# Patient Record
Sex: FEMALE | Race: WHITE | Employment: UNEMPLOYED | URBAN - METROPOLITAN AREA
[De-identification: names, ages, dates, MRNs, and addresses within clinical notes are randomized per-mention and may not be internally consistent; named-entity substitution may affect disease eponyms.]

---

## 2017-04-06 ENCOUNTER — HOSPITAL ENCOUNTER (OUTPATIENT)
Dept: GENERAL RADIOLOGY | Age: 57
Discharge: HOME | End: 2017-04-06
Attending: INTERNAL MEDICINE
Payer: MEDICAID

## 2017-04-06 ENCOUNTER — OFFICE VISIT (OUTPATIENT)
Dept: INTERNAL MEDICINE CLINIC | Facility: CLINIC | Age: 57
End: 2017-04-06

## 2017-04-06 VITALS
WEIGHT: 135 LBS | HEIGHT: 62 IN | HEART RATE: 74 BPM | OXYGEN SATURATION: 96 % | DIASTOLIC BLOOD PRESSURE: 112 MMHG | SYSTOLIC BLOOD PRESSURE: 150 MMHG | RESPIRATION RATE: 18 BRPM | TEMPERATURE: 98 F | BODY MASS INDEX: 24.84 KG/M2

## 2017-04-06 DIAGNOSIS — M54.12 CHRONIC CERVICAL RADICULOPATHY: ICD-10-CM

## 2017-04-06 DIAGNOSIS — M54.41 CHRONIC BILATERAL LOW BACK PAIN WITH RIGHT-SIDED SCIATICA: Primary | ICD-10-CM

## 2017-04-06 DIAGNOSIS — G89.29 CHRONIC BILATERAL LOW BACK PAIN WITH RIGHT-SIDED SCIATICA: ICD-10-CM

## 2017-04-06 DIAGNOSIS — G89.29 CHRONIC BILATERAL LOW BACK PAIN WITH RIGHT-SIDED SCIATICA: Primary | ICD-10-CM

## 2017-04-06 DIAGNOSIS — M54.41 CHRONIC BILATERAL LOW BACK PAIN WITH RIGHT-SIDED SCIATICA: ICD-10-CM

## 2017-04-06 DIAGNOSIS — I10 ESSENTIAL HYPERTENSION: ICD-10-CM

## 2017-04-06 DIAGNOSIS — Z12.31 ENCOUNTER FOR SCREENING MAMMOGRAM FOR BREAST CANCER: ICD-10-CM

## 2017-04-06 DIAGNOSIS — G93.0 CYST OF BRAIN: ICD-10-CM

## 2017-04-06 DIAGNOSIS — Z00.00 PREVENTATIVE HEALTH CARE: ICD-10-CM

## 2017-04-06 PROCEDURE — 99204 OFFICE O/P NEW MOD 45 MIN: CPT | Performed by: INTERNAL MEDICINE

## 2017-04-06 PROCEDURE — 72110 X-RAY EXAM L-2 SPINE 4/>VWS: CPT

## 2017-04-06 PROCEDURE — 72050 X-RAY EXAM NECK SPINE 4/5VWS: CPT

## 2017-04-06 RX ORDER — HYDROCHLOROTHIAZIDE 12.5 MG/1
12.5 CAPSULE, GELATIN COATED ORAL DAILY
COMMUNITY
End: 2017-11-01

## 2017-04-06 RX ORDER — AMLODIPINE BESYLATE 10 MG/1
10 TABLET ORAL DAILY
Qty: 30 TABLET | Refills: 3 | Status: SHIPPED | OUTPATIENT
Start: 2017-04-06 | End: 2017-11-01

## 2017-04-06 RX ORDER — LISINOPRIL 40 MG/1
40 TABLET ORAL DAILY
Qty: 30 TABLET | Refills: 3 | Status: SHIPPED | OUTPATIENT
Start: 2017-04-06 | End: 2017-11-01

## 2017-04-06 RX ORDER — AMLODIPINE BESYLATE 2.5 MG/1
2.5 TABLET ORAL DAILY
COMMUNITY
End: 2017-04-06

## 2017-04-06 RX ORDER — PROPRANOLOL HYDROCHLORIDE 10 MG/1
10 TABLET ORAL 3 TIMES DAILY
COMMUNITY
End: 2017-04-06 | Stop reason: ALTCHOICE

## 2017-04-06 RX ORDER — BISOPROLOL FUMARATE 5 MG/1
5 TABLET ORAL DAILY
COMMUNITY
End: 2017-04-06

## 2017-04-06 RX ORDER — BISOPROLOL FUMARATE 5 MG/1
5 TABLET ORAL DAILY
Qty: 30 TABLET | Refills: 3 | Status: SHIPPED | OUTPATIENT
Start: 2017-04-06 | End: 2018-01-15

## 2017-04-06 RX ORDER — LISINOPRIL 40 MG/1
40 TABLET ORAL DAILY
COMMUNITY
End: 2017-04-06

## 2017-04-06 NOTE — PROGRESS NOTES
Solomon Estevez is a 55year old female.      HPI:   Patient presents with:  Low Back Pain: +5years, laying down severe pain, feels pressure from wait down  Other: right side laquita pain, right hip pain radiates to right leg, +6 months   Patient is a new carola lisinopril 40 MG Oral Tab, Take 1 tablet (40 mg total) by mouth daily. , Disp: 30 tablet, Rfl: 3  •  Bisoprolol Fumarate 5 MG Oral Tab, Take 1 tablet (5 mg total) by mouth daily. , Disp: 30 tablet, Rfl: 3  •  AmLODIPine Besylate 10 MG Oral Tab, Take 1 tablet symptoms. 3.  Cyst of brain  Patient states she had CT scan done four years ago after experiencing a severe migraine, was told she had a benign cyst.  Records requested from [facility].  Also referred to Neurosurgery as above.       4

## 2017-04-06 NOTE — PATIENT INSTRUCTIONS
- We will check x-rays today  - Follow up with Neurosurgery for your back/hip pain  - Get blood work done when fasting (at least 8 hours)  - Schedule mammogram when you have time  - Schedule appointment with Ob-Gyn for pap smear.     Follow up in 3-4 weeks

## 2017-04-27 ENCOUNTER — LAB ENCOUNTER (OUTPATIENT)
Dept: LAB | Age: 57
End: 2017-04-27
Attending: INTERNAL MEDICINE
Payer: MEDICAID

## 2017-04-27 ENCOUNTER — HOSPITAL ENCOUNTER (OUTPATIENT)
Dept: MAMMOGRAPHY | Age: 57
Discharge: HOME | End: 2017-04-27
Attending: INTERNAL MEDICINE
Payer: MEDICAID

## 2017-04-27 DIAGNOSIS — Z00.00 PREVENTATIVE HEALTH CARE: ICD-10-CM

## 2017-04-27 DIAGNOSIS — Z12.31 ENCOUNTER FOR SCREENING MAMMOGRAM FOR BREAST CANCER: ICD-10-CM

## 2017-04-27 PROCEDURE — 80061 LIPID PANEL: CPT

## 2017-04-27 PROCEDURE — 84443 ASSAY THYROID STIM HORMONE: CPT

## 2017-04-27 PROCEDURE — 80048 BASIC METABOLIC PNL TOTAL CA: CPT

## 2017-04-27 PROCEDURE — 36415 COLL VENOUS BLD VENIPUNCTURE: CPT

## 2017-04-27 PROCEDURE — 83036 HEMOGLOBIN GLYCOSYLATED A1C: CPT

## 2017-04-27 PROCEDURE — 77067 SCR MAMMO BI INCL CAD: CPT

## 2017-04-27 PROCEDURE — 85025 COMPLETE CBC W/AUTO DIFF WBC: CPT

## 2017-04-28 PROBLEM — E78.1 HYPERTRIGLYCERIDEMIA: Status: ACTIVE | Noted: 2017-04-28

## 2017-04-28 PROBLEM — R73.03 PREDIABETES: Status: ACTIVE | Noted: 2017-04-28

## 2017-04-29 ENCOUNTER — HOSPITAL ENCOUNTER (OUTPATIENT)
Age: 57
Discharge: HOME | End: 2017-04-29
Payer: MEDICAID

## 2017-04-29 VITALS
RESPIRATION RATE: 20 BRPM | TEMPERATURE: 98 F | OXYGEN SATURATION: 97 % | HEART RATE: 92 BPM | WEIGHT: 134 LBS | SYSTOLIC BLOOD PRESSURE: 181 MMHG | DIASTOLIC BLOOD PRESSURE: 85 MMHG | BODY MASS INDEX: 25 KG/M2

## 2017-04-29 DIAGNOSIS — H10.33 ACUTE CONJUNCTIVITIS OF BOTH EYES, UNSPECIFIED ACUTE CONJUNCTIVITIS TYPE: Primary | ICD-10-CM

## 2017-04-29 PROCEDURE — 99203 OFFICE O/P NEW LOW 30 MIN: CPT

## 2017-04-29 PROCEDURE — 99213 OFFICE O/P EST LOW 20 MIN: CPT

## 2017-04-29 RX ORDER — OFLOXACIN 3 MG/ML
1 SOLUTION/ DROPS OPHTHALMIC EVERY 4 HOURS
Qty: 1 BOTTLE | Refills: 0 | Status: SHIPPED | OUTPATIENT
Start: 2017-04-29 | End: 2017-05-06

## 2017-04-29 NOTE — ED INITIAL ASSESSMENT (HPI)
Pt started with pain and redness to eyes yesterday. (started with right eye, now in both eyes).   Vision problems in both eyes

## 2017-04-29 NOTE — ED PROVIDER NOTES
Patient Seen in: THE [facility] In [place] [facility]    History   Patient presents with:   Eye Visual Problem (opthalmic)    Stated Complaint: RED EYE IRRITATION    HPI    52-year-old female presents to the immediate care for evaluation of bilateral eye Device 04/29/17 1637 None (Room air)       Current:/85 mmHg  Pulse 92  Temp(Src) 98.2 °F (36.8 °C) (Temporal)  Resp 20  Wt 60.782 kg  SpO2 97%    Right Eye Chart Acuity: 20/20 (-1 letter'), Uncorrected    Left Eye Chart Acuity: 20/25, Uncorrected

## 2017-08-10 ENCOUNTER — OCC HEALTH (OUTPATIENT)
Dept: OCCUPATIONAL MEDICINE | Age: 57
End: 2017-08-10
Attending: PHYSICIAN ASSISTANT

## 2017-10-28 ENCOUNTER — HOSPITAL ENCOUNTER (INPATIENT)
Facility: HOSPITAL | Age: 57
LOS: 2 days | Discharge: HOME | DRG: 392 | End: 2017-10-30
Attending: EMERGENCY MEDICINE | Admitting: HOSPITALIST
Payer: MEDICAID

## 2017-10-28 ENCOUNTER — APPOINTMENT (OUTPATIENT)
Dept: CT IMAGING | Facility: HOSPITAL | Age: 57
DRG: 392 | End: 2017-10-28
Attending: EMERGENCY MEDICINE
Payer: MEDICAID

## 2017-10-28 DIAGNOSIS — K57.92 ACUTE DIVERTICULITIS: Primary | ICD-10-CM

## 2017-10-28 PROBLEM — R73.9 HYPERGLYCEMIA: Status: ACTIVE | Noted: 2017-10-28

## 2017-10-28 PROBLEM — D72.829 LEUKOCYTOSIS: Status: ACTIVE | Noted: 2017-10-28

## 2017-10-28 PROCEDURE — 74176 CT ABD & PELVIS W/O CONTRAST: CPT | Performed by: EMERGENCY MEDICINE

## 2017-10-28 PROCEDURE — 99223 1ST HOSP IP/OBS HIGH 75: CPT | Performed by: HOSPITALIST

## 2017-10-28 RX ORDER — AMLODIPINE BESYLATE 5 MG/1
10 TABLET ORAL DAILY
Status: DISCONTINUED | OUTPATIENT
Start: 2017-10-28 | End: 2017-10-30

## 2017-10-28 RX ORDER — LEVOFLOXACIN 5 MG/ML
750 INJECTION, SOLUTION INTRAVENOUS EVERY 24 HOURS
Status: DISCONTINUED | OUTPATIENT
Start: 2017-10-29 | End: 2017-10-30

## 2017-10-28 RX ORDER — SODIUM PHOSPHATE, DIBASIC AND SODIUM PHOSPHATE, MONOBASIC 7; 19 G/133ML; G/133ML
1 ENEMA RECTAL ONCE AS NEEDED
Status: DISCONTINUED | OUTPATIENT
Start: 2017-10-28 | End: 2017-10-30

## 2017-10-28 RX ORDER — NICOTINE 21 MG/24HR
1 PATCH, TRANSDERMAL 24 HOURS TRANSDERMAL ONCE
Status: COMPLETED | OUTPATIENT
Start: 2017-10-28 | End: 2017-10-29

## 2017-10-28 RX ORDER — METOPROLOL TARTRATE 50 MG/1
50 TABLET, FILM COATED ORAL
Status: DISCONTINUED | OUTPATIENT
Start: 2017-10-28 | End: 2017-10-30

## 2017-10-28 RX ORDER — METRONIDAZOLE 500 MG/100ML
500 INJECTION, SOLUTION INTRAVENOUS EVERY 8 HOURS
Status: DISCONTINUED | OUTPATIENT
Start: 2017-10-29 | End: 2017-10-30

## 2017-10-28 RX ORDER — LISINOPRIL 40 MG/1
40 TABLET ORAL DAILY
Status: DISCONTINUED | OUTPATIENT
Start: 2017-10-28 | End: 2017-10-30

## 2017-10-28 RX ORDER — SODIUM CHLORIDE 9 MG/ML
INJECTION, SOLUTION INTRAVENOUS CONTINUOUS
Status: CANCELLED | OUTPATIENT
Start: 2017-10-28 | End: 2017-10-28

## 2017-10-28 RX ORDER — HYDROMORPHONE HYDROCHLORIDE 1 MG/ML
0.2 INJECTION, SOLUTION INTRAMUSCULAR; INTRAVENOUS; SUBCUTANEOUS EVERY 2 HOUR PRN
Status: DISCONTINUED | OUTPATIENT
Start: 2017-10-28 | End: 2017-10-30

## 2017-10-28 RX ORDER — HYDROMORPHONE HYDROCHLORIDE 1 MG/ML
1 INJECTION, SOLUTION INTRAMUSCULAR; INTRAVENOUS; SUBCUTANEOUS ONCE
Status: COMPLETED | OUTPATIENT
Start: 2017-10-28 | End: 2017-10-28

## 2017-10-28 RX ORDER — ONDANSETRON 2 MG/ML
4 INJECTION INTRAMUSCULAR; INTRAVENOUS EVERY 4 HOURS PRN
Status: CANCELLED | OUTPATIENT
Start: 2017-10-28

## 2017-10-28 RX ORDER — HYDROMORPHONE HYDROCHLORIDE 1 MG/ML
0.8 INJECTION, SOLUTION INTRAMUSCULAR; INTRAVENOUS; SUBCUTANEOUS EVERY 2 HOUR PRN
Status: DISCONTINUED | OUTPATIENT
Start: 2017-10-28 | End: 2017-10-30

## 2017-10-28 RX ORDER — LEVOFLOXACIN 5 MG/ML
750 INJECTION, SOLUTION INTRAVENOUS ONCE
Status: DISCONTINUED | OUTPATIENT
Start: 2017-10-28 | End: 2017-10-28

## 2017-10-28 RX ORDER — METRONIDAZOLE 500 MG/100ML
500 INJECTION, SOLUTION INTRAVENOUS ONCE
Status: COMPLETED | OUTPATIENT
Start: 2017-10-28 | End: 2017-10-28

## 2017-10-28 RX ORDER — BISACODYL 10 MG
10 SUPPOSITORY, RECTAL RECTAL
Status: DISCONTINUED | OUTPATIENT
Start: 2017-10-28 | End: 2017-10-30

## 2017-10-28 RX ORDER — ONDANSETRON 2 MG/ML
4 INJECTION INTRAMUSCULAR; INTRAVENOUS EVERY 6 HOURS PRN
Status: DISCONTINUED | OUTPATIENT
Start: 2017-10-28 | End: 2017-10-30

## 2017-10-28 RX ORDER — POLYETHYLENE GLYCOL 3350 17 G/17G
17 POWDER, FOR SOLUTION ORAL DAILY PRN
Status: DISCONTINUED | OUTPATIENT
Start: 2017-10-28 | End: 2017-10-30

## 2017-10-28 RX ORDER — DEXTROSE AND SODIUM CHLORIDE 5; .9 G/100ML; G/100ML
INJECTION, SOLUTION INTRAVENOUS CONTINUOUS
Status: DISCONTINUED | OUTPATIENT
Start: 2017-10-28 | End: 2017-10-30

## 2017-10-28 RX ORDER — HYDROMORPHONE HYDROCHLORIDE 1 MG/ML
0.5 INJECTION, SOLUTION INTRAMUSCULAR; INTRAVENOUS; SUBCUTANEOUS EVERY 30 MIN PRN
Status: CANCELLED | OUTPATIENT
Start: 2017-10-28 | End: 2017-10-28

## 2017-10-28 RX ORDER — ACETAMINOPHEN 325 MG/1
650 TABLET ORAL EVERY 6 HOURS PRN
Status: DISCONTINUED | OUTPATIENT
Start: 2017-10-28 | End: 2017-10-30

## 2017-10-28 RX ORDER — ONDANSETRON 2 MG/ML
4 INJECTION INTRAMUSCULAR; INTRAVENOUS ONCE
Status: COMPLETED | OUTPATIENT
Start: 2017-10-28 | End: 2017-10-28

## 2017-10-28 RX ORDER — DOCUSATE SODIUM 100 MG/1
100 CAPSULE, LIQUID FILLED ORAL 2 TIMES DAILY
Status: DISCONTINUED | OUTPATIENT
Start: 2017-10-28 | End: 2017-10-30

## 2017-10-28 RX ORDER — HYDROMORPHONE HYDROCHLORIDE 1 MG/ML
0.4 INJECTION, SOLUTION INTRAMUSCULAR; INTRAVENOUS; SUBCUTANEOUS EVERY 2 HOUR PRN
Status: DISCONTINUED | OUTPATIENT
Start: 2017-10-28 | End: 2017-10-30

## 2017-10-28 NOTE — ED PROVIDER NOTES
Patient Seen in: [facility] Emergency Department    History   Patient presents with:  Abdomen/Flank Pain (GI/)    Stated Complaint: abd pain    HPI    60-year-old female complaining of abdominal pain.   This patient states started about 2 days ago Physical Exam    Patient's alert orient ×3 no acute distress HEENT exam within normal limits neck is no lymphadenopathy JVD lungs are clear cardiovascular exam shows regular rate and rhythm without murmurs abdomen is soft moderate tenderness somewh admitted. Disposition and Plan     Clinical Impression:  Acute diverticulitis  (primary encounter diagnosis)    Disposition:  Admit    Follow-up:  No follow-up provider specified.     Medications Prescribed:  Current Discharge Medication List        Pr

## 2017-10-29 PROCEDURE — 99232 SBSQ HOSP IP/OBS MODERATE 35: CPT | Performed by: HOSPITALIST

## 2017-10-29 RX ORDER — NICOTINE 21 MG/24HR
1 PATCH, TRANSDERMAL 24 HOURS TRANSDERMAL DAILY
Status: DISCONTINUED | OUTPATIENT
Start: 2017-10-29 | End: 2017-10-30

## 2017-10-29 RX ORDER — ENOXAPARIN SODIUM 100 MG/ML
40 INJECTION SUBCUTANEOUS DAILY
Status: DISCONTINUED | OUTPATIENT
Start: 2017-10-29 | End: 2017-10-30

## 2017-10-29 NOTE — ED NOTES
Report given to floor RN Maddi, patient and  updated on room assignment, room ready per floor RN, patient awaiting transportation.

## 2017-10-29 NOTE — PROGRESS NOTES
[facility] Note:  Dose Adjustment for Levaquin (levofloxacin)    Anitra Bosworth is a 55year old female who has been prescribed Levaquin (levofloxacin) 500 mg every one time.   Last known weight was 60.8kg from 4/29/17 so the dose has been adjusted to [facility]

## 2017-10-29 NOTE — PROGRESS NOTES
[facility]  Progress Note     Jes Plascencia Patient Status:  Inpatient    1960 MRN [id number]   [facility] 3NW-A Attending Melissa Colbert, [address] [facility] Day # 1 PCP Rafaela Beckham MD     Chief Complaint: Abdominal pain    S: Kandis Daily   • docusate sodium  100 mg Oral BID   • levofloxacin  750 mg Intravenous Q24H   • metRONIDAZOLE  500 mg Intravenous Q8H       ASSESSMENT / PLAN:     1. Acute diverticulitis  1. Continue NPO, IVF, anti-emetics, and pain control  2.  Continue empiric a

## 2017-10-29 NOTE — H&P
[facility]  History and Physical     Rasta Tamara Patient Status:  Emergency    1960 MRN [id number]   Location [address] [facility] Attending Raymundo Dobson MD   Hosp Day # 0 PCP Bibiana Angulo MD     Chief Complaint: positives and negatives noted in the HPI. Physical Exam:    /78   Pulse 87   Temp 98.3 °F (36.8 °C)   Resp (!) 2   SpO2 93%   General: No acute distress. Alert and oriented x 3. HEENT: Normocephalic atraumatic. Moist mucous membranes. EOM-I.  PERR Lovenox  · CODE status: Full Code  · Lombardo: None    Plan of care discussed with Patient.     Leatha Reese MD  10/28/2017

## 2017-10-30 VITALS
WEIGHT: 135 LBS | RESPIRATION RATE: 16 BRPM | SYSTOLIC BLOOD PRESSURE: 132 MMHG | TEMPERATURE: 98 F | BODY MASS INDEX: 23.92 KG/M2 | OXYGEN SATURATION: 96 % | HEART RATE: 74 BPM | HEIGHT: 63 IN | DIASTOLIC BLOOD PRESSURE: 66 MMHG

## 2017-10-30 PROCEDURE — 99239 HOSP IP/OBS DSCHRG MGMT >30: CPT | Performed by: HOSPITALIST

## 2017-10-30 RX ORDER — HYDROCODONE BITARTRATE AND ACETAMINOPHEN 5; 325 MG/1; MG/1
1 TABLET ORAL EVERY 4 HOURS PRN
Qty: 15 TABLET | Refills: 0 | Status: SHIPPED | OUTPATIENT
Start: 2017-10-30 | End: 2018-01-15

## 2017-10-30 RX ORDER — METRONIDAZOLE 500 MG/1
500 TABLET ORAL 3 TIMES DAILY
Qty: 24 TABLET | Refills: 0 | Status: SHIPPED | OUTPATIENT
Start: 2017-10-30 | End: 2017-11-07

## 2017-10-30 RX ORDER — LEVOFLOXACIN 500 MG/1
500 TABLET, FILM COATED ORAL DAILY
Qty: 8 TABLET | Refills: 0 | Status: SHIPPED | OUTPATIENT
Start: 2017-10-30 | End: 2017-11-07

## 2017-10-30 NOTE — CM/SW NOTE
10/30/17 1100   CM/SW Screening   Referral Source Social Work (self-referral)   Information Source [facility] staff;Nursing rounds; Chart review   Patient's Mental Status Alert;Oriented       Patient's post d/c needs discussed in care round with RN, Charge Rn,

## 2017-10-30 NOTE — PLAN OF CARE
Patient tolerating soft diet,  brought patient lunch. Denies any nausea, abdominal pain. Will discharge as ordered.

## 2017-10-30 NOTE — PLAN OF CARE
Patient sitting in chair. VSS. Had formed BM this am. Pain mild, states feels better. Passing flatus. Would like to have diet advanced. Denies chest/calf pain. POC discussed, all questions and concerns addressed. Will continue to monitor.

## 2017-10-30 NOTE — PROGRESS NOTES
[facility]  Progress Note     Pervis Jamaal Patient Status:  Inpatient    1960 MRN [id number]   [facility] 3NW-A Attending Chely Willams, [address] [facility] Day # 2 PCP Elder MD Dayana     Chief Complaint: Abdominal pain    S: Kandis docusate sodium  100 mg Oral BID   • levofloxacin  750 mg Intravenous Q24H   • metRONIDAZOLE  500 mg Intravenous Q8H       ASSESSMENT / PLAN:     1. Acute diverticulitis  1. Clinically improving   2. Continue empiric antibiotics  3.  Advance to [facility] and then

## 2017-10-30 NOTE — PLAN OF CARE
Patients 2 IV's d/c'd, catheters intact. All discharge instructions explained, all questions answered. Patient will be discharged via wheelchair with support staff.

## 2017-10-30 NOTE — PROGRESS NOTES
[facility] [address]  [place], [address]  ?  10/30/17  ? Re: Jes Plascencia  ? To Whom It May Concern:    Jes Plascencia was admitted to [facility] from 10/28/2017 to 10/30/17.     Please excuse Jes Plascencia from attending

## 2017-10-30 NOTE — DISCHARGE SUMMARY
[facility]  DISCHARGE SUMMARY     Elizabeth Crow Patient Status:  Inpatient    1960 MRN [id number]   [facility] 3NW-A Attending No att. providers found   [facility] Day # 2 PCP Rosalie Mccord MD     Date of Admission: 10/28/2017  D START taking these medications      Instructions Prescription details   HYDROcodone-acetaminophen 5-325 MG Tabs  Commonly known as:  Francia Kumari  Notes to patient:  Do not take other products that contain tylenol. Do not exceed dosage amount.        Take 1 t spent:  > 30 minutes

## 2017-10-30 NOTE — PAYOR COMM NOTE
--------------  ADMISSION REVIEW     Payor: Elvin Rivera #:  [id number]  Authorization Number: N/A    Admit date: 10/28/17  Admit time: 2111       Admitting Physician: Lindsey Lu MD  Attending Physician:  Rocio Tripathi in HPI.     Physical Exam[RH.1]   ED Triage Vitals  BP: (!) 174/100 [10/28/17 1836]  Pulse: 113 [10/28/17 1836]  Resp: 18 [10/28/17 1836]  Temp: 98.3 °F (36.8 °C) [10/28/17 1838]  Temp src: n/a  SpO2: 93 % [10/28/17 1836]  O2 Device: None (Room air) [10/28/ K57.92 10/28/2017 Unknown    Hyperglycemia R73.9 10/28/2017 Yes    Leukocytosis D72.829 10/28/2017 Yes[RH.2]        Bryce Medina MD on 10/28/2017  9:23 PM    H&P signed by Clif Mcmahon MD at 10/28/2017  8:04 PM     Author:  Clif Mcmahon MD Service: distress. Alert and oriented x 3. HEENT: Normocephalic atraumatic. Moist mucous membranes. EOM-I. PERRLA. Anicteric. Neck: No lymphadenopathy. No JVD. No carotid bruits. Respiratory: Clear to auscultation bilaterally. No wheezes. No rhonchi.   Cardiovasc mg     Date Action Dose Route User    10/30/2017 0815 Given 10 mg Oral Degroote, Carmela Wilson RN      dextrose 5 % and 0.9 % NaCl infusion    100cc/h    Date Action Dose Route User    10/29/2017 2110 New Bag (none) Intravenous Ramonita West, RN      docus

## 2017-10-31 ENCOUNTER — PATIENT OUTREACH (OUTPATIENT)
Dept: CASE MANAGEMENT | Age: 57
End: 2017-10-31

## 2017-10-31 ENCOUNTER — PATIENT MESSAGE (OUTPATIENT)
Dept: CASE MANAGEMENT | Age: 57
End: 2017-10-31

## 2017-11-01 ENCOUNTER — OFFICE VISIT (OUTPATIENT)
Dept: INTERNAL MEDICINE CLINIC | Facility: CLINIC | Age: 57
End: 2017-11-01

## 2017-11-01 VITALS
HEART RATE: 78 BPM | HEIGHT: 62 IN | DIASTOLIC BLOOD PRESSURE: 72 MMHG | TEMPERATURE: 98 F | BODY MASS INDEX: 25.58 KG/M2 | RESPIRATION RATE: 18 BRPM | WEIGHT: 139 LBS | SYSTOLIC BLOOD PRESSURE: 109 MMHG

## 2017-11-01 DIAGNOSIS — K57.32 DIVERTICULITIS OF LARGE INTESTINE WITHOUT PERFORATION OR ABSCESS WITHOUT BLEEDING: Primary | ICD-10-CM

## 2017-11-01 DIAGNOSIS — E78.1 HYPERTRIGLYCERIDEMIA: ICD-10-CM

## 2017-11-01 DIAGNOSIS — I10 ESSENTIAL HYPERTENSION: ICD-10-CM

## 2017-11-01 DIAGNOSIS — R73.03 PREDIABETES: ICD-10-CM

## 2017-11-01 PROBLEM — D72.829 LEUKOCYTOSIS: Status: RESOLVED | Noted: 2017-10-28 | Resolved: 2017-11-01

## 2017-11-01 PROCEDURE — 99214 OFFICE O/P EST MOD 30 MIN: CPT | Performed by: INTERNAL MEDICINE

## 2017-11-01 RX ORDER — LISINOPRIL 40 MG/1
40 TABLET ORAL DAILY
Qty: 90 TABLET | Refills: 1 | Status: SHIPPED | OUTPATIENT
Start: 2017-11-01 | End: 2018-03-02

## 2017-11-01 RX ORDER — AMLODIPINE BESYLATE 10 MG/1
10 TABLET ORAL DAILY
Qty: 90 TABLET | Refills: 1 | Status: SHIPPED | OUTPATIENT
Start: 2017-11-01 | End: 2018-01-15

## 2017-11-01 RX ORDER — HYDROCHLOROTHIAZIDE 12.5 MG/1
12.5 CAPSULE, GELATIN COATED ORAL DAILY
Qty: 90 CAPSULE | Refills: 1 | Status: SHIPPED | OUTPATIENT
Start: 2017-11-01 | End: 2018-08-23

## 2017-11-01 NOTE — PROGRESS NOTES
Multiple attempts to reach pt and messages left with no return call. Pt went in for HFU appt with PCP today. Encounter closing.

## 2017-11-01 NOTE — PATIENT INSTRUCTIONS
- Follow up with General Surgery to set up colonoscopy  - [id number] Caitlin Mckeon to return to work next week. Discharge Instructions for Diverticulitis  You have been diagnosed with diverticulitis.  This is a condition in which small pouches form in your colon (large intestin Call your healthcare provider immediately if you have any of the following:  · Fever of 100.4°F (38.0°C) or higher, or as directed by your healthcare provider  · Chills  · Severe cramps in the belly, most commonly the lower left side  · Tenderness in the b

## 2017-11-01 NOTE — PROGRESS NOTES
HPI:    Micaela Saxena is a 55year old female here today for hospital follow up.    She was discharged from Inpatient hospital, [facility] to Home   Admit Date: 10/28/17  Discharge Date: 10/30/17  Hospital Discharge Diagnosis: Acute diverticulitis  I mouth daily. metRONIDAZOLE 500 MG Oral Tab Take 1 tablet (500 mg total) by mouth 3 (three) times daily. HYDROcodone-acetaminophen 5-325 MG Oral Tab Take 1 tablet by mouth every 4 (four) hours as needed for Pain.    Bisoprolol Fumarate 5 MG Oral Tab Take clear  NECK: supple, no adenopathy, no bruits  LUNGS: clear to auscultation  CARDIO: RRR without murmur  GI: good BS's, no masses, HSM.   Tender to palpation LUQ  MUSCULOSKELETAL: back is not tender, FROM of the extremities  NEURO: Oriented times three, cra moderate    Overall Risk:   moderate    Patient seen within 14 days from date of discharge.      Jonathan Diane MD, 11/1/2017

## 2017-11-14 ENCOUNTER — OFFICE VISIT (OUTPATIENT)
Dept: SURGERY | Facility: CLINIC | Age: 57
End: 2017-11-14

## 2017-11-14 VITALS
RESPIRATION RATE: 16 BRPM | DIASTOLIC BLOOD PRESSURE: 83 MMHG | HEART RATE: 74 BPM | BODY MASS INDEX: 24.84 KG/M2 | SYSTOLIC BLOOD PRESSURE: 160 MMHG | HEIGHT: 62 IN | WEIGHT: 135 LBS

## 2017-11-14 DIAGNOSIS — K57.32 DIVERTICULITIS LARGE INTESTINE W/O PERFORATION OR ABSCESS W/O BLEEDING: Primary | ICD-10-CM

## 2017-11-14 PROCEDURE — 99243 OFF/OP CNSLTJ NEW/EST LOW 30: CPT | Performed by: SURGERY

## 2017-11-14 NOTE — H&P
New Patient Visit Note       Active Problems      1. Diverticulitis large intestine w/o perforation or abscess w/o bleeding        Chief Complaint   Patient presents with:  Colonoscopy: [facility] PT ref by Dr Edie Bruno for cscope consult- has diverticulitis.  PT has Problem Relation Age of Onset   • Heart Disease Father    • Stroke Father    • Heart Disease Mother    • Heart Disease Sister    • Heart Disease Brother      Social History    Marital status:              Spouse name:                       Years o and vomiting. Genitourinary: Negative for difficulty urinating, dysuria, frequency and urgency. Musculoskeletal: Negative for arthralgias and myalgias. Skin: Negative for color change and rash.    Neurological: Negative for tremors, syncope and weakne occipital adenopathy present. Right cervical: No superficial cervical, no deep cervical and no posterior cervical adenopathy present. Left cervical: No superficial cervical, no deep cervical and no posterior cervical adenopathy present. this encounter. Imaging & Referrals   None    Follow Up  Return in about 4 weeks (around 12/12/2017).     Jory Galloway MD

## 2017-12-15 RX ORDER — POLYETHYLENE GLYCOL 3350, SODIUM CHLORIDE, SODIUM BICARBONATE, POTASSIUM CHLORIDE 420; 11.2; 5.72; 1.48 G/4L; G/4L; G/4L; G/4L
POWDER, FOR SOLUTION ORAL
Qty: 1 BOTTLE | Refills: 0 | Status: SHIPPED | OUTPATIENT
Start: 2017-12-15 | End: 2017-12-21 | Stop reason: ALTCHOICE

## 2017-12-20 ENCOUNTER — LABORATORY ENCOUNTER (OUTPATIENT)
Dept: LAB | Age: 57
End: 2017-12-20
Attending: SURGERY
Payer: MEDICAID

## 2017-12-20 DIAGNOSIS — K57.92 DIVERTICULITIS: Primary | ICD-10-CM

## 2017-12-20 PROCEDURE — 88305 TISSUE EXAM BY PATHOLOGIST: CPT

## 2017-12-21 ENCOUNTER — OFFICE VISIT (OUTPATIENT)
Dept: INTERNAL MEDICINE CLINIC | Facility: CLINIC | Age: 57
End: 2017-12-21

## 2017-12-21 VITALS
HEART RATE: 78 BPM | DIASTOLIC BLOOD PRESSURE: 88 MMHG | RESPIRATION RATE: 19 BRPM | TEMPERATURE: 98 F | WEIGHT: 137.5 LBS | HEIGHT: 62 IN | BODY MASS INDEX: 25.3 KG/M2 | SYSTOLIC BLOOD PRESSURE: 150 MMHG

## 2017-12-21 DIAGNOSIS — R59.0 CERVICAL LYMPHADENOPATHY: Primary | ICD-10-CM

## 2017-12-21 DIAGNOSIS — I10 ESSENTIAL HYPERTENSION: ICD-10-CM

## 2017-12-21 DIAGNOSIS — K63.5 POLYP OF COLON, UNSPECIFIED PART OF COLON, UNSPECIFIED TYPE: ICD-10-CM

## 2017-12-21 PROCEDURE — 99214 OFFICE O/P EST MOD 30 MIN: CPT | Performed by: INTERNAL MEDICINE

## 2017-12-21 RX ORDER — AZITHROMYCIN 250 MG/1
TABLET, FILM COATED ORAL
Qty: 6 TABLET | Refills: 0 | Status: SHIPPED | OUTPATIENT
Start: 2017-12-21 | End: 2018-01-15 | Stop reason: ALTCHOICE

## 2017-12-21 NOTE — PROGRESS NOTES
Ata Almeida is a 65year old female. HPI:   Patient presents with:  Swollen Glands  Test Results: had colonoscopy done yesterday found liv   Patient presents with complaint of cervical lymphadenopathy.    She has been dealing with URI symptoms fo includes Heart Disease in her brother, father, mother, and sister; Stroke in her father. Social:  reports that she has quit smoking. She has a 40.00 pack-year smoking history.  She has never used smokeless tobacco. She reports that she does not drink alcoh half of that time was spent coordinating and counseling on the aforementioned medical issues, primarily on discussion of possible colonoscopy results, blood pressure.     Patient Care Team:  Susanna Cole MD as PCP - General (Internal Medicine)  The carola

## 2017-12-21 NOTE — PATIENT INSTRUCTIONS
- Start azithromycin (Hailey Patton)  - Follow up with Dr. Giuseppe Hernandez as scheduled. - Follow up with me in 2-3 months. It was a pleasure seeing you in the clinic today.   Thank you for choosing the [facility] office for your healthcare needs - Subjective


Encounter Start Date: 12/18/17


Encounter Start Time: 08:25





Pt doing well, no new complaints, no new events.  no labs.





 at bedside, updated him to current plans, issues. 





no f/C, no N/V/D/C, no chocking, no cough, no sputum production.  feeling a 

little stronger every day





10 point ROs performed and neg for all systems except as per HPI





- Objective


MAR Reviewed: Yes


Vital Signs & Weight: 


 Vital Signs (12 hours)











  Temp Pulse Resp BP BP Pulse Ox


 


 12/18/17 11:15  98.3 F  79  18   127/71  91 L


 


 12/18/17 08:37   94   174/71 H  


 


 12/18/17 08:00  98.9 F  94  20    96


 


 12/18/17 07:50  98.9 F  94  20   174/71 H  90 L








 Weight











Admit Weight                   250 lb


 


Weight                         322 lb 15.635 oz














I&O: 


 











 12/17/17 12/18/17 12/19/17





 06:59 06:59 06:59


 


Intake Total 2815 1920 620


 


Output Total 2550 1500 1350


 


Balance 265 420 -730











Result Diagrams: 


 12/16/17 03:47





 12/16/17 03:47





Phys Exam





- Physical Examination


Constitutional: NAD


HEENT: PERRLA, moist MMs, sclera anicteric, oral pharynx no lesions


DFT in place


Neck: no nodes, no JVD, supple, full ROM


Respiratory: no wheezing, no rales, no rhonchi, clear to auscultation bilateral


Cardiovascular: RRR, no significant murmur, no rub


Gastrointestinal: soft, non-tender, no distention, positive bowel sounds


Musculoskeletal: no edema, pulses present


Neurological: non-focal, normal sensation, moves all 4 limbs


Lymphatic: no nodes


Psychiatric: normal affect, A&O x 3


Skin: no rash, normal turgor





Dx/Plan


(1) Physical deconditioning


Code(s): R53.81 - OTHER MALAISE   Status: Acute   Comment: to rehab soon, LTAC 

denied.  Will discuss with daughter need for SNF.  will begin process.  she is 

adamant about trying appelaing LTAC and then IPR first.  Referral made by [facility] 12/

15.  Pt ha sno acute care needs, i do not see any way of getting an LTAC 

approved   





(2) CKD (chronic kidney disease) stage 3, GFR 30-59 ml/min


Code(s): N18.3 - CHRONIC KIDNEY DISEASE, STAGE 3 (MODERATE)   Status: Chronic   

Comment: GFR up over 60 now   





(3) Oropharyngeal dysphagia


Code(s): R13.12 - DYSPHAGIA, OROPHARYNGEAL PHASE   Status: Acute   Comment: TF 

at goal.  Diet upgraded by ST.  Nutrition to come by todya and assess need for 

ongoing TFs and DFT.  Will likely be able ot stop TF and pull DFT   





(4) Acute kidney injury


Code(s): N17.9 - ACUTE KIDNEY FAILURE, UNSPECIFIED   Status: Resolved   Comment

: AM labs   





(5) Hyperkalemia


Code(s): E87.5 - HYPERKALEMIA   Status: Resolved   Comment: resolved..   





(6) Dyslipidemia


Code(s): E78.5 - HYPERLIPIDEMIA, UNSPECIFIED   Status: Chronic   





(7) HTN (hypertension)


Code(s): I10 - ESSENTIAL (PRIMARY) HYPERTENSION   Status: Chronic   


Qualifiers: 


   Hypertension type: essential hypertension   Qualified Code(s): I10 - 

Essential (primary) hypertension   


Comment: better controlled   





(8) Obesity


Code(s): E66.9 - OBESITY, UNSPECIFIED   Status: Chronic   


Qualifiers: 


   Obesity classification: adult class 3 (BMI >= 40)   Body mass index: BMI 40.0

-44.9 





(9) TIA (transient ischemic attack)


Status: Resolved   Comment: Likely hypertensive emergency causing the neuro 

symptoms, repeat MRI negative for stroke   





(10) Metabolic encephalopathy


Code(s): G93.41 - METABOLIC ENCEPHALOPATHY   Status: Resolved   Comment: Ox3 

earlier.  markedly improved.  speech normal but shaky   





- Plan





* .

## 2018-01-04 ENCOUNTER — OFFICE VISIT (OUTPATIENT)
Dept: SURGERY | Facility: CLINIC | Age: 58
End: 2018-01-04

## 2018-01-04 VITALS
BODY MASS INDEX: 25 KG/M2 | HEART RATE: 84 BPM | TEMPERATURE: 98 F | WEIGHT: 137 LBS | DIASTOLIC BLOOD PRESSURE: 87 MMHG | SYSTOLIC BLOOD PRESSURE: 157 MMHG

## 2018-01-04 DIAGNOSIS — K57.32 DIVERTICULITIS LARGE INTESTINE W/O PERFORATION OR ABSCESS W/O BLEEDING: Primary | ICD-10-CM

## 2018-01-04 PROCEDURE — 99212 OFFICE O/P EST SF 10 MIN: CPT | Performed by: SURGERY

## 2018-01-04 RX ORDER — POLYETHYLENE GLYCOL 3350, SODIUM CHLORIDE, SODIUM BICARBONATE, POTASSIUM CHLORIDE 420; 11.2; 5.72; 1.48 G/4L; G/4L; G/4L; G/4L
POWDER, FOR SOLUTION ORAL
Qty: 1 BOTTLE | Refills: 0 | Status: SHIPPED | OUTPATIENT
Start: 2018-01-04 | End: 2018-02-22 | Stop reason: ALTCHOICE

## 2018-01-04 RX ORDER — METRONIDAZOLE 500 MG/1
TABLET ORAL
Qty: 3 TABLET | Refills: 0 | Status: SHIPPED | OUTPATIENT
Start: 2018-01-04 | End: 2018-02-08 | Stop reason: ALTCHOICE

## 2018-01-04 RX ORDER — NEOMYCIN SULFATE 500 MG/1
TABLET ORAL
Qty: 6 TABLET | Refills: 0 | Status: SHIPPED | OUTPATIENT
Start: 2018-01-04 | End: 2018-03-01

## 2018-01-04 NOTE — PROGRESS NOTES
Follow Up Visit Note       Active Problems      1.  Diverticulitis large intestine w/o perforation or abscess w/o bleeding          Chief Complaint   Patient presents with:  Colonoscopy: Est Pt in for further care and treatment after colonoscopy done 12/20/ diverticulitis. Allergies  Tyson Dixon has No Known Allergies. Past Medical / Surgical / Social / Family History    The past medical and past surgical history have been reviewed by me today.     Past Medical History:   Diagnosis Date   • High blood pressure daily. Disp: 90 tablet Rfl: 1   hydrochlorothiazide 12.5 MG Oral Cap Take 1 capsule (12.5 mg total) by mouth daily. Disp: 90 capsule Rfl: 1   Bisoprolol Fumarate 5 MG Oral Tab Take 1 tablet (5 mg total) by mouth daily.  Disp: 30 tablet Rfl: 3   azithromycin tenderness. There is no rebound and no guarding. Neurological: She is alert and oriented to person, place, and time. She has normal reflexes. Skin: Skin is warm and dry.             Assessment   Diverticulitis large intestine w/o perforation or absc

## 2018-02-01 ENCOUNTER — OFFICE VISIT (OUTPATIENT)
Dept: INTERNAL MEDICINE CLINIC | Facility: CLINIC | Age: 58
End: 2018-02-01

## 2018-02-01 ENCOUNTER — LAB ENCOUNTER (OUTPATIENT)
Dept: LAB | Age: 58
End: 2018-02-01
Attending: INTERNAL MEDICINE
Payer: MEDICAID

## 2018-02-01 VITALS
RESPIRATION RATE: 16 BRPM | WEIGHT: 143 LBS | BODY MASS INDEX: 26.31 KG/M2 | SYSTOLIC BLOOD PRESSURE: 136 MMHG | HEART RATE: 76 BPM | HEIGHT: 62 IN | TEMPERATURE: 98 F | DIASTOLIC BLOOD PRESSURE: 78 MMHG

## 2018-02-01 DIAGNOSIS — Z01.818 PREOPERATIVE EXAMINATION: Primary | ICD-10-CM

## 2018-02-01 DIAGNOSIS — E78.1 HYPERTRIGLYCERIDEMIA: ICD-10-CM

## 2018-02-01 DIAGNOSIS — Z01.818 PREOPERATIVE EXAMINATION: ICD-10-CM

## 2018-02-01 DIAGNOSIS — Z72.0 TOBACCO ABUSE: ICD-10-CM

## 2018-02-01 DIAGNOSIS — R73.03 PREDIABETES: ICD-10-CM

## 2018-02-01 DIAGNOSIS — I10 ESSENTIAL HYPERTENSION: ICD-10-CM

## 2018-02-01 DIAGNOSIS — K57.32 DIVERTICULITIS LARGE INTESTINE W/O PERFORATION OR ABSCESS W/O BLEEDING: ICD-10-CM

## 2018-02-01 LAB
BASOPHILS # BLD AUTO: 0.07 X10(3) UL (ref 0–0.1)
BASOPHILS NFR BLD AUTO: 0.8 %
BUN BLD-MCNC: 10 MG/DL (ref 8–20)
CALCIUM BLD-MCNC: 9.1 MG/DL (ref 8.3–10.3)
CHLORIDE: 107 MMOL/L (ref 101–111)
CO2: 26 MMOL/L (ref 22–32)
CREAT BLD-MCNC: 0.7 MG/DL (ref 0.55–1.02)
EOSINOPHIL # BLD AUTO: 0.18 X10(3) UL (ref 0–0.3)
EOSINOPHIL NFR BLD AUTO: 2 %
ERYTHROCYTE [DISTWIDTH] IN BLOOD BY AUTOMATED COUNT: 12.3 % (ref 11.5–16)
GLUCOSE BLD-MCNC: 95 MG/DL (ref 70–99)
HCT VFR BLD AUTO: 46.6 % (ref 34–50)
HGB BLD-MCNC: 15.8 G/DL (ref 12–16)
IMMATURE GRANULOCYTE COUNT: 0.02 X10(3) UL (ref 0–1)
IMMATURE GRANULOCYTE RATIO %: 0.2 %
LYMPHOCYTES # BLD AUTO: 3.59 X10(3) UL (ref 0.9–4)
LYMPHOCYTES NFR BLD AUTO: 39.5 %
MCH RBC QN AUTO: 29.1 PG (ref 27–33.2)
MCHC RBC AUTO-ENTMCNC: 33.9 G/DL (ref 31–37)
MCV RBC AUTO: 85.8 FL (ref 81–100)
MONOCYTES # BLD AUTO: 0.76 X10(3) UL (ref 0.1–0.6)
MONOCYTES NFR BLD AUTO: 8.4 %
NEUTROPHIL ABS PRELIM: 4.46 X10 (3) UL (ref 1.3–6.7)
NEUTROPHILS # BLD AUTO: 4.46 X10(3) UL (ref 1.3–6.7)
NEUTROPHILS NFR BLD AUTO: 49.1 %
PLATELET # BLD AUTO: 243 10(3)UL (ref 150–450)
POTASSIUM SERPL-SCNC: 4.4 MMOL/L (ref 3.6–5.1)
RBC # BLD AUTO: 5.43 X10(6)UL (ref 3.8–5.1)
RED CELL DISTRIBUTION WIDTH-SD: 38.5 FL (ref 35.1–46.3)
SODIUM SERPL-SCNC: 139 MMOL/L (ref 136–144)
WBC # BLD AUTO: 9.1 X10(3) UL (ref 4–13)

## 2018-02-01 PROCEDURE — 80048 BASIC METABOLIC PNL TOTAL CA: CPT | Performed by: INTERNAL MEDICINE

## 2018-02-01 PROCEDURE — 99214 OFFICE O/P EST MOD 30 MIN: CPT | Performed by: INTERNAL MEDICINE

## 2018-02-01 PROCEDURE — 36415 COLL VENOUS BLD VENIPUNCTURE: CPT | Performed by: INTERNAL MEDICINE

## 2018-02-01 PROCEDURE — 93000 ELECTROCARDIOGRAM COMPLETE: CPT | Performed by: INTERNAL MEDICINE

## 2018-02-01 PROCEDURE — 85025 COMPLETE CBC W/AUTO DIFF WBC: CPT | Performed by: INTERNAL MEDICINE

## 2018-02-01 NOTE — PATIENT INSTRUCTIONS
- Get blood work done this morning and then you'll be done with pre-op testing  - You should be fine for surgery  - Starting 1 week before surgery, do not take aspirin, Advil, Ibuprofen, anything over the counter. Tylenol is ok.   - Follow up when you are

## 2018-02-01 NOTE — PROGRESS NOTES
Rasta Mcdonald is a 65year old female who presents for a pre-operative physical exam.   Rasta Mcdonald is scheduled for a robotic colectomy procedure at [facility] on 2/26/18 performed by Dr Howard Mcgrath, who has requested that I provide pre-oper • Heart Disease Brother        Social History  Social History   Marital status:   Spouse name: N/A    Years of education: N/A  Number of children: N/A     Occupational History  None on file     Social History Main Topics   Smoking status: Former S no joint pain  PSYCH: pleasant, appropriate mood and affect  LABORATORY DATA:   EKG: NSR  CBC/CMP pending  ASSESSMENT AND PLAN:   1. Pre-operative exam  Patient presents for pre-operative examination at the request of performing surgeon.   Patient has good

## 2018-02-02 NOTE — PROGRESS NOTES
Labs and EKG faxed with confirmation to [facility]. Pre op  Clearance faxed to [facility] with confirmation.  All corresponding paperwork placed in purple folder labeled \"Pre-op\"

## 2018-02-08 ENCOUNTER — OFFICE VISIT (OUTPATIENT)
Dept: INTERNAL MEDICINE CLINIC | Facility: CLINIC | Age: 58
End: 2018-02-08

## 2018-02-08 VITALS
SYSTOLIC BLOOD PRESSURE: 150 MMHG | TEMPERATURE: 98 F | HEIGHT: 62 IN | HEART RATE: 71 BPM | BODY MASS INDEX: 26.91 KG/M2 | RESPIRATION RATE: 14 BRPM | DIASTOLIC BLOOD PRESSURE: 88 MMHG | OXYGEN SATURATION: 94 % | WEIGHT: 146.25 LBS

## 2018-02-08 DIAGNOSIS — J06.9 ACUTE UPPER RESPIRATORY INFECTION: Primary | ICD-10-CM

## 2018-02-08 DIAGNOSIS — I10 ESSENTIAL HYPERTENSION: ICD-10-CM

## 2018-02-08 DIAGNOSIS — R59.0 CERVICAL LYMPHADENOPATHY: ICD-10-CM

## 2018-02-08 PROCEDURE — 99213 OFFICE O/P EST LOW 20 MIN: CPT | Performed by: INTERNAL MEDICINE

## 2018-02-08 RX ORDER — AZITHROMYCIN 250 MG/1
TABLET, FILM COATED ORAL
Qty: 6 TABLET | Refills: 0 | Status: SHIPPED | OUTPATIENT
Start: 2018-02-08 | End: 2018-02-22 | Stop reason: ALTCHOICE

## 2018-02-08 NOTE — PROGRESS NOTES
Sagar Hinton is a 65year old female. HPI:   Patient presents with:  Chest Congestion: Est Pt. c/c x 3 days fever, sre throat, headache, body aches, cough  Patient presents with several upper respiratory symptoms for the past 3 days.     Sore throat, has a 40.00 pack-year smoking history. She has never used smokeless tobacco. She reports that she does not drink alcohol or use drugs.   Wt Readings from Last 6 Encounters:  02/08/18 : 146 lb 4 oz  02/01/18 : 143 lb  01/04/18 : 137 lb  12/21/17 : 137 lb 8 o

## 2018-02-21 ENCOUNTER — TELEPHONE (OUTPATIENT)
Dept: SURGERY | Facility: CLINIC | Age: 58
End: 2018-02-21

## 2018-02-21 ENCOUNTER — OFFICE VISIT (OUTPATIENT)
Dept: SURGERY | Facility: CLINIC | Age: 58
End: 2018-02-21

## 2018-02-21 VITALS
WEIGHT: 150 LBS | DIASTOLIC BLOOD PRESSURE: 80 MMHG | BODY MASS INDEX: 27.6 KG/M2 | HEIGHT: 62 IN | SYSTOLIC BLOOD PRESSURE: 148 MMHG | HEART RATE: 88 BPM

## 2018-02-21 DIAGNOSIS — K57.32 DIVERTICULITIS LARGE INTESTINE W/O PERFORATION OR ABSCESS W/O BLEEDING: Primary | ICD-10-CM

## 2018-02-21 PROCEDURE — 99213 OFFICE O/P EST LOW 20 MIN: CPT | Performed by: SURGERY

## 2018-02-21 NOTE — PROGRESS NOTES
Follow Up Visit Note       Active Problems      1.  Diverticulitis large intestine w/o perforation or abscess w/o bleeding          Chief Complaint   Patient presents with:  Colon Problem: Preop visit scheduled for ROBOTIC EXTENDED LEFT COLECTOMY, SIGMOIDEC hyperlipidemia    • Unspecified essential hypertension      Past Surgical History:  12/20/2018: COLONOSCOPY      Comment: polyps    The family history and social history have been reviewed by me today.     Family History   Problem Relation Age of Onset   • change. HENT: Negative for hearing loss, nosebleeds, sore throat and trouble swallowing. Respiratory: Negative for apnea, cough, shortness of breath and wheezing. Cardiovascular: Negative for chest pain, palpitations and leg swelling.    Gastrointes was discussed with the patient, who voices understanding. Activity and lifting recommendations were discussed in length. ·   · The risks, benefits, and alternatives to the procedure were explained to the patient.   The risks explained include, but are no

## 2018-02-22 ENCOUNTER — OFFICE VISIT (OUTPATIENT)
Dept: INTERNAL MEDICINE CLINIC | Facility: CLINIC | Age: 58
End: 2018-02-22

## 2018-02-22 VITALS
BODY MASS INDEX: 27 KG/M2 | OXYGEN SATURATION: 97 % | SYSTOLIC BLOOD PRESSURE: 124 MMHG | HEART RATE: 80 BPM | TEMPERATURE: 98 F | DIASTOLIC BLOOD PRESSURE: 82 MMHG | RESPIRATION RATE: 15 BRPM | WEIGHT: 149 LBS

## 2018-02-22 DIAGNOSIS — R06.02 SHORTNESS OF BREATH: Primary | ICD-10-CM

## 2018-02-22 PROCEDURE — 99213 OFFICE O/P EST LOW 20 MIN: CPT | Performed by: INTERNAL MEDICINE

## 2018-02-22 RX ORDER — OMEPRAZOLE 40 MG/1
40 CAPSULE, DELAYED RELEASE ORAL DAILY
Qty: 30 CAPSULE | Refills: 0 | Status: SHIPPED | OUTPATIENT
Start: 2018-02-22 | End: 2018-03-09

## 2018-02-22 RX ORDER — METHYLPREDNISOLONE 4 MG/1
TABLET ORAL
Qty: 1 KIT | Refills: 0 | Status: ON HOLD | OUTPATIENT
Start: 2018-02-22 | End: 2018-02-26 | Stop reason: ALTCHOICE

## 2018-02-22 NOTE — PROGRESS NOTES
Shannon Najera is a 65year old female. HPI:   Patient presents with:  Shortness Of Breath  Patient presents with shortness of breath. Going on for past few days. She has surgery on Monday. Did have some mild LE swelling as well.   Started taking he Encounters:  02/22/18 : 149 lb  02/21/18 : 150 lb  02/08/18 : 146 lb 4 oz  02/01/18 : 143 lb  01/04/18 : 137 lb  12/21/17 : 137 lb 8 oz    EXAM:   /82 (BP Location: Left arm, Patient Position: Sitting, Cuff Size: adult)   Pulse 80   Temp 97.6 °F (36.

## 2018-02-22 NOTE — PATIENT INSTRUCTIONS
- Start steroids today. Take as directed. Stop steroids after your Sunday dose. - Start acid medication today as well (omeprazole).   Take 1 capsule today when you take your first steroid dose - after that take 1 capsule every morning before you eat an

## 2018-02-26 ENCOUNTER — ANESTHESIA EVENT (OUTPATIENT)
Dept: SURGERY | Facility: HOSPITAL | Age: 58
End: 2018-02-26

## 2018-02-26 ENCOUNTER — ANESTHESIA (OUTPATIENT)
Dept: SURGERY | Facility: HOSPITAL | Age: 58
End: 2018-02-26

## 2018-02-26 ENCOUNTER — HOSPITAL ENCOUNTER (INPATIENT)
Facility: HOSPITAL | Age: 58
LOS: 3 days | Discharge: HOME | DRG: 330 | End: 2018-03-01
Attending: SURGERY | Admitting: SURGERY
Payer: MEDICAID

## 2018-02-26 ENCOUNTER — SURGERY (OUTPATIENT)
Age: 58
End: 2018-02-26

## 2018-02-26 DIAGNOSIS — K57.32 DIVERTICULITIS LARGE INTESTINE W/O PERFORATION OR ABSCESS W/O BLEEDING: ICD-10-CM

## 2018-02-26 LAB
GLUCOSE BLD-MCNC: 143 MG/DL (ref 65–99)
GLUCOSE BLD-MCNC: 214 MG/DL (ref 65–99)

## 2018-02-26 PROCEDURE — 0DBL4ZZ EXCISION OF TRANSVERSE COLON, PERCUTANEOUS ENDOSCOPIC APPROACH: ICD-10-PCS | Performed by: SURGERY

## 2018-02-26 PROCEDURE — 8E0W4CZ ROBOTIC ASSISTED PROCEDURE OF TRUNK REGION, PERCUTANEOUS ENDOSCOPIC APPROACH: ICD-10-PCS | Performed by: SURGERY

## 2018-02-26 PROCEDURE — 82962 GLUCOSE BLOOD TEST: CPT

## 2018-02-26 PROCEDURE — 88307 TISSUE EXAM BY PATHOLOGIST: CPT | Performed by: SURGERY

## 2018-02-26 RX ORDER — HEPARIN SODIUM 5000 [USP'U]/ML
INJECTION, SOLUTION INTRAVENOUS; SUBCUTANEOUS
Status: DISPENSED
Start: 2018-02-26 | End: 2018-02-26

## 2018-02-26 RX ORDER — MIDAZOLAM HYDROCHLORIDE 1 MG/ML
1 INJECTION INTRAMUSCULAR; INTRAVENOUS EVERY 5 MIN PRN
Status: DISCONTINUED | OUTPATIENT
Start: 2018-02-26 | End: 2018-02-26 | Stop reason: HOSPADM

## 2018-02-26 RX ORDER — HYDROMORPHONE HYDROCHLORIDE 1 MG/ML
0.4 INJECTION, SOLUTION INTRAMUSCULAR; INTRAVENOUS; SUBCUTANEOUS EVERY 2 HOUR PRN
Status: DISCONTINUED | OUTPATIENT
Start: 2018-02-26 | End: 2018-03-01

## 2018-02-26 RX ORDER — HYDROCHLOROTHIAZIDE 12.5 MG/1
12.5 CAPSULE, GELATIN COATED ORAL DAILY PRN
Status: DISCONTINUED | OUTPATIENT
Start: 2018-02-26 | End: 2018-03-01

## 2018-02-26 RX ORDER — ACETAMINOPHEN 500 MG
1000 TABLET ORAL EVERY 8 HOURS
Status: DISCONTINUED | OUTPATIENT
Start: 2018-02-26 | End: 2018-03-01

## 2018-02-26 RX ORDER — ACETAMINOPHEN 500 MG
TABLET ORAL
Status: DISPENSED
Start: 2018-02-26 | End: 2018-02-26

## 2018-02-26 RX ORDER — HYDROMORPHONE HYDROCHLORIDE 1 MG/ML
0.8 INJECTION, SOLUTION INTRAMUSCULAR; INTRAVENOUS; SUBCUTANEOUS EVERY 2 HOUR PRN
Status: DISCONTINUED | OUTPATIENT
Start: 2018-02-26 | End: 2018-03-01

## 2018-02-26 RX ORDER — HEPARIN SODIUM 5000 [USP'U]/ML
5000 INJECTION, SOLUTION INTRAVENOUS; SUBCUTANEOUS EVERY 8 HOURS SCHEDULED
Status: DISCONTINUED | OUTPATIENT
Start: 2018-02-26 | End: 2018-03-01

## 2018-02-26 RX ORDER — LISINOPRIL 40 MG/1
40 TABLET ORAL DAILY
Status: DISCONTINUED | OUTPATIENT
Start: 2018-02-27 | End: 2018-03-01

## 2018-02-26 RX ORDER — HEPARIN SODIUM 5000 [USP'U]/ML
5000 INJECTION, SOLUTION INTRAVENOUS; SUBCUTANEOUS ONCE
Status: COMPLETED | OUTPATIENT
Start: 2018-02-26 | End: 2018-02-26

## 2018-02-26 RX ORDER — SODIUM CHLORIDE, SODIUM LACTATE, POTASSIUM CHLORIDE, CALCIUM CHLORIDE 600; 310; 30; 20 MG/100ML; MG/100ML; MG/100ML; MG/100ML
50 INJECTION, SOLUTION INTRAVENOUS CONTINUOUS
Status: DISCONTINUED | OUTPATIENT
Start: 2018-02-26 | End: 2018-02-28

## 2018-02-26 RX ORDER — POLYETHYLENE GLYCOL 3350 17 G/17G
17 POWDER, FOR SOLUTION ORAL DAILY PRN
Status: DISCONTINUED | OUTPATIENT
Start: 2018-02-26 | End: 2018-03-01

## 2018-02-26 RX ORDER — ACETAMINOPHEN 500 MG
1000 TABLET ORAL ONCE
Status: COMPLETED | OUTPATIENT
Start: 2018-02-26 | End: 2018-02-26

## 2018-02-26 RX ORDER — SODIUM PHOSPHATE, DIBASIC AND SODIUM PHOSPHATE, MONOBASIC 7; 19 G/133ML; G/133ML
1 ENEMA RECTAL ONCE AS NEEDED
Status: DISCONTINUED | OUTPATIENT
Start: 2018-02-26 | End: 2018-02-26 | Stop reason: HOSPADM

## 2018-02-26 RX ORDER — NALOXONE HYDROCHLORIDE 0.4 MG/ML
80 INJECTION, SOLUTION INTRAMUSCULAR; INTRAVENOUS; SUBCUTANEOUS AS NEEDED
Status: DISCONTINUED | OUTPATIENT
Start: 2018-02-26 | End: 2018-02-26 | Stop reason: HOSPADM

## 2018-02-26 RX ORDER — GABAPENTIN 300 MG/1
300 CAPSULE ORAL NIGHTLY
Status: DISCONTINUED | OUTPATIENT
Start: 2018-02-26 | End: 2018-03-01

## 2018-02-26 RX ORDER — DEXAMETHASONE SODIUM PHOSPHATE 4 MG/ML
4 VIAL (ML) INJECTION AS NEEDED
Status: DISCONTINUED | OUTPATIENT
Start: 2018-02-26 | End: 2018-02-26 | Stop reason: HOSPADM

## 2018-02-26 RX ORDER — ONDANSETRON 2 MG/ML
4 INJECTION INTRAMUSCULAR; INTRAVENOUS EVERY 4 HOURS PRN
Status: DISCONTINUED | OUTPATIENT
Start: 2018-02-26 | End: 2018-03-01

## 2018-02-26 RX ORDER — OXYCODONE HYDROCHLORIDE 5 MG/1
10 TABLET ORAL EVERY 4 HOURS PRN
Status: DISCONTINUED | OUTPATIENT
Start: 2018-02-26 | End: 2018-03-01

## 2018-02-26 RX ORDER — MEPERIDINE HYDROCHLORIDE 25 MG/ML
INJECTION INTRAMUSCULAR; INTRAVENOUS; SUBCUTANEOUS
Status: COMPLETED
Start: 2018-02-26 | End: 2018-02-26

## 2018-02-26 RX ORDER — KETOROLAC TROMETHAMINE 30 MG/ML
30 INJECTION, SOLUTION INTRAMUSCULAR; INTRAVENOUS EVERY 6 HOURS
Status: COMPLETED | OUTPATIENT
Start: 2018-02-26 | End: 2018-02-27

## 2018-02-26 RX ORDER — SODIUM CHLORIDE 9 MG/ML
INJECTION, SOLUTION INTRAVENOUS CONTINUOUS
Status: DISCONTINUED | OUTPATIENT
Start: 2018-02-26 | End: 2018-02-26 | Stop reason: HOSPADM

## 2018-02-26 RX ORDER — OXYCODONE HYDROCHLORIDE 15 MG/1
15 TABLET ORAL EVERY 4 HOURS PRN
Status: DISCONTINUED | OUTPATIENT
Start: 2018-02-26 | End: 2018-03-01

## 2018-02-26 RX ORDER — OXYCODONE HYDROCHLORIDE 5 MG/1
5 TABLET ORAL EVERY 4 HOURS PRN
Status: DISCONTINUED | OUTPATIENT
Start: 2018-02-26 | End: 2018-03-01

## 2018-02-26 RX ORDER — DIPHENHYDRAMINE HYDROCHLORIDE 50 MG/ML
12.5 INJECTION INTRAMUSCULAR; INTRAVENOUS AS NEEDED
Status: DISCONTINUED | OUTPATIENT
Start: 2018-02-26 | End: 2018-02-26 | Stop reason: HOSPADM

## 2018-02-26 RX ORDER — METRONIDAZOLE 500 MG/100ML
500 INJECTION, SOLUTION INTRAVENOUS ONCE
Status: DISCONTINUED | OUTPATIENT
Start: 2018-02-26 | End: 2018-02-26 | Stop reason: HOSPADM

## 2018-02-26 RX ORDER — ONDANSETRON 2 MG/ML
4 INJECTION INTRAMUSCULAR; INTRAVENOUS AS NEEDED
Status: DISCONTINUED | OUTPATIENT
Start: 2018-02-26 | End: 2018-02-26 | Stop reason: HOSPADM

## 2018-02-26 RX ORDER — PANTOPRAZOLE SODIUM 40 MG/1
40 TABLET, DELAYED RELEASE ORAL
Status: DISCONTINUED | OUTPATIENT
Start: 2018-02-27 | End: 2018-03-01

## 2018-02-26 RX ORDER — MEPERIDINE HYDROCHLORIDE 25 MG/ML
12.5 INJECTION INTRAMUSCULAR; INTRAVENOUS; SUBCUTANEOUS AS NEEDED
Status: DISCONTINUED | OUTPATIENT
Start: 2018-02-26 | End: 2018-02-26 | Stop reason: HOSPADM

## 2018-02-26 RX ORDER — BUPIVACAINE HYDROCHLORIDE AND EPINEPHRINE 5; 5 MG/ML; UG/ML
INJECTION, SOLUTION EPIDURAL; INTRACAUDAL; PERINEURAL AS NEEDED
Status: DISCONTINUED | OUTPATIENT
Start: 2018-02-26 | End: 2018-02-26 | Stop reason: HOSPADM

## 2018-02-26 RX ORDER — METRONIDAZOLE 500 MG/100ML
INJECTION, SOLUTION INTRAVENOUS
Status: DISPENSED
Start: 2018-02-26 | End: 2018-02-26

## 2018-02-26 RX ORDER — MAGNESIUM OXIDE 400 MG (241.3 MG MAGNESIUM) TABLET
400 TABLET DAILY
Status: DISCONTINUED | OUTPATIENT
Start: 2018-02-26 | End: 2018-03-01

## 2018-02-26 RX ORDER — HYDROMORPHONE HYDROCHLORIDE 1 MG/ML
0.2 INJECTION, SOLUTION INTRAMUSCULAR; INTRAVENOUS; SUBCUTANEOUS EVERY 2 HOUR PRN
Status: DISCONTINUED | OUTPATIENT
Start: 2018-02-26 | End: 2018-03-01

## 2018-02-26 RX ORDER — ONDANSETRON 2 MG/ML
INJECTION INTRAMUSCULAR; INTRAVENOUS
Status: COMPLETED
Start: 2018-02-26 | End: 2018-02-26

## 2018-02-26 RX ORDER — DOCUSATE SODIUM 100 MG/1
100 CAPSULE, LIQUID FILLED ORAL 2 TIMES DAILY
Status: DISCONTINUED | OUTPATIENT
Start: 2018-02-26 | End: 2018-03-01

## 2018-02-26 NOTE — BRIEF OP NOTE
Pre-Operative Diagnosis: Diverticulitis large intestine w/o perforation or abscess w/o bleeding [K57.32]     Post-Operative Diagnosis: Diverticulitis large intestine w/o perforation or abscess w/o bleeding [K57.32]     Procedure Performed:   Procedure(s

## 2018-02-26 NOTE — PROGRESS NOTES
NURSING ADMISSION NOTE      Patient admitted via Cart  Oriented to room. Safety precautions initiated. Bed in low position. Call light in reach.   RECEIVED PATIENT FROM PACU , ALERT AND ORIENT X 3 , VERY DROWSY , STATES FEELS NAUSEA , MEDICATED FROM

## 2018-02-26 NOTE — ANESTHESIA POSTPROCEDURE EVALUATION
[id number] Patient Status:  Surgery Admit   Age/Gender 65year old female MRN [id number]   [facility] SURGERY Attending Kiah Abdullahi MD   Hosp Day # 0 PCP Amy Early MD       Anesthesia Post-op Note    Proced

## 2018-02-26 NOTE — OPERATIVE REPORT
[facility]    PATIENT'S NAME: Diana Contreraskylah   ATTENDING PHYSICIAN: Dollene Mohs, M.D. OPERATING PHYSICIAN: Dollene Mohs, M.D.    PATIENT ACCOUNT#:   [de-identified]    LOCATION:  PACU [facility] PACU 1 ED  MEDICAL RECORD #:   [id number]       DATE OF BIRTH Risks, benefits, and alternatives were explained to the patient and spouse in detail on multiple occasions.   The risks explained included, but were not limited to, bleeding, infection, injury to adjacent organs and structures, anastomotic leak, conversion visualization. An assistant port is also placed. The previous 5 mm trocar in the subcostal region is exchanged for an 8 mm trocar.   Now, the robot is docked, and after targeting is completed, instruments are placed under direct vision and surgery initiat the colon together. Two colotomies are created on the proximal and distal colon using electrocautery. Next, a blue cartridge Endo SHWETA stapler is positioned to form the side-to-side anastomosis.   The common opening is then reapproximated using 2-0 V-Loc s the procedure without apparent complication. Needle, sponge, and instrument counts are correct at the end of procedure. Procedural findings were discussed with the patient's family immediately upon conclusion of surgery.     Dictated By Anupam Gusman

## 2018-02-26 NOTE — ANESTHESIA PREPROCEDURE EVALUATION
PRE-OP EVALUATION    Patient Name: Lacie Summers    Pre-op Diagnosis: Diverticulitis large intestine w/o perforation or abscess w/o bleeding [K57.32]    Procedure(s):  ROBOTIC EXTENDED LEFT COLECTOMY, SIGMOIDECTOMY AND MOBILIZATION OF SPLENIC FLEXURE WIT Cardiovascular                  (+) hypertension   (+) hyperlipidemia                                  Endo/Other      (+) diabetes                            Pulmonary        (+) COPD and moderate  COPD not requiring home oxygen.       (

## 2018-02-26 NOTE — H&P
Active Problems      1.  Diverticulitis large intestine w/o perforation or abscess w/o bleeding          Chief Complaint   Patient presents with:  Colon Problem: Preop visit scheduled for ROBOTIC EXTENDED LEFT COLECTOMY, SIGMOIDECTOMY AND MOBILIZATION OF SP • Unspecified essential hypertension        Past Surgical History:  12/20/2018: COLONOSCOPY      Comment: polyps     The family history and social history have been reviewed by me today.           Family History   Problem Relation Age of Onset   • Heart weight change. HENT: Negative for hearing loss, nosebleeds, sore throat and trouble swallowing. Respiratory: Negative for apnea, cough, shortness of breath and wheezing. Cardiovascular: Negative for chest pain, palpitations and leg swelling.    Isabella jesse-operative care plan was discussed with the patient, who voices understanding. Activity and lifting recommendations were discussed in length. ·    · The risks, benefits, and alternatives to the procedure were explained to the patient.   The risks exp

## 2018-02-27 LAB
BUN BLD-MCNC: 17 MG/DL (ref 8–20)
CALCIUM BLD-MCNC: 8.4 MG/DL (ref 8.3–10.3)
CHLORIDE: 105 MMOL/L (ref 101–111)
CO2: 27 MMOL/L (ref 22–32)
CREAT BLD-MCNC: 0.74 MG/DL (ref 0.55–1.02)
GLUCOSE BLD-MCNC: 133 MG/DL (ref 65–99)
GLUCOSE BLD-MCNC: 95 MG/DL (ref 70–99)
GLUCOSE BLD-MCNC: 98 MG/DL (ref 65–99)
HAV IGM SER QL: 2.2 MG/DL (ref 1.7–3)
PHOSPHATE SERPL-MCNC: 3.3 MG/DL (ref 2.5–4.9)
POTASSIUM SERPL-SCNC: 4.1 MMOL/L (ref 3.6–5.1)
SODIUM SERPL-SCNC: 141 MMOL/L (ref 136–144)

## 2018-02-27 PROCEDURE — 83735 ASSAY OF MAGNESIUM: CPT | Performed by: SURGERY

## 2018-02-27 PROCEDURE — 84100 ASSAY OF PHOSPHORUS: CPT | Performed by: SURGERY

## 2018-02-27 PROCEDURE — 80048 BASIC METABOLIC PNL TOTAL CA: CPT | Performed by: SURGERY

## 2018-02-27 PROCEDURE — 82962 GLUCOSE BLOOD TEST: CPT

## 2018-02-27 PROCEDURE — 94640 AIRWAY INHALATION TREATMENT: CPT

## 2018-02-27 PROCEDURE — 97162 PT EVAL MOD COMPLEX 30 MIN: CPT

## 2018-02-27 PROCEDURE — 97116 GAIT TRAINING THERAPY: CPT

## 2018-02-27 RX ORDER — ALBUTEROL SULFATE 2.5 MG/3ML
2.5 SOLUTION RESPIRATORY (INHALATION) EVERY 4 HOURS PRN
Status: DISCONTINUED | OUTPATIENT
Start: 2018-02-27 | End: 2018-03-01

## 2018-02-27 NOTE — PROGRESS NOTES
[facility]  Progress Note    Rasta Mcdonald Patient Status:  Inpatient    1960 MRN [id number]   [facility] 3NW-A Attending Arnav Ng MD   Hosp Day # 1 PCP Bibiana Angulo MD     Subjective:  Pt sitting up in chair, repor above to reflect my evaluation, opinion, and physical exam findings. I, Dr. Michael Ignacio, personally performed the services described in this documentation, as scribed by Ms Ghanshyam Hirsch PA-C, in my presence, and it is both accurate and complete.

## 2018-02-27 NOTE — OCCUPATIONAL THERAPY NOTE
Attempted to see pt for OT. Upon therapist arrival, pt stating she had sudden onset of L lower abdominal pain. Notified RN. Will re-attempt to see pt as able.

## 2018-02-27 NOTE — PHYSICAL THERAPY NOTE
PHYSICAL THERAPY QUICK EVALUATION - INPATIENT    Room Number: 320/320-A  Evaluation Date: 2/27/2018  Presenting Problem: Diverticulitis  Physician Order: PT Eval and Treat    Problem List  Active Problems:    Diverticulitis large intestine w/o perforatio patient currently need. ..   -   Moving to and from a bed to a chair (including a wheelchair)?: None   -   Need to walk in hospital room?: None   -   Climbing 3-5 steps with a railing?: None       AM-PAC Score:  Raw Score: 24   PT Approx Degree of Impairmen to safely return home and to prior level of function with pain management. Pt may benefit from RW for d/c to use temporarily.    PT Discharge Recommendations: Home    PLAN  Patient currently does not meet criteria for skilled inpatient physical therapy serv

## 2018-02-27 NOTE — PROGRESS NOTES
PAGED EUGENIO NATH TO INQUIRE ABOUT PATIENT GETTING PRN NEB TREATMENTS AND STOPPING ACCUCHECKS BECAUSE SHE SAID SHE IS PRE-DIABETIC AND ACCUCHECKS HAVE BEEN WNL TODAY. ORDERS RECEIVED.

## 2018-02-27 NOTE — PROGRESS NOTES
PATIENT IS SALINE LOCKED, ON 2L OXYGEN PER NC-ATTEMPTED TO WEAN AND PATIENTS SATURATIONS DROP, ON A LOW RESIDUE DIET-TOLERATING WELL, OCCASIONALLY SHORT OF BREATH W/ EXERTION-NEBS ORDERED PRN, AMBULATES INDEPENDENTLY-OCCASIONALLY USING WALKER (PHYSICAL THE

## 2018-02-27 NOTE — PAYOR COMM NOTE
--------------  ADMISSION REVIEW     Payor: Elvin Rivera #:  [id number]  Authorization Number: Linda Als date: 2/26/18  Admit time: 56       Admitting Physician: Arnav Ng MD  Attending Physician With respect to her diverticulitis, the patient has occasional discomfort and bloating in the abdomen but not nearly as severe as with her recent hospitalization.   The patient has multiple questions regarding her upcoming surgery which were answered in det Constitutional: Negative for chills, diaphoresis, fatigue, fever and unexpected weight change. HENT: Negative for hearing loss, nosebleeds, sore throat and trouble swallowing. Respiratory: Negative for apnea, cough, shortness of breath and wheezing. · I have asked the patient to contact her primary care physician to discuss her shortness of breath in order to determine if further workup is required. · The jesse-operative care plan was discussed with the patient, who voices understanding.   Activity and POSTOPERATIVE DIAGNOSIS:  Diverticulitis of large intestine without perforation or abscess or bleeding located in splenic flexure. PROCEDURE PERFORMED:    1.       Robotic mobilization of splenic flexure.     2.       Robotic resection of splenic flexure w INDICATIONS:  Please review the preprocedural history and physical.  Briefly, the patient is a 28-year-old female who had diverticulitis located in the distal transverse colon and splenic flexure with phlegmon of the mesentery.   She recovered without opera 2/26/2018 2146 Given 100 mg Oral (Left Lower Abdomen) Jeff Hinkle, RN    2/26/2018 1341 Given 100 mg Oral Wero Miller, RN      fentaNYL citrate (SUBLIMAZE) 0.05 MG/ML injection 25 mcg     Date Action Dose Route User    2/26/2018 1200 Given 25 mc Date Action Dose Route User    2/26/2018 1230 Given 4 mg Intravenous Genaro Shook, RN      oxyCODONE HCl (OXY-IR) cap/tab 5 mg     Date Action Dose Route User    2/27/2018 0759 Given 5 mg Oral Izola Maritza RN      Pantoprazole Sodium (PROT

## 2018-02-28 ENCOUNTER — APPOINTMENT (OUTPATIENT)
Dept: GENERAL RADIOLOGY | Facility: HOSPITAL | Age: 58
DRG: 330 | End: 2018-02-28
Attending: SURGERY
Payer: MEDICAID

## 2018-02-28 PROCEDURE — 71045 X-RAY EXAM CHEST 1 VIEW: CPT | Performed by: SURGERY

## 2018-02-28 RX ORDER — FUROSEMIDE 10 MG/ML
20 INJECTION INTRAMUSCULAR; INTRAVENOUS ONCE
Status: COMPLETED | OUTPATIENT
Start: 2018-02-28 | End: 2018-02-28

## 2018-02-28 NOTE — PAYOR COMM NOTE
--------------  CONTINUED STAY REVIEW    Payor: Elvin Rivera #:  [id number]  Authorization Number: Alexandr Flower date: 2/26/18  Admit time: 56    Admitting Physician: Hillary Myers MD  Attending Physicia oxyCODONE HCl (OXY-IR) cap/tab 10 mg     Date Action Dose Route User    2/27/2018 2245 Given 10 mg Oral Umesh Stanley RN    2/27/2018 1850 Given 10 mg Oral Casa Morgan, RN      Pantoprazole Sodium (PROTONIX) EC tab 40 mg     Date Action Dose R

## 2018-02-28 NOTE — CM/SW NOTE
Per CM, pt may require home O2 at d/c. SHANNON also discussed with unit RN, pt's O2 level currently drops with exertion. SHANNON went ahead and checked insurance for [employer] provider, would be [employer] #[phone].      For O2 referral, [employer] would n

## 2018-02-28 NOTE — PROGRESS NOTES
Restorative activity deferred d/t patient's numerous modified independent ambulation w/RW today. Encourage patient to continue activity trend min TID. Will re-attempt as appropriate.

## 2018-02-28 NOTE — PROGRESS NOTES
[facility]  Progress Note    Blue Carmona Patient Status:  Inpatient    1960 MRN [id number]   [facility] 3NW-A Attending Chris Hendrickson MD   Hosp Day # 2 PCP Tu Brothers MD     Subjective:   The patient states she is doi bleeding      Postop day #2 status post robotic partial colectomy of the splenic flexure with anastomosis. · Patient has hypoxia when off supplemental O2; check portable chest x-ray. · Possible diuresis versus albuterol nebulizers as needed.   · Anders Yang

## 2018-03-01 VITALS
DIASTOLIC BLOOD PRESSURE: 56 MMHG | WEIGHT: 138 LBS | TEMPERATURE: 99 F | SYSTOLIC BLOOD PRESSURE: 127 MMHG | RESPIRATION RATE: 18 BRPM | HEART RATE: 88 BPM | BODY MASS INDEX: 25.4 KG/M2 | HEIGHT: 62 IN | OXYGEN SATURATION: 93 %

## 2018-03-01 PROCEDURE — 97535 SELF CARE MNGMENT TRAINING: CPT

## 2018-03-01 PROCEDURE — 97165 OT EVAL LOW COMPLEX 30 MIN: CPT

## 2018-03-01 RX ORDER — HYDROCODONE BITARTRATE AND ACETAMINOPHEN 5; 325 MG/1; MG/1
1-2 TABLET ORAL
Qty: 30 TABLET | Refills: 0 | Status: SHIPPED | OUTPATIENT
Start: 2018-03-01 | End: 2018-03-08

## 2018-03-01 NOTE — PROGRESS NOTES
NURSING DISCHARGE NOTE    Discharged Home via Wheelchair. Accompanied by Support staff  Belongings Taken by patient/family. Patient stable for discharge. Discharge instructions provided and discussed.  Patient asking if she can go home with Oxy-IR p

## 2018-03-01 NOTE — OCCUPATIONAL THERAPY NOTE
OCCUPATIONAL THERAPY QUICK EVALUATION - INPATIENT    Room Number: 320/320-A  Evaluation Date: 3/1/2018     Type of Evaluation: Initial and Quick Eval  Presenting Problem: diverticulitis s/p partial colectomy    Physician Order: IP Consult to Occupational T is within functional limits     Upper extremity strength is within functional limits     NEUROLOGICAL FINDINGS                   ACTIVITIES OF DAILY LIVING ASSESSMENT  AM-PAC ‘6-Clicks’ Inpatient Daily Activity Short Form   How much help from another perso completed include AMPAC, ROM, MMT. The AM-MARKOS ' '6-Clicks' Inpatient Daily Activity Short Form was completed and  this patient  is demonstrating a  0% degree impairment in activities of daily living.  Research supports that patients with this level of impai

## 2018-03-01 NOTE — PROGRESS NOTES
[facility]  Progress Note    Pervis Jamaal Patient Status:  Inpatient    1960 MRN [id number]   [facility] 3NW-A Attending Joanne Hawk MD   Hosp Day # 3 PCP Víctor Anne MD     Subjective:  Patient feeling better.  Showe management, appreciate their assistance   · Discharge home today    · Norco script in chart  · Continue soft diet  · Follow up with general surgery in 1 week  · No driving or heavy lifting until seen in the office   · Reviewed discharged instructions with

## 2018-03-01 NOTE — PROGRESS NOTES
Patient O2 sats 94% on room air at rest. Drops to 90% when ambulating in andrew on room air.  Maintained 94% when placed on 1L O2 via nasal cannula and 98% when placed on 2L.     1109: Per Jumana ORTEGA patient does not qualify for home O2.   1128: Luis Miguel ESQUEDA notifi

## 2018-03-01 NOTE — PROGRESS NOTES
PATIENT IS SALINE LOCKED, ON ROOM AIR AT REST BUT REQUIRES OXYGEN WITH AMBULATION, SOB W/ EXERTION, COMPLAINS OF MINIMAL PAIN-ONLY PAIN MEDICATION TAKEN TODAY WAS SCHEDULED TYLENOL, VOIDING WELL, INCISIONS ARE C/D/I, CXR SHOWED MILD VASCULAR CONGESTION-20M

## 2018-03-02 ENCOUNTER — TELEPHONE (OUTPATIENT)
Dept: INTERNAL MEDICINE CLINIC | Facility: CLINIC | Age: 58
End: 2018-03-02

## 2018-03-02 ENCOUNTER — PATIENT OUTREACH (OUTPATIENT)
Dept: CASE MANAGEMENT | Age: 58
End: 2018-03-02

## 2018-03-02 RX ORDER — ONDANSETRON 4 MG/1
4 TABLET, ORALLY DISINTEGRATING ORAL EVERY 8 HOURS PRN
Qty: 20 TABLET | Refills: 0 | Status: SHIPPED | OUTPATIENT
Start: 2018-03-02 | End: 2018-04-16 | Stop reason: ALTCHOICE

## 2018-03-02 RX ORDER — LISINOPRIL 40 MG/1
40 TABLET ORAL DAILY
Qty: 90 TABLET | Refills: 1 | Status: SHIPPED | OUTPATIENT
Start: 2018-03-02 | End: 2019-03-14

## 2018-03-02 NOTE — PROGRESS NOTES
Initial Post Discharge Follow Up   Discharge Date: 3/1/18  Contact Date: 3/2/2018    Consent Verification:  Assessment Completed With: Patient  HIPAA Verified?   Yes    Discharge Dx:  S/p ROBOTIC MOBILIZATION AND RESECTION OF SPLENIC FLEXURE WITH ANASTOM

## 2018-03-02 NOTE — CM/SW NOTE
03/02/18 1000   Discharge disposition   Discharged to: Home or Self   Discharge transportation Private car

## 2018-03-02 NOTE — TELEPHONE ENCOUNTER
Spoke to pt for TCM. Pt will be coming in for HFU appt on 3/5/18. Pt requested refill for Lisinopril 40mg.   Pt states she has only been taking Norco 1/2 tablet every 3-4 hours because a full tab makes her very nauseous- this only relieves the pain minima

## 2018-03-02 NOTE — TELEPHONE ENCOUNTER
Lisinopril refilled. Ondansetron prescription sent in for nausea (1 tablet every 8 hours as needed). Will evaluate/discuss further at [facility] visit Monday.

## 2018-03-04 NOTE — DISCHARGE SUMMARY
[facility]  Discharge Summary    Elizabeth Crow Patient Status:  Inpatient    1960 MRN [id number]   [facility] 3NW-A Attending No att. providers found   [facility] Day # 3 PCP Rosalie Mccord MD     Date of Admission: 2018    Date Soft, non-tender, bowel sounds active all four quadrants,     no masses, no organomegaly   Genitalia:    Normal female without lesion, discharge or tenderness   Rectal:    Normal tone, no masses or tenderness;    guaiac negative stool   Extremities:   E

## 2018-03-05 ENCOUNTER — OFFICE VISIT (OUTPATIENT)
Dept: INTERNAL MEDICINE CLINIC | Facility: CLINIC | Age: 58
End: 2018-03-05

## 2018-03-05 VITALS
WEIGHT: 148 LBS | OXYGEN SATURATION: 92 % | TEMPERATURE: 98 F | DIASTOLIC BLOOD PRESSURE: 58 MMHG | BODY MASS INDEX: 27 KG/M2 | HEART RATE: 80 BPM | SYSTOLIC BLOOD PRESSURE: 100 MMHG | RESPIRATION RATE: 16 BRPM

## 2018-03-05 DIAGNOSIS — R09.02 HYPOXIA: ICD-10-CM

## 2018-03-05 DIAGNOSIS — Z72.0 TOBACCO ABUSE: ICD-10-CM

## 2018-03-05 DIAGNOSIS — I10 ESSENTIAL HYPERTENSION: ICD-10-CM

## 2018-03-05 DIAGNOSIS — Z90.49 STATUS POST PARTIAL RESECTION OF COLON: Primary | ICD-10-CM

## 2018-03-05 PROCEDURE — 99214 OFFICE O/P EST MOD 30 MIN: CPT | Performed by: INTERNAL MEDICINE

## 2018-03-05 NOTE — PROGRESS NOTES
Ewelina Sinclair is a 65year old female. HPI:   Patient presents with:  Hospital F/U  Patient presents for hospital follow up. She recently was hospitalized for elective hemicolectomy. She had resection of her splenic flexure.   This was done due to p hours as needed for Nausea., Disp: 20 tablet, Rfl: 0  •  Omeprazole 40 MG Oral Capsule Delayed Release, Take 1 capsule (40 mg total) by mouth daily. , Disp: 30 capsule, Rfl: 0  •  hydrochlorothiazide 12.5 MG Oral Cap, Take 1 capsule (12.5 mg total) by mouth PLAN:   1. Status post partial resection of colon  Resection of splenic flexure due to previous diverticulitis episode. Doing well post-operatively. Abdominal pain improving - off all narcotics, only using Tylenol.   Loose stools improving, still sensiti

## 2018-03-05 NOTE — PATIENT INSTRUCTIONS
- Use the Symbicort inhaler. Use 1 puff twice daily for the next month  - Follow up with Dr. Shell Montelongo as scheduled  - Follow up with me in six weeks for your breathing and blood pressure. It was a pleasure seeing you in the clinic today.   Thank you for

## 2018-03-08 ENCOUNTER — OFFICE VISIT (OUTPATIENT)
Dept: SURGERY | Facility: CLINIC | Age: 58
End: 2018-03-08

## 2018-03-08 VITALS
TEMPERATURE: 98 F | HEIGHT: 62 IN | WEIGHT: 149.63 LBS | DIASTOLIC BLOOD PRESSURE: 72 MMHG | SYSTOLIC BLOOD PRESSURE: 110 MMHG | BODY MASS INDEX: 27.53 KG/M2 | HEART RATE: 85 BPM

## 2018-03-08 DIAGNOSIS — K57.32 DIVERTICULITIS LARGE INTESTINE W/O PERFORATION OR ABSCESS W/O BLEEDING: Primary | ICD-10-CM

## 2018-03-08 PROCEDURE — 99024 POSTOP FOLLOW-UP VISIT: CPT | Performed by: SURGERY

## 2018-03-08 NOTE — PROGRESS NOTES
Post Operative Visit Note       Active Problems  1.  Diverticulitis large intestine w/o perforation or abscess w/o bleeding         Chief Complaint   Patient presents with:  Post-Op: P/O, ROBOTIC RESECTION AND LOCALIZATION OF SPLENIC FLEXURE AND MOBILIZATIO Packs/day: 1.00      Years: 40.00          Quit date: 12/21/2017    Smokeless tobacco: Never Used                        Comment: more then half pack daily     Alcohol use: No              Drug use:  No              Sexua problems and sleep disturbance.        Physical Findings   /72 (BP Location: Right arm, Patient Position: Sitting, Cuff Size: adult)   Pulse 85   Temp 97.9 °F (36.6 °C) (Oral)   Ht 62\"   Wt 149 lb 9.6 oz   LMP 01/15/2013   BMI 27.36 kg/m²   Physical diagnosis)      Plan   This patient is doing very well following robotic resection of splenic flexure portion of colon. At today's visit I discussed with patient that she may continue a general diet as tolerated.   We have no dietary restrictions for her a

## 2018-03-12 RX ORDER — OMEPRAZOLE 40 MG/1
CAPSULE, DELAYED RELEASE ORAL
Qty: 90 CAPSULE | Refills: 0 | Status: SHIPPED | OUTPATIENT
Start: 2018-03-12 | End: 2018-08-17

## 2018-03-26 ENCOUNTER — HOSPITAL ENCOUNTER (OUTPATIENT)
Dept: GENERAL RADIOLOGY | Age: 58
Discharge: HOME | End: 2018-03-26
Attending: INTERNAL MEDICINE
Payer: MEDICAID

## 2018-03-26 ENCOUNTER — OFFICE VISIT (OUTPATIENT)
Dept: INTERNAL MEDICINE CLINIC | Facility: CLINIC | Age: 58
End: 2018-03-26

## 2018-03-26 VITALS
RESPIRATION RATE: 20 BRPM | BODY MASS INDEX: 28.29 KG/M2 | HEIGHT: 62 IN | HEART RATE: 78 BPM | WEIGHT: 153.75 LBS | SYSTOLIC BLOOD PRESSURE: 160 MMHG | TEMPERATURE: 98 F | DIASTOLIC BLOOD PRESSURE: 94 MMHG | OXYGEN SATURATION: 92 %

## 2018-03-26 DIAGNOSIS — R09.02 HYPOXIA: Primary | ICD-10-CM

## 2018-03-26 DIAGNOSIS — I10 ESSENTIAL HYPERTENSION: ICD-10-CM

## 2018-03-26 DIAGNOSIS — R09.02 HYPOXIA: ICD-10-CM

## 2018-03-26 DIAGNOSIS — R06.02 SHORTNESS OF BREATH: ICD-10-CM

## 2018-03-26 DIAGNOSIS — Z90.49 STATUS POST PARTIAL RESECTION OF COLON: ICD-10-CM

## 2018-03-26 PROCEDURE — 99214 OFFICE O/P EST MOD 30 MIN: CPT | Performed by: INTERNAL MEDICINE

## 2018-03-26 PROCEDURE — 71046 X-RAY EXAM CHEST 2 VIEWS: CPT | Performed by: INTERNAL MEDICINE

## 2018-03-26 RX ORDER — AZITHROMYCIN 250 MG/1
TABLET, FILM COATED ORAL
Qty: 6 TABLET | Refills: 0 | Status: SHIPPED | OUTPATIENT
Start: 2018-03-26 | End: 2018-04-16 | Stop reason: ALTCHOICE

## 2018-03-26 RX ORDER — METHYLPREDNISOLONE 4 MG/1
TABLET ORAL
Qty: 1 KIT | Refills: 0 | Status: SHIPPED | OUTPATIENT
Start: 2018-03-26 | End: 2018-04-16

## 2018-03-26 NOTE — PROGRESS NOTES
Valeriano Betancur is a 65year old female. HPI:   Patient presents with:  Breathing Problem: low O2 at home low 80's   Wheezing  Patient presents with shortness of breath, wheezing.     This has been a chronic issue since she has been home from colon resect unspecified hyperlipidemia; and Unspecified essential hypertension. Surgical:  has a past surgical history that includes colonoscopy (12/20/2018).   Family: family history includes Heart Disease in her brother, father, mother, and sister; Stroke in her fat beginning of the month. Monitor for now, follow up in 1 month. Continue lisinopril 40 mg qd and HCTZ 25 mg qd.      4.  Status post partial resection of colon  Saw General Surgery for post-op appointment at beginning of month, went well.   To follow up wi

## 2018-03-26 NOTE — PATIENT INSTRUCTIONS
- start antibiotics today (azithromycin/z pack)  - If not better in 1-2 days start steroids  - We will check an x-ray today. If there is fluid on your lungs, we will do a course of diuretics to pull the fluid off your lungs.   - Follow up with Pulmonologis

## 2018-04-02 DIAGNOSIS — R91.8 GROUND GLASS OPACITY PRESENT ON IMAGING OF LUNG: Primary | ICD-10-CM

## 2018-04-02 DIAGNOSIS — R09.02 HYPOXIA: ICD-10-CM

## 2018-04-11 ENCOUNTER — HOSPITAL ENCOUNTER (OUTPATIENT)
Dept: CT IMAGING | Age: 58
Discharge: HOME | End: 2018-04-11
Attending: INTERNAL MEDICINE
Payer: MEDICAID

## 2018-04-11 DIAGNOSIS — R09.02 HYPOXIA: ICD-10-CM

## 2018-04-11 DIAGNOSIS — R91.8 GROUND GLASS OPACITY PRESENT ON IMAGING OF LUNG: ICD-10-CM

## 2018-04-11 PROCEDURE — 71250 CT THORAX DX C-: CPT | Performed by: INTERNAL MEDICINE

## 2018-04-13 ENCOUNTER — TELEPHONE (OUTPATIENT)
Dept: INTERNAL MEDICINE CLINIC | Facility: CLINIC | Age: 58
End: 2018-04-13

## 2018-04-13 NOTE — TELEPHONE ENCOUNTER
Patient called to request CT results be sent to Dr. Wilson Bojorquez 's office.  Phone [phone] Fax [phone]

## 2018-04-16 ENCOUNTER — HOSPITAL ENCOUNTER (OUTPATIENT)
Age: 58
Discharge: HOME | End: 2018-04-16
Attending: FAMILY MEDICINE
Payer: MEDICAID

## 2018-04-16 VITALS
OXYGEN SATURATION: 93 % | DIASTOLIC BLOOD PRESSURE: 88 MMHG | HEART RATE: 88 BPM | TEMPERATURE: 99 F | RESPIRATION RATE: 18 BRPM | SYSTOLIC BLOOD PRESSURE: 140 MMHG

## 2018-04-16 DIAGNOSIS — J98.01 ACUTE BRONCHOSPASM: ICD-10-CM

## 2018-04-16 DIAGNOSIS — R06.02 SHORTNESS OF BREATH: Primary | ICD-10-CM

## 2018-04-16 DIAGNOSIS — J01.00 ACUTE NON-RECURRENT MAXILLARY SINUSITIS: ICD-10-CM

## 2018-04-16 PROCEDURE — 96374 THER/PROPH/DIAG INJ IV PUSH: CPT

## 2018-04-16 PROCEDURE — 85025 COMPLETE CBC W/AUTO DIFF WBC: CPT | Performed by: FAMILY MEDICINE

## 2018-04-16 PROCEDURE — 94640 AIRWAY INHALATION TREATMENT: CPT

## 2018-04-16 PROCEDURE — 99214 OFFICE O/P EST MOD 30 MIN: CPT

## 2018-04-16 PROCEDURE — 80047 BASIC METABLC PNL IONIZED CA: CPT

## 2018-04-16 PROCEDURE — 99215 OFFICE O/P EST HI 40 MIN: CPT

## 2018-04-16 RX ORDER — FLUTICASONE PROPIONATE 50 MCG
SPRAY, SUSPENSION (ML) NASAL
Qty: 1 INHALER | Refills: 0 | Status: SHIPPED | OUTPATIENT
Start: 2018-04-16 | End: 2019-03-14

## 2018-04-16 RX ORDER — ALBUTEROL SULFATE 2.5 MG/3ML
2.5 SOLUTION RESPIRATORY (INHALATION) ONCE
Status: COMPLETED | OUTPATIENT
Start: 2018-04-16 | End: 2018-04-16

## 2018-04-16 RX ORDER — METHYLPREDNISOLONE SODIUM SUCCINATE 125 MG/2ML
125 INJECTION, POWDER, LYOPHILIZED, FOR SOLUTION INTRAMUSCULAR; INTRAVENOUS ONCE
Status: COMPLETED | OUTPATIENT
Start: 2018-04-16 | End: 2018-04-16

## 2018-04-16 RX ORDER — ALBUTEROL SULFATE 2.5 MG/3ML
2.5 SOLUTION RESPIRATORY (INHALATION) EVERY 4 HOURS PRN
Qty: 1 BOX | Refills: 0 | Status: SHIPPED | OUTPATIENT
Start: 2018-04-16 | End: 2018-08-23

## 2018-04-16 RX ORDER — IPRATROPIUM BROMIDE AND ALBUTEROL SULFATE 2.5; .5 MG/3ML; MG/3ML
3 SOLUTION RESPIRATORY (INHALATION) ONCE
Status: COMPLETED | OUTPATIENT
Start: 2018-04-16 | End: 2018-04-16

## 2018-04-16 RX ORDER — PREDNISONE 10 MG/1
TABLET ORAL
Qty: 20 TABLET | Refills: 0 | Status: SHIPPED | OUTPATIENT
Start: 2018-04-16 | End: 2018-08-23 | Stop reason: ALTCHOICE

## 2018-04-16 RX ORDER — ALBUTEROL SULFATE 90 UG/1
2 AEROSOL, METERED RESPIRATORY (INHALATION) EVERY 4 HOURS PRN
Qty: 1 INHALER | Refills: 0 | Status: SHIPPED | OUTPATIENT
Start: 2018-04-16 | End: 2018-08-23

## 2018-04-16 RX ORDER — AMOXICILLIN AND CLAVULANATE POTASSIUM 875; 125 MG/1; MG/1
875 TABLET, FILM COATED ORAL 2 TIMES DAILY
Qty: 20 TABLET | Refills: 0 | Status: SHIPPED | OUTPATIENT
Start: 2018-04-16 | End: 2018-05-29 | Stop reason: ALTCHOICE

## 2018-04-16 NOTE — ED PROVIDER NOTES
Patient Seen in: [facility]    History   Patient presents with:  Sinus Problem  Cough/URI    Stated Complaint: sinus infection x 3 weeks    HPI    This 52-year-old female presents to the office with complaint of sinus congestion which she states caused her to have inability urinate so they stopped it. Her primary doctor started her on Symbicort but the patient has not noticed any improvement. The patient states she is occasionally coughing up thick green sputum.   She denies any post nasal drip is noted, no exudates seen, airway patent, uvula midline  NECK:  No cervical lymphadenopathy. No thyromegaly,  HEART: Regular rate and rhythm, no S3, S4 or murmur noted.   LUNGS: Diminished throughout with expiratory wheezing noted bibasilar Approved by: Catherine Cranker, MD            Ct Chest (skd=[id number])    Result Date: 4/11/2018  PROCEDURE:  CT CHEST (CPT=71250)  COMPARISON:  No comparison imaging is available for review.   INDICATIONS:  R09.02 Hypoxemia R91.8 Other nonspecific abnormal finding of the liver.     Dictated by: Yari Pratt MD on 4/11/2018 at 10:03     Approved by: Yari Pratt MD              ED Course as of Apr 16 1108  ------------------------------------------------------------       MDM     Patient was given Solu-Medrol 1 tablet by mouth 2 (two) times daily. TAKE WITH FOOD. STOP FOR GI UPSET. Qty: 20 tablet Refills: 0    predniSONE 10 MG Oral Tab  Take 40 mg for 2 days, 30 mg for 2 days, 20 mg for 2 days, 10 mg for 2 days with lunch. Start on 4/17/18.   Qty: 20 tablet Refi with your primary doctor in 2-3 days if not improving. Keep your appointment as scheduled with your pulmonary doctor on 4/25/18.

## 2018-04-16 NOTE — ED INITIAL ASSESSMENT (HPI)
c/o sinus pressure/congestion for 3 weeks with cough. States can't breath. Hx of lung disease. Appt. With Pulmonologist 4/25/2018 at [facility]. Also with chest pain and lung hurts.

## 2018-04-26 ENCOUNTER — MED REC SCAN ONLY (OUTPATIENT)
Dept: INTERNAL MEDICINE CLINIC | Facility: CLINIC | Age: 58
End: 2018-04-26

## 2018-05-14 ENCOUNTER — MED REC SCAN ONLY (OUTPATIENT)
Dept: INTERNAL MEDICINE CLINIC | Facility: CLINIC | Age: 58
End: 2018-05-14

## 2018-05-18 ENCOUNTER — TELEPHONE (OUTPATIENT)
Dept: INTERNAL MEDICINE CLINIC | Facility: CLINIC | Age: 58
End: 2018-05-18

## 2018-05-18 NOTE — TELEPHONE ENCOUNTER
Patient tested positive for SIL isn't feeling well need to follow up with Rheumatology concerned she cannot get in until October with Dr Francisca Solis please advise patient for patient care.

## 2018-05-18 NOTE — TELEPHONE ENCOUNTER
I don't think [facility] is accepting specialist referrals for her insurance unless patient has a [facility] PCP.   Per website these would be the other options    Clayton Garcia MD/ Kelvin Cortez MD/ J Carlos Stewart MD   [facility]  [address] [place] [address], [address]

## 2018-05-23 DIAGNOSIS — R76.8 POSITIVE ANA (ANTINUCLEAR ANTIBODY): Primary | ICD-10-CM

## 2018-05-29 ENCOUNTER — OFFICE VISIT (OUTPATIENT)
Dept: RHEUMATOLOGY | Facility: CLINIC | Age: 58
End: 2018-05-29

## 2018-05-29 VITALS
HEART RATE: 71 BPM | SYSTOLIC BLOOD PRESSURE: 134 MMHG | BODY MASS INDEX: 29.63 KG/M2 | WEIGHT: 161 LBS | DIASTOLIC BLOOD PRESSURE: 81 MMHG | HEIGHT: 62 IN

## 2018-05-29 DIAGNOSIS — J44.9 CHRONIC OBSTRUCTIVE PULMONARY DISEASE, UNSPECIFIED COPD TYPE (HCC): Primary | ICD-10-CM

## 2018-05-29 DIAGNOSIS — R76.8 POSITIVE ANA (ANTINUCLEAR ANTIBODY): ICD-10-CM

## 2018-05-29 DIAGNOSIS — M79.7 FIBROMYALGIA: ICD-10-CM

## 2018-05-29 PROCEDURE — 99244 OFF/OP CNSLTJ NEW/EST MOD 40: CPT | Performed by: INTERNAL MEDICINE

## 2018-05-29 PROCEDURE — 99212 OFFICE O/P EST SF 10 MIN: CPT | Performed by: INTERNAL MEDICINE

## 2018-05-29 RX ORDER — AZITHROMYCIN 250 MG/1
250 TABLET, FILM COATED ORAL DAILY
Refills: 1 | COMMUNITY
Start: 2018-05-26 | End: 2018-08-23 | Stop reason: ALTCHOICE

## 2018-05-29 RX ORDER — ATORVASTATIN CALCIUM 20 MG/1
20 TABLET, FILM COATED ORAL DAILY
Refills: 0 | COMMUNITY
Start: 2018-05-10 | End: 2020-02-03

## 2018-05-29 RX ORDER — CARVEDILOL 12.5 MG/1
12.5 TABLET ORAL 2 TIMES DAILY
Refills: 0 | COMMUNITY
Start: 2018-05-08 | End: 2020-02-03

## 2018-05-29 NOTE — PROGRESS NOTES
Dear Dr. Joanne Sharif:    I saw your patient Bay Griffiths in consultation this afternoon at your request for evaluation of positive SIL. As you know, she is a 67-year-old woman who smoked 1 pack of cigarettes daily for 40 years.   She discontinued them in in December 2017. She has hypertension. She has fatty liver. She had 9 inches of colon removed in February 2018 for diverticulitis.   She is on albuterol inhaler which does not help, one baby aspirin a day, atorvastatin, azithromycin for months, carvedil extremity edema. She has tenderness to the touch all over in the soft tissues. Neck moves well. Shoulders, elbows, wrists, and hands are without synovitis. Low back motion is good. Hips move well. Knees flex and extend fully without effusions.   Ankle

## 2018-08-20 RX ORDER — OMEPRAZOLE 40 MG/1
40 CAPSULE, DELAYED RELEASE ORAL DAILY
Qty: 90 CAPSULE | Refills: 0 | Status: SHIPPED | OUTPATIENT
Start: 2018-08-20 | End: 2018-08-23

## 2018-08-20 NOTE — TELEPHONE ENCOUNTER
Spoke to pt - states a doctor told her to take Omeprazole BID but has been only doing it daily. Pt scheduled FU appt for 8/23/18.     Refill requested: Omeprazole 40 mg     Failed protocol    Last refill:3/12/8 #90 NR    Relevant Labs: BMP 2/27/18     Last

## 2018-08-21 PROBLEM — Z82.49 FAMILY HISTORY OF PREMATURE CAD: Status: ACTIVE | Noted: 2018-05-08

## 2018-08-21 PROBLEM — R94.31 ABNORMAL EKG: Status: ACTIVE | Noted: 2018-05-08

## 2018-08-21 PROBLEM — R74.01 TRANSAMINITIS: Status: ACTIVE | Noted: 2018-06-13

## 2018-08-21 PROBLEM — J84.10 PULMONARY FIBROSIS (HCC): Status: ACTIVE | Noted: 2018-08-21

## 2018-08-23 ENCOUNTER — OFFICE VISIT (OUTPATIENT)
Dept: INTERNAL MEDICINE CLINIC | Facility: CLINIC | Age: 58
End: 2018-08-23
Payer: MEDICAID

## 2018-08-23 VITALS
RESPIRATION RATE: 18 BRPM | WEIGHT: 168 LBS | OXYGEN SATURATION: 95 % | TEMPERATURE: 98 F | HEIGHT: 62 IN | BODY MASS INDEX: 30.91 KG/M2 | HEART RATE: 69 BPM | SYSTOLIC BLOOD PRESSURE: 130 MMHG | DIASTOLIC BLOOD PRESSURE: 84 MMHG

## 2018-08-23 DIAGNOSIS — E78.1 HYPERTRIGLYCERIDEMIA: ICD-10-CM

## 2018-08-23 DIAGNOSIS — K21.9 GASTROESOPHAGEAL REFLUX DISEASE, ESOPHAGITIS PRESENCE NOT SPECIFIED: ICD-10-CM

## 2018-08-23 DIAGNOSIS — I10 ESSENTIAL HYPERTENSION: ICD-10-CM

## 2018-08-23 DIAGNOSIS — Z12.39 ENCOUNTER FOR SCREENING FOR MALIGNANT NEOPLASM OF BREAST: Primary | ICD-10-CM

## 2018-08-23 DIAGNOSIS — J84.10 PULMONARY FIBROSIS (HCC): ICD-10-CM

## 2018-08-23 DIAGNOSIS — R73.03 PREDIABETES: ICD-10-CM

## 2018-08-23 PROCEDURE — 99214 OFFICE O/P EST MOD 30 MIN: CPT | Performed by: INTERNAL MEDICINE

## 2018-08-23 RX ORDER — OMEPRAZOLE 40 MG/1
40 CAPSULE, DELAYED RELEASE ORAL DAILY
Qty: 90 CAPSULE | Refills: 1 | Status: SHIPPED | OUTPATIENT
Start: 2018-08-23 | End: 2019-04-24

## 2018-08-23 RX ORDER — HYDROCHLOROTHIAZIDE 12.5 MG/1
12.5 CAPSULE, GELATIN COATED ORAL DAILY
Qty: 90 CAPSULE | Refills: 1 | Status: SHIPPED | OUTPATIENT
Start: 2018-08-23 | End: 2020-07-21

## 2018-08-23 NOTE — PROGRESS NOTES
Brooklyn Tomas is a 65year old female. HPI:   Patient presents with:  Blood Pressure  Swollen Glands  Other: Due for CPX - Pap smear - Mammogram   Patient presents for follow up on chronic medical issues.     Pulmonary fibrosis - following with [facility]- Osteoarthritis; Other and unspecified hyperlipidemia; and Unspecified essential hypertension. Surgical:  has a past surgical history that includes colonoscopy (12/20/2018) and other (02/26/2018).   Family: family history includes Heart Disease in her broth breast/preventative health  Mammogram ordered. Referred to gynecology for pap smear. 6.  GERD  Stable; continue daily omeprazole for now. Refilled. 7.  Chest pain/pressure  Had abnormal EKG, chest symptoms earlier in year.   Following with [facility]

## 2018-08-23 NOTE — PATIENT INSTRUCTIONS
- Continue current medications  - Call or send a message when you have 7-10 days of lisinopril left. I will send in a new prescription for the 20 mg tablets.   - Follow up with specialists as scheduled  - Follow up with Gynecology for your pap smear and to

## 2018-08-25 PROBLEM — K21.9 GASTROESOPHAGEAL REFLUX DISEASE: Status: ACTIVE | Noted: 2018-08-25

## 2018-11-29 ENCOUNTER — APPOINTMENT (OUTPATIENT)
Dept: GENERAL RADIOLOGY | Age: 58
End: 2018-11-29
Attending: FAMILY MEDICINE
Payer: MEDICAID

## 2018-11-29 ENCOUNTER — HOSPITAL ENCOUNTER (OUTPATIENT)
Age: 58
Discharge: HOME | End: 2018-11-29
Attending: FAMILY MEDICINE
Payer: MEDICAID

## 2018-11-29 VITALS
HEART RATE: 73 BPM | SYSTOLIC BLOOD PRESSURE: 110 MMHG | TEMPERATURE: 98 F | DIASTOLIC BLOOD PRESSURE: 58 MMHG | RESPIRATION RATE: 18 BRPM | OXYGEN SATURATION: 92 %

## 2018-11-29 DIAGNOSIS — S46.911A STRAIN OF RIGHT SHOULDER, INITIAL ENCOUNTER: ICD-10-CM

## 2018-11-29 DIAGNOSIS — M25.511 ACUTE PAIN OF RIGHT SHOULDER: Primary | ICD-10-CM

## 2018-11-29 PROCEDURE — 99214 OFFICE O/P EST MOD 30 MIN: CPT

## 2018-11-29 PROCEDURE — 73030 X-RAY EXAM OF SHOULDER: CPT | Performed by: FAMILY MEDICINE

## 2018-11-29 PROCEDURE — 96372 THER/PROPH/DIAG INJ SC/IM: CPT

## 2018-11-29 RX ORDER — KETOROLAC TROMETHAMINE 30 MG/ML
60 INJECTION, SOLUTION INTRAMUSCULAR; INTRAVENOUS ONCE
Status: COMPLETED | OUTPATIENT
Start: 2018-11-29 | End: 2018-11-29

## 2018-11-29 RX ORDER — PREDNISONE 10 MG/1
TABLET ORAL
Qty: 20 TABLET | Refills: 0 | Status: SHIPPED | OUTPATIENT
Start: 2018-11-29 | End: 2019-03-14 | Stop reason: ALTCHOICE

## 2018-11-29 RX ORDER — IBUPROFEN 400 MG/1
400 TABLET ORAL EVERY 6 HOURS PRN
COMMUNITY
End: 2019-04-24 | Stop reason: ALTCHOICE

## 2018-11-29 NOTE — ED PROVIDER NOTES
Patient Seen in: [facility]    History   Patient presents with:  Shoulder Pain    Stated Complaint: r shoulder pain    HPI    This 52-year-old female presents to the office with progressively worsening right shoulder pain for th Performed by Chris Hendrickson MD at [facility] MAIN OR       Family history reviewed and is not pertinent to presenting problem.     Social History    Tobacco Use      Smoking status: Passive Smoke Exposure - Never Smoker      Smokeless tobacco: Never Used shoulder, elbow, wrist and hand. Normal  strength to left hand. CMS is intact the left hand. SKIN: Warm and dry.       ED Course   Labs Reviewed - No data to display       Xr Shoulder, Complete (min 2 Views), Right (cpt=73030)    Result Date: 11/29/2 initial encounter    Disposition:  Discharge  11/29/2018 11:23 am    Follow-up:  NAOMI Prather/ Viv[id number] [place] [address]    Schedule an appointment as soon as possible for a visit in 3 days          Medications Presc

## 2018-11-29 NOTE — ED INITIAL ASSESSMENT (HPI)
Pt c/o entire right shoulder hurting. With pain also under her arm, around scapula and radiating into right arm. States feels weak when lifting. Has been this way for two months. States arm goes numb when she goes to sleep.

## 2019-03-14 ENCOUNTER — OFFICE VISIT (OUTPATIENT)
Dept: INTERNAL MEDICINE CLINIC | Facility: CLINIC | Age: 59
End: 2019-03-14
Payer: MEDICAID

## 2019-03-14 DIAGNOSIS — M25.511 ACUTE PAIN OF RIGHT SHOULDER: Primary | ICD-10-CM

## 2019-03-14 DIAGNOSIS — R91.8 PULMONARY NODULES: ICD-10-CM

## 2019-03-14 DIAGNOSIS — M79.641 RIGHT HAND PAIN: ICD-10-CM

## 2019-03-14 DIAGNOSIS — M25.551 ACUTE RIGHT HIP PAIN: ICD-10-CM

## 2019-03-14 DIAGNOSIS — J84.10 PULMONARY FIBROSIS (HCC): ICD-10-CM

## 2019-03-14 DIAGNOSIS — R73.03 PREDIABETES: ICD-10-CM

## 2019-03-14 DIAGNOSIS — Z00.00 PREVENTATIVE HEALTH CARE: ICD-10-CM

## 2019-03-14 DIAGNOSIS — Z12.4 SCREENING FOR CERVICAL CANCER: ICD-10-CM

## 2019-03-14 DIAGNOSIS — I10 ESSENTIAL HYPERTENSION: ICD-10-CM

## 2019-03-14 DIAGNOSIS — E78.1 HYPERTRIGLYCERIDEMIA: ICD-10-CM

## 2019-03-14 PROCEDURE — 99214 OFFICE O/P EST MOD 30 MIN: CPT | Performed by: INTERNAL MEDICINE

## 2019-03-14 RX ORDER — LISINOPRIL 40 MG/1
40 TABLET ORAL DAILY
COMMUNITY
End: 2020-02-03

## 2019-03-14 RX ORDER — AZITHROMYCIN 250 MG/1
250 TABLET, FILM COATED ORAL DAILY
COMMUNITY
End: 2019-09-06

## 2019-03-14 NOTE — PATIENT INSTRUCTIONS
- Get blood work done when fasting (at least 8 hours, water and medications only). - We will check x-ray of your right hand and right hip. - Use wrist brace/ACE wraps on your left wrist.    - We will check an MRI of your shoulder.   Referral department s

## 2019-03-14 NOTE — PROGRESS NOTES
Thalia Smith is a 63year old female.    HPI:   Patient presents with:  Shoulder Pain: Right side shoulder pain x 5 months  Joint Pain  Low Back Pain  Hand Pain: Right side   Follow - Up: 6 month follow up on chronic issues  Patient presents to follow up atorvastatin 20 MG Oral Tab, Take 20 mg by mouth daily. , Disp: , Rfl: 0  •  carvedilol 12.5 MG Oral Tab, Take 12.5 mg by mouth 2 (two) times daily. , Disp: , Rfl: 0  •  aspirin 81 MG Oral Tab, Take 81 mg by mouth daily. , Disp: , Rfl:   Medical:  has a past will check x-ray, recommend ACE wrap/wrist splint at night. - XR HAND (MIN 3 VIEWS), RIGHT (CPT=32549); Future    3. Acute right shoulder pain  For past five months. Decreased strength against resistance in right arm, decreased range of motion as well.

## 2019-03-16 VITALS
SYSTOLIC BLOOD PRESSURE: 130 MMHG | RESPIRATION RATE: 16 BRPM | HEART RATE: 88 BPM | TEMPERATURE: 98 F | DIASTOLIC BLOOD PRESSURE: 84 MMHG

## 2019-03-16 DIAGNOSIS — M25.611 DECREASED RANGE OF MOTION OF RIGHT SHOULDER: ICD-10-CM

## 2019-03-16 DIAGNOSIS — M25.511 ACUTE PAIN OF RIGHT SHOULDER: Primary | ICD-10-CM

## 2019-03-16 PROBLEM — R91.8 PULMONARY NODULES: Status: ACTIVE | Noted: 2018-09-12

## 2019-03-22 ENCOUNTER — HOSPITAL ENCOUNTER (OUTPATIENT)
Age: 59
Discharge: HOME | End: 2019-03-22
Attending: FAMILY MEDICINE
Payer: MEDICAID

## 2019-03-22 ENCOUNTER — APPOINTMENT (OUTPATIENT)
Dept: GENERAL RADIOLOGY | Age: 59
End: 2019-03-22
Attending: FAMILY MEDICINE
Payer: MEDICAID

## 2019-03-22 VITALS
TEMPERATURE: 98 F | SYSTOLIC BLOOD PRESSURE: 145 MMHG | DIASTOLIC BLOOD PRESSURE: 65 MMHG | RESPIRATION RATE: 16 BRPM | HEART RATE: 87 BPM | OXYGEN SATURATION: 96 %

## 2019-03-22 DIAGNOSIS — J84.10 PULMONARY FIBROSIS (HCC): ICD-10-CM

## 2019-03-22 DIAGNOSIS — J44.1 COPD EXACERBATION (HCC): Primary | ICD-10-CM

## 2019-03-22 PROCEDURE — 71046 X-RAY EXAM CHEST 2 VIEWS: CPT | Performed by: FAMILY MEDICINE

## 2019-03-22 PROCEDURE — 99214 OFFICE O/P EST MOD 30 MIN: CPT

## 2019-03-22 PROCEDURE — 94640 AIRWAY INHALATION TREATMENT: CPT

## 2019-03-22 RX ORDER — IPRATROPIUM BROMIDE AND ALBUTEROL SULFATE 2.5; .5 MG/3ML; MG/3ML
3 SOLUTION RESPIRATORY (INHALATION) ONCE
Status: COMPLETED | OUTPATIENT
Start: 2019-03-22 | End: 2019-03-22

## 2019-03-22 RX ORDER — ALBUTEROL SULFATE 90 UG/1
2 AEROSOL, METERED RESPIRATORY (INHALATION) EVERY 4 HOURS PRN
Qty: 1 INHALER | Refills: 0 | Status: SHIPPED | OUTPATIENT
Start: 2019-03-22 | End: 2021-07-16 | Stop reason: CLARIF

## 2019-03-22 RX ORDER — PREDNISONE 20 MG/1
TABLET ORAL
Qty: 10 TABLET | Refills: 0 | Status: SHIPPED | OUTPATIENT
Start: 2019-03-22 | End: 2019-04-24

## 2019-03-22 NOTE — ED INITIAL ASSESSMENT (HPI)
Here for eval of CASS that started 2 days ago. Has COPD, on home O2. Sts that she has had the flu initially.

## 2019-03-22 NOTE — ED PROVIDER NOTES
Patient Seen in: [facility]    History   Patient presents with:  Dyspnea CASS SOB (respiratory)    Stated Complaint: oxygen level low,difficulty breathing,has lung issues    HPI     This 60-year-old female with a known history fo All other systems reviewed and negative except as noted above.     Physical Exam     ED Triage Vitals   BP 03/22/19 1043 145/65   Pulse 03/22/19 1043 94   Resp 03/22/19 1043 18   Temp 03/22/19 1044 97.8 °F (36.6 °C)   Temp src 03/22/19 1043 Oral   SpO2 Normal heart size and vascularity. MEDIASTINUM:  Normal. PLEURA:  Normal.  No pleural effusions. BONES:  Normal for age. CONCLUSION:  There is stable moderate parenchymal fibrosis. Stable peribronchial thickening. No definite consolidation.    Dictat 4 (four) hours as needed for Wheezing or Shortness of Breath. CARRY YOUR INHALER WITH YOU. Qty: 1 Inhaler Refills: 0  Comments: You may dispense whatever version of albuterol MDI that is covered by the patient's insurance.         Restart your Zithromax on

## 2019-04-16 ENCOUNTER — HOSPITAL ENCOUNTER (OUTPATIENT)
Dept: MRI IMAGING | Age: 59
Discharge: HOME | End: 2019-04-16
Attending: INTERNAL MEDICINE
Payer: COMMERCIAL

## 2019-04-16 DIAGNOSIS — M25.511 ACUTE PAIN OF RIGHT SHOULDER: ICD-10-CM

## 2019-04-16 DIAGNOSIS — M25.611 DECREASED RANGE OF MOTION OF RIGHT SHOULDER: ICD-10-CM

## 2019-04-16 PROCEDURE — 73221 MRI JOINT UPR EXTREM W/O DYE: CPT | Performed by: INTERNAL MEDICINE

## 2019-04-24 ENCOUNTER — OFFICE VISIT (OUTPATIENT)
Dept: INTERNAL MEDICINE CLINIC | Facility: CLINIC | Age: 59
End: 2019-04-24
Payer: COMMERCIAL

## 2019-04-24 ENCOUNTER — LAB ENCOUNTER (OUTPATIENT)
Dept: LAB | Age: 59
End: 2019-04-24
Attending: INTERNAL MEDICINE
Payer: COMMERCIAL

## 2019-04-24 VITALS
HEART RATE: 68 BPM | DIASTOLIC BLOOD PRESSURE: 70 MMHG | HEIGHT: 62 IN | WEIGHT: 167.25 LBS | BODY MASS INDEX: 30.78 KG/M2 | TEMPERATURE: 98 F | SYSTOLIC BLOOD PRESSURE: 124 MMHG | RESPIRATION RATE: 16 BRPM

## 2019-04-24 DIAGNOSIS — E78.1 HYPERTRIGLYCERIDEMIA: ICD-10-CM

## 2019-04-24 DIAGNOSIS — Z00.00 PREVENTATIVE HEALTH CARE: ICD-10-CM

## 2019-04-24 DIAGNOSIS — I25.10 CORONARY ARTERY DISEASE INVOLVING NATIVE CORONARY ARTERY OF NATIVE HEART WITHOUT ANGINA PECTORIS: ICD-10-CM

## 2019-04-24 DIAGNOSIS — R73.03 PREDIABETES: ICD-10-CM

## 2019-04-24 DIAGNOSIS — E78.1 HYPERTRIGLYCERIDEMIA: Chronic | ICD-10-CM

## 2019-04-24 DIAGNOSIS — Z01.818 PREOPERATIVE EXAMINATION: Primary | ICD-10-CM

## 2019-04-24 DIAGNOSIS — M75.121 NONTRAUMATIC COMPLETE TEAR OF RIGHT ROTATOR CUFF: ICD-10-CM

## 2019-04-24 DIAGNOSIS — I10 ESSENTIAL HYPERTENSION: Chronic | ICD-10-CM

## 2019-04-24 DIAGNOSIS — R74.01 TRANSAMINITIS: ICD-10-CM

## 2019-04-24 DIAGNOSIS — J84.10 PULMONARY FIBROSIS (HCC): Chronic | ICD-10-CM

## 2019-04-24 DIAGNOSIS — R91.8 PULMONARY NODULES: Chronic | ICD-10-CM

## 2019-04-24 DIAGNOSIS — K21.9 GASTROESOPHAGEAL REFLUX DISEASE, ESOPHAGITIS PRESENCE NOT SPECIFIED: ICD-10-CM

## 2019-04-24 DIAGNOSIS — I10 ESSENTIAL HYPERTENSION: ICD-10-CM

## 2019-04-24 PROBLEM — K57.32 DIVERTICULITIS LARGE INTESTINE W/O PERFORATION OR ABSCESS W/O BLEEDING: Status: RESOLVED | Noted: 2017-11-14 | Resolved: 2019-04-24

## 2019-04-24 PROCEDURE — 80050 GENERAL HEALTH PANEL: CPT | Performed by: INTERNAL MEDICINE

## 2019-04-24 PROCEDURE — 80061 LIPID PANEL: CPT | Performed by: INTERNAL MEDICINE

## 2019-04-24 PROCEDURE — 36415 COLL VENOUS BLD VENIPUNCTURE: CPT | Performed by: INTERNAL MEDICINE

## 2019-04-24 PROCEDURE — 83036 HEMOGLOBIN GLYCOSYLATED A1C: CPT | Performed by: INTERNAL MEDICINE

## 2019-04-24 PROCEDURE — 99244 OFF/OP CNSLTJ NEW/EST MOD 40: CPT | Performed by: INTERNAL MEDICINE

## 2019-04-24 PROCEDURE — 93000 ELECTROCARDIOGRAM COMPLETE: CPT | Performed by: INTERNAL MEDICINE

## 2019-04-24 RX ORDER — OMEPRAZOLE 40 MG/1
40 CAPSULE, DELAYED RELEASE ORAL DAILY
Qty: 90 CAPSULE | Refills: 1 | Status: SHIPPED | OUTPATIENT
Start: 2019-04-24 | End: 2021-04-21

## 2019-04-24 NOTE — PATIENT INSTRUCTIONS
- From my perspective, you are ok for surgery  - Follow up with the pulmonologist as scheduled. - Stop aspirin and anything over the counter 1 week prior to surgery. It was a pleasure seeing you in the clinic today.   Thank you for choosing the [facility]

## 2019-04-24 NOTE — PROGRESS NOTES
Vidal Howard is a 63year old female who presents for a pre-operative physical exam.   Vidal Howard is scheduled for a right shoulder arthroscopy procedure on 5/16/19 performed by Dr. Lainey Verduzco, who has requested that I provide pre-operative c Outpatient Medications:   •  Omeprazole 40 MG Oral Capsule Delayed Release, Take 1 capsule (40 mg total) by mouth daily. , Disp: 90 capsule, Rfl: 1  •  Albuterol Sulfate  (90 Base) MCG/ACT Inhalation Aero Soln, Inhale 2 puffs into the lungs every 4 ( and conjunctiva  LUNGS: clear to auscultation bilaterally  CARDIO: RRR without murmurs. No clubbing, cyanosis or edema.   GI: soft non tender nondistended no hepatosplenomegaly, bowel sounds throughout  NEURO: CN II-XII intact  MS: decreased ROM right shou longer requiring nocturnal O2. She will be seeing Pulmonology for pre-operative evaluation as well. 9. Gastroesophageal reflux disease, esophagitis presence not specified  Stable. Continue omeprazole; refilled as below.   - Omeprazole 40 MG Oral Capsu

## 2019-05-08 ENCOUNTER — TELEPHONE (OUTPATIENT)
Dept: INTERNAL MEDICINE CLINIC | Facility: CLINIC | Age: 59
End: 2019-05-08

## 2019-05-08 NOTE — TELEPHONE ENCOUNTER
Patient calling in, stated her surgeons office, Desiree Benitez, Dr. Amber Luciano office have still not received the pre-op documents. Please send the preliminary tests pt completed for her upcoming surgery.     Fax: [phone]  T: [phone]

## 2019-09-01 ENCOUNTER — HOSPITAL ENCOUNTER (OUTPATIENT)
Age: 59
Discharge: HOME | End: 2019-09-01
Payer: MEDICAID

## 2019-09-01 ENCOUNTER — APPOINTMENT (OUTPATIENT)
Dept: GENERAL RADIOLOGY | Age: 59
End: 2019-09-01
Attending: PHYSICIAN ASSISTANT
Payer: MEDICAID

## 2019-09-01 VITALS
RESPIRATION RATE: 16 BRPM | HEART RATE: 88 BPM | DIASTOLIC BLOOD PRESSURE: 87 MMHG | TEMPERATURE: 99 F | OXYGEN SATURATION: 96 % | SYSTOLIC BLOOD PRESSURE: 149 MMHG

## 2019-09-01 DIAGNOSIS — R50.9 FEVER, UNSPECIFIED FEVER CAUSE: ICD-10-CM

## 2019-09-01 DIAGNOSIS — J06.9 UPPER RESPIRATORY TRACT INFECTION, UNSPECIFIED TYPE: ICD-10-CM

## 2019-09-01 DIAGNOSIS — J84.10 PULMONARY FIBROSIS, UNSPECIFIED (HCC): Primary | ICD-10-CM

## 2019-09-01 DIAGNOSIS — J01.90 ACUTE SINUSITIS, RECURRENCE NOT SPECIFIED, UNSPECIFIED LOCATION: ICD-10-CM

## 2019-09-01 LAB
#MXD IC: 1 X10ˆ3/UL (ref 0.1–1)
ATRIAL RATE: 82 BPM
CREAT BLD-MCNC: 0.5 MG/DL (ref 0.55–1.02)
DDIMER WHOLE BLOOD: <100 NG/ML DDU (ref ?–400)
GLUCOSE BLD-MCNC: 182 MG/DL (ref 70–99)
HCT VFR BLD AUTO: 42.7 % (ref 35–48)
HGB BLD-MCNC: 15 G/DL (ref 12–16)
ISTAT BUN: 6 MG/DL (ref 8–20)
ISTAT CHLORIDE: 102 MMOL/L (ref 101–111)
ISTAT HEMATOCRIT: 45 % (ref 34–50)
ISTAT IONIZED CALCIUM FOR CHEM 8: 1.13 MMOL/L (ref 1.12–1.32)
ISTAT POTASSIUM: 4.2 MMOL/L (ref 3.6–5.1)
ISTAT SODIUM: 137 MMOL/L (ref 136–145)
ISTAT TROPONIN: <0.1 NG/ML (ref ?–0.1)
LYMPHOCYTES # BLD AUTO: 2.3 X10ˆ3/UL (ref 1–4)
LYMPHOCYTES NFR BLD AUTO: 29.9 %
MCH RBC QN AUTO: 29.8 PG (ref 26–34)
MCHC RBC AUTO-ENTMCNC: 35.1 G/DL (ref 31–37)
MCV RBC AUTO: 84.7 FL (ref 80–100)
MIXED CELL %: 12.8 %
NEUTROPHILS # BLD AUTO: 4.5 X10ˆ3/UL (ref 1.5–7.7)
NEUTROPHILS NFR BLD AUTO: 57.3 %
P AXIS: 63 DEGREES
P-R INTERVAL: 132 MS
PLATELET # BLD AUTO: 230 X10ˆ3/UL (ref 150–450)
Q-T INTERVAL: 388 MS
QRS DURATION: 86 MS
QTC CALCULATION (BEZET): 453 MS
R AXIS: 29 DEGREES
RBC # BLD AUTO: 5.04 X10ˆ6/UL (ref 3.8–5.3)
T AXIS: 30 DEGREES
VENTRICULAR RATE: 82 BPM
WBC # BLD AUTO: 7.8 X10ˆ3/UL (ref 4–11)

## 2019-09-01 PROCEDURE — 99215 OFFICE O/P EST HI 40 MIN: CPT

## 2019-09-01 PROCEDURE — 93005 ELECTROCARDIOGRAM TRACING: CPT

## 2019-09-01 PROCEDURE — 80047 BASIC METABLC PNL IONIZED CA: CPT

## 2019-09-01 PROCEDURE — 36415 COLL VENOUS BLD VENIPUNCTURE: CPT

## 2019-09-01 PROCEDURE — 85025 COMPLETE CBC W/AUTO DIFF WBC: CPT | Performed by: PHYSICIAN ASSISTANT

## 2019-09-01 PROCEDURE — 85378 FIBRIN DEGRADE SEMIQUANT: CPT | Performed by: PHYSICIAN ASSISTANT

## 2019-09-01 PROCEDURE — 94640 AIRWAY INHALATION TREATMENT: CPT

## 2019-09-01 PROCEDURE — 84484 ASSAY OF TROPONIN QUANT: CPT

## 2019-09-01 PROCEDURE — 71046 X-RAY EXAM CHEST 2 VIEWS: CPT | Performed by: PHYSICIAN ASSISTANT

## 2019-09-01 PROCEDURE — 93010 ELECTROCARDIOGRAM REPORT: CPT

## 2019-09-01 RX ORDER — IPRATROPIUM BROMIDE AND ALBUTEROL SULFATE 2.5; .5 MG/3ML; MG/3ML
3 SOLUTION RESPIRATORY (INHALATION) ONCE
Status: COMPLETED | OUTPATIENT
Start: 2019-09-01 | End: 2019-09-01

## 2019-09-01 RX ORDER — PREDNISONE 20 MG/1
40 TABLET ORAL DAILY
Qty: 8 TABLET | Refills: 0 | Status: SHIPPED | OUTPATIENT
Start: 2019-09-01 | End: 2019-09-05

## 2019-09-01 RX ORDER — CEFDINIR 300 MG/1
300 CAPSULE ORAL 2 TIMES DAILY
Qty: 20 CAPSULE | Refills: 0 | Status: SHIPPED | OUTPATIENT
Start: 2019-09-01 | End: 2019-09-11

## 2019-09-01 RX ORDER — DEXAMETHASONE SODIUM PHOSPHATE 4 MG/ML
10 VIAL (ML) INJECTION ONCE
Status: COMPLETED | OUTPATIENT
Start: 2019-09-01 | End: 2019-09-01

## 2019-09-01 NOTE — ED PROVIDER NOTES
Patient Seen in: [facility]    History   Patient presents with:  Cough/URI    Stated Complaint: cold x 5 days    HPI    60-year-old female with an extensive medical history that includes pulmonary fibrosis and pulmonary nodules External ear normal. Tympanic membrane is bulging. Left Ear: External ear normal. Tympanic membrane is bulging. Nose: Mucosal edema and rhinorrhea present. Right sinus exhibits maxillary sinus tenderness. Left sinus exhibits maxillary sinus tenderness. heart size. No sign of pneumonia, pleural effusion, pneumothorax, hyperinflation. Mild pectus excavatum.    Dictated by: Terry Barrera MD on 9/01/2019 at 9:23     Approved by: Terry Barrera MD           NOTE: PT has opened after tx. she reports that s

## 2019-09-01 NOTE — ED INITIAL ASSESSMENT (HPI)
Pt presents with coarse bronchial cough. States usually on a daily Azithromycin for one year. Insurance stopped paying 45 days ago. Insurance reinstated today. Has home oxygen, not using.

## 2019-09-04 NOTE — PLAN OF CARE
Problem: RESPIRATORY - ADULT  Goal: Achieves optimal ventilation and oxygenation  INTERVENTIONS:  - Assess for changes in respiratory status  - Assess for changes in mentation and behavior  - Position to facilitate oxygenation and minimize respiratory effo 04-Sep-2019 12:10

## 2019-09-06 ENCOUNTER — OFFICE VISIT (OUTPATIENT)
Dept: INTERNAL MEDICINE CLINIC | Facility: CLINIC | Age: 59
End: 2019-09-06
Payer: MEDICAID

## 2019-09-06 DIAGNOSIS — M25.532 LEFT WRIST PAIN: ICD-10-CM

## 2019-09-06 DIAGNOSIS — J84.10 PULMONARY FIBROSIS (HCC): ICD-10-CM

## 2019-09-06 DIAGNOSIS — I25.10 CORONARY ARTERY DISEASE INVOLVING NATIVE CORONARY ARTERY OF NATIVE HEART WITHOUT ANGINA PECTORIS: Chronic | ICD-10-CM

## 2019-09-06 DIAGNOSIS — R73.03 PREDIABETES: ICD-10-CM

## 2019-09-06 DIAGNOSIS — M79.642 LEFT HAND PAIN: Primary | ICD-10-CM

## 2019-09-06 DIAGNOSIS — I10 ESSENTIAL HYPERTENSION: Chronic | ICD-10-CM

## 2019-09-06 DIAGNOSIS — E78.1 HYPERTRIGLYCERIDEMIA: Chronic | ICD-10-CM

## 2019-09-06 DIAGNOSIS — Z12.31 ENCOUNTER FOR SCREENING MAMMOGRAM FOR BREAST CANCER: ICD-10-CM

## 2019-09-06 PROCEDURE — 99214 OFFICE O/P EST MOD 30 MIN: CPT | Performed by: INTERNAL MEDICINE

## 2019-09-06 RX ORDER — AZITHROMYCIN 250 MG/1
250 TABLET, FILM COATED ORAL DAILY
Qty: 90 TABLET | Refills: 0 | Status: SHIPPED | OUTPATIENT
Start: 2019-09-06 | End: 2020-02-03 | Stop reason: ALTCHOICE

## 2019-09-06 NOTE — PROGRESS NOTES
Mike Segal is a 63year old female. HPI:   Patient presents with:  Wrist Pain: Pt c/o left wrist pain. Patient presents with an acute musculoskeletal complaint.   Patient has been dealing with pain in left wrist.  It has been going on for 8 month by mouth daily. , Disp: , Rfl:   •  hydrochlorothiazide 12.5 MG Oral Cap, Take 1 capsule (12.5 mg total) by mouth daily.  (Patient taking differently: Take 12.5 mg by mouth daily as needed.  ), Disp: 90 capsule, Rfl: 1  •  atorvastatin 20 MG Oral Tab, Take 2 pain and decreased range of motion over the last 8 months. NSAID's not helping. Will check x-rays as below. Will also have patient see Ortho Hand - information provided for in-network specialists. - XR HAND (MIN 3 VIEWS), LEFT (CPT=73130);  Future  - XR

## 2019-09-08 VITALS
HEART RATE: 80 BPM | RESPIRATION RATE: 16 BRPM | BODY MASS INDEX: 32.07 KG/M2 | WEIGHT: 174.25 LBS | DIASTOLIC BLOOD PRESSURE: 84 MMHG | TEMPERATURE: 98 F | SYSTOLIC BLOOD PRESSURE: 136 MMHG | HEIGHT: 62 IN

## 2019-09-08 PROBLEM — I25.10 CORONARY ARTERY DISEASE INVOLVING NATIVE CORONARY ARTERY OF NATIVE HEART WITHOUT ANGINA PECTORIS: Chronic | Status: ACTIVE | Noted: 2019-04-24

## 2019-09-09 ENCOUNTER — HOSPITAL ENCOUNTER (OUTPATIENT)
Dept: GENERAL RADIOLOGY | Age: 59
Discharge: HOME | End: 2019-09-09
Attending: INTERNAL MEDICINE
Payer: MEDICAID

## 2019-09-09 DIAGNOSIS — M79.642 LEFT HAND PAIN: ICD-10-CM

## 2019-09-09 DIAGNOSIS — M25.532 LEFT WRIST PAIN: ICD-10-CM

## 2019-09-09 PROCEDURE — 73110 X-RAY EXAM OF WRIST: CPT | Performed by: INTERNAL MEDICINE

## 2019-09-09 PROCEDURE — 73130 X-RAY EXAM OF HAND: CPT | Performed by: INTERNAL MEDICINE

## 2019-09-13 ENCOUNTER — HOSPITAL ENCOUNTER (OUTPATIENT)
Dept: MAMMOGRAPHY | Age: 59
Discharge: HOME | End: 2019-09-13
Attending: INTERNAL MEDICINE
Payer: MEDICAID

## 2019-09-13 DIAGNOSIS — Z12.31 ENCOUNTER FOR SCREENING MAMMOGRAM FOR BREAST CANCER: ICD-10-CM

## 2019-09-13 PROCEDURE — 77067 SCR MAMMO BI INCL CAD: CPT | Performed by: INTERNAL MEDICINE

## 2019-09-13 PROCEDURE — 77063 BREAST TOMOSYNTHESIS BI: CPT | Performed by: INTERNAL MEDICINE

## 2019-10-08 ENCOUNTER — HOSPITAL ENCOUNTER (OUTPATIENT)
Age: 59
Discharge: HOME | End: 2019-10-08
Payer: MEDICAID

## 2019-10-08 ENCOUNTER — APPOINTMENT (OUTPATIENT)
Dept: CT IMAGING | Age: 59
End: 2019-10-08
Attending: NURSE PRACTITIONER
Payer: MEDICAID

## 2019-10-08 VITALS
RESPIRATION RATE: 16 BRPM | DIASTOLIC BLOOD PRESSURE: 59 MMHG | OXYGEN SATURATION: 99 % | TEMPERATURE: 99 F | HEIGHT: 62 IN | WEIGHT: 168 LBS | HEART RATE: 90 BPM | SYSTOLIC BLOOD PRESSURE: 119 MMHG | BODY MASS INDEX: 30.91 KG/M2

## 2019-10-08 DIAGNOSIS — R10.12 LUQ ABDOMINAL PAIN: ICD-10-CM

## 2019-10-08 DIAGNOSIS — R10.13 EPIGASTRIC PAIN: Primary | ICD-10-CM

## 2019-10-08 PROCEDURE — 96374 THER/PROPH/DIAG INJ IV PUSH: CPT

## 2019-10-08 PROCEDURE — 74177 CT ABD & PELVIS W/CONTRAST: CPT | Performed by: NURSE PRACTITIONER

## 2019-10-08 PROCEDURE — 80047 BASIC METABLC PNL IONIZED CA: CPT

## 2019-10-08 PROCEDURE — 99214 OFFICE O/P EST MOD 30 MIN: CPT

## 2019-10-08 PROCEDURE — 82272 OCCULT BLD FECES 1-3 TESTS: CPT | Performed by: NURSE PRACTITIONER

## 2019-10-08 PROCEDURE — S0028 INJECTION, FAMOTIDINE, 20 MG: HCPCS

## 2019-10-08 PROCEDURE — 96361 HYDRATE IV INFUSION ADD-ON: CPT

## 2019-10-08 PROCEDURE — 96375 TX/PRO/DX INJ NEW DRUG ADDON: CPT

## 2019-10-08 PROCEDURE — 81002 URINALYSIS NONAUTO W/O SCOPE: CPT | Performed by: NURSE PRACTITIONER

## 2019-10-08 PROCEDURE — 85025 COMPLETE CBC W/AUTO DIFF WBC: CPT | Performed by: NURSE PRACTITIONER

## 2019-10-08 RX ORDER — FAMOTIDINE 10 MG/ML
20 INJECTION, SOLUTION INTRAVENOUS ONCE
Status: COMPLETED | OUTPATIENT
Start: 2019-10-08 | End: 2019-10-08

## 2019-10-08 RX ORDER — SODIUM CHLORIDE 9 MG/ML
1000 INJECTION, SOLUTION INTRAVENOUS ONCE
Status: COMPLETED | OUTPATIENT
Start: 2019-10-08 | End: 2019-10-08

## 2019-10-08 RX ORDER — KETOROLAC TROMETHAMINE 30 MG/ML
30 INJECTION, SOLUTION INTRAMUSCULAR; INTRAVENOUS ONCE
Status: COMPLETED | OUTPATIENT
Start: 2019-10-08 | End: 2019-10-08

## 2019-10-08 NOTE — ED INITIAL ASSESSMENT (HPI)
Patient presents with two week h/o of left upper abdominal pain. No nausea,vomiting or diarrhea. No blood in BM yesterday. s/p hemicolectomy about a year and half ago. No fevers noted.

## 2019-10-08 NOTE — ED PROVIDER NOTES
Patient Seen in: [facility]      History   Patient presents with:  Abdominal Pain    Stated Complaint: abdominal pain, x2weeks    HPI  Patient is a 60-year-old female past medical history of hypertension, hyperlipidemia, prediab Past Surgical History:   Procedure Laterality Date   • COLONOSCOPY  12/20/2018    polyps   • OTHER  02/26/2018    resection Splenic flexure   • XI ROBOT-ASSISTED LAPAROSCOPIC LOW ANTERIOR COLON RESECTION Left 2/26/2018    Performed by Kimberly Maciel entire left side of abdomen and LLQ). There is no rigidity, no rebound, no guarding, no CVA tenderness, no tenderness at McBurney's point and negative Dan's sign. Neurological: She is alert and oriented to person, place, and time.    Skin: Skin is warm she has had all over pain in her left wrist for nine months. FINDINGS:  No evidence of acute displaced fracture or dislocation. Normal mineralization. Unremarkable soft tissues.       CONCLUSION:  No evidence of acute displaced fracture or dislocation i disease predominantly involves sigmoid  colon. Postsurgical changes with patent anastomosis involves the more distal colon. ABDOMINAL WALL:  Small umbilical fat containing hernia. URINARY BLADDER:  No visible focal wall thickening, lesion, or calculus. pain or fever or any signs of GI bleeding. Patient case discussed with Dr. Penny Gomez.    Disposition and Plan     Clinical Impression:  Epigastric pain  (primary encounter diagnosis)  LUQ abdominal pain    Disposition:  Discharge  10/8/2019  2:52 pm

## 2020-01-12 ENCOUNTER — HOSPITAL ENCOUNTER (OUTPATIENT)
Age: 60
Discharge: HOME | End: 2020-01-12
Attending: FAMILY MEDICINE
Payer: MEDICAID

## 2020-01-12 ENCOUNTER — APPOINTMENT (OUTPATIENT)
Dept: GENERAL RADIOLOGY | Age: 60
End: 2020-01-12
Attending: FAMILY MEDICINE
Payer: MEDICAID

## 2020-01-12 VITALS
TEMPERATURE: 98 F | DIASTOLIC BLOOD PRESSURE: 79 MMHG | OXYGEN SATURATION: 95 % | RESPIRATION RATE: 18 BRPM | SYSTOLIC BLOOD PRESSURE: 136 MMHG | HEART RATE: 84 BPM

## 2020-01-12 DIAGNOSIS — Z87.09 HX OF PULMONARY FIBROSIS: ICD-10-CM

## 2020-01-12 DIAGNOSIS — J44.1 COPD EXACERBATION (HCC): Primary | ICD-10-CM

## 2020-01-12 PROCEDURE — 71046 X-RAY EXAM CHEST 2 VIEWS: CPT | Performed by: FAMILY MEDICINE

## 2020-01-12 PROCEDURE — 96372 THER/PROPH/DIAG INJ SC/IM: CPT

## 2020-01-12 PROCEDURE — 99214 OFFICE O/P EST MOD 30 MIN: CPT

## 2020-01-12 PROCEDURE — 94640 AIRWAY INHALATION TREATMENT: CPT

## 2020-01-12 RX ORDER — IPRATROPIUM BROMIDE AND ALBUTEROL SULFATE 2.5; .5 MG/3ML; MG/3ML
3 SOLUTION RESPIRATORY (INHALATION) ONCE
Status: COMPLETED | OUTPATIENT
Start: 2020-01-12 | End: 2020-01-12

## 2020-01-12 RX ORDER — PREDNISONE 20 MG/1
20 TABLET ORAL 2 TIMES DAILY
Qty: 10 TABLET | Refills: 0 | Status: SHIPPED | OUTPATIENT
Start: 2020-01-12 | End: 2020-01-17

## 2020-01-12 RX ORDER — METHYLPREDNISOLONE SODIUM SUCCINATE 125 MG/2ML
125 INJECTION, POWDER, LYOPHILIZED, FOR SOLUTION INTRAMUSCULAR; INTRAVENOUS ONCE
Status: COMPLETED | OUTPATIENT
Start: 2020-01-12 | End: 2020-01-12

## 2020-01-12 RX ORDER — AZITHROMYCIN 250 MG/1
TABLET, FILM COATED ORAL
Qty: 6 TABLET | Refills: 0 | Status: SHIPPED | OUTPATIENT
Start: 2020-01-12 | End: 2020-02-03 | Stop reason: ALTCHOICE

## 2020-01-12 RX ORDER — ALBUTEROL SULFATE 2.5 MG/3ML
2.5 SOLUTION RESPIRATORY (INHALATION) EVERY 4 HOURS PRN
Qty: 120 AMPULE | Refills: 0 | Status: SHIPPED | OUTPATIENT
Start: 2020-01-12 | End: 2020-02-11

## 2020-01-12 NOTE — ED INITIAL ASSESSMENT (HPI)
Pt began 2-3 weeks ago with a head/sinus congestion ill\ness, and for the past 2 weeks it has gone into her lungs causing a bad cough and her lungs to hurt

## 2020-01-12 NOTE — ED PROVIDER NOTES
Patient Seen in: [facility]      History   Patient presents with:  Cough/URI    Stated Complaint: CONGESTION/SORE THROAT X 5 DAYS    HPI  56-year-old female with a significant past medical history of COPD pulmonary fibrosis form negative except as noted above.     Physical Exam     ED Triage Vitals [01/12/20 0918]   BP (!) 179/86   Pulse 77   Resp 20   Temp 97.9 °F (36.6 °C)   Temp src Temporal   SpO2 94 %   O2 Device None (Room air)       Current:/86   Pulse 70   Temp 97.9 ° place, and time.                ED Course   Labs Reviewed - No data to display               MDM   bp check manual sitting left arm  140/86  Patient received a DuoNeb treatmentx2     Post neb treatment   Lungs on auscultation much clearer   No wheezing  Sherri Rosas Tab  Take 1 tablet (20 mg total) by mouth 2 (two) times daily for 5 days. Qty: 10 tablet Refills: 0    !! azithromycin 250 MG Oral Tab  Take 2 tab day 1 and then 1 tab day 2-5  Qty: 6 tablet Refills: 0    !! - Potential duplicate medications found.  Please

## 2020-01-31 ENCOUNTER — HOSPITAL ENCOUNTER (OUTPATIENT)
Age: 60
Discharge: ANOTHER HOSPITAL | End: 2020-01-31
Attending: FAMILY MEDICINE
Payer: MEDICAID

## 2020-01-31 ENCOUNTER — APPOINTMENT (OUTPATIENT)
Dept: CT IMAGING | Facility: HOSPITAL | Age: 60
End: 2020-01-31
Attending: EMERGENCY MEDICINE
Payer: MEDICAID

## 2020-01-31 ENCOUNTER — HOSPITAL ENCOUNTER (EMERGENCY)
Facility: HOSPITAL | Age: 60
Discharge: HOME | End: 2020-01-31
Attending: EMERGENCY MEDICINE
Payer: MEDICAID

## 2020-01-31 VITALS
TEMPERATURE: 98 F | DIASTOLIC BLOOD PRESSURE: 76 MMHG | HEIGHT: 62 IN | OXYGEN SATURATION: 95 % | HEART RATE: 75 BPM | RESPIRATION RATE: 12 BRPM | WEIGHT: 165 LBS | SYSTOLIC BLOOD PRESSURE: 145 MMHG | BODY MASS INDEX: 30.36 KG/M2

## 2020-01-31 VITALS
WEIGHT: 165 LBS | HEART RATE: 72 BPM | TEMPERATURE: 98 F | SYSTOLIC BLOOD PRESSURE: 178 MMHG | RESPIRATION RATE: 16 BRPM | DIASTOLIC BLOOD PRESSURE: 90 MMHG | OXYGEN SATURATION: 96 % | BODY MASS INDEX: 30.36 KG/M2 | HEIGHT: 62 IN

## 2020-01-31 DIAGNOSIS — R74.8 ABNORMAL LIVER ENZYMES: ICD-10-CM

## 2020-01-31 DIAGNOSIS — R19.4 CHANGE IN BOWEL HABITS: ICD-10-CM

## 2020-01-31 DIAGNOSIS — R14.0 BLOATING: ICD-10-CM

## 2020-01-31 DIAGNOSIS — R10.11 ABDOMINAL PAIN, RIGHT UPPER QUADRANT: Primary | ICD-10-CM

## 2020-01-31 DIAGNOSIS — R10.11 RUQ ABDOMINAL PAIN: Primary | ICD-10-CM

## 2020-01-31 LAB
ALBUMIN SERPL-MCNC: 4.3 G/DL (ref 3.4–5)
ALBUMIN/GLOB SERPL: 0.9 {RATIO} (ref 1–2)
ALP LIVER SERPL-CCNC: 166 U/L (ref 46–118)
ALT SERPL-CCNC: 107 U/L (ref 13–56)
ANION GAP SERPL CALC-SCNC: 7 MMOL/L (ref 0–18)
AST SERPL-CCNC: 55 U/L (ref 15–37)
BASOPHILS # BLD AUTO: 0.09 X10(3) UL (ref 0–0.2)
BASOPHILS NFR BLD AUTO: 1 %
BILIRUB SERPL-MCNC: 0.6 MG/DL (ref 0.1–2)
BILIRUB UR QL STRIP.AUTO: NEGATIVE
BUN BLD-MCNC: 10 MG/DL (ref 7–18)
BUN/CREAT SERPL: 11.8 (ref 10–20)
CALCIUM BLD-MCNC: 9.9 MG/DL (ref 8.5–10.1)
CHLORIDE SERPL-SCNC: 107 MMOL/L (ref 98–112)
CLARITY UR REFRACT.AUTO: CLEAR
CO2 SERPL-SCNC: 26 MMOL/L (ref 21–32)
COLOR UR AUTO: YELLOW
CREAT BLD-MCNC: 0.85 MG/DL (ref 0.55–1.02)
DEPRECATED RDW RBC AUTO: 37.9 FL (ref 35.1–46.3)
EOSINOPHIL # BLD AUTO: 0.23 X10(3) UL (ref 0–0.7)
EOSINOPHIL NFR BLD AUTO: 2.5 %
ERYTHROCYTE [DISTWIDTH] IN BLOOD BY AUTOMATED COUNT: 12.3 % (ref 11–15)
GLOBULIN PLAS-MCNC: 4.6 G/DL (ref 2.8–4.4)
GLUCOSE BLD-MCNC: 88 MG/DL (ref 70–99)
GLUCOSE UR STRIP.AUTO-MCNC: NEGATIVE MG/DL
HCT VFR BLD AUTO: 46.9 % (ref 35–48)
HGB BLD-MCNC: 16 G/DL (ref 12–16)
IMM GRANULOCYTES # BLD AUTO: 0.02 X10(3) UL (ref 0–1)
IMM GRANULOCYTES NFR BLD: 0.2 %
KETONES UR STRIP.AUTO-MCNC: NEGATIVE MG/DL
LEUKOCYTE ESTERASE UR QL STRIP.AUTO: NEGATIVE
LIPASE SERPL-CCNC: 221 U/L (ref 73–393)
LYMPHOCYTES # BLD AUTO: 3.03 X10(3) UL (ref 1–4)
LYMPHOCYTES NFR BLD AUTO: 32.8 %
M PROTEIN MFR SERPL ELPH: 8.9 G/DL (ref 6.4–8.2)
MCH RBC QN AUTO: 29.2 PG (ref 26–34)
MCHC RBC AUTO-ENTMCNC: 34.1 G/DL (ref 31–37)
MCV RBC AUTO: 85.6 FL (ref 80–100)
MONOCYTES # BLD AUTO: 0.78 X10(3) UL (ref 0.1–1)
MONOCYTES NFR BLD AUTO: 8.5 %
NEUTROPHILS # BLD AUTO: 5.08 X10 (3) UL (ref 1.5–7.7)
NEUTROPHILS # BLD AUTO: 5.08 X10(3) UL (ref 1.5–7.7)
NEUTROPHILS NFR BLD AUTO: 55 %
NITRITE UR QL STRIP.AUTO: NEGATIVE
OSMOLALITY SERPL CALC.SUM OF ELEC: 288 MOSM/KG (ref 275–295)
PH UR STRIP.AUTO: 5 [PH] (ref 4.5–8)
PLATELET # BLD AUTO: 276 10(3)UL (ref 150–450)
POCT BILIRUBIN URINE: NEGATIVE
POCT BLOOD URINE: NEGATIVE
POCT GLUCOSE URINE: NEGATIVE MG/DL
POCT KETONE URINE: NEGATIVE MG/DL
POCT LEUKOCYTE ESTERASE URINE: NEGATIVE
POCT NITRITE URINE: NEGATIVE
POCT PH URINE: 5.5 (ref 5–8)
POCT PROTEIN URINE: NEGATIVE MG/DL
POCT SPECIFIC GRAVITY URINE: 1.02
POCT URINE CLARITY: CLEAR
POCT URINE COLOR: YELLOW
POCT UROBILINOGEN URINE: 0.2 MG/DL
POTASSIUM SERPL-SCNC: 3.8 MMOL/L (ref 3.5–5.1)
PROT UR STRIP.AUTO-MCNC: NEGATIVE MG/DL
RBC # BLD AUTO: 5.48 X10(6)UL (ref 3.8–5.3)
RBC UR QL AUTO: NEGATIVE
SODIUM SERPL-SCNC: 140 MMOL/L (ref 136–145)
SP GR UR STRIP.AUTO: 1.02 (ref 1–1.03)
UROBILINOGEN UR STRIP.AUTO-MCNC: <2 MG/DL
WBC # BLD AUTO: 9.2 X10(3) UL (ref 4–11)

## 2020-01-31 PROCEDURE — 99212 OFFICE O/P EST SF 10 MIN: CPT

## 2020-01-31 PROCEDURE — 85025 COMPLETE CBC W/AUTO DIFF WBC: CPT | Performed by: EMERGENCY MEDICINE

## 2020-01-31 PROCEDURE — 80053 COMPREHEN METABOLIC PANEL: CPT | Performed by: EMERGENCY MEDICINE

## 2020-01-31 PROCEDURE — 81003 URINALYSIS AUTO W/O SCOPE: CPT | Performed by: EMERGENCY MEDICINE

## 2020-01-31 PROCEDURE — 96374 THER/PROPH/DIAG INJ IV PUSH: CPT

## 2020-01-31 PROCEDURE — 99213 OFFICE O/P EST LOW 20 MIN: CPT

## 2020-01-31 PROCEDURE — 99285 EMERGENCY DEPT VISIT HI MDM: CPT

## 2020-01-31 PROCEDURE — 74177 CT ABD & PELVIS W/CONTRAST: CPT | Performed by: EMERGENCY MEDICINE

## 2020-01-31 PROCEDURE — 83690 ASSAY OF LIPASE: CPT | Performed by: EMERGENCY MEDICINE

## 2020-01-31 PROCEDURE — 81002 URINALYSIS NONAUTO W/O SCOPE: CPT | Performed by: FAMILY MEDICINE

## 2020-01-31 RX ORDER — TRAMADOL HYDROCHLORIDE 50 MG/1
50 TABLET ORAL EVERY 6 HOURS PRN
Qty: 12 TABLET | Refills: 0 | Status: SHIPPED | OUTPATIENT
Start: 2020-01-31 | End: 2020-02-10

## 2020-01-31 RX ORDER — DOCUSATE SODIUM 100 MG/1
2 CAPSULE, LIQUID FILLED ORAL EVERY MORNING
COMMUNITY

## 2020-01-31 RX ORDER — MORPHINE SULFATE 4 MG/ML
4 INJECTION, SOLUTION INTRAMUSCULAR; INTRAVENOUS ONCE
Status: COMPLETED | OUTPATIENT
Start: 2020-01-31 | End: 2020-01-31

## 2020-01-31 NOTE — ED NOTES
Pt instructed to go directly to THE [facility] ED and stay NPO; pt verbalized understanding.

## 2020-01-31 NOTE — ED PROVIDER NOTES
Patient Seen in: [facility] Emergency Department      History   Patient presents with:  Abdomen/Flank Pain    Stated Complaint: abd pain    HPI    Patient is a 56-year-old female who presents for evaluation of right upper quadrant and right flank pa and vital signs reviewed. All other systems reviewed and negative except as noted above.     Physical Exam     ED Triage Vitals   BP 01/31/20 1118 150/77   Pulse 01/31/20 1118 75   Resp 01/31/20 1118 18   Temp 01/31/20 1118 98 °F (36.7 °C)   Temp src - were created for panel order CBC WITH DIFFERENTIAL WITH PLATELET.   Procedure                               Abnormality         Status                     ---------                               -----------         ------                     CBC W/ DIFFERFAISAL ordered. She may need follow-up gallbladder ultrasound if CT is suspicious for biliary pathology. Update at 2:50 PM.  Labs demonstrate mild elevations in liver transaminases. These were normal in April which is the most recent comparison.   Normal li

## 2020-01-31 NOTE — ED PROVIDER NOTES
Patient Seen in: [facility]      History   Patient presents with:  Abdomen/Flank Pain  Constipation  Urinary Symptoms    Stated Complaint: pain right side of torso    HPI    This 43-year-old female presents to the office with co Performed by Hillary Myers MD at [facility] MAIN OR                Family history reviewed with patient/caregiver and is not pertinent to presenting problem.     Social History    Tobacco Use      Smoking status: Former Smoker        Packs/day: 1.00 patient and her  that there are multiple etiologies that could be causing her pain including gallbladder problems and possible bowel obstruction. The patient was informed she needs more testing than I am able to perform in this office.   She is subs

## 2020-01-31 NOTE — ED INITIAL ASSESSMENT (HPI)
Pt with constipation x 3 weeks - last BM this morning was very hard; pt was taking laxative every 3 days, now taking colace x 1 week    Pt seen in Sept/Oct for RUQ pain - neg CT; pain to RUQ now constant for about 3 weeks - dairy makes it worse    Pt also

## 2020-02-03 ENCOUNTER — OFFICE VISIT (OUTPATIENT)
Dept: INTERNAL MEDICINE CLINIC | Facility: CLINIC | Age: 60
End: 2020-02-03
Payer: MEDICAID

## 2020-02-03 VITALS
WEIGHT: 166.5 LBS | RESPIRATION RATE: 16 BRPM | OXYGEN SATURATION: 97 % | BODY MASS INDEX: 30.64 KG/M2 | HEIGHT: 62 IN | DIASTOLIC BLOOD PRESSURE: 80 MMHG | HEART RATE: 80 BPM | TEMPERATURE: 98 F | SYSTOLIC BLOOD PRESSURE: 124 MMHG

## 2020-02-03 DIAGNOSIS — E78.1 HYPERTRIGLYCERIDEMIA: Chronic | ICD-10-CM

## 2020-02-03 DIAGNOSIS — N28.9 RENAL LESION: ICD-10-CM

## 2020-02-03 DIAGNOSIS — R74.01 TRANSAMINITIS: ICD-10-CM

## 2020-02-03 DIAGNOSIS — I10 ESSENTIAL HYPERTENSION: Chronic | ICD-10-CM

## 2020-02-03 DIAGNOSIS — J84.10 PULMONARY FIBROSIS (HCC): ICD-10-CM

## 2020-02-03 DIAGNOSIS — R91.8 PULMONARY NODULES: ICD-10-CM

## 2020-02-03 DIAGNOSIS — M54.42 ACUTE BILATERAL LOW BACK PAIN WITH BILATERAL SCIATICA: Primary | ICD-10-CM

## 2020-02-03 DIAGNOSIS — C79.51 METASTATIC BONE TUMOR (HCC): ICD-10-CM

## 2020-02-03 DIAGNOSIS — M54.41 ACUTE BILATERAL LOW BACK PAIN WITH BILATERAL SCIATICA: Primary | ICD-10-CM

## 2020-02-03 DIAGNOSIS — G93.0 CYST OF BRAIN: ICD-10-CM

## 2020-02-03 PROCEDURE — 99214 OFFICE O/P EST MOD 30 MIN: CPT | Performed by: INTERNAL MEDICINE

## 2020-02-03 RX ORDER — ALPRAZOLAM 0.25 MG/1
0.25 TABLET ORAL 2 TIMES DAILY PRN
Qty: 30 TABLET | Refills: 0 | Status: SHIPPED | OUTPATIENT
Start: 2020-02-03 | End: 2020-05-05

## 2020-02-03 RX ORDER — NITROGLYCERIN 0.4 MG/1
0.4 TABLET SUBLINGUAL
COMMUNITY
Start: 2016-02-15 | End: 2020-02-05 | Stop reason: ALTCHOICE

## 2020-02-03 RX ORDER — LISINOPRIL 40 MG/1
40 TABLET ORAL DAILY
Qty: 90 TABLET | Refills: 1 | Status: SHIPPED | OUTPATIENT
Start: 2020-02-03 | End: 2021-01-05

## 2020-02-03 RX ORDER — ATORVASTATIN CALCIUM 20 MG/1
20 TABLET, FILM COATED ORAL NIGHTLY
Qty: 90 TABLET | Refills: 1 | Status: SHIPPED | OUTPATIENT
Start: 2020-02-03 | End: 2020-07-21 | Stop reason: SINTOL

## 2020-02-03 RX ORDER — NEBULIZER AND COMPRESSOR
EACH MISCELLANEOUS
COMMUNITY
End: 2020-04-29 | Stop reason: CLARIF

## 2020-02-03 NOTE — PATIENT INSTRUCTIONS
- Follow up with Dr. Solo Colon  - Follow up with Dr. Washington/Oncology (cancer doctors)  - Also follow up with Pulmonology (Dr. Drea Tracey) and GI/liver specialists (Dr. Rodney Troncoso).   - Lisinopril and atorvastatin refilled

## 2020-02-03 NOTE — PROGRESS NOTES
Bobby Reece is a 63year old female. HPI:   Patient presents with:  ER F/U: 1/31/2020 dt RUQ and right flank pain. Lesions on spine that have grown. Patient presents for follow up on several issues.   She presented to the [facility] emergency departm Medications:   •  nitroGLYCERIN 0.4 MG Sublingual SL Tab, Place 0.4 mg under the tongue., Disp: , Rfl:   •  Nebulizers (COMP AIR COMPRESSOR NEBULIZER) Does not apply Misc, by Misc. (Non-Drug; Combo Route) route., Disp: , Rfl:   •  Influenza Vac Split Quad ( pulmonary disease) ([facility] Diabetes ([facility] Diverticulitis large intestine w/o perforation or abscess w/o bleeding (11/14/2017), and Tobacco abuse. Surgical:  has a past surgical history that includes colonoscopy (12/20/2018) and other (02/26/2018).   Family Patient with no previous documented history of primary malignancy. She has multiple pulmonary nodules, which have been under watch by Pulmonology ([facility]).   She was referred to gynecology for pap smear, which she has not done yet - however on CT/abdo - impression was \"mild changes of UIP, stable\" and \"stable left lower pulmonary nodule [which has remained] stable over 14 months. \"  Patient wishes to transfer care to local pulmonology group.   Referral entered for DMG.  - [facility]

## 2020-02-05 ENCOUNTER — OFFICE VISIT (OUTPATIENT)
Dept: SURGERY | Facility: CLINIC | Age: 60
End: 2020-02-05
Payer: MEDICAID

## 2020-02-05 VITALS
BODY MASS INDEX: 29.81 KG/M2 | SYSTOLIC BLOOD PRESSURE: 132 MMHG | HEIGHT: 62 IN | WEIGHT: 162 LBS | DIASTOLIC BLOOD PRESSURE: 72 MMHG | HEART RATE: 80 BPM

## 2020-02-05 DIAGNOSIS — G89.29 CHRONIC BILATERAL LOW BACK PAIN WITHOUT SCIATICA: Primary | ICD-10-CM

## 2020-02-05 DIAGNOSIS — N28.9 RENAL LESION: ICD-10-CM

## 2020-02-05 DIAGNOSIS — M89.9 BONE LESION: ICD-10-CM

## 2020-02-05 DIAGNOSIS — M54.50 CHRONIC BILATERAL LOW BACK PAIN WITHOUT SCIATICA: Primary | ICD-10-CM

## 2020-02-05 PROCEDURE — 99204 OFFICE O/P NEW MOD 45 MIN: CPT | Performed by: PHYSICIAN ASSISTANT

## 2020-02-05 NOTE — PROGRESS NOTES
Location of Pain:  Pt states that she has issues with lower back pain. Pt states that she has issues with she feels like bugs are crawling on the bilateral legs. Pt states that she has no issues with numbness and tingling.  Pt states that she has issues wit

## 2020-02-05 NOTE — PROGRESS NOTES
[facility] Note    Diagnosis: L3 Sclerotic Lesion    Treatment History: N/A    Visit Diagnosis:  Metastatic bone tumor ([facility]  (primary encounter diagnosis)  Elevated LFTs    History of Present Illness: 60F with a PMH of COPD, Div was completed and pertinent positives are in the HPI    Nebulizers (COMP AIR COMPRESSOR NEBULIZER) Does not apply Misc, by Misc. (Non-Drug; Combo Route) route., Disp: , Rfl:   lisinopril 40 MG Oral Tab, Take 1 tablet (40 mg total) by mouth daily. , Disp: 90 (DUONEB) nebulizer solution 3 mL, 3 mL, Nebulization, Once, Luis Kraus MD, 3 mL at 01/12/20 0934  [COMPLETED] ipratropium-albuterol (DUONEB) nebulizer solution 3 mL, 3 mL, Nebulization, Once, Luis Kraus MD, 3 mL at 01/12/20 0949  [COMPLETED LAD  CV: RRR S1S2 no murmurs  Extremities: No edema   Lungs: CTAB, no increased work of breathing  Abd: soft nt nd +BS , mild hepatomegaly  Neuro: CN: II-XII grossly intact  Bone: focal tenderness in Thoracic and Lumbar spine      Results:  Lab Results   C

## 2020-02-05 NOTE — H&P
Neurosurgery Clinic Visit  2020    [facility]ona PCP:  Tu Brothers MD    1960 MRN [id number]       CC:  L3 Lesion    HPI:    Ashlee Key is a very pleasant 63year old female who presents for evaluation of an L3 lesion.   She has had lumbosacral is an enlarging indeterminate renal cortical lesion measures 8 x 6 mm in the lower pole cortex of the right kidney, previously 5.5 on the exam of 10/8/2019.   A dedicated CT with contrast using renal mass protocol would be recommended on a nonemergent basis Dentition, No Facial Asymmetry  Lungs: CTA B/L to bases, no w/r/r, Normal Inspiratory Effort  Cardiac: RRR S1 S2 no murmurs  Abd: Soft, non-tender  Integumentary: Skin intact, no onychomycosis, no clubbing. A detailed skin exam was not performed.   Neurolo explained in detail. The diagnosis, treatment options, and expectations were discussed. All questions were answered to satisfaction. Total visit time = 45 Minutes; More than 50% spent coordinating care and counseling.     SANDIE Arguelles Neurosc

## 2020-02-06 ENCOUNTER — OFFICE VISIT (OUTPATIENT)
Dept: HEMATOLOGY/ONCOLOGY | Facility: HOSPITAL | Age: 60
End: 2020-02-06
Attending: INTERNAL MEDICINE
Payer: MEDICAID

## 2020-02-06 DIAGNOSIS — R79.89 ELEVATED LFTS: ICD-10-CM

## 2020-02-06 DIAGNOSIS — C79.51 METASTATIC BONE TUMOR (HCC): Primary | ICD-10-CM

## 2020-02-06 PROCEDURE — 99245 OFF/OP CONSLTJ NEW/EST HI 55: CPT | Performed by: INTERNAL MEDICINE

## 2020-02-06 NOTE — PROGRESS NOTES
Education Record    Learner:  Patient and Family Member    Disease / Deleta Asters nodules with bone lesions     Barriers / Limitations:  None   Comments:    Method:  Discussion   Comments:    General Topics:  Plan of care reviewed   Comments:    Outcome:

## 2020-02-10 ENCOUNTER — TELEPHONE (OUTPATIENT)
Dept: INTERNAL MEDICINE CLINIC | Facility: CLINIC | Age: 60
End: 2020-02-10

## 2020-02-10 DIAGNOSIS — M54.41 ACUTE BILATERAL LOW BACK PAIN WITH BILATERAL SCIATICA: ICD-10-CM

## 2020-02-10 DIAGNOSIS — C79.51 METASTATIC BONE TUMOR (HCC): Primary | ICD-10-CM

## 2020-02-10 DIAGNOSIS — M54.42 ACUTE BILATERAL LOW BACK PAIN WITH BILATERAL SCIATICA: ICD-10-CM

## 2020-02-10 RX ORDER — TRAMADOL HYDROCHLORIDE 50 MG/1
50 TABLET ORAL EVERY 8 HOURS PRN
Qty: 30 TABLET | Refills: 1 | Status: SHIPPED | OUTPATIENT
Start: 2020-02-10 | End: 2020-02-24

## 2020-02-10 NOTE — TELEPHONE ENCOUNTER
Patient was in ED last week;she was given traMADol HCl 50 MG Oral Tab for tumor pain management; she is calling for refill of that medication Rafaela Watts please callback

## 2020-02-12 ENCOUNTER — TELEPHONE (OUTPATIENT)
Dept: HEMATOLOGY/ONCOLOGY | Facility: HOSPITAL | Age: 60
End: 2020-02-12

## 2020-02-12 NOTE — TELEPHONE ENCOUNTER
LVM for patient letting her know PET scan ha been approved. I provided her central scheduling's number and instructed her to call and schedule. Contact information provide if patient has additional questions.

## 2020-02-12 NOTE — TELEPHONE ENCOUNTER
Stiven Ansari called on her mother's behalf saying her PET scan was denied, and they wanted to know next steps. She also says they would like a call to go over her recent blood work because she would like to know what's going on.  Please call

## 2020-02-12 NOTE — TELEPHONE ENCOUNTER
Spoke with patient. She wanted to know what to do regarding denial of PET scan. I informed her that billing is aware on working on her case. I left  billing for an update. Patient  Verbalize understanding.

## 2020-02-17 ENCOUNTER — HOSPITAL ENCOUNTER (OUTPATIENT)
Dept: NUCLEAR MEDICINE | Facility: HOSPITAL | Age: 60
Discharge: HOME | End: 2020-02-17
Attending: INTERNAL MEDICINE
Payer: MEDICAID

## 2020-02-17 ENCOUNTER — TELEPHONE (OUTPATIENT)
Dept: HEMATOLOGY/ONCOLOGY | Facility: HOSPITAL | Age: 60
End: 2020-02-17

## 2020-02-17 DIAGNOSIS — C79.51 METASTATIC BONE TUMOR (HCC): ICD-10-CM

## 2020-02-17 LAB — GLUCOSE BLD-MCNC: 94 MG/DL (ref 70–99)

## 2020-02-17 PROCEDURE — 82962 GLUCOSE BLOOD TEST: CPT

## 2020-02-17 PROCEDURE — 78815 PET IMAGE W/CT SKULL-THIGH: CPT | Performed by: INTERNAL MEDICINE

## 2020-02-17 NOTE — TELEPHONE ENCOUNTER
LVM for patient letting her know Dr. Nichelle Knowles received PET scan results and he would like to make a follow-up appointment to discuss those results. Contact information provided.

## 2020-02-18 ENCOUNTER — OFFICE VISIT (OUTPATIENT)
Dept: HEMATOLOGY/ONCOLOGY | Facility: HOSPITAL | Age: 60
End: 2020-02-18
Attending: INTERNAL MEDICINE
Payer: MEDICAID

## 2020-02-18 ENCOUNTER — TELEPHONE (OUTPATIENT)
Dept: HEMATOLOGY/ONCOLOGY | Facility: HOSPITAL | Age: 60
End: 2020-02-18

## 2020-02-18 VITALS
TEMPERATURE: 97 F | SYSTOLIC BLOOD PRESSURE: 136 MMHG | WEIGHT: 168.38 LBS | RESPIRATION RATE: 16 BRPM | OXYGEN SATURATION: 97 % | HEIGHT: 62.01 IN | BODY MASS INDEX: 30.6 KG/M2 | HEART RATE: 82 BPM | DIASTOLIC BLOOD PRESSURE: 81 MMHG

## 2020-02-18 DIAGNOSIS — C79.51 BONE METASTASES (HCC): ICD-10-CM

## 2020-02-18 DIAGNOSIS — C78.7 LIVER METASTASES (HCC): Primary | ICD-10-CM

## 2020-02-18 PROCEDURE — 99215 OFFICE O/P EST HI 40 MIN: CPT | Performed by: INTERNAL MEDICINE

## 2020-02-18 RX ORDER — HYDROCODONE BITARTRATE AND ACETAMINOPHEN 5; 325 MG/1; MG/1
1-2 TABLET ORAL EVERY 4 HOURS PRN
Qty: 60 TABLET | Refills: 0 | Status: SHIPPED | OUTPATIENT
Start: 2020-02-18 | End: 2020-02-24

## 2020-02-18 RX ORDER — LORATADINE 10 MG/1
10 TABLET ORAL DAILY
COMMUNITY
End: 2021-04-20

## 2020-02-18 NOTE — TELEPHONE ENCOUNTER
Spoke with patient and Nikolai Morales in Radiology. Biopsy has been moved to 2/20/20 @1300pm. Instructions and directions provided to patient. She verbalized understanding.

## 2020-02-18 NOTE — PROGRESS NOTES
[facility]    Diagnosis: Metastatic Disease: Bone, Liver: Dx pending    Treatment History: N/A    Visit Diagnosis:  Liver metastases ([facility] [address]  (primary encounter diagnosis)  Bone metastases ([facility]    History of Present Illness: mm.  · 2/17/20: PET/CT:  · Bones: L3 sclerotic focus w/o FDG uptake. T7 lytic lesion with SUV 5.3.   · Lungs: micro-nodules w/o specific uptake. Limited by motion. Concerning for disease. · Liver: Left hepatic lobe lesion with SUV 8.9 concerning.  Numerou Take 81 mg by mouth daily. , Disp: , Rfl:   [] albuterol sulfate (2.5 MG/3ML) 0.083% Inhalation Nebu Soln, Take 3 mL (2.5 mg total) by nebulization every 4 (four) hours as needed for Wheezing or Shortness of Breath., Disp: 120 ampule, Rfl: 0    [COMP 9107)  Pulse: 82 (02/18 0854)  BP: 136/81 (02/18 0854)  Temp: 97.1 °F (36.2 °C) (02/18 0854)  Do Not Use - Resp Rate: --  SpO2: 97 % (02/18 0854)     General: NAD, AOX3  HEENT: clear op, mmm, no jvd, no scleral icterus  LN: no supraclavicular, infraclavicu Norco 5/325 q4-6 hr PRN ordered. May need to switch to Morphine d/t liver function. Refer to palliative care as well. RTC in 2 weeks or after Bx results     [facility]

## 2020-02-18 NOTE — PROGRESS NOTES
Education Record    Learner:  Patient and Spouse      Barriers / Limitations:  None   Comments:    Method:  Discussion   Comments:    General Topics:  Plan of care reviewed   Comments:    Outcome:  Shows understanding   Comments:    Patient here for PET sc

## 2020-02-19 NOTE — IMAGING NOTE
Patient states her last dose of Aspirin was yesterday. Maribel Angulo RN spoke to Dr Terrie Meyer and she stated per DR Terrie Meyer, procedure will still  proceed for tomorrow due to the urgent need to start patient on therapy.     Patient made aware she needs to hold her A

## 2020-02-20 ENCOUNTER — HOSPITAL ENCOUNTER (OUTPATIENT)
Dept: ULTRASOUND IMAGING | Facility: HOSPITAL | Age: 60
Discharge: HOME | End: 2020-02-20
Attending: INTERNAL MEDICINE
Payer: MEDICAID

## 2020-02-20 ENCOUNTER — APPOINTMENT (OUTPATIENT)
Dept: LAB | Facility: HOSPITAL | Age: 60
End: 2020-02-20
Attending: INTERNAL MEDICINE
Payer: MEDICAID

## 2020-02-20 VITALS
RESPIRATION RATE: 16 BRPM | DIASTOLIC BLOOD PRESSURE: 90 MMHG | BODY MASS INDEX: 30.91 KG/M2 | WEIGHT: 168 LBS | SYSTOLIC BLOOD PRESSURE: 131 MMHG | HEART RATE: 76 BPM | TEMPERATURE: 98 F | OXYGEN SATURATION: 98 % | HEIGHT: 62 IN

## 2020-02-20 DIAGNOSIS — C78.7 LIVER METASTASES (HCC): ICD-10-CM

## 2020-02-20 DIAGNOSIS — C78.7 LIVER METASTASES (HCC): Primary | ICD-10-CM

## 2020-02-20 LAB
INR BLD: 0.98 (ref 0.9–1.1)
PSA SERPL DL<=0.01 NG/ML-MCNC: 13.4 SECONDS (ref 12.5–14.7)

## 2020-02-20 PROCEDURE — 47000 NEEDLE BIOPSY OF LIVER PERQ: CPT | Performed by: INTERNAL MEDICINE

## 2020-02-20 PROCEDURE — 85610 PROTHROMBIN TIME: CPT

## 2020-02-20 PROCEDURE — 88341 IMHCHEM/IMCYTCHM EA ADD ANTB: CPT | Performed by: INTERNAL MEDICINE

## 2020-02-20 PROCEDURE — 99152 MOD SED SAME PHYS/QHP 5/>YRS: CPT | Performed by: INTERNAL MEDICINE

## 2020-02-20 PROCEDURE — 76942 ECHO GUIDE FOR BIOPSY: CPT | Performed by: INTERNAL MEDICINE

## 2020-02-20 PROCEDURE — 36415 COLL VENOUS BLD VENIPUNCTURE: CPT

## 2020-02-20 PROCEDURE — 88342 IMHCHEM/IMCYTCHM 1ST ANTB: CPT | Performed by: INTERNAL MEDICINE

## 2020-02-20 PROCEDURE — 88307 TISSUE EXAM BY PATHOLOGIST: CPT | Performed by: INTERNAL MEDICINE

## 2020-02-20 RX ORDER — TRAMADOL HYDROCHLORIDE 50 MG/1
50 TABLET ORAL EVERY 6 HOURS PRN
Status: DISCONTINUED | OUTPATIENT
Start: 2020-02-20 | End: 2020-02-22

## 2020-02-20 RX ORDER — HYDROCODONE BITARTRATE AND ACETAMINOPHEN 5; 325 MG/1; MG/1
2 TABLET ORAL EVERY 4 HOURS PRN
Status: DISCONTINUED | OUTPATIENT
Start: 2020-02-20 | End: 2020-02-22

## 2020-02-20 RX ORDER — NALOXONE HYDROCHLORIDE 0.4 MG/ML
80 INJECTION, SOLUTION INTRAMUSCULAR; INTRAVENOUS; SUBCUTANEOUS AS NEEDED
Status: COMPLETED | OUTPATIENT
Start: 2020-02-20 | End: 2020-02-20

## 2020-02-20 RX ORDER — MIDAZOLAM HYDROCHLORIDE 1 MG/ML
INJECTION INTRAMUSCULAR; INTRAVENOUS
Status: COMPLETED
Start: 2020-02-20 | End: 2020-02-20

## 2020-02-20 RX ORDER — HYDROCODONE BITARTRATE AND ACETAMINOPHEN 5; 325 MG/1; MG/1
1 TABLET ORAL EVERY 4 HOURS PRN
Status: DISCONTINUED | OUTPATIENT
Start: 2020-02-20 | End: 2020-02-22

## 2020-02-20 RX ORDER — FLUMAZENIL 0.1 MG/ML
0.2 INJECTION, SOLUTION INTRAVENOUS AS NEEDED
Status: DISCONTINUED | OUTPATIENT
Start: 2020-02-20 | End: 2020-02-22

## 2020-02-20 RX ORDER — MIDAZOLAM HYDROCHLORIDE 1 MG/ML
1 INJECTION INTRAMUSCULAR; INTRAVENOUS EVERY 5 MIN PRN
Status: ACTIVE | OUTPATIENT
Start: 2020-02-20 | End: 2020-02-20

## 2020-02-20 RX ORDER — FLUMAZENIL 0.1 MG/ML
INJECTION, SOLUTION INTRAVENOUS
Status: COMPLETED
Start: 2020-02-20 | End: 2020-02-20

## 2020-02-20 RX ORDER — ACETAMINOPHEN 325 MG/1
650 TABLET ORAL EVERY 4 HOURS PRN
Status: DISCONTINUED | OUTPATIENT
Start: 2020-02-20 | End: 2020-02-22

## 2020-02-20 RX ORDER — SODIUM CHLORIDE 9 MG/ML
INJECTION, SOLUTION INTRAVENOUS CONTINUOUS
Status: DISCONTINUED | OUTPATIENT
Start: 2020-02-20 | End: 2020-02-22

## 2020-02-20 RX ORDER — NALOXONE HYDROCHLORIDE 0.4 MG/ML
INJECTION, SOLUTION INTRAMUSCULAR; INTRAVENOUS; SUBCUTANEOUS
Status: COMPLETED
Start: 2020-02-20 | End: 2020-02-20

## 2020-02-20 RX ADMIN — MIDAZOLAM HYDROCHLORIDE 1 MG: 1 INJECTION INTRAMUSCULAR; INTRAVENOUS at 13:15:00

## 2020-02-20 RX ADMIN — TRAMADOL HYDROCHLORIDE 50 MG: 50 TABLET ORAL at 15:32:00

## 2020-02-20 NOTE — PROCEDURES
[facility]  Procedure Note    Lacie Summers Patient Status:  Outpatient    1960 MRN [id number]   Location [address] [place] [address] Attending Sandra Farnsworth MD   Hosp Day # 0 PCP David Rodriguez MD     Procedure: US guided liver biopsy

## 2020-02-20 NOTE — PRE-SEDATION ASSESSMENT
[facility]  IR Pre-Procedure Sedation Assessment    History of snoring or sleep or apnea?    Yes    History of previous problems with anesthesia or sedation  No    Physical Findings:  Neck: nl ROM  CV: nl  PULM: normal respiratory rate/effort    [place]

## 2020-02-20 NOTE — IMAGING NOTE
Patient here for US Liver Biopsy with Dr. Rebekah Bynum. Consent Signed. Patient/ Family verbalized understanding. Patient tolerated procedure well. Safety protocols maintained. See paper and electronic chart for details. Report called to [facility] [id number].  Pa

## 2020-02-24 ENCOUNTER — OFFICE VISIT (OUTPATIENT)
Dept: HEMATOLOGY/ONCOLOGY | Facility: HOSPITAL | Age: 60
End: 2020-02-24
Attending: INTERNAL MEDICINE
Payer: MEDICAID

## 2020-02-24 VITALS
HEIGHT: 62.01 IN | SYSTOLIC BLOOD PRESSURE: 124 MMHG | WEIGHT: 165.19 LBS | OXYGEN SATURATION: 92 % | BODY MASS INDEX: 30.02 KG/M2 | TEMPERATURE: 98 F | HEART RATE: 73 BPM | RESPIRATION RATE: 16 BRPM | DIASTOLIC BLOOD PRESSURE: 79 MMHG

## 2020-02-24 DIAGNOSIS — G89.3 NEOPLASM RELATED PAIN: Primary | ICD-10-CM

## 2020-02-24 PROBLEM — Z51.5 PALLIATIVE CARE BY SPECIALIST: Status: ACTIVE | Noted: 2020-02-24

## 2020-02-24 PROCEDURE — 99214 OFFICE O/P EST MOD 30 MIN: CPT | Performed by: NURSE PRACTITIONER

## 2020-02-24 RX ORDER — TRAMADOL HYDROCHLORIDE 50 MG/1
50 TABLET ORAL EVERY 6 HOURS PRN
Qty: 120 TABLET | Refills: 0 | Status: SHIPPED | OUTPATIENT
Start: 2020-02-24 | End: 2020-04-14

## 2020-02-24 NOTE — PROGRESS NOTES
Palliative Care Consult Note     Patient Name: Ajit Baptiste   YOB: 1960   Medical Record Number: [id number]   CSN: [id number]   Date of visit: 2/24/2020     Reason for Consult:  Symptom management     Chief Complaint/Reason for Visit:  Pain ANTERIOR COLON RESECTION Left 2/26/2018    Performed by Kiah Abdullahi MD at [facility] MAIN OR       Allergies:    Simvastatin             MYALGIA    Comment:Cramping  Gemfibrozil             MYALGIA    Comment:Muscle aches    Family History:  Family History Sodium (COLACE OR) Take 1 tablet by mouth daily. • Omeprazole 40 MG Oral Capsule Delayed Release Take 1 capsule (40 mg total) by mouth daily.  90 capsule 1   • Albuterol Sulfate  (90 Base) MCG/ACT Inhalation Aero Soln Inhale 2 puffs into the breann for symptom management.     Electronically Signed by:  FREYA Moss   THE [facility] Outpatient Palliative Nurse Practitioner

## 2020-02-25 ENCOUNTER — TELEPHONE (OUTPATIENT)
Dept: HEMATOLOGY/ONCOLOGY | Facility: HOSPITAL | Age: 60
End: 2020-02-25

## 2020-02-25 NOTE — PROGRESS NOTES
[facility]    Diagnosis: Metastatic Cholangiocarcinoma: liver, bone    Treatment History: N/A    Visit Diagnosis:  Cholangiocarcinoma ([facility]  (primary encounter diagnosis)  Drug-related hair loss    History of Present Illness mm.  · 2/17/20: PET/CT:  · Bones: L3 sclerotic focus w/o FDG uptake. T7 lytic lesion with SUV 5.3.   · Lungs: micro-nodules w/o specific uptake. Limited by motion. Concerning for disease. · Liver: Left hepatic lobe lesion with SUV 8.9 concerning.  Numerou Disp: 1 Inhaler, Rfl: 0  hydrochlorothiazide 12.5 MG Oral Cap, Take 1 capsule (12.5 mg total) by mouth daily.  (Patient taking differently: Take 12.5 mg by mouth daily as needed.  ), Disp: 90 capsule, Rfl: 1  aspirin 81 MG Oral Tab, Take 81 mg by mouth maritza file    Tobacco Use      Smoking status: Former Smoker        Packs/day: 1.00        Years: 40.00        Pack years: 36        Quit date: 2017        Years since quitting: 3.1      Smokeless tobacco: Never Used    Substance and Sexual Activity      Alcohol Check baseline CEA,   - Port  - Discussed Cisplatin 25 mg/m2 + Gemcitabine D1/8 q21 days x 8 cycles per the ABC-02 trial. Orly Kim al. Rea Bosworth 2010.  Discussed that median OS on the order of 12 months, however, patients may live longer pending response r

## 2020-02-25 NOTE — TELEPHONE ENCOUNTER
LVM requesting call back to discuss information below. Contact information provided. Shae East MD  P [facility]             Hi,     Can she see me tomorrow to discuss bx results and draw labs. Will need Tempus.      Thanks

## 2020-02-26 ENCOUNTER — OFFICE VISIT (OUTPATIENT)
Dept: HEMATOLOGY/ONCOLOGY | Facility: HOSPITAL | Age: 60
End: 2020-02-26
Attending: INTERNAL MEDICINE
Payer: MEDICAID

## 2020-02-26 ENCOUNTER — SOCIAL WORK SERVICES (OUTPATIENT)
Dept: HEMATOLOGY/ONCOLOGY | Facility: HOSPITAL | Age: 60
End: 2020-02-26

## 2020-02-26 VITALS
TEMPERATURE: 98 F | DIASTOLIC BLOOD PRESSURE: 79 MMHG | OXYGEN SATURATION: 90 % | BODY MASS INDEX: 30.46 KG/M2 | WEIGHT: 167.63 LBS | HEIGHT: 62.01 IN | SYSTOLIC BLOOD PRESSURE: 124 MMHG | HEART RATE: 83 BPM | RESPIRATION RATE: 18 BRPM

## 2020-02-26 DIAGNOSIS — T50.905A DRUG-RELATED HAIR LOSS: ICD-10-CM

## 2020-02-26 DIAGNOSIS — L65.8 DRUG-RELATED HAIR LOSS: ICD-10-CM

## 2020-02-26 DIAGNOSIS — C22.1 CHOLANGIOCARCINOMA (HCC): Primary | ICD-10-CM

## 2020-02-26 LAB
ALBUMIN SERPL-MCNC: 3.7 G/DL (ref 3.4–5)
ALBUMIN/GLOB SERPL: 0.9 {RATIO} (ref 1–2)
ALP LIVER SERPL-CCNC: 179 U/L (ref 46–118)
ALT SERPL-CCNC: 103 U/L (ref 13–56)
ANION GAP SERPL CALC-SCNC: 5 MMOL/L (ref 0–18)
AST SERPL-CCNC: 62 U/L (ref 15–37)
BASOPHILS # BLD AUTO: 0.09 X10(3) UL (ref 0–0.2)
BASOPHILS NFR BLD AUTO: 1.1 %
BILIRUB SERPL-MCNC: 0.5 MG/DL (ref 0.1–2)
BUN BLD-MCNC: 8 MG/DL (ref 7–18)
BUN/CREAT SERPL: 10.1 (ref 10–20)
CALCIUM BLD-MCNC: 9.4 MG/DL (ref 8.5–10.1)
CANCER AG19-9 SERPL-ACNC: ABNORMAL U/ML (ref ?–37)
CEA SERPL-MCNC: 43.6 NG/ML (ref ?–5)
CHLORIDE SERPL-SCNC: 105 MMOL/L (ref 98–112)
CO2 SERPL-SCNC: 25 MMOL/L (ref 21–32)
CREAT BLD-MCNC: 0.79 MG/DL (ref 0.55–1.02)
DEPRECATED RDW RBC AUTO: 38.9 FL (ref 35.1–46.3)
EOSINOPHIL # BLD AUTO: 0.13 X10(3) UL (ref 0–0.7)
EOSINOPHIL NFR BLD AUTO: 1.5 %
ERYTHROCYTE [DISTWIDTH] IN BLOOD BY AUTOMATED COUNT: 12.3 % (ref 11–15)
GLOBULIN PLAS-MCNC: 4.1 G/DL (ref 2.8–4.4)
GLUCOSE BLD-MCNC: 116 MG/DL (ref 70–99)
HCT VFR BLD AUTO: 42.4 % (ref 35–48)
HGB BLD-MCNC: 14.5 G/DL (ref 12–16)
IMM GRANULOCYTES # BLD AUTO: 0.02 X10(3) UL (ref 0–1)
IMM GRANULOCYTES NFR BLD: 0.2 %
LYMPHOCYTES # BLD AUTO: 2.37 X10(3) UL (ref 1–4)
LYMPHOCYTES NFR BLD AUTO: 27.7 %
M PROTEIN MFR SERPL ELPH: 7.8 G/DL (ref 6.4–8.2)
MCH RBC QN AUTO: 29.8 PG (ref 26–34)
MCHC RBC AUTO-ENTMCNC: 34.2 G/DL (ref 31–37)
MCV RBC AUTO: 87.1 FL (ref 80–100)
MONOCYTES # BLD AUTO: 0.78 X10(3) UL (ref 0.1–1)
MONOCYTES NFR BLD AUTO: 9.1 %
NEUTROPHILS # BLD AUTO: 5.18 X10 (3) UL (ref 1.5–7.7)
NEUTROPHILS # BLD AUTO: 5.18 X10(3) UL (ref 1.5–7.7)
NEUTROPHILS NFR BLD AUTO: 60.4 %
OSMOLALITY SERPL CALC.SUM OF ELEC: 279 MOSM/KG (ref 275–295)
PATIENT FASTING Y/N/NP: NO
PLATELET # BLD AUTO: 262 10(3)UL (ref 150–450)
POTASSIUM SERPL-SCNC: 3.9 MMOL/L (ref 3.5–5.1)
RBC # BLD AUTO: 4.87 X10(6)UL (ref 3.8–5.3)
SODIUM SERPL-SCNC: 135 MMOL/L (ref 136–145)
WBC # BLD AUTO: 8.6 X10(3) UL (ref 4–11)

## 2020-02-26 PROCEDURE — 99215 OFFICE O/P EST HI 40 MIN: CPT | Performed by: INTERNAL MEDICINE

## 2020-02-26 NOTE — PROGRESS NOTES
Outpatient Oncology Care Plan   Problem list:   knowledge deficit   Problems related to:   disease/disease progression   Interventions:   provided general teaching   Expected outcomes:   understands plan of care   Progress towards outcome: making progress

## 2020-02-26 NOTE — PROGRESS NOTES
DOREEN Ballard informed  of Patient who received prognosis of 1 year and had questions.  met with Patient and  Ed[place] due to them having questions about Disability and Resources before Patient begins treatment.  Patient appeared to be on FMLA;  is on Disability, but Patient stated that Daughter Darien Cervantes (#[phone]) will be taking Continuous Leave, then Intermittent Leave; contact card provided so Daughter can submit forms for  to complete.  Patient and  ap

## 2020-02-27 LAB
IMMUNOGLOBULIN G SUBCLASS 1: 457 MG/DL
IMMUNOGLOBULIN G SUBCLASS 2: 297 MG/DL
IMMUNOGLOBULIN G SUBCLASS 3: 52 MG/DL
IMMUNOGLOBULIN G SUBCLASS 4: 38 MG/DL

## 2020-02-28 ENCOUNTER — OFFICE VISIT (OUTPATIENT)
Dept: HEMATOLOGY/ONCOLOGY | Facility: HOSPITAL | Age: 60
End: 2020-02-28
Attending: INTERNAL MEDICINE
Payer: MEDICAID

## 2020-02-28 DIAGNOSIS — C22.1 CHOLANGIOCARCINOMA (HCC): Primary | ICD-10-CM

## 2020-02-28 DIAGNOSIS — Z71.89 OTHER SPECIFIED COUNSELING: ICD-10-CM

## 2020-02-28 PROCEDURE — 99215 OFFICE O/P EST HI 40 MIN: CPT | Performed by: CLINICAL NURSE SPECIALIST

## 2020-02-28 RX ORDER — ONDANSETRON HYDROCHLORIDE 8 MG/1
8 TABLET, FILM COATED ORAL EVERY 8 HOURS PRN
Qty: 30 TABLET | Refills: 3 | Status: SHIPPED | OUTPATIENT
Start: 2020-02-28 | End: 2020-04-14

## 2020-02-28 RX ORDER — PROCHLORPERAZINE MALEATE 10 MG
10 TABLET ORAL EVERY 6 HOURS PRN
Qty: 30 TABLET | Refills: 3 | Status: SHIPPED | OUTPATIENT
Start: 2020-02-28 | End: 2020-08-13

## 2020-02-28 NOTE — PROGRESS NOTES
IV Chemotherapy Education    Patient:Terrie Tang     Date: 2/28/2020   Diagnosis:  Cholangiocarcinoma   Caregivers present:     Drug names:  Cisplatin, Gemzar    Myelosuppression  Decrease in wbc  Decrease in hgb  Decrease in platelets  Risk of information.      Marcellus DALTON  Nurse Practitioner  THE [facility] Hematology Oncology [address] [place] [address]

## 2020-02-28 NOTE — PROGRESS NOTES
[facility] Note    Diagnosis: Metastatic Cholangiocarcinoma: liver, bone    Treatment History:   1. Cisplatin 25 mg/m2 D1/8 + Gemcitabine 1000 mg/m2 D1/8 q21 days.    C1: 3/2/20: : 20,224, CEA 43.6    Visit Diagnosis:  Chola (previous 5 x 5 mm). Enlarging sclerotic lesion at L3 measuring 8 mm from 2 mm. · 2/17/20: PET/CT:  · Bones: L3 sclerotic focus w/o FDG uptake. T7 lytic lesion with SUV 5.3.   · Lungs: micro-nodules w/o specific uptake. Limited by motion.  Concerning for d (COLACE OR), Take 1 tablet by mouth daily. , Disp: , Rfl:   Omeprazole 40 MG Oral Capsule Delayed Release, Take 1 capsule (40 mg total) by mouth daily. , Disp: 90 capsule, Rfl: 1  Albuterol Sulfate  (90 Base) MCG/ACT Inhalation Aero Soln, Inhale 2 p History:   Diagnosis Date   • [facility]    • COPD (chronic obstructive pulmonary disease) ([facility]     2 1/2 L O2 @ night   • Coronary atherosclerosis    • Diverticulitis large intestine w/o perforation or abscess w/o bleeding 11/14/2017   • High blood pre work of breathing  Abd: soft nt nd +BS , mild hepatomegaly  Neuro: CN: II-XII grossly intact  Bone: focal tenderness in Thoracic and Lumbar spine      Results:  Lab Results   Component Value Date    WBC 9.6 03/03/2020    HGB 15.0 03/03/2020    HCT 44.8 03/ care.     Checklist:  ECOG 0  RTC in 1 week    [facility] Hematology and Oncology [facility]

## 2020-03-02 ENCOUNTER — HOSPITAL ENCOUNTER (OUTPATIENT)
Dept: INTERVENTIONAL RADIOLOGY/VASCULAR | Facility: HOSPITAL | Age: 60
Discharge: HOME | End: 2020-03-02
Attending: INTERNAL MEDICINE | Admitting: INTERNAL MEDICINE
Payer: MEDICAID

## 2020-03-02 VITALS
SYSTOLIC BLOOD PRESSURE: 125 MMHG | TEMPERATURE: 98 F | RESPIRATION RATE: 14 BRPM | OXYGEN SATURATION: 96 % | HEART RATE: 78 BPM | DIASTOLIC BLOOD PRESSURE: 66 MMHG

## 2020-03-02 DIAGNOSIS — C22.1 CHOLANGIOCARCINOMA (HCC): ICD-10-CM

## 2020-03-02 LAB — INR: 1 (ref 0.8–1.3)

## 2020-03-02 PROCEDURE — 77001 FLUOROGUIDE FOR VEIN DEVICE: CPT

## 2020-03-02 PROCEDURE — 85610 PROTHROMBIN TIME: CPT

## 2020-03-02 PROCEDURE — 02HV33Z INSERTION OF INFUSION DEVICE INTO SUPERIOR VENA CAVA, PERCUTANEOUS APPROACH: ICD-10-PCS | Performed by: RADIOLOGY

## 2020-03-02 PROCEDURE — 76937 US GUIDE VASCULAR ACCESS: CPT

## 2020-03-02 PROCEDURE — 0JH60WZ INSERTION OF TOTALLY IMPLANTABLE VASCULAR ACCESS DEVICE INTO CHEST SUBCUTANEOUS TISSUE AND FASCIA, OPEN APPROACH: ICD-10-PCS | Performed by: RADIOLOGY

## 2020-03-02 PROCEDURE — 99153 MOD SED SAME PHYS/QHP EA: CPT

## 2020-03-02 PROCEDURE — 99152 MOD SED SAME PHYS/QHP 5/>YRS: CPT

## 2020-03-02 PROCEDURE — 36561 INSERT TUNNELED CV CATH: CPT

## 2020-03-02 RX ORDER — SODIUM CHLORIDE 9 MG/ML
INJECTION, SOLUTION INTRAVENOUS CONTINUOUS
Status: DISCONTINUED | OUTPATIENT
Start: 2020-03-02 | End: 2020-03-02

## 2020-03-02 RX ORDER — LIDOCAINE HYDROCHLORIDE 10 MG/ML
INJECTION, SOLUTION INFILTRATION; PERINEURAL
Status: COMPLETED
Start: 2020-03-02 | End: 2020-03-02

## 2020-03-02 RX ORDER — LIDOCAINE HYDROCHLORIDE AND EPINEPHRINE 10; 10 MG/ML; UG/ML
INJECTION, SOLUTION INFILTRATION; PERINEURAL
Status: COMPLETED
Start: 2020-03-02 | End: 2020-03-02

## 2020-03-02 RX ORDER — HEPARIN SODIUM 5000 [USP'U]/ML
INJECTION, SOLUTION INTRAVENOUS; SUBCUTANEOUS
Status: COMPLETED
Start: 2020-03-02 | End: 2020-03-02

## 2020-03-02 RX ORDER — BACITRACIN 50000 [USP'U]/1
INJECTION, POWDER, LYOPHILIZED, FOR SOLUTION INTRAMUSCULAR
Status: COMPLETED
Start: 2020-03-02 | End: 2020-03-02

## 2020-03-02 RX ORDER — MIDAZOLAM HYDROCHLORIDE 1 MG/ML
INJECTION INTRAMUSCULAR; INTRAVENOUS
Status: COMPLETED
Start: 2020-03-02 | End: 2020-03-02

## 2020-03-02 RX ORDER — CEFAZOLIN SODIUM/WATER 2 G/20 ML
SYRINGE (ML) INTRAVENOUS
Status: COMPLETED
Start: 2020-03-02 | End: 2020-03-02

## 2020-03-02 RX ADMIN — SODIUM CHLORIDE: 9 INJECTION, SOLUTION INTRAVENOUS at 07:30:00

## 2020-03-02 NOTE — PROGRESS NOTES
Pt is alert and oriented x 4. Right chest dressing dry and intact. 1015:  Discharge Instructions given , pt and  verbalized understanding.     Follow up with Dr Edwardo Jernigan , Call for an appointment  Resume diet and Medications    Keep the dressing ,th

## 2020-03-03 ENCOUNTER — HOSPITAL ENCOUNTER (OUTPATIENT)
Dept: CT IMAGING | Facility: HOSPITAL | Age: 60
Discharge: HOME | End: 2020-03-03
Attending: INTERNAL MEDICINE
Payer: MEDICAID

## 2020-03-03 ENCOUNTER — SOCIAL WORK SERVICES (OUTPATIENT)
Dept: HEMATOLOGY/ONCOLOGY | Facility: HOSPITAL | Age: 60
End: 2020-03-03

## 2020-03-03 ENCOUNTER — OFFICE VISIT (OUTPATIENT)
Dept: HEMATOLOGY/ONCOLOGY | Facility: HOSPITAL | Age: 60
End: 2020-03-03
Attending: INTERNAL MEDICINE
Payer: MEDICAID

## 2020-03-03 ENCOUNTER — APPOINTMENT (OUTPATIENT)
Dept: LAB | Facility: HOSPITAL | Age: 60
End: 2020-03-03
Attending: INTERNAL MEDICINE
Payer: MEDICAID

## 2020-03-03 VITALS
OXYGEN SATURATION: 92 % | BODY MASS INDEX: 29.8 KG/M2 | HEART RATE: 73 BPM | WEIGHT: 164 LBS | DIASTOLIC BLOOD PRESSURE: 81 MMHG | HEIGHT: 62.01 IN | TEMPERATURE: 98 F | RESPIRATION RATE: 16 BRPM | SYSTOLIC BLOOD PRESSURE: 124 MMHG

## 2020-03-03 VITALS — BODY MASS INDEX: 31 KG/M2 | HEIGHT: 62.01 IN

## 2020-03-03 DIAGNOSIS — C22.1 CHOLANGIOCARCINOMA (HCC): Primary | ICD-10-CM

## 2020-03-03 LAB
ALBUMIN SERPL-MCNC: 3.7 G/DL (ref 3.4–5)
ALBUMIN/GLOB SERPL: 0.9 {RATIO} (ref 1–2)
ALP LIVER SERPL-CCNC: 197 U/L (ref 46–118)
ALT SERPL-CCNC: 84 U/L (ref 13–56)
ANION GAP SERPL CALC-SCNC: 5 MMOL/L (ref 0–18)
AST SERPL-CCNC: 54 U/L (ref 15–37)
BASOPHILS # BLD AUTO: 0.06 X10(3) UL (ref 0–0.2)
BASOPHILS NFR BLD AUTO: 0.6 %
BILIRUB SERPL-MCNC: 0.6 MG/DL (ref 0.1–2)
BUN BLD-MCNC: 6 MG/DL (ref 7–18)
BUN/CREAT SERPL: 7.1 (ref 10–20)
CALCIUM BLD-MCNC: 9.1 MG/DL (ref 8.5–10.1)
CANCER AG19-9 SERPL-ACNC: ABNORMAL U/ML (ref ?–37)
CEA SERPL-MCNC: 45.8 NG/ML (ref ?–5)
CHLORIDE SERPL-SCNC: 104 MMOL/L (ref 98–112)
CO2 SERPL-SCNC: 27 MMOL/L (ref 21–32)
CREAT BLD-MCNC: 0.85 MG/DL (ref 0.55–1.02)
DEPRECATED RDW RBC AUTO: 39.8 FL (ref 35.1–46.3)
EOSINOPHIL # BLD AUTO: 0.14 X10(3) UL (ref 0–0.7)
EOSINOPHIL NFR BLD AUTO: 1.5 %
ERYTHROCYTE [DISTWIDTH] IN BLOOD BY AUTOMATED COUNT: 12.4 % (ref 11–15)
GLOBULIN PLAS-MCNC: 4.3 G/DL (ref 2.8–4.4)
GLUCOSE BLD-MCNC: 162 MG/DL (ref 70–99)
HCT VFR BLD AUTO: 44.8 % (ref 35–48)
HGB BLD-MCNC: 15 G/DL (ref 12–16)
IMM GRANULOCYTES # BLD AUTO: 0.02 X10(3) UL (ref 0–1)
IMM GRANULOCYTES NFR BLD: 0.2 %
LYMPHOCYTES # BLD AUTO: 1.84 X10(3) UL (ref 1–4)
LYMPHOCYTES NFR BLD AUTO: 19.1 %
M PROTEIN MFR SERPL ELPH: 8 G/DL (ref 6.4–8.2)
MCH RBC QN AUTO: 29.6 PG (ref 26–34)
MCHC RBC AUTO-ENTMCNC: 33.5 G/DL (ref 31–37)
MCV RBC AUTO: 88.4 FL (ref 80–100)
MONOCYTES # BLD AUTO: 0.64 X10(3) UL (ref 0.1–1)
MONOCYTES NFR BLD AUTO: 6.6 %
NEUTROPHILS # BLD AUTO: 6.93 X10 (3) UL (ref 1.5–7.7)
NEUTROPHILS # BLD AUTO: 6.93 X10(3) UL (ref 1.5–7.7)
NEUTROPHILS NFR BLD AUTO: 72 %
OSMOLALITY SERPL CALC.SUM OF ELEC: 283 MOSM/KG (ref 275–295)
PLATELET # BLD AUTO: 274 10(3)UL (ref 150–450)
POTASSIUM SERPL-SCNC: 4.1 MMOL/L (ref 3.5–5.1)
RBC # BLD AUTO: 5.07 X10(6)UL (ref 3.8–5.3)
SODIUM SERPL-SCNC: 136 MMOL/L (ref 136–145)
WBC # BLD AUTO: 9.6 X10(3) UL (ref 4–11)

## 2020-03-03 PROCEDURE — 86301 IMMUNOASSAY TUMOR CA 19-9: CPT

## 2020-03-03 PROCEDURE — 80053 COMPREHEN METABOLIC PANEL: CPT

## 2020-03-03 PROCEDURE — 96366 THER/PROPH/DIAG IV INF ADDON: CPT

## 2020-03-03 PROCEDURE — 96375 TX/PRO/DX INJ NEW DRUG ADDON: CPT

## 2020-03-03 PROCEDURE — 96361 HYDRATE IV INFUSION ADD-ON: CPT

## 2020-03-03 PROCEDURE — 85025 COMPLETE CBC W/AUTO DIFF WBC: CPT

## 2020-03-03 PROCEDURE — 96413 CHEMO IV INFUSION 1 HR: CPT

## 2020-03-03 PROCEDURE — 99214 OFFICE O/P EST MOD 30 MIN: CPT | Performed by: INTERNAL MEDICINE

## 2020-03-03 PROCEDURE — 96367 TX/PROPH/DG ADDL SEQ IV INF: CPT

## 2020-03-03 PROCEDURE — 82378 CARCINOEMBRYONIC ANTIGEN: CPT

## 2020-03-03 PROCEDURE — 96417 CHEMO IV INFUS EACH ADDL SEQ: CPT

## 2020-03-03 PROCEDURE — 96376 TX/PRO/DX INJ SAME DRUG ADON: CPT

## 2020-03-03 RX ORDER — SODIUM CHLORIDE 9 MG/ML
1000 INJECTION, SOLUTION INTRAVENOUS ONCE
Status: CANCELLED | OUTPATIENT
Start: 2020-03-10

## 2020-03-03 RX ORDER — SODIUM CHLORIDE 9 MG/ML
1000 INJECTION, SOLUTION INTRAVENOUS ONCE
Status: CANCELLED | OUTPATIENT
Start: 2020-03-03

## 2020-03-03 RX ORDER — SODIUM CHLORIDE 9 MG/ML
1000 INJECTION, SOLUTION INTRAVENOUS ONCE
Status: COMPLETED | OUTPATIENT
Start: 2020-03-03 | End: 2020-03-03

## 2020-03-03 RX ADMIN — SODIUM CHLORIDE 1000 ML: 9 INJECTION, SOLUTION INTRAVENOUS at 15:10:00

## 2020-03-03 NOTE — PROGRESS NOTES
Pt here for C1D1 Gemzar/Cisplat for Cholangiocarcinoma.   Arrives Ambulating independently, accompanied by Family member and Friend           Patient reports possible pregnancy since last therapy cycle: No    Modifications in dose or schedule: No     Carvel Huerta

## 2020-03-04 ENCOUNTER — TELEPHONE (OUTPATIENT)
Dept: HEMATOLOGY/ONCOLOGY | Facility: HOSPITAL | Age: 60
End: 2020-03-04

## 2020-03-04 NOTE — TELEPHONE ENCOUNTER
Toxicities: C1 D1 Gemcitabine/Cisplatin on 3/3/2020    Martha Tracey said she is feeling very good today. I encouraged her to call the office if she is not feeling well or if she has any questions or concerns. She verbalized understanding.

## 2020-03-05 ENCOUNTER — TELEPHONE (OUTPATIENT)
Dept: HEMATOLOGY/ONCOLOGY | Facility: HOSPITAL | Age: 60
End: 2020-03-05

## 2020-03-05 NOTE — TELEPHONE ENCOUNTER
Cholangiocarcinoma - 3/3/20 - C1D1 - Cisplatin/Gemzar    Khushi Blank called, she is not having any problems, no fever, nausea.  But most of her family at home is sick with colds, ear infections, temps of 100, she was wondering if there should be anything she shoul

## 2020-03-08 ENCOUNTER — APPOINTMENT (OUTPATIENT)
Dept: GENERAL RADIOLOGY | Facility: HOSPITAL | Age: 60
End: 2020-03-08
Attending: EMERGENCY MEDICINE
Payer: MEDICAID

## 2020-03-08 ENCOUNTER — HOSPITAL ENCOUNTER (EMERGENCY)
Facility: HOSPITAL | Age: 60
Discharge: HOME | End: 2020-03-08
Attending: EMERGENCY MEDICINE
Payer: MEDICAID

## 2020-03-08 VITALS
SYSTOLIC BLOOD PRESSURE: 140 MMHG | DIASTOLIC BLOOD PRESSURE: 80 MMHG | BODY MASS INDEX: 30 KG/M2 | TEMPERATURE: 98 F | RESPIRATION RATE: 20 BRPM | HEART RATE: 95 BPM | WEIGHT: 164 LBS | OXYGEN SATURATION: 94 %

## 2020-03-08 DIAGNOSIS — J98.01 ACUTE BRONCHOSPASM: ICD-10-CM

## 2020-03-08 DIAGNOSIS — J84.10 PULMONARY FIBROSIS (HCC): Chronic | ICD-10-CM

## 2020-03-08 DIAGNOSIS — C22.1 CHOLANGIOCARCINOMA (HCC): ICD-10-CM

## 2020-03-08 DIAGNOSIS — J21.0 ACUTE BRONCHIOLITIS DUE TO RESPIRATORY SYNCYTIAL VIRUS (RSV): Primary | ICD-10-CM

## 2020-03-08 LAB
ALBUMIN SERPL-MCNC: 3.4 G/DL (ref 3.4–5)
ALBUMIN/GLOB SERPL: 0.8 {RATIO} (ref 1–2)
ALP LIVER SERPL-CCNC: 192 U/L (ref 46–118)
ALT SERPL-CCNC: 144 U/L (ref 13–56)
ANION GAP SERPL CALC-SCNC: 5 MMOL/L (ref 0–18)
AST SERPL-CCNC: 56 U/L (ref 15–37)
BASOPHILS # BLD AUTO: 0.05 X10(3) UL (ref 0–0.2)
BASOPHILS NFR BLD AUTO: 0.9 %
BILIRUB SERPL-MCNC: 0.5 MG/DL (ref 0.1–2)
BILIRUB UR QL STRIP.AUTO: NEGATIVE
BUN BLD-MCNC: 12 MG/DL (ref 7–18)
BUN/CREAT SERPL: 15.4 (ref 10–20)
CALCIUM BLD-MCNC: 8.9 MG/DL (ref 8.5–10.1)
CHLORIDE SERPL-SCNC: 102 MMOL/L (ref 98–112)
CO2 SERPL-SCNC: 29 MMOL/L (ref 21–32)
COLOR UR AUTO: YELLOW
CREAT BLD-MCNC: 0.78 MG/DL (ref 0.55–1.02)
DEPRECATED RDW RBC AUTO: 38.4 FL (ref 35.1–46.3)
EOSINOPHIL # BLD AUTO: 0.24 X10(3) UL (ref 0–0.7)
EOSINOPHIL NFR BLD AUTO: 4.4 %
ERYTHROCYTE [DISTWIDTH] IN BLOOD BY AUTOMATED COUNT: 11.9 % (ref 11–15)
FLUAV + FLUBV RNA SPEC NAA+PROBE: NEGATIVE
FLUAV + FLUBV RNA SPEC NAA+PROBE: NEGATIVE
FLUAV + FLUBV RNA SPEC NAA+PROBE: POSITIVE
GLOBULIN PLAS-MCNC: 4.1 G/DL (ref 2.8–4.4)
GLUCOSE BLD-MCNC: 188 MG/DL (ref 70–99)
GLUCOSE UR STRIP.AUTO-MCNC: NEGATIVE MG/DL
HCT VFR BLD AUTO: 37.6 % (ref 35–48)
HGB BLD-MCNC: 12.7 G/DL (ref 12–16)
IMM GRANULOCYTES # BLD AUTO: 0.01 X10(3) UL (ref 0–1)
IMM GRANULOCYTES NFR BLD: 0.2 %
KETONES UR STRIP.AUTO-MCNC: NEGATIVE MG/DL
LACTATE SERPL-SCNC: 1.7 MMOL/L (ref 0.4–2)
LEUKOCYTE ESTERASE UR QL STRIP.AUTO: NEGATIVE
LYMPHOCYTES # BLD AUTO: 1.51 X10(3) UL (ref 1–4)
LYMPHOCYTES NFR BLD AUTO: 27.7 %
M PROTEIN MFR SERPL ELPH: 7.5 G/DL (ref 6.4–8.2)
MCH RBC QN AUTO: 29.4 PG (ref 26–34)
MCHC RBC AUTO-ENTMCNC: 33.8 G/DL (ref 31–37)
MCV RBC AUTO: 87 FL (ref 80–100)
MONOCYTES # BLD AUTO: 0.1 X10(3) UL (ref 0.1–1)
MONOCYTES NFR BLD AUTO: 1.8 %
NEUTROPHILS # BLD AUTO: 3.55 X10 (3) UL (ref 1.5–7.7)
NEUTROPHILS # BLD AUTO: 3.55 X10(3) UL (ref 1.5–7.7)
NEUTROPHILS NFR BLD AUTO: 65 %
NITRITE UR QL STRIP.AUTO: NEGATIVE
OSMOLALITY SERPL CALC.SUM OF ELEC: 287 MOSM/KG (ref 275–295)
PH UR STRIP.AUTO: 7 [PH] (ref 4.5–8)
PLATELET # BLD AUTO: 231 10(3)UL (ref 150–450)
POTASSIUM SERPL-SCNC: 3.7 MMOL/L (ref 3.5–5.1)
PROT UR STRIP.AUTO-MCNC: NEGATIVE MG/DL
RBC # BLD AUTO: 4.32 X10(6)UL (ref 3.8–5.3)
RBC UR QL AUTO: NEGATIVE
SODIUM SERPL-SCNC: 136 MMOL/L (ref 136–145)
SP GR UR STRIP.AUTO: 1.02 (ref 1–1.03)
UROBILINOGEN UR STRIP.AUTO-MCNC: <2 MG/DL
WBC # BLD AUTO: 5.5 X10(3) UL (ref 4–11)

## 2020-03-08 PROCEDURE — 87999 UNLISTED MICROBIOLOGY PX: CPT

## 2020-03-08 PROCEDURE — 87040 BLOOD CULTURE FOR BACTERIA: CPT | Performed by: EMERGENCY MEDICINE

## 2020-03-08 PROCEDURE — 87077 CULTURE AEROBIC IDENTIFY: CPT | Performed by: EMERGENCY MEDICINE

## 2020-03-08 PROCEDURE — 87150 DNA/RNA AMPLIFIED PROBE: CPT | Performed by: EMERGENCY MEDICINE

## 2020-03-08 PROCEDURE — 99284 EMERGENCY DEPT VISIT MOD MDM: CPT

## 2020-03-08 PROCEDURE — 80053 COMPREHEN METABOLIC PANEL: CPT | Performed by: EMERGENCY MEDICINE

## 2020-03-08 PROCEDURE — 94664 DEMO&/EVAL PT USE INHALER: CPT

## 2020-03-08 PROCEDURE — 81003 URINALYSIS AUTO W/O SCOPE: CPT | Performed by: EMERGENCY MEDICINE

## 2020-03-08 PROCEDURE — 87798 DETECT AGENT NOS DNA AMP: CPT | Performed by: EMERGENCY MEDICINE

## 2020-03-08 PROCEDURE — 94770 HC CARBON DIOXIDE EXPIRED GAS DETERMINATION: CPT

## 2020-03-08 PROCEDURE — 83605 ASSAY OF LACTIC ACID: CPT | Performed by: EMERGENCY MEDICINE

## 2020-03-08 PROCEDURE — 85025 COMPLETE CBC W/AUTO DIFF WBC: CPT | Performed by: EMERGENCY MEDICINE

## 2020-03-08 PROCEDURE — 99285 EMERGENCY DEPT VISIT HI MDM: CPT

## 2020-03-08 PROCEDURE — 94640 AIRWAY INHALATION TREATMENT: CPT

## 2020-03-08 PROCEDURE — 87502 INFLUENZA DNA AMP PROBE: CPT | Performed by: EMERGENCY MEDICINE

## 2020-03-08 PROCEDURE — 36415 COLL VENOUS BLD VENIPUNCTURE: CPT

## 2020-03-08 PROCEDURE — 71045 X-RAY EXAM CHEST 1 VIEW: CPT | Performed by: EMERGENCY MEDICINE

## 2020-03-08 RX ORDER — PREDNISONE 20 MG/1
60 TABLET ORAL ONCE
Status: COMPLETED | OUTPATIENT
Start: 2020-03-08 | End: 2020-03-08

## 2020-03-08 RX ORDER — ALBUTEROL SULFATE 2.5 MG/3ML
2.5 SOLUTION RESPIRATORY (INHALATION) EVERY 4 HOURS PRN
Qty: 30 AMPULE | Refills: 0 | Status: SHIPPED | OUTPATIENT
Start: 2020-03-08 | End: 2020-04-21

## 2020-03-08 RX ORDER — IPRATROPIUM BROMIDE AND ALBUTEROL SULFATE 2.5; .5 MG/3ML; MG/3ML
3 SOLUTION RESPIRATORY (INHALATION) ONCE
Status: COMPLETED | OUTPATIENT
Start: 2020-03-08 | End: 2020-03-08

## 2020-03-08 RX ORDER — PREDNISONE 20 MG/1
60 TABLET ORAL DAILY
Qty: 12 TABLET | Refills: 0 | Status: SHIPPED | OUTPATIENT
Start: 2020-03-09 | End: 2020-03-31

## 2020-03-08 NOTE — ED INITIAL ASSESSMENT (HPI)
Pt presents to ER with cough and temperature of 99F. Coughing up yellow mucous. Pt also notes pain in her lungs worse with coughing. Pt is being tx for liver CA. Last chemo was Tuesday. Yes vomiting, no diarrhea. Pt is speaking clearly. Skin warm and dry.

## 2020-03-08 NOTE — ED PROVIDER NOTES
Patient Seen in: [facility] Emergency Department      History   Patient presents with:  Fever  Cough/URI    Stated Complaint: cough/uri, fever, chemo patient    HPI    32-year-old female with a history of pulmonary fibrosis with as needed oxygen use reviewed. All other systems reviewed and negative except as noted above.     Physical Exam     ED Triage Vitals [03/08/20 0947]   /78   Pulse 101   Resp 18   Temp 98.4 °F (36.9 °C)   Temp src Temporal   SpO2 95 %   O2 Device None (Room air) ---------                               -----------         ------                     CBC W/ DIFFERENTIAL[[id number]]                              Final result                 Please view results for these tests on the individual orders.    Romeo Wasserman sulfate (2.5 MG/3ML) 0.083% Inhalation Nebu Soln  Take 3 mL (2.5 mg total) by nebulization every 4 (four) hours as needed for Wheezing or Shortness of Breath., Normal, Disp-30 ampule, R-0    predniSONE 20 MG Oral Tab  Take 3 tablets (60 mg total) by mouth

## 2020-03-09 ENCOUNTER — APPOINTMENT (OUTPATIENT)
Dept: GENERAL RADIOLOGY | Facility: HOSPITAL | Age: 60
End: 2020-03-09
Attending: EMERGENCY MEDICINE
Payer: MEDICAID

## 2020-03-09 ENCOUNTER — HOSPITAL ENCOUNTER (EMERGENCY)
Facility: HOSPITAL | Age: 60
Discharge: HOME | End: 2020-03-09
Attending: EMERGENCY MEDICINE
Payer: MEDICAID

## 2020-03-09 VITALS
DIASTOLIC BLOOD PRESSURE: 99 MMHG | HEART RATE: 80 BPM | OXYGEN SATURATION: 95 % | TEMPERATURE: 97 F | HEIGHT: 62 IN | WEIGHT: 160 LBS | BODY MASS INDEX: 29.44 KG/M2 | SYSTOLIC BLOOD PRESSURE: 159 MMHG | RESPIRATION RATE: 18 BRPM

## 2020-03-09 DIAGNOSIS — R78.81 POSITIVE BLOOD CULTURE: Primary | ICD-10-CM

## 2020-03-09 DIAGNOSIS — R04.0 EPISTAXIS: ICD-10-CM

## 2020-03-09 DIAGNOSIS — J21.0 ACUTE BRONCHIOLITIS DUE TO RESPIRATORY SYNCYTIAL VIRUS (RSV): ICD-10-CM

## 2020-03-09 LAB
BASOPHILS # BLD AUTO: 0.04 X10(3) UL (ref 0–0.2)
BASOPHILS NFR BLD AUTO: 0.5 %
DEPRECATED RDW RBC AUTO: 38.5 FL (ref 35.1–46.3)
EOSINOPHIL # BLD AUTO: 0.14 X10(3) UL (ref 0–0.7)
EOSINOPHIL NFR BLD AUTO: 1.6 %
ERYTHROCYTE [DISTWIDTH] IN BLOOD BY AUTOMATED COUNT: 11.9 % (ref 11–15)
HCT VFR BLD AUTO: 39.6 % (ref 35–48)
HGB BLD-MCNC: 13.3 G/DL (ref 12–16)
IMM GRANULOCYTES # BLD AUTO: 0.03 X10(3) UL (ref 0–1)
IMM GRANULOCYTES NFR BLD: 0.4 %
LYMPHOCYTES # BLD AUTO: 2.35 X10(3) UL (ref 1–4)
LYMPHOCYTES NFR BLD AUTO: 27.5 %
MCH RBC QN AUTO: 29.8 PG (ref 26–34)
MCHC RBC AUTO-ENTMCNC: 33.6 G/DL (ref 31–37)
MCV RBC AUTO: 88.6 FL (ref 80–100)
MONOCYTES # BLD AUTO: 0.29 X10(3) UL (ref 0.1–1)
MONOCYTES NFR BLD AUTO: 3.4 %
NEUTROPHILS # BLD AUTO: 5.71 X10 (3) UL (ref 1.5–7.7)
NEUTROPHILS # BLD AUTO: 5.71 X10(3) UL (ref 1.5–7.7)
NEUTROPHILS NFR BLD AUTO: 66.6 %
PLATELET # BLD AUTO: 215 10(3)UL (ref 150–450)
RBC # BLD AUTO: 4.47 X10(6)UL (ref 3.8–5.3)
WBC # BLD AUTO: 8.6 X10(3) UL (ref 4–11)

## 2020-03-09 PROCEDURE — 30901 CONTROL OF NOSEBLEED: CPT

## 2020-03-09 PROCEDURE — 85025 COMPLETE CBC W/AUTO DIFF WBC: CPT | Performed by: EMERGENCY MEDICINE

## 2020-03-09 PROCEDURE — 71045 X-RAY EXAM CHEST 1 VIEW: CPT | Performed by: EMERGENCY MEDICINE

## 2020-03-09 PROCEDURE — 94640 AIRWAY INHALATION TREATMENT: CPT

## 2020-03-09 PROCEDURE — 99284 EMERGENCY DEPT VISIT MOD MDM: CPT

## 2020-03-09 PROCEDURE — 87040 BLOOD CULTURE FOR BACTERIA: CPT | Performed by: EMERGENCY MEDICINE

## 2020-03-09 PROCEDURE — 36415 COLL VENOUS BLD VENIPUNCTURE: CPT

## 2020-03-09 RX ORDER — AZITHROMYCIN 250 MG/1
TABLET, FILM COATED ORAL
Qty: 1 PACKAGE | Refills: 0 | Status: SHIPPED | OUTPATIENT
Start: 2020-03-09 | End: 2020-03-31

## 2020-03-09 RX ORDER — IPRATROPIUM BROMIDE AND ALBUTEROL SULFATE 2.5; .5 MG/3ML; MG/3ML
3 SOLUTION RESPIRATORY (INHALATION) ONCE
Status: COMPLETED | OUTPATIENT
Start: 2020-03-09 | End: 2020-03-09

## 2020-03-09 NOTE — ED PROVIDER NOTES
Patient Seen in: [facility] Emergency Department      History   Patient presents with:  Cough/URI    Stated Complaint: URI    HPI    27-year-old female seen by me yesterday in the emergency department and diagnosed with RSV bronchiolitis was asked t Temp 97.2 °F (36.2 °C)   Temp src Temporal   SpO2 100 %   O2 Device None (Room air)       Current:BP (!) 159/99   Pulse 80   Temp 97.2 °F (36.2 °C) (Temporal)   Resp 18   Ht 157.5 cm (5' 2\")   Wt 72.6 kg   LMP 01/15/2013   SpO2 95%   BMI 29.26 kg/m² culture were a skin contaminant. Treatment plan was discussed with the patient prior to disposition. The patient also advised that she has had some intermittent left nostril bleeding. A superficial erosion was noted on the anterior left septum.   Top

## 2020-03-09 NOTE — ED INITIAL ASSESSMENT (HPI)
Pt here after call from [facility] with abnormal lab, pt unsure of what lab is, + for rsv , seen here yesterday

## 2020-03-10 ENCOUNTER — APPOINTMENT (OUTPATIENT)
Dept: HEMATOLOGY/ONCOLOGY | Facility: HOSPITAL | Age: 60
End: 2020-03-10
Attending: INTERNAL MEDICINE
Payer: MEDICAID

## 2020-03-10 DIAGNOSIS — J21.0 RSV (ACUTE BRONCHIOLITIS DUE TO RESPIRATORY SYNCYTIAL VIRUS): ICD-10-CM

## 2020-03-10 DIAGNOSIS — C22.1 CHOLANGIOCARCINOMA (HCC): Primary | ICD-10-CM

## 2020-03-10 PROCEDURE — 96361 HYDRATE IV INFUSION ADD-ON: CPT

## 2020-03-10 PROCEDURE — 96367 TX/PROPH/DG ADDL SEQ IV INF: CPT

## 2020-03-10 PROCEDURE — 96366 THER/PROPH/DIAG IV INF ADDON: CPT

## 2020-03-10 PROCEDURE — 96375 TX/PRO/DX INJ NEW DRUG ADDON: CPT

## 2020-03-10 PROCEDURE — 99215 OFFICE O/P EST HI 40 MIN: CPT | Performed by: INTERNAL MEDICINE

## 2020-03-10 PROCEDURE — 96417 CHEMO IV INFUS EACH ADDL SEQ: CPT

## 2020-03-10 PROCEDURE — 96413 CHEMO IV INFUSION 1 HR: CPT

## 2020-03-10 RX ORDER — SODIUM CHLORIDE 9 MG/ML
1000 INJECTION, SOLUTION INTRAVENOUS ONCE
Status: COMPLETED | OUTPATIENT
Start: 2020-03-10 | End: 2020-03-10

## 2020-03-10 RX ORDER — SODIUM CHLORIDE 0.9 % (FLUSH) 0.9 %
10 SYRINGE (ML) INJECTION ONCE
Status: COMPLETED | OUTPATIENT
Start: 2020-03-10 | End: 2020-03-10

## 2020-03-10 RX ORDER — SODIUM CHLORIDE 0.9 % (FLUSH) 0.9 %
10 SYRINGE (ML) INJECTION ONCE
Status: CANCELLED | OUTPATIENT
Start: 2020-03-10

## 2020-03-10 RX ADMIN — SODIUM CHLORIDE 0.9 % (FLUSH) 10 ML: 0.9 % SYRINGE (ML) INJECTION at 16:40:00

## 2020-03-10 RX ADMIN — SODIUM CHLORIDE 1000 ML: 9 INJECTION, SOLUTION INTRAVENOUS at 14:43:00

## 2020-03-10 NOTE — PROGRESS NOTES
[facility]    Diagnosis: Metastatic Cholangiocarcinoma: liver, bone    Treatment History:   1. Cisplatin 25 mg/m2 D1/8 + Gemcitabine 1000 mg/m2 D1/8 q21 days.    C1: 3/2/20: : 20,224, CEA 43.6    Visit Diagnosis:  No di sclerotic lesion at L3 measuring 8 mm from 2 mm. · 2/17/20: PET/CT:  · Bones: L3 sclerotic focus w/o FDG uptake. T7 lytic lesion with SUV 5.3.   · Lungs: micro-nodules w/o specific uptake. Limited by motion. Concerning for disease.    · Liver: Left hepatic MG Oral Tab, Take 1 tablet (50 mg total) by mouth every 6 (six) hours as needed for Pain., Disp: 120 tablet, Rfl: 0  loratadine 10 MG Oral Tab, Take 10 mg by mouth daily. , Disp: , Rfl:   Nebulizers (COMP AIR COMPRESSOR NEBULIZER) Does not apply Misc, by Mi 1011  [COMPLETED] ipratropium-albuterol (DUONEB) nebulizer solution 3 mL, 3 mL, Nebulization, Once, Bashir Davies MD, 3 mL at 03/08/20 1014  [COMPLETED] predniSONE (DELTASONE) tab 60 mg, 60 mg, Oral, Once, Bashir Davies MD, 60 mg at 03/08/20 (XYLOCAINE/EPINEPHRINE) 1 %-1:[id number] injection, , , ,   [COMPLETED] lidocaine (XYLOCAINE) 1 % injection, , , ,   [COMPLETED] fentaNYL citrate (SUBLIMAZE) 0.05 MG/ML injection, , , ,   [COMPLETED] Midazolam HCl (VERSED) 2 MG/2ML injection, , , ,   [COMPLETE date: 2017        Years since quitting: 3.1      Smokeless tobacco: Never Used    Substance and Sexual Activity      Alcohol use: No      Drug use: No      Sexual activity: Never     Family History   Problem Relation Age of Onset   • Heart Disease Father chemotherapy discussed. - C1D8 of Cisplatin/Gemcitabine today  - May need G-CSF, will monitor CBCs  - She would like to meet with SHANNON to discuss counseling     RSV PNA: Improving with steroids. Started empirically on Azithromycin.     Gram Positive Rods in

## 2020-03-10 NOTE — PROGRESS NOTES
Pt here for C1D8 Cis/Ritchie.   Arrives Ambulating independently, accompanied by Spouse           Patient reports possible pregnancy since last therapy cycle: No    Modifications in dose or schedule: No     Frequency of blood return and site check throughout ad

## 2020-03-16 ENCOUNTER — TELEPHONE (OUTPATIENT)
Dept: HEMATOLOGY/ONCOLOGY | Facility: HOSPITAL | Age: 60
End: 2020-03-16

## 2020-03-16 RX ORDER — LEVOFLOXACIN 500 MG/1
500 TABLET, FILM COATED ORAL DAILY
Qty: 7 TABLET | Refills: 0 | Status: SHIPPED | OUTPATIENT
Start: 2020-03-16 | End: 2020-03-31

## 2020-03-16 NOTE — TELEPHONE ENCOUNTER
Called pt to advise her concerning her routine follow up visit tomorrow. She is recovering from RSV bronchiolitis, finished Z-Pack and corticosteroids. Now with sinus pain/facial pain, green sinus drainage, low grade temps 99 range. She is not SOB.   Jonas

## 2020-03-17 ENCOUNTER — APPOINTMENT (OUTPATIENT)
Dept: HEMATOLOGY/ONCOLOGY | Facility: HOSPITAL | Age: 60
End: 2020-03-17
Attending: INTERNAL MEDICINE
Payer: MEDICAID

## 2020-03-24 ENCOUNTER — OFFICE VISIT (OUTPATIENT)
Dept: HEMATOLOGY/ONCOLOGY | Facility: HOSPITAL | Age: 60
End: 2020-03-24
Attending: INTERNAL MEDICINE
Payer: MEDICAID

## 2020-03-24 ENCOUNTER — DIETICIAN VISIT (OUTPATIENT)
Dept: HEMATOLOGY/ONCOLOGY | Facility: HOSPITAL | Age: 60
End: 2020-03-24

## 2020-03-24 VITALS
SYSTOLIC BLOOD PRESSURE: 139 MMHG | DIASTOLIC BLOOD PRESSURE: 82 MMHG | OXYGEN SATURATION: 95 % | RESPIRATION RATE: 20 BRPM | TEMPERATURE: 98 F | HEART RATE: 103 BPM | HEIGHT: 62.01 IN | BODY MASS INDEX: 29.98 KG/M2 | WEIGHT: 165 LBS

## 2020-03-24 DIAGNOSIS — C22.1 CHOLANGIOCARCINOMA (HCC): Primary | ICD-10-CM

## 2020-03-24 LAB
ALBUMIN SERPL-MCNC: 3.4 G/DL (ref 3.4–5)
ALBUMIN/GLOB SERPL: 0.8 {RATIO} (ref 1–2)
ALP LIVER SERPL-CCNC: 188 U/L (ref 46–118)
ALT SERPL-CCNC: 57 U/L (ref 13–56)
ANION GAP SERPL CALC-SCNC: 6 MMOL/L (ref 0–18)
AST SERPL-CCNC: 40 U/L (ref 15–37)
BASOPHILS # BLD AUTO: 0.07 X10(3) UL (ref 0–0.2)
BASOPHILS NFR BLD AUTO: 1 %
BILIRUB SERPL-MCNC: 0.4 MG/DL (ref 0.1–2)
BUN BLD-MCNC: 7 MG/DL (ref 7–18)
BUN/CREAT SERPL: 9.1 (ref 10–20)
CALCIUM BLD-MCNC: 8.7 MG/DL (ref 8.5–10.1)
CANCER AG19-9 SERPL-ACNC: ABNORMAL U/ML (ref ?–37)
CEA SERPL-MCNC: 67.7 NG/ML (ref ?–5)
CHLORIDE SERPL-SCNC: 105 MMOL/L (ref 98–112)
CO2 SERPL-SCNC: 26 MMOL/L (ref 21–32)
CREAT BLD-MCNC: 0.77 MG/DL (ref 0.55–1.02)
DEPRECATED RDW RBC AUTO: 42.6 FL (ref 35.1–46.3)
EOSINOPHIL # BLD AUTO: 0.11 X10(3) UL (ref 0–0.7)
EOSINOPHIL NFR BLD AUTO: 1.6 %
ERYTHROCYTE [DISTWIDTH] IN BLOOD BY AUTOMATED COUNT: 15.1 % (ref 11–15)
GLOBULIN PLAS-MCNC: 4.1 G/DL (ref 2.8–4.4)
GLUCOSE BLD-MCNC: 161 MG/DL (ref 70–99)
HCT VFR BLD AUTO: 35.8 % (ref 35–48)
HGB BLD-MCNC: 11.8 G/DL (ref 12–16)
IMM GRANULOCYTES # BLD AUTO: 0.12 X10(3) UL (ref 0–1)
IMM GRANULOCYTES NFR BLD: 1.8 %
LYMPHOCYTES # BLD AUTO: 2.53 X10(3) UL (ref 1–4)
LYMPHOCYTES NFR BLD AUTO: 37.6 %
M PROTEIN MFR SERPL ELPH: 7.5 G/DL (ref 6.4–8.2)
MCH RBC QN AUTO: 30.4 PG (ref 26–34)
MCHC RBC AUTO-ENTMCNC: 33 G/DL (ref 31–37)
MCV RBC AUTO: 92.3 FL (ref 80–100)
MONOCYTES # BLD AUTO: 0.82 X10(3) UL (ref 0.1–1)
MONOCYTES NFR BLD AUTO: 12.2 %
NEUTROPHILS # BLD AUTO: 3.08 X10 (3) UL (ref 1.5–7.7)
NEUTROPHILS # BLD AUTO: 3.08 X10(3) UL (ref 1.5–7.7)
NEUTROPHILS NFR BLD AUTO: 45.8 %
OSMOLALITY SERPL CALC.SUM OF ELEC: 285 MOSM/KG (ref 275–295)
PATIENT FASTING Y/N/NP: NO
PLATELET # BLD AUTO: 494 10(3)UL (ref 150–450)
POTASSIUM SERPL-SCNC: 4 MMOL/L (ref 3.5–5.1)
RBC # BLD AUTO: 3.88 X10(6)UL (ref 3.8–5.3)
SODIUM SERPL-SCNC: 137 MMOL/L (ref 136–145)
WBC # BLD AUTO: 6.7 X10(3) UL (ref 4–11)

## 2020-03-24 PROCEDURE — 86301 IMMUNOASSAY TUMOR CA 19-9: CPT

## 2020-03-24 PROCEDURE — 96376 TX/PRO/DX INJ SAME DRUG ADON: CPT

## 2020-03-24 PROCEDURE — 96361 HYDRATE IV INFUSION ADD-ON: CPT

## 2020-03-24 PROCEDURE — 82378 CARCINOEMBRYONIC ANTIGEN: CPT

## 2020-03-24 PROCEDURE — 99215 OFFICE O/P EST HI 40 MIN: CPT | Performed by: INTERNAL MEDICINE

## 2020-03-24 PROCEDURE — 96417 CHEMO IV INFUS EACH ADDL SEQ: CPT

## 2020-03-24 PROCEDURE — 96367 TX/PROPH/DG ADDL SEQ IV INF: CPT

## 2020-03-24 PROCEDURE — 96366 THER/PROPH/DIAG IV INF ADDON: CPT

## 2020-03-24 PROCEDURE — 96413 CHEMO IV INFUSION 1 HR: CPT

## 2020-03-24 PROCEDURE — 80053 COMPREHEN METABOLIC PANEL: CPT

## 2020-03-24 PROCEDURE — 96375 TX/PRO/DX INJ NEW DRUG ADDON: CPT

## 2020-03-24 PROCEDURE — 85025 COMPLETE CBC W/AUTO DIFF WBC: CPT

## 2020-03-24 RX ORDER — SODIUM CHLORIDE 0.9 % (FLUSH) 0.9 %
10 SYRINGE (ML) INJECTION ONCE
Status: CANCELLED | OUTPATIENT
Start: 2020-03-24

## 2020-03-24 RX ORDER — SODIUM CHLORIDE 0.9 % (FLUSH) 0.9 %
10 SYRINGE (ML) INJECTION ONCE
Status: COMPLETED | OUTPATIENT
Start: 2020-03-24 | End: 2020-03-24

## 2020-03-24 RX ORDER — SODIUM CHLORIDE 9 MG/ML
1000 INJECTION, SOLUTION INTRAVENOUS ONCE
Status: CANCELLED | OUTPATIENT
Start: 2020-03-31

## 2020-03-24 RX ORDER — SODIUM CHLORIDE 9 MG/ML
1000 INJECTION, SOLUTION INTRAVENOUS ONCE
Status: COMPLETED | OUTPATIENT
Start: 2020-03-24 | End: 2020-03-24

## 2020-03-24 RX ORDER — SODIUM CHLORIDE 9 MG/ML
1000 INJECTION, SOLUTION INTRAVENOUS ONCE
Status: CANCELLED | OUTPATIENT
Start: 2020-03-24

## 2020-03-24 RX ADMIN — SODIUM CHLORIDE 0.9 % (FLUSH) 10 ML: 0.9 % SYRINGE (ML) INJECTION at 15:59:00

## 2020-03-24 RX ADMIN — SODIUM CHLORIDE 1000 ML: 9 INJECTION, SOLUTION INTRAVENOUS at 14:18:00

## 2020-03-24 NOTE — PROGRESS NOTES
[facility]    Diagnosis: Metastatic Cholangiocarcinoma: liver, bone: BRAF V600E +    Treatment History:   1. Cisplatin 25 mg/m2 D1/8 + Gemcitabine 1000 mg/m2 D1/8 q21 days.    C1: 3/2/20: : 20,224, CEA 43.6  C2: 3/24/20 right renal cortical lesion 8 x 6 mm (previous 5 x 5 mm). Enlarging sclerotic lesion at L3 measuring 8 mm from 2 mm. · 2/17/20: PET/CT:  · Bones: L3 sclerotic focus w/o FDG uptake.  T7 lytic lesion with SUV 5.3.   · Lungs: micro-nodules w/o specific uptake Disp: , Rfl:   lisinopril 40 MG Oral Tab, Take 1 tablet (40 mg total) by mouth daily. , Disp: 90 tablet, Rfl: 1  atorvastatin 20 MG Oral Tab, Take 1 tablet (20 mg total) by mouth nightly., Disp: 90 tablet, Rfl: 1  ALPRAZolam 0.25 MG Oral Tab, Take 1 tablet IVPB, 150 mg, Intravenous, Once, Monika Leary MD, Stopped at 03/10/20 1216  [COMPLETED] ondansetron HCl (ZOFRAN) 8 mg, Dexamethasone Sodium Phosphate (DECADRON) 10 mg in sodium chloride 0.9% 105 mL IVPB, , Intravenous, Once, Monika Leary MD, Stoppe sodium chloride 0.9% 1,018 mL IV, , Intravenous, Once, Marco Clement MD, Stopped at 03/03/20 1127  [COMPLETED] Fosaprepitant Dimeglumine (EMEND) 150 mg in sodium chloride 0.9% 150 mL IVPB, 150 mg, Intravenous, Once, Marco Clement MD, Stopped at 03/03 History:   Diagnosis Date   • Atherosclerosis of coronary artery    • Cancer (HCC)    • COPD (chronic obstructive pulmonary disease) ([facility]     2 1/2 L O2 @ night   • Coronary atherosclerosis    • Diverticulitis large intestine w/o perforation or abscess w/ S1S2 no murmurs  Extremities: No edema   Lungs: mild rhonchi bilaterally   Abd: soft nt nd +BS , mild hepatomegaly  Neuro: CN: II-XII grossly intact  Bone: focal tenderness in Thoracic and Lumbar spine      Results:  Lab Results   Component Value Date    W [facility] at [facility]. Being considered for bronchoscopy  - Should follow up with Dr. Carlito Hennessy  - PET/CT as above. Lesions to small to biopsy but appear concerning.   - Uses PRN 02    Right Renal Cortical Lesion: No FDG uptake. Continue to monitor.

## 2020-03-24 NOTE — PROGRESS NOTES
Nutrition screen complete as triggered by Best Practice dx of metastatic cholangiocarcinoma to liver and bones. Chart reviewed. Pt appears nutritionally stable at present. RD available on consult.

## 2020-03-30 NOTE — PROGRESS NOTES
[facility]    Diagnosis: Metastatic Cholangiocarcinoma: liver, bone: BRAF V600E +    Treatment History:   1. Cisplatin 25 mg/m2 D1/8 + Gemcitabine 1000 mg/m2 D1/8 q21 days.    C1: 3/3/20 and 3/10/20: : 347,721 CEA 43.6 · 02/2018: splenic flexure/colon robotic resection: benign   · 09/2019: Mammogram was BIRADS1  · 1/31/20: CT AP: Fatty liver, enlarging right renal cortical lesion 8 x 6 mm (previous 5 x 5 mm). Enlarging sclerotic lesion at L3 measuring 8 mm from 2 mm. daily., Disp: , Rfl:   Nebulizers (COMP AIR COMPRESSOR NEBULIZER) Does not apply Misc, by Misc. (Non-Drug; Combo Route) route., Disp: , Rfl:   lisinopril 40 MG Oral Tab, Take 1 tablet (40 mg total) by mouth daily. , Disp: 90 tablet, Rfl: 1  atorvastatin 20 M mg/m2 (Treatment Plan Recorded), Intravenous, Once, Winston Washington MD  mannitol 12.5 g in sodium chloride 0.9% 100 mL infusion, 12.5 g, Intravenous, Once, Warden Tyler MD  0.9% NaCl infusion, 1,000 mL, Intravenous, Once, Warden Tyler MD  [facility] Once, Carmencita Monge MD, Stopped at 03/10/20 1151  [COMPLETED] mannitol 12.5 g in sodium chloride 0.9% 100 mL infusion, 12.5 g, Intravenous, Once, Winston Washington MD, Stopped at 03/10/20 1252  [COMPLETED] Fosaprepitant Dimeglumine (EMEND) 150 mg in sodi 60 mg, Oral, Once, Quincy Garza MD, 60 mg at 03/08/20 1153  [COMPLETED] Heparin Lock Flush 100 UNIT/ML lock flush 500 Units, 5 mL, Intravenous, Once, Quincy Garza MD, 500 Units at 03/08/20 1156  [COMPLETED] potassium chloride 20 mEq, love Midazolam HCl (VERSED) 2 MG/2ML injection, , , ,   [COMPLETED] Heparin Sodium (Porcine) 5000 UNIT/ML injection, , , ,   [COMPLETED] fentaNYL citrate (SUBLIMAZE) 0.05 MG/ML injection, , , ,   [COMPLETED] Midazolam HCl (VERSED) 2 MG/2ML injection, , , , (03/31 0907)  Temp: 98.1 °F (36.7 °C) (03/31 0907)  Do Not Use - Resp Rate: --  SpO2: 98 % (03/31 0907)     Vitals reviewed in Epic  General: NAD, AOX3  HEENT: clear op, mmm, no jvd, no scleral icterus  LN: no supraclavicular, infraclavicular, axillary or Blood: Discussed with ID, likely contaminant on 3/8/10. Follow up cx are negative. Osseous Lesions (L3 and T7): has poor dentition. Will need dental clearance for zometa. She has already met with NSGY. Monoclonal study is negative.      Transaminititis:

## 2020-03-31 ENCOUNTER — OFFICE VISIT (OUTPATIENT)
Dept: HEMATOLOGY/ONCOLOGY | Facility: HOSPITAL | Age: 60
End: 2020-03-31
Attending: INTERNAL MEDICINE
Payer: MEDICAID

## 2020-03-31 VITALS
HEIGHT: 62.01 IN | TEMPERATURE: 98 F | DIASTOLIC BLOOD PRESSURE: 92 MMHG | RESPIRATION RATE: 18 BRPM | HEART RATE: 82 BPM | BODY MASS INDEX: 30.13 KG/M2 | OXYGEN SATURATION: 98 % | WEIGHT: 165.81 LBS | SYSTOLIC BLOOD PRESSURE: 137 MMHG

## 2020-03-31 DIAGNOSIS — M54.50 CHRONIC LOW BACK PAIN, UNSPECIFIED BACK PAIN LATERALITY, UNSPECIFIED WHETHER SCIATICA PRESENT: ICD-10-CM

## 2020-03-31 DIAGNOSIS — C22.1 CHOLANGIOCARCINOMA (HCC): Primary | ICD-10-CM

## 2020-03-31 DIAGNOSIS — G89.29 CHRONIC LOW BACK PAIN, UNSPECIFIED BACK PAIN LATERALITY, UNSPECIFIED WHETHER SCIATICA PRESENT: ICD-10-CM

## 2020-03-31 LAB
ALBUMIN SERPL-MCNC: 3.5 G/DL (ref 3.4–5)
ALBUMIN/GLOB SERPL: 0.9 {RATIO} (ref 1–2)
ALP LIVER SERPL-CCNC: 172 U/L (ref 46–118)
ALT SERPL-CCNC: 72 U/L (ref 13–56)
ANION GAP SERPL CALC-SCNC: 6 MMOL/L (ref 0–18)
AST SERPL-CCNC: 45 U/L (ref 15–37)
BASOPHILS # BLD: 0.1 X10(3) UL (ref 0–0.2)
BASOPHILS NFR BLD: 1 %
BILIRUB SERPL-MCNC: 0.3 MG/DL (ref 0.1–2)
BUN BLD-MCNC: 12 MG/DL (ref 7–18)
BUN/CREAT SERPL: 17.6 (ref 10–20)
CALCIUM BLD-MCNC: 8.7 MG/DL (ref 8.5–10.1)
CANCER AG19-9 SERPL-ACNC: ABNORMAL U/ML (ref ?–37)
CHLORIDE SERPL-SCNC: 106 MMOL/L (ref 98–112)
CO2 SERPL-SCNC: 25 MMOL/L (ref 21–32)
CREAT BLD-MCNC: 0.68 MG/DL (ref 0.55–1.02)
DEPRECATED RDW RBC AUTO: 46.5 FL (ref 35.1–46.3)
EOSINOPHIL # BLD: 0 X10(3) UL (ref 0–0.7)
EOSINOPHIL NFR BLD: 0 %
ERYTHROCYTE [DISTWIDTH] IN BLOOD BY AUTOMATED COUNT: 14.5 % (ref 11–15)
GLOBULIN PLAS-MCNC: 3.8 G/DL (ref 2.8–4.4)
GLUCOSE BLD-MCNC: 141 MG/DL (ref 70–99)
HCT VFR BLD AUTO: 33.3 % (ref 35–48)
HGB BLD-MCNC: 11.2 G/DL (ref 12–16)
LYMPHOCYTES NFR BLD: 3.27 X10(3) UL (ref 1–4)
LYMPHOCYTES NFR BLD: 33 %
M PROTEIN MFR SERPL ELPH: 7.3 G/DL (ref 6.4–8.2)
MCH RBC QN AUTO: 30.9 PG (ref 26–34)
MCHC RBC AUTO-ENTMCNC: 33.6 G/DL (ref 31–37)
MCV RBC AUTO: 91.7 FL (ref 80–100)
METAMYELOCYTES # BLD: 0.2 X10(3) UL
METAMYELOCYTES NFR BLD: 2 %
MONOCYTES # BLD: 0.79 X10(3) UL (ref 0.1–1)
MONOCYTES NFR BLD: 8 %
MORPHOLOGY: NORMAL
MYELOCYTES # BLD: 0.1 X10(3) UL
MYELOCYTES NFR BLD: 1 %
NEUTROPHILS # BLD AUTO: 4.7 X10 (3) UL (ref 1.5–7.7)
NEUTROPHILS NFR BLD: 54 %
NEUTS BAND NFR BLD: 1 %
NEUTS HYPERSEG # BLD: 5.45 X10(3) UL (ref 1.5–7.7)
OSMOLALITY SERPL CALC.SUM OF ELEC: 286 MOSM/KG (ref 275–295)
PATIENT FASTING Y/N/NP: NO
PLATELET # BLD AUTO: 422 10(3)UL (ref 150–450)
PLATELET MORPHOLOGY: NORMAL
POTASSIUM SERPL-SCNC: 4.2 MMOL/L (ref 3.5–5.1)
RBC # BLD AUTO: 3.63 X10(6)UL (ref 3.8–5.3)
SODIUM SERPL-SCNC: 137 MMOL/L (ref 136–145)
TOTAL CELLS COUNTED: 100
WBC # BLD AUTO: 9.9 X10(3) UL (ref 4–11)

## 2020-03-31 PROCEDURE — 96375 TX/PRO/DX INJ NEW DRUG ADDON: CPT

## 2020-03-31 PROCEDURE — 85007 BL SMEAR W/DIFF WBC COUNT: CPT

## 2020-03-31 PROCEDURE — 86301 IMMUNOASSAY TUMOR CA 19-9: CPT

## 2020-03-31 PROCEDURE — 96417 CHEMO IV INFUS EACH ADDL SEQ: CPT

## 2020-03-31 PROCEDURE — 96367 TX/PROPH/DG ADDL SEQ IV INF: CPT

## 2020-03-31 PROCEDURE — 96413 CHEMO IV INFUSION 1 HR: CPT

## 2020-03-31 PROCEDURE — 85025 COMPLETE CBC W/AUTO DIFF WBC: CPT

## 2020-03-31 PROCEDURE — 99213 OFFICE O/P EST LOW 20 MIN: CPT | Performed by: INTERNAL MEDICINE

## 2020-03-31 PROCEDURE — 96366 THER/PROPH/DIAG IV INF ADDON: CPT

## 2020-03-31 PROCEDURE — 80053 COMPREHEN METABOLIC PANEL: CPT

## 2020-03-31 PROCEDURE — 96361 HYDRATE IV INFUSION ADD-ON: CPT

## 2020-03-31 PROCEDURE — 85027 COMPLETE CBC AUTOMATED: CPT

## 2020-03-31 RX ORDER — SODIUM CHLORIDE 9 MG/ML
1000 INJECTION, SOLUTION INTRAVENOUS ONCE
Status: COMPLETED | OUTPATIENT
Start: 2020-03-31 | End: 2020-03-31

## 2020-03-31 RX ADMIN — SODIUM CHLORIDE 1000 ML: 9 INJECTION, SOLUTION INTRAVENOUS at 15:18:00

## 2020-03-31 NOTE — PROGRESS NOTES
Pt here for C2D8.   Arrives Ambulating independently, accompanied by Self           Patient reports possible pregnancy since last therapy cycle: No    Modifications in dose or schedule: No     Frequency of blood return and site check throughout administrati

## 2020-03-31 NOTE — PROGRESS NOTES
Cycle 2   Outpatient Oncology Care Plan  Problem list:  {em onc outpatient oncology problem list:5941}    Problems related to:    {em onc outpatient oncology problem related to:5945}    Interventions:  {em onc outpatient oncology interventions:5946}    Exp

## 2020-04-01 ENCOUNTER — TELEPHONE (OUTPATIENT)
Dept: HEMATOLOGY/ONCOLOGY | Facility: HOSPITAL | Age: 60
End: 2020-04-01

## 2020-04-01 NOTE — TELEPHONE ENCOUNTER
Spoke with patient. She will call central scheduling to make appointment for PET scan. Anita Reardon MD  P Edw Maycol Washington Rns             Results reviewed.  went up. I discussed with patient yesterday if  goes up we will repeat imaging.  I

## 2020-04-08 NOTE — PROGRESS NOTES
[facility] Note    Diagnosis: Metastatic Cholangiocarcinoma: liver, bone: BRAF V600E +    Treatment History:   1. Cisplatin 25 mg/m2 D1/8 + Gemcitabine 1000 mg/m2 D1/8 q21 days.    C1: 3/3/20 and 3/10/20: : 116,866 CEA 43.6 benign   · 09/2019: Mammogram was BIRADS1  · 1/31/20: CT AP: Fatty liver, enlarging right renal cortical lesion 8 x 6 mm (previous 5 x 5 mm). Enlarging sclerotic lesion at L3 measuring 8 mm from 2 mm.   · 2/17/20: PET/CT:  · Bones: L3 sclerotic focus w/o FD 90 tablet, Rfl: 1  Docusate Sodium (COLACE OR), Take 1 tablet by mouth daily. , Disp: , Rfl:   Omeprazole 40 MG Oral Capsule Delayed Release, Take 1 capsule (40 mg total) by mouth daily. , Disp: 90 capsule, Rfl: 1  Albuterol Sulfate  (90 Base) MCG/A mg/m2 = 44 mg in sodium chloride 0.9% 294 mL chemo-infusion, 25 mg/m2 (Treatment Plan Recorded), Intravenous, Once, Anita Reardon MD, Stopped at 03/31/20 1516  [COMPLETED] 0.9% NaCl infusion, 1,000 mL, Intravenous, Once, Anita Reardon MD, Stopped at [facility])    • COPD [facility]chronic obstructive pulmonary disease) ([facility]     2 1/2 L O2 @ night   • Coronary atherosclerosis    • Diverticulitis large intestine w/o perforation or abscess w/o bleeding 11/14/2017   • High blood pressure    • High cholesterol Abd: soft nt nd +BS , mild hepatomegaly  Neuro: CN: II-XII grossly intact      Results:  Lab Results   Component Value Date    WBC 8.5 04/14/2020    HGB 10.4 (L) 04/14/2020    HCT 31.6 (L) 04/14/2020    MCV 94.9 04/14/2020    .0 04/14/2020    EOSA biopsy but appear concerning.   - Uses PRN 02    Right Renal Cortical Lesion: No FDG uptake. Continue to monitor. Pain Control: Following with Palliative care. Pain improved after C1 of chemotherapy. Checklist:  ECOG 0  RTC in 1 week    PAUL Velasco

## 2020-04-14 ENCOUNTER — OFFICE VISIT (OUTPATIENT)
Dept: HEMATOLOGY/ONCOLOGY | Facility: HOSPITAL | Age: 60
End: 2020-04-14
Attending: INTERNAL MEDICINE
Payer: MEDICAID

## 2020-04-14 ENCOUNTER — SOCIAL WORK SERVICES (OUTPATIENT)
Dept: HEMATOLOGY/ONCOLOGY | Facility: HOSPITAL | Age: 60
End: 2020-04-14

## 2020-04-14 VITALS
OXYGEN SATURATION: 94 % | SYSTOLIC BLOOD PRESSURE: 164 MMHG | RESPIRATION RATE: 16 BRPM | WEIGHT: 165 LBS | HEIGHT: 62.01 IN | DIASTOLIC BLOOD PRESSURE: 88 MMHG | TEMPERATURE: 97 F | BODY MASS INDEX: 29.98 KG/M2 | HEART RATE: 86 BPM

## 2020-04-14 DIAGNOSIS — C22.1 CHOLANGIOCARCINOMA (HCC): ICD-10-CM

## 2020-04-14 DIAGNOSIS — K21.9 GASTROESOPHAGEAL REFLUX DISEASE, ESOPHAGITIS PRESENCE NOT SPECIFIED: ICD-10-CM

## 2020-04-14 DIAGNOSIS — C22.1 CHOLANGIOCARCINOMA (HCC): Primary | ICD-10-CM

## 2020-04-14 PROCEDURE — 96367 TX/PROPH/DG ADDL SEQ IV INF: CPT

## 2020-04-14 PROCEDURE — 80053 COMPREHEN METABOLIC PANEL: CPT

## 2020-04-14 PROCEDURE — 85025 COMPLETE CBC W/AUTO DIFF WBC: CPT

## 2020-04-14 PROCEDURE — 86301 IMMUNOASSAY TUMOR CA 19-9: CPT

## 2020-04-14 PROCEDURE — 82378 CARCINOEMBRYONIC ANTIGEN: CPT

## 2020-04-14 PROCEDURE — 96366 THER/PROPH/DIAG IV INF ADDON: CPT

## 2020-04-14 PROCEDURE — 99214 OFFICE O/P EST MOD 30 MIN: CPT | Performed by: INTERNAL MEDICINE

## 2020-04-14 PROCEDURE — 96417 CHEMO IV INFUS EACH ADDL SEQ: CPT

## 2020-04-14 PROCEDURE — 96375 TX/PRO/DX INJ NEW DRUG ADDON: CPT

## 2020-04-14 PROCEDURE — 96361 HYDRATE IV INFUSION ADD-ON: CPT

## 2020-04-14 PROCEDURE — 96413 CHEMO IV INFUSION 1 HR: CPT

## 2020-04-14 RX ORDER — ONDANSETRON HYDROCHLORIDE 8 MG/1
8 TABLET, FILM COATED ORAL EVERY 8 HOURS PRN
Qty: 78 TABLET | Refills: 0 | Status: SHIPPED | OUTPATIENT
Start: 2020-04-14 | End: 2020-05-10

## 2020-04-14 RX ORDER — SODIUM CHLORIDE 9 MG/ML
1000 INJECTION, SOLUTION INTRAVENOUS ONCE
Status: CANCELLED | OUTPATIENT
Start: 2020-04-14

## 2020-04-14 RX ORDER — SODIUM CHLORIDE 9 MG/ML
1000 INJECTION, SOLUTION INTRAVENOUS ONCE
Status: COMPLETED | OUTPATIENT
Start: 2020-04-14 | End: 2020-04-14

## 2020-04-14 RX ADMIN — SODIUM CHLORIDE 1000 ML: 9 INJECTION, SOLUTION INTRAVENOUS at 14:46:00

## 2020-04-14 NOTE — PROGRESS NOTES
met with Patient to check in. Patient reports that she is feeling better than when she started treatment; pain is better and has not lost her hair yet, which she is so excited about.  She cut her hair shorter in preparation, but stated it has

## 2020-04-14 NOTE — PROGRESS NOTES
Pt here for C3D1 of Gemzar/Cisplatin.   Arrives Ambulating independently, accompanied by Self           Patient reports possible pregnancy since last therapy cycle: No    Modifications in dose or schedule: No     Frequency of blood return and site check thr

## 2020-04-20 ENCOUNTER — HOSPITAL ENCOUNTER (OUTPATIENT)
Dept: NUCLEAR MEDICINE | Facility: HOSPITAL | Age: 60
Discharge: HOME | End: 2020-04-20
Attending: INTERNAL MEDICINE
Payer: MEDICAID

## 2020-04-20 DIAGNOSIS — C22.1 CHOLANGIOCARCINOMA (HCC): ICD-10-CM

## 2020-04-20 PROCEDURE — 78815 PET IMAGE W/CT SKULL-THIGH: CPT | Performed by: INTERNAL MEDICINE

## 2020-04-20 PROCEDURE — 82962 GLUCOSE BLOOD TEST: CPT

## 2020-04-20 NOTE — IMAGING NOTE
Pt arrived to PET scan and attempted to access port-a-cath for injection. When attempting to aspirate blood resistance is met and < 2cc's aspirated. The port flushes well and pt states she could taste the saline.  I explained that unfortunately we can't use

## 2020-04-21 ENCOUNTER — OFFICE VISIT (OUTPATIENT)
Dept: HEMATOLOGY/ONCOLOGY | Facility: HOSPITAL | Age: 60
End: 2020-04-21
Attending: INTERNAL MEDICINE
Payer: MEDICAID

## 2020-04-21 VITALS
HEART RATE: 77 BPM | OXYGEN SATURATION: 93 % | BODY MASS INDEX: 29.91 KG/M2 | DIASTOLIC BLOOD PRESSURE: 81 MMHG | HEIGHT: 62.01 IN | SYSTOLIC BLOOD PRESSURE: 148 MMHG | RESPIRATION RATE: 16 BRPM | WEIGHT: 164.63 LBS | TEMPERATURE: 98 F

## 2020-04-21 DIAGNOSIS — C22.1 CHOLANGIOCARCINOMA (HCC): Primary | ICD-10-CM

## 2020-04-21 DIAGNOSIS — R79.89 ELEVATED LFTS: ICD-10-CM

## 2020-04-21 DIAGNOSIS — C78.7 LIVER METASTASES (HCC): ICD-10-CM

## 2020-04-21 DIAGNOSIS — C79.51 BONE METASTASES (HCC): ICD-10-CM

## 2020-04-21 PROCEDURE — 96417 CHEMO IV INFUS EACH ADDL SEQ: CPT

## 2020-04-21 PROCEDURE — 85027 COMPLETE CBC AUTOMATED: CPT

## 2020-04-21 PROCEDURE — 96361 HYDRATE IV INFUSION ADD-ON: CPT

## 2020-04-21 PROCEDURE — 96375 TX/PRO/DX INJ NEW DRUG ADDON: CPT

## 2020-04-21 PROCEDURE — 96413 CHEMO IV INFUSION 1 HR: CPT

## 2020-04-21 PROCEDURE — 85007 BL SMEAR W/DIFF WBC COUNT: CPT

## 2020-04-21 PROCEDURE — 99215 OFFICE O/P EST HI 40 MIN: CPT | Performed by: INTERNAL MEDICINE

## 2020-04-21 PROCEDURE — 85025 COMPLETE CBC W/AUTO DIFF WBC: CPT

## 2020-04-21 PROCEDURE — 80053 COMPREHEN METABOLIC PANEL: CPT

## 2020-04-21 PROCEDURE — 96366 THER/PROPH/DIAG IV INF ADDON: CPT

## 2020-04-21 PROCEDURE — 96367 TX/PROPH/DG ADDL SEQ IV INF: CPT

## 2020-04-21 RX ORDER — SODIUM CHLORIDE 9 MG/ML
1000 INJECTION, SOLUTION INTRAVENOUS ONCE
Status: COMPLETED | OUTPATIENT
Start: 2020-04-21 | End: 2020-04-21

## 2020-04-21 RX ORDER — SODIUM CHLORIDE 9 MG/ML
1000 INJECTION, SOLUTION INTRAVENOUS ONCE
Status: CANCELLED | OUTPATIENT
Start: 2020-04-21

## 2020-04-21 RX ADMIN — SODIUM CHLORIDE 1000 ML: 9 INJECTION, SOLUTION INTRAVENOUS at 10:20:00

## 2020-04-21 NOTE — PROGRESS NOTES
[facility]    Diagnosis: Metastatic Cholangiocarcinoma: liver, bone: BRAF V600E +    Treatment History:   1. Cisplatin 25 mg/m2 D1/8 + Gemcitabine 1000 mg/m2 D1/8 q21 days.    C1: 3/3/20 and 3/10/20: : 455,983 CEA 43.6 enlarging right renal cortical lesion 8 x 6 mm (previous 5 x 5 mm). Enlarging sclerotic lesion at L3 measuring 8 mm from 2 mm. · 2/17/20: PET/CT:  · Bones: L3 sclerotic focus w/o FDG uptake.  T7 lytic lesion with SUV 5.3.   · Lungs: micro-nodules w/o speci 1  atorvastatin 20 MG Oral Tab, Take 1 tablet (20 mg total) by mouth nightly., Disp: 90 tablet, Rfl: 1  ALPRAZolam 0.25 MG Oral Tab, Take 1 tablet (0.25 mg total) by mouth 2 (two) times daily as needed for Anxiety. , Disp: 30 tablet, Rfl: 0  Docusate Sodium MD, Stopped at 04/14/20 1020  [COMPLETED] ondansetron HCl (ZOFRAN) 16 mg, Dexamethasone Sodium Phosphate (DECADRON) 12 mg in sodium chloride 0.9% 109.2 mL IVPB, , Intravenous, Once, Winston Washington MD, Stopped at 04/14/20 1037  [COMPLETED] Gemcitabine HCl mg/m2 = 44 mg in sodium chloride 0.9% 294 mL chemo-infusion, 25 mg/m2 (Treatment Plan Recorded), Intravenous, Once, Edilia Lazo MD, Stopped at 03/31/20 1516  [COMPLETED] 0.9% NaCl infusion, 1,000 mL, Intravenous, Once, Edilia Lazo MD, Stopped at [facility])    • COPD (chronic obstructive pulmonary disease) ([facility]     2 1/2 L O2 @ night   • Coronary atherosclerosis    • Diverticulitis large intestine w/o perforation or abscess w/o bleeding 11/14/2017   • High blood pressure    • High cholesterol Hematologic/Lymphatic Normal - No petechiae or purpura. No tender or palpable lymph nodes in the cervical, supraclavicular, axillary or inguinal area. Respiratory Normal - Lungs are clear to auscultation, no rhonchi or wheezing.    Cardiovascular Zara Dandy C3D8 of Cisplatin/Gemcitabine today. Continue to monitor CEA and     Osseous Lesions (L3 and T7): Stable on PET. She has poor dentition. Will need dental clearance for zometa (delayed d/t covid). She has already met with NSGY.  Monoclonal study is neg

## 2020-04-21 NOTE — PROGRESS NOTES
Pt here for C3D8 Cisplatin/Gemzar.   Arrives Ambulating independently, accompanied by Self           Patient reports possible pregnancy since last therapy cycle: No    Modifications in dose or schedule: No     Frequency of blood return and site check throug

## 2020-04-29 ENCOUNTER — APPOINTMENT (OUTPATIENT)
Dept: CT IMAGING | Facility: HOSPITAL | Age: 60
End: 2020-04-29
Attending: EMERGENCY MEDICINE
Payer: MEDICAID

## 2020-04-29 ENCOUNTER — TELEPHONE (OUTPATIENT)
Dept: HEMATOLOGY/ONCOLOGY | Facility: HOSPITAL | Age: 60
End: 2020-04-29

## 2020-04-29 ENCOUNTER — HOSPITAL ENCOUNTER (OUTPATIENT)
Facility: HOSPITAL | Age: 60
Setting detail: OBSERVATION
Discharge: HOME | End: 2020-04-30
Attending: EMERGENCY MEDICINE | Admitting: HOSPITALIST
Payer: MEDICAID

## 2020-04-29 DIAGNOSIS — D64.9 ANEMIA, UNSPECIFIED TYPE: Primary | ICD-10-CM

## 2020-04-29 DIAGNOSIS — C22.1 CHOLANGIOCARCINOMA (HCC): ICD-10-CM

## 2020-04-29 PROBLEM — I10 BENIGN ESSENTIAL HTN: Chronic | Status: ACTIVE | Noted: 2017-04-06

## 2020-04-29 PROCEDURE — 71275 CT ANGIOGRAPHY CHEST: CPT | Performed by: EMERGENCY MEDICINE

## 2020-04-29 PROCEDURE — 30233N1 TRANSFUSION OF NONAUTOLOGOUS RED BLOOD CELLS INTO PERIPHERAL VEIN, PERCUTANEOUS APPROACH: ICD-10-PCS | Performed by: EMERGENCY MEDICINE

## 2020-04-29 PROCEDURE — 99220 INITIAL OBSERVATION CARE,LEVL III: CPT | Performed by: INTERNAL MEDICINE

## 2020-04-29 RX ORDER — HYDROCHLOROTHIAZIDE 12.5 MG/1
12.5 CAPSULE, GELATIN COATED ORAL DAILY
Status: DISCONTINUED | OUTPATIENT
Start: 2020-04-30 | End: 2020-04-30

## 2020-04-29 RX ORDER — DOCUSATE SODIUM 100 MG/1
100 CAPSULE, LIQUID FILLED ORAL DAILY
Status: DISCONTINUED | OUTPATIENT
Start: 2020-04-30 | End: 2020-04-30

## 2020-04-29 RX ORDER — CETIRIZINE HYDROCHLORIDE 10 MG/1
10 TABLET ORAL DAILY
Status: DISCONTINUED | OUTPATIENT
Start: 2020-04-30 | End: 2020-04-30

## 2020-04-29 RX ORDER — ONDANSETRON 2 MG/ML
4 INJECTION INTRAMUSCULAR; INTRAVENOUS EVERY 6 HOURS PRN
Status: DISCONTINUED | OUTPATIENT
Start: 2020-04-29 | End: 2020-04-30

## 2020-04-29 RX ORDER — HEPARIN SODIUM 5000 [USP'U]/ML
5000 INJECTION, SOLUTION INTRAVENOUS; SUBCUTANEOUS EVERY 8 HOURS SCHEDULED
Status: DISCONTINUED | OUTPATIENT
Start: 2020-04-29 | End: 2020-04-30

## 2020-04-29 RX ORDER — LISINOPRIL 40 MG/1
40 TABLET ORAL DAILY
Status: DISCONTINUED | OUTPATIENT
Start: 2020-04-30 | End: 2020-04-30

## 2020-04-29 RX ORDER — SODIUM CHLORIDE 9 MG/ML
INJECTION, SOLUTION INTRAVENOUS CONTINUOUS
Status: DISCONTINUED | OUTPATIENT
Start: 2020-04-29 | End: 2020-04-30

## 2020-04-29 RX ORDER — METOCLOPRAMIDE HYDROCHLORIDE 5 MG/ML
10 INJECTION INTRAMUSCULAR; INTRAVENOUS EVERY 8 HOURS PRN
Status: DISCONTINUED | OUTPATIENT
Start: 2020-04-29 | End: 2020-04-30

## 2020-04-29 RX ORDER — ACETAMINOPHEN 325 MG/1
650 TABLET ORAL EVERY 6 HOURS PRN
Status: DISCONTINUED | OUTPATIENT
Start: 2020-04-29 | End: 2020-04-30

## 2020-04-29 RX ORDER — PANTOPRAZOLE SODIUM 40 MG/1
40 TABLET, DELAYED RELEASE ORAL
Status: DISCONTINUED | OUTPATIENT
Start: 2020-04-30 | End: 2020-04-30

## 2020-04-29 RX ORDER — ALPRAZOLAM 0.25 MG/1
0.25 TABLET ORAL 2 TIMES DAILY PRN
Status: DISCONTINUED | OUTPATIENT
Start: 2020-04-29 | End: 2020-04-30

## 2020-04-29 RX ORDER — ASPIRIN 81 MG/1
81 TABLET ORAL DAILY
Status: DISCONTINUED | OUTPATIENT
Start: 2020-04-30 | End: 2020-04-30

## 2020-04-29 RX ORDER — HYDROCODONE BITARTRATE AND ACETAMINOPHEN 5; 325 MG/1; MG/1
1 TABLET ORAL EVERY 6 HOURS PRN
Status: DISCONTINUED | OUTPATIENT
Start: 2020-04-29 | End: 2020-04-30

## 2020-04-29 RX ORDER — ATORVASTATIN CALCIUM 20 MG/1
20 TABLET, FILM COATED ORAL NIGHTLY
Status: DISCONTINUED | OUTPATIENT
Start: 2020-04-29 | End: 2020-04-30

## 2020-04-29 NOTE — TELEPHONE ENCOUNTER
Cholangiocarcinoma - Liver mets - 4/21/20 - C3 D8 - Cisplatin/Gemzar    Called with increased weakness, \"s on pulse ox, PO 95-96% with oxygen at 3 liters, sob with exertion.  Slight discomfort under left breast, took diuretic at 0900 today thinking i

## 2020-04-29 NOTE — H&P
[facility]  History and Physical     Blue Mealy Patient Status:  Emergency    1960 MRN [id number]   Location [address] [facility] Attending Davis Ospina MD   Hosp Day # 0 PCP Tu Brothers MD     Chief Com Comment:Cramping  Gemfibrozil             MYALGIA    Comment:Muscle aches    Medications:  No current facility-administered medications on file prior to encounter.    HYDROCODONE-ACETAMINOPHEN OR, Take 1 tablet by mouth every 6 (six) hours as needed for Christopher Vela 159/65   Pulse 87   Temp 97.4 °F (36.3 °C) (Temporal)   Resp 19   Ht 157.5 cm (5' 2\")   Wt 163 lb 2.3 oz (74 kg)   LMP 01/15/2013   SpO2 100%   BMI 29.84 kg/m²   General: No acute distress. Alert and oriented x 3. HEENT: Normocephalic atraumatic.  Moist m

## 2020-04-29 NOTE — ED NOTES
While in room, PT remains calm and breathing non labored, Pt rpt feeling no abnormalities post beginning of blood transfusion, PT vitals remain stable, PT has no complaints. PT updated to care plan.

## 2020-04-29 NOTE — PLAN OF CARE
Patient admitted to unit from ER at approx 1430 via cart and ER RN. Transferred to bed. Blood transfusing, no evidence of complications. Welcomed and oriented to unit, order sets, POC, safety, call and phone systems. Call don't fall, . Denies pain.   Bed

## 2020-04-29 NOTE — ED PROVIDER NOTES
Patient Seen in: [facility] Emergency Department      History   Patient presents with:  Dyspnea CASS SOB    Stated Complaint: sob    HPI    71-year-old female presents to the emergency department for shortness of breath and rapid heartbeat.   The carola 1.00        Years: 40.00        Pack years: 36        Quit date: 2017        Years since quitting: 3.3      Smokeless tobacco: Never Used    Alcohol use: No    Drug use:  No             Review of Systems    Positive for stated complaint: sob  Other systems TROPONIN I - Normal   PRO BETA NATRIURETIC PEPTIDE - Normal   RAPID SARS-COV-2 BY PCR - Normal   CBC WITH DIFFERENTIAL WITH PLATELET    Narrative: The following orders were created for panel order CBC WITH DIFFERENTIAL WITH PLATELET.   Procedure but is not limited to pulmonary embolism. Await CTA report. Because of the patient's underlying lung disease and oxygen dependence she may require more than 1 unit. 2.  Cholangiocarcinoma.             Disposition and Plan     Clinical Impression:  Anemia

## 2020-04-29 NOTE — ED INITIAL ASSESSMENT (HPI)
A/o x3 pt with CASS on exertion now since Thursday, denies fevers or cough, Pt states she has been using her PRN O2  Now for three days and when moving O2 drops to 74% and heartrate to 130bpm. Currently at 100% with 3L via NC

## 2020-04-29 NOTE — PROGRESS NOTES
Pt currently receiving blood transfusion. Called into room to check on pt. Per pt, she is doing fine. States she just got up to the bathroom and was \"a little short of breath, but now I'm fine\".  No c/o itchiness, feeling hot, flushed, etc.

## 2020-04-30 VITALS
TEMPERATURE: 98 F | HEART RATE: 90 BPM | OXYGEN SATURATION: 100 % | HEIGHT: 62 IN | WEIGHT: 163.13 LBS | BODY MASS INDEX: 30.02 KG/M2 | SYSTOLIC BLOOD PRESSURE: 129 MMHG | DIASTOLIC BLOOD PRESSURE: 64 MMHG | RESPIRATION RATE: 18 BRPM

## 2020-04-30 PROCEDURE — 99217 OBSERVATION CARE DISCHARGE: CPT | Performed by: HOSPITALIST

## 2020-04-30 PROCEDURE — 99243 OFF/OP CNSLTJ NEW/EST LOW 30: CPT | Performed by: INTERNAL MEDICINE

## 2020-04-30 RX ORDER — HYDROCODONE BITARTRATE AND ACETAMINOPHEN 5; 325 MG/1; MG/1
TABLET ORAL EVERY 6 HOURS PRN
Refills: 0 | Status: SHIPPED | COMMUNITY
Start: 2020-04-30 | End: 2020-06-16

## 2020-04-30 NOTE — PROGRESS NOTES
Patient discharged home at this time. All discharge instructions were discussed with patient. All questions and concerns addressed. IV access removed. Patient left unit via wheelchair, wearing a mask, with transport staff.  Patient left unit in stable condi

## 2020-04-30 NOTE — PLAN OF CARE
Patient received alert and oriented, no complaints of pain. Blood infused with no adverse reaction noted. Patient verbalizes \" I feel way better than when I came in. Hopefully I can go home tomorrow. \"   Patient ambulating to the bathroom this shif intermittent suction as ordered  - Evaluate effectiveness of ordered antiemetic medications  - Provide nonpharmacologic comfort measures as appropriate  - Advance diet as tolerated, if ordered  - Obtain nutritional consult as needed  - Evaluate fluid izzy

## 2020-04-30 NOTE — DISCHARGE SUMMARY
[facility] [place] HOSPITALIST  DISCHARGE SUMMARY     Ata Almeida Patient Status:  Observation    1960 MRN [id number]   [facility] 5NW-A Attending Samanta Hardy MD   Hosp Day # 0 PCP Vincent Collins MD     Date of Admission: 2020  Date needed for Anxiety. Quantity:  30 tablet  Refills:  0     aspirin 81 MG Tabs      Take 81 mg by mouth daily. Refills:  0     atorvastatin 20 MG Tabs  Commonly known as:  LIPITOR      Take 1 tablet (20 mg total) by mouth nightly.    Quantity:  90 tablet protection of patients, staff, physicians and community is an absolute priority for [facility], we have put the following visitor restrictions in place during the Coronavirus outbreak.   -Visitors 18 years and under are not allowed at the [facility]

## 2020-04-30 NOTE — PLAN OF CARE
Patient alert and oriented x4. States that she feels much better than she did on admission. Denies pain. Denies any difficulties breathing or shortness of breath at rest or exertion. Tele, NSR. Voiding. Tolerating diet. BM this AM. IV fluids infusing.  Pt u behavior  - Position to facilitate oxygenation and minimize respiratory effort  - Oxygen supplementation based on oxygen saturation or ABGs  - Provide Smoking Cessation handout, if applicable  - Encourage broncho-pulmonary hygiene including cough, deep rafy

## 2020-04-30 NOTE — CONSULTS
[facility] Hematology and Oncology Consult Note    Reason for Consult: Dyspnea, metastatic cholangiocarcinoma   Medical Record Number: [id number]   CSN: [id number]   Referring Physician: No ref.  provider found  PCP: Galdino Mcnair MD    History of Present Illne · Tempus: BRAF V600E mutation, ARID1A (?PARP Inhibitor). PD-L1 <1%, MSI-S   · 3/3/20: Cisplatin + Gemcitabine started   · 4/20/20: PET/CT after C2:  Stable disease: T7, L3 and Liver. Treatment History:   1.  Cisplatin 25 mg/m2 D1/8 + Gemcitabine 1000 05/2019    rotator cuff   • COLECTOMY     • COLONOSCOPY  12/20/2018    polyps   • OTHER  02/26/2018    resection Splenic flexure   • OTHER SURGICAL HISTORY     • XI ROBOT-ASSISTED LAPAROSCOPIC LOW ANTERIOR COLON RESECTION Left 2/26/2018    Performed by Pet Radiology: I personally reviewed the imaging    Pathology: reviewed     Assessment and Plan:  Dyspnea: multifactorial from chemotherapy, COPD and Anemia. Symptoms improved after 1 unit of PRBC. CTA negative for PE. Anemia: 2/2 Chemotherapy.  Fe susanna

## 2020-05-05 ENCOUNTER — OFFICE VISIT (OUTPATIENT)
Dept: HEMATOLOGY/ONCOLOGY | Facility: HOSPITAL | Age: 60
End: 2020-05-05
Attending: INTERNAL MEDICINE
Payer: MEDICAID

## 2020-05-05 VITALS
SYSTOLIC BLOOD PRESSURE: 147 MMHG | RESPIRATION RATE: 16 BRPM | DIASTOLIC BLOOD PRESSURE: 83 MMHG | BODY MASS INDEX: 30.82 KG/M2 | OXYGEN SATURATION: 92 % | WEIGHT: 169.63 LBS | TEMPERATURE: 99 F | HEIGHT: 62.01 IN | HEART RATE: 89 BPM

## 2020-05-05 DIAGNOSIS — C22.1 CHOLANGIOCARCINOMA (HCC): Primary | ICD-10-CM

## 2020-05-05 DIAGNOSIS — R30.0 DYSURIA: ICD-10-CM

## 2020-05-05 DIAGNOSIS — F41.9 ANXIETY: ICD-10-CM

## 2020-05-05 DIAGNOSIS — T45.1X5A ANEMIA ASSOCIATED WITH CHEMOTHERAPY: ICD-10-CM

## 2020-05-05 DIAGNOSIS — C78.7 LIVER METASTASES (HCC): ICD-10-CM

## 2020-05-05 DIAGNOSIS — D64.81 ANEMIA ASSOCIATED WITH CHEMOTHERAPY: ICD-10-CM

## 2020-05-05 DIAGNOSIS — C79.51 BONE METASTASES (HCC): ICD-10-CM

## 2020-05-05 DIAGNOSIS — G89.3 NEOPLASM RELATED PAIN: ICD-10-CM

## 2020-05-05 DIAGNOSIS — R79.89 ELEVATED LFTS: ICD-10-CM

## 2020-05-05 DIAGNOSIS — G47.9 SLEEPING DIFFICULTY: Primary | ICD-10-CM

## 2020-05-05 PROCEDURE — 96413 CHEMO IV INFUSION 1 HR: CPT

## 2020-05-05 PROCEDURE — 85025 COMPLETE CBC W/AUTO DIFF WBC: CPT

## 2020-05-05 PROCEDURE — 96366 THER/PROPH/DIAG IV INF ADDON: CPT

## 2020-05-05 PROCEDURE — 81003 URINALYSIS AUTO W/O SCOPE: CPT

## 2020-05-05 PROCEDURE — 86301 IMMUNOASSAY TUMOR CA 19-9: CPT

## 2020-05-05 PROCEDURE — 96417 CHEMO IV INFUS EACH ADDL SEQ: CPT

## 2020-05-05 PROCEDURE — 99215 OFFICE O/P EST HI 40 MIN: CPT | Performed by: INTERNAL MEDICINE

## 2020-05-05 PROCEDURE — 96375 TX/PRO/DX INJ NEW DRUG ADDON: CPT

## 2020-05-05 PROCEDURE — 80053 COMPREHEN METABOLIC PANEL: CPT

## 2020-05-05 PROCEDURE — 82378 CARCINOEMBRYONIC ANTIGEN: CPT

## 2020-05-05 PROCEDURE — 99214 OFFICE O/P EST MOD 30 MIN: CPT | Performed by: NURSE PRACTITIONER

## 2020-05-05 PROCEDURE — 96361 HYDRATE IV INFUSION ADD-ON: CPT

## 2020-05-05 PROCEDURE — 96367 TX/PROPH/DG ADDL SEQ IV INF: CPT

## 2020-05-05 RX ORDER — SODIUM CHLORIDE 9 MG/ML
1000 INJECTION, SOLUTION INTRAVENOUS ONCE
Status: CANCELLED | OUTPATIENT
Start: 2020-05-05

## 2020-05-05 RX ORDER — SODIUM CHLORIDE 9 MG/ML
1000 INJECTION, SOLUTION INTRAVENOUS ONCE
Status: CANCELLED | OUTPATIENT
Start: 2020-05-12

## 2020-05-05 RX ORDER — SODIUM CHLORIDE 9 MG/ML
1000 INJECTION, SOLUTION INTRAVENOUS ONCE
Status: COMPLETED | OUTPATIENT
Start: 2020-05-05 | End: 2020-05-05

## 2020-05-05 RX ORDER — SODIUM CHLORIDE 0.9 % (FLUSH) 0.9 %
10 SYRINGE (ML) INJECTION ONCE
Status: CANCELLED | OUTPATIENT
Start: 2020-05-05

## 2020-05-05 RX ORDER — SODIUM CHLORIDE 0.9 % (FLUSH) 0.9 %
10 SYRINGE (ML) INJECTION ONCE
Status: COMPLETED | OUTPATIENT
Start: 2020-05-05 | End: 2020-05-05

## 2020-05-05 RX ADMIN — SODIUM CHLORIDE 0.9 % (FLUSH) 10 ML: 0.9 % SYRINGE (ML) INJECTION at 15:00:00

## 2020-05-05 RX ADMIN — SODIUM CHLORIDE 1000 ML: 9 INJECTION, SOLUTION INTRAVENOUS at 12:55:00

## 2020-05-05 NOTE — PROGRESS NOTES
Palliative Care Follow Up Note     Patient Name: Edmar Rachel   YOB: 1960   Medical Record Number: [id number]   [facility]: [id number]   Date of visit: 5/5/2020     Chief Complaint/Reason for Visit:  Pain     History of Present Illness:         Ever Kurtz Splenic flexure   • OTHER SURGICAL HISTORY     • XI ROBOT-ASSISTED LAPAROSCOPIC LOW ANTERIOR COLON RESECTION Left 2/26/2018    Performed by Luise Closs, MD at [facility] MAIN OR       Allergies:    Simvastatin             MYALGIA    Comment:Cramping  Gemfibr mouth nightly. 90 tablet 1   • ALPRAZolam 0.25 MG Oral Tab Take 1 tablet (0.25 mg total) by mouth 2 (two) times daily as needed for Anxiety. 30 tablet 0   • Docusate Sodium (COLACE OR) Take 1 tablet by mouth daily.        • Omeprazole 40 MG Oral Capsule Del >50% of visit was spent in counseling and coordination of care for symptom management.     Electronically Signed by:  FREYA Grijalva   THE [facility] Outpatient Palliative Nurse Practitioner

## 2020-05-05 NOTE — PROGRESS NOTES
[facility]    Diagnosis: Metastatic Cholangiocarcinoma: liver, bone: BRAF V600E +    Treatment History:   1. Cisplatin 25 mg/m2 D1/8 + Gemcitabine 1000 mg/m2 D1/8 q21 days.    C1: 3/3/20 and 3/10/20: : 565,059 CEA 43.6 enlarging right renal cortical lesion 8 x 6 mm (previous 5 x 5 mm). Enlarging sclerotic lesion at L3 measuring 8 mm from 2 mm. · 2/17/20: PET/CT:  · Bones: L3 sclerotic focus w/o FDG uptake.  T7 lytic lesion with SUV 5.3.   · Lungs: micro-nodules w/o speci mg total) by mouth 2 (two) times daily as needed for Anxiety. , Disp: 30 tablet, Rfl: 0  Docusate Sodium (COLACE OR), Take 1 tablet by mouth daily. , Disp: , Rfl:   Omeprazole 40 MG Oral Capsule Delayed Release, Take 1 capsule (40 mg total) by mouth daily. (Treatment Plan Recorded), Intravenous, Once, Salina Miller MD, Stopped at 04/21/20 1243  [COMPLETED] 0.9% NaCl infusion, 1,000 mL, Intravenous, Once, Salina Miller MD, Stopped at 04/21/20 1315  [COMPLETED] Fosaprepitant Dimeglumine (EMEND) polyps   • OTHER  02/26/2018    resection Splenic flexure   • OTHER SURGICAL HISTORY     • XI ROBOT-ASSISTED LAPAROSCOPIC LOW ANTERIOR COLON RESECTION Left 2/26/2018    Performed by Fabiana Schultz MD at [address] [place] [address] [email or web address]   Neurologic Normal - No sensory or motor deficits, normal cerebellar function, normal gait, cranial nerves intact. Psychiatric Normal - A&Ox3. Coherent speech. Verbalizes understanding of our discussions today.          Results:  Lab Results Lesions to small to biopsy. - Uses PRN 02    Right Renal Cortical Lesion: No FDG uptake. Continue to monitor. Pain Control: Following with Palliative care. Dysuria: Check U/A with reflex culture.     Checklist:  ECOG 0  RTC in 1 weeks    Ophelia Cai

## 2020-05-05 NOTE — PROGRESS NOTES
Pt here for C4D1.   Arrives Ambulating independently, accompanied by Self           Patient reports possible pregnancy since last therapy cycle: Not Applicable    Modifications in dose or schedule: No     Frequency of blood return and site check throughout

## 2020-05-05 NOTE — PROGRESS NOTES
Pt here for follow up and treatment.   She complains of stomach/liver pain, nausea, foul smelling urine and pain at the end of the urine stream.

## 2020-05-12 ENCOUNTER — OFFICE VISIT (OUTPATIENT)
Dept: HEMATOLOGY/ONCOLOGY | Facility: HOSPITAL | Age: 60
End: 2020-05-12
Attending: INTERNAL MEDICINE
Payer: MEDICAID

## 2020-05-12 VITALS
HEIGHT: 62.01 IN | HEART RATE: 83 BPM | DIASTOLIC BLOOD PRESSURE: 84 MMHG | TEMPERATURE: 98 F | SYSTOLIC BLOOD PRESSURE: 147 MMHG | OXYGEN SATURATION: 93 % | RESPIRATION RATE: 16 BRPM | BODY MASS INDEX: 30.49 KG/M2 | WEIGHT: 167.81 LBS

## 2020-05-12 DIAGNOSIS — C22.1 CHOLANGIOCARCINOMA (HCC): Primary | ICD-10-CM

## 2020-05-12 PROCEDURE — 96366 THER/PROPH/DIAG IV INF ADDON: CPT

## 2020-05-12 PROCEDURE — 84443 ASSAY THYROID STIM HORMONE: CPT

## 2020-05-12 PROCEDURE — 96375 TX/PRO/DX INJ NEW DRUG ADDON: CPT

## 2020-05-12 PROCEDURE — 85025 COMPLETE CBC W/AUTO DIFF WBC: CPT

## 2020-05-12 PROCEDURE — 96367 TX/PROPH/DG ADDL SEQ IV INF: CPT

## 2020-05-12 PROCEDURE — 96413 CHEMO IV INFUSION 1 HR: CPT

## 2020-05-12 PROCEDURE — 85007 BL SMEAR W/DIFF WBC COUNT: CPT

## 2020-05-12 PROCEDURE — 85027 COMPLETE CBC AUTOMATED: CPT

## 2020-05-12 PROCEDURE — 96361 HYDRATE IV INFUSION ADD-ON: CPT

## 2020-05-12 PROCEDURE — 80053 COMPREHEN METABOLIC PANEL: CPT

## 2020-05-12 PROCEDURE — 99214 OFFICE O/P EST MOD 30 MIN: CPT | Performed by: INTERNAL MEDICINE

## 2020-05-12 PROCEDURE — 96417 CHEMO IV INFUS EACH ADDL SEQ: CPT

## 2020-05-12 RX ORDER — SODIUM CHLORIDE 9 MG/ML
1000 INJECTION, SOLUTION INTRAVENOUS ONCE
Status: COMPLETED | OUTPATIENT
Start: 2020-05-12 | End: 2020-05-12

## 2020-05-12 RX ADMIN — SODIUM CHLORIDE 1000 ML: 9 INJECTION, SOLUTION INTRAVENOUS at 14:40:00

## 2020-05-12 NOTE — PROGRESS NOTES
Pt here for C4D2.   Arrives Ambulating independently, accompanied by Self           Patient reports possible pregnancy since last therapy cycle: No    Modifications in dose or schedule: No     Frequency of blood return and site check throughout administrati

## 2020-05-12 NOTE — PROGRESS NOTES
THE [facility] Hematology and Oncology Clinic Note    Diagnosis: Metastatic Cholangiocarcinoma: liver, bone: BRAF V600E +    Treatment History:   1. Cisplatin 25 mg/m2 D1/8 + Gemcitabine 1000 mg/m2 D1/8 q21 days.    C1: 3/3/20 and 3/10/20: : 715,878 CEA 43.6 malignancy. · 02/2018: splenic flexure/colon robotic resection: benign   · 09/2019: Mammogram was BIRADS1  · 1/31/20: CT AP: Fatty liver, enlarging right renal cortical lesion 8 x 6 mm (previous 5 x 5 mm).  Enlarging sclerotic lesion at L3 measuring 8 mm lisinopril 40 MG Oral Tab, Take 1 tablet (40 mg total) by mouth daily. , Disp: 90 tablet, Rfl: 1  atorvastatin 20 MG Oral Tab, Take 1 tablet (20 mg total) by mouth nightly., Disp: 90 tablet, Rfl: 1  Docusate Sodium (COLACE OR), Take 1 tablet by mouth maritza Recorded), Intravenous, Once, Alondra Flower MD, Stopped at 05/05/20 1354  [COMPLETED] 0.9% NaCl infusion, 1,000 mL, Intravenous, Once, Alondra Flower MD, Stopped at 05/05/20 1500  [COMPLETED] Normal Saline Flush 0.9 % injection 10 mL, 10 mL, Fosaprepitant Dimeglumine (EMEND) 150 mg in sodium chloride 0.9% 150 mL IVPB, 150 mg, Intravenous, Once, Winston Washington MD, Stopped at 04/14/20 1020  [COMPLETED] ondansetron HCl (ZOFRAN) 16 mg, Dexamethasone Sodium Phosphate (DECADRON) 12 mg in sodium ch History    Socioeconomic History      Marital status:       Spouse name: Not on file      Number of children: Not on file      Years of education: Not on file      Highest education level: Not on file    Tobacco Use      Smoking status: Former Smoke above   4. Colonic diverticulosis without evidence of diverticulitis. 5.  Stable heterogeneous appearance of the liver with areas of increased FDG uptake, specifically within left lobe, consistent with metastatic disease. 6.  Hepatic steatosis  7.   Emph Fibrosis/Nodules  40-45 pack year smoking history: quit 2018  - Seen Dr. Grace Singh at [facility]. Being considered for bronchoscopy  - Should follow up with Dr. Jun Lam  - PET/CT as above.  Lesions to small to biopsy but appear concerning.   - Uses ID

## 2020-05-19 ENCOUNTER — OFFICE VISIT (OUTPATIENT)
Dept: HEMATOLOGY/ONCOLOGY | Facility: HOSPITAL | Age: 60
End: 2020-05-19
Attending: INTERNAL MEDICINE
Payer: MEDICAID

## 2020-05-19 VITALS
TEMPERATURE: 99 F | WEIGHT: 172 LBS | HEART RATE: 75 BPM | DIASTOLIC BLOOD PRESSURE: 68 MMHG | OXYGEN SATURATION: 96 % | BODY MASS INDEX: 31.25 KG/M2 | RESPIRATION RATE: 16 BRPM | HEIGHT: 62.01 IN | SYSTOLIC BLOOD PRESSURE: 110 MMHG

## 2020-05-19 DIAGNOSIS — D64.9 SYMPTOMATIC ANEMIA: ICD-10-CM

## 2020-05-19 DIAGNOSIS — C22.1 CHOLANGIOCARCINOMA (HCC): Primary | ICD-10-CM

## 2020-05-19 PROCEDURE — 86901 BLOOD TYPING SEROLOGIC RH(D): CPT

## 2020-05-19 PROCEDURE — 86920 COMPATIBILITY TEST SPIN: CPT

## 2020-05-19 PROCEDURE — 86900 BLOOD TYPING SEROLOGIC ABO: CPT

## 2020-05-19 PROCEDURE — 85025 COMPLETE CBC W/AUTO DIFF WBC: CPT

## 2020-05-19 PROCEDURE — 86850 RBC ANTIBODY SCREEN: CPT

## 2020-05-19 PROCEDURE — 36430 TRANSFUSION BLD/BLD COMPNT: CPT

## 2020-05-19 RX ORDER — ACETAMINOPHEN 325 MG/1
650 TABLET ORAL ONCE
Status: CANCELLED | OUTPATIENT
Start: 2020-05-19

## 2020-05-19 RX ORDER — ACETAMINOPHEN 325 MG/1
650 TABLET ORAL ONCE
Status: COMPLETED | OUTPATIENT
Start: 2020-05-19 | End: 2020-05-19

## 2020-05-19 RX ORDER — DIPHENHYDRAMINE HCL 25 MG
25 CAPSULE ORAL ONCE
Status: CANCELLED | OUTPATIENT
Start: 2020-05-19

## 2020-05-19 RX ORDER — DIPHENHYDRAMINE HCL 25 MG
25 CAPSULE ORAL ONCE
Status: COMPLETED | OUTPATIENT
Start: 2020-05-19 | End: 2020-05-19

## 2020-05-19 RX ADMIN — DIPHENHYDRAMINE HCL 25 MG: 25 MG CAPSULE ORAL at 10:19:00

## 2020-05-19 RX ADMIN — ACETAMINOPHEN 650 MG: 325 TABLET ORAL at 10:19:00

## 2020-05-19 NOTE — PROGRESS NOTES
Pt here for transfusion.   Arrives Ambulating independently, accompanied by Self           Patient reports possible pregnancy since last therapy cycle: No    Modifications in dose or schedule: No     Frequency of blood return and site check throughout admin

## 2020-05-25 NOTE — PROGRESS NOTES
THE [facility] Hematology and Oncology Clinic Note    Diagnosis: Metastatic Cholangiocarcinoma: liver, bone: BRAF V600E +    Treatment History:   1. Cisplatin 25 mg/m2 D1/8 + Gemcitabine 1000 mg/m2 D1/8 q21 days.    C1: 3/3/20 and 3/10/20: : 944,198 CEA 43.6 aggregates. No evidence of malignancy. · 02/2018: splenic flexure/colon robotic resection: benign   · 09/2019: Mammogram was BIRADS1  · 1/31/20: CT AP: Fatty liver, enlarging right renal cortical lesion 8 x 6 mm (previous 5 x 5 mm).  Enlarging sclerotic l Tab, Take 10 mg by mouth daily. , Disp: , Rfl:   lisinopril 40 MG Oral Tab, Take 1 tablet (40 mg total) by mouth daily. , Disp: 90 tablet, Rfl: 1  atorvastatin 20 MG Oral Tab, Take 1 tablet (20 mg total) by mouth nightly., Disp: 90 tablet, Rfl: 1  Docusate S Gemcitabine HCl (GEMZAR) 1,748 mg in sodium chloride 0.9% 296 mL chemo-infusion, 1,000 mg/m2 (Treatment Plan Recorded), Intravenous, Once, Kimberly Cox MD, Stopped at 05/12/20 1405  [COMPLETED] CISplatin (PLATINOL) 25 mg/m2 = 44 mg in sodium chlor MD Winston, 500 Units at 05/05/20 1500  [COMPLETED] sodium chloride 0.9% IV bolus 1,000 mL, 1,000 mL, Intravenous, Once, Earle Parks MD, Stopped at 04/29/20 1358  [COMPLETED] iohexol (OMNIPAQUE) 350 MG/ML injection 100 mL, 100 mL, Intravenous, O meters (05/26 0858)  Pulse: 82 (05/26 0858)  BP: 127/85 (05/26 0858)  Temp: 99.1 °F (37.3 °C) (05/26 0858)  Do Not Use - Resp Rate: --  SpO2: 96 % (05/26 0858)     General: NAD, AOX3  HEENT: clear op, mmm, no jvd, no scleral icterus  CV: RRR S1S2 no murmur median OS on the order of 12 months, however, patients may live longer pending response rates and rate of disease. Risk and benefits of chemotherapy discussed. - C5D1 of Cisplatin/Gemcitabine today.    - Plan to repeat imaging after C6 or this cycle pendi

## 2020-05-26 ENCOUNTER — OFFICE VISIT (OUTPATIENT)
Dept: HEMATOLOGY/ONCOLOGY | Facility: HOSPITAL | Age: 60
End: 2020-05-26
Attending: INTERNAL MEDICINE
Payer: MEDICAID

## 2020-05-26 VITALS
WEIGHT: 170.5 LBS | HEART RATE: 82 BPM | DIASTOLIC BLOOD PRESSURE: 85 MMHG | RESPIRATION RATE: 18 BRPM | BODY MASS INDEX: 30.98 KG/M2 | HEIGHT: 62.01 IN | TEMPERATURE: 99 F | OXYGEN SATURATION: 96 % | SYSTOLIC BLOOD PRESSURE: 127 MMHG

## 2020-05-26 DIAGNOSIS — C22.1 CHOLANGIOCARCINOMA (HCC): Primary | ICD-10-CM

## 2020-05-26 PROCEDURE — 86301 IMMUNOASSAY TUMOR CA 19-9: CPT

## 2020-05-26 PROCEDURE — 96417 CHEMO IV INFUS EACH ADDL SEQ: CPT

## 2020-05-26 PROCEDURE — 80053 COMPREHEN METABOLIC PANEL: CPT

## 2020-05-26 PROCEDURE — 96361 HYDRATE IV INFUSION ADD-ON: CPT

## 2020-05-26 PROCEDURE — 82378 CARCINOEMBRYONIC ANTIGEN: CPT

## 2020-05-26 PROCEDURE — 96413 CHEMO IV INFUSION 1 HR: CPT

## 2020-05-26 PROCEDURE — 96367 TX/PROPH/DG ADDL SEQ IV INF: CPT

## 2020-05-26 PROCEDURE — 85025 COMPLETE CBC W/AUTO DIFF WBC: CPT

## 2020-05-26 PROCEDURE — 96366 THER/PROPH/DIAG IV INF ADDON: CPT

## 2020-05-26 PROCEDURE — 96375 TX/PRO/DX INJ NEW DRUG ADDON: CPT

## 2020-05-26 PROCEDURE — 99214 OFFICE O/P EST MOD 30 MIN: CPT | Performed by: INTERNAL MEDICINE

## 2020-05-26 RX ORDER — SODIUM CHLORIDE 9 MG/ML
1000 INJECTION, SOLUTION INTRAVENOUS ONCE
Status: COMPLETED | OUTPATIENT
Start: 2020-05-26 | End: 2020-05-26

## 2020-05-26 RX ORDER — SODIUM CHLORIDE 9 MG/ML
1000 INJECTION, SOLUTION INTRAVENOUS ONCE
Status: CANCELLED | OUTPATIENT
Start: 2020-06-02

## 2020-05-26 RX ADMIN — SODIUM CHLORIDE 1000 ML: 9 INJECTION, SOLUTION INTRAVENOUS at 10:35:00

## 2020-05-26 NOTE — PROGRESS NOTES
Palliative Care Follow Up Note     Patient Name: Robin Kohli   YOB: 1960   Medical Record Number: [id number]   CSN: [id number]   Date of visit: 5/26/2020     Chief Complaint/Reason for Visit:  Pain     History of Present Illness:         L rotator cuff   • COLECTOMY     • COLONOSCOPY  12/20/2018    polyps   • OTHER  02/26/2018    resection Splenic flexure   • OTHER SURGICAL HISTORY     • XI ROBOT-ASSISTED LAPAROSCOPIC LOW ANTERIOR COLON RESECTION Left 2/26/2018    Performed by Alejandra Roblero Docusate Sodium (COLACE OR) Take 1 tablet by mouth daily. • Omeprazole 40 MG Oral Capsule Delayed Release Take 1 capsule (40 mg total) by mouth daily.  90 capsule 1   • Albuterol Sulfate  (90 Base) MCG/ACT Inhalation Aero Soln Inhale 2 puffs in was spent in counseling and coordination of care for symptom management.     Electronically Signed by:  FREYA Irizarry Outpatient Palliative Nurse Practitioner

## 2020-05-26 NOTE — PROGRESS NOTES
Pt here for C5D1 Cisplatin/Gemzar.   Arrives Ambulating independently, accompanied by Self           Patient reports possible pregnancy since last therapy cycle: No    Modifications in dose or schedule: No     Frequency of blood return and site check throug

## 2020-05-27 ENCOUNTER — OFFICE VISIT (OUTPATIENT)
Dept: HEMATOLOGY/ONCOLOGY | Facility: HOSPITAL | Age: 60
End: 2020-05-27
Attending: INTERNAL MEDICINE
Payer: MEDICAID

## 2020-05-27 ENCOUNTER — TELEPHONE (OUTPATIENT)
Dept: HEMATOLOGY/ONCOLOGY | Facility: HOSPITAL | Age: 60
End: 2020-05-27

## 2020-05-27 VITALS
HEART RATE: 101 BPM | TEMPERATURE: 99 F | WEIGHT: 172 LBS | DIASTOLIC BLOOD PRESSURE: 74 MMHG | SYSTOLIC BLOOD PRESSURE: 151 MMHG | OXYGEN SATURATION: 97 % | BODY MASS INDEX: 31.25 KG/M2 | HEIGHT: 62.01 IN | RESPIRATION RATE: 18 BRPM

## 2020-05-27 PROCEDURE — 96523 IRRIG DRUG DELIVERY DEVICE: CPT

## 2020-05-27 NOTE — PROGRESS NOTES
Education Record    Learner:  Patient    Disease / Diagnosis:     Barriers / Limitations:  None   Comments:    Method:  Brief focused   Comments:    General Topics:  Plan of care reviewed   Comments:    Outcome:  Shows understanding   Comments:    Pt here

## 2020-05-27 NOTE — TELEPHONE ENCOUNTER
Test(s) completed: CA 19-9  Results: per Dr Farzana Kennedy reviewed.  down trending, now 134,974.  Thanks\"  Plan: patient notified

## 2020-05-28 ENCOUNTER — APPOINTMENT (OUTPATIENT)
Dept: HEMATOLOGY/ONCOLOGY | Facility: HOSPITAL | Age: 60
End: 2020-05-28
Attending: INTERNAL MEDICINE
Payer: MEDICAID

## 2020-06-02 ENCOUNTER — OFFICE VISIT (OUTPATIENT)
Dept: HEMATOLOGY/ONCOLOGY | Facility: HOSPITAL | Age: 60
End: 2020-06-02
Attending: INTERNAL MEDICINE
Payer: MEDICAID

## 2020-06-02 VITALS
SYSTOLIC BLOOD PRESSURE: 151 MMHG | HEIGHT: 62.01 IN | BODY MASS INDEX: 30.23 KG/M2 | WEIGHT: 166.38 LBS | TEMPERATURE: 98 F | OXYGEN SATURATION: 95 % | RESPIRATION RATE: 20 BRPM | DIASTOLIC BLOOD PRESSURE: 89 MMHG | HEART RATE: 84 BPM

## 2020-06-02 DIAGNOSIS — C22.1 CHOLANGIOCARCINOMA (HCC): ICD-10-CM

## 2020-06-02 DIAGNOSIS — C22.1 CHOLANGIOCARCINOMA (HCC): Primary | ICD-10-CM

## 2020-06-02 PROCEDURE — 96375 TX/PRO/DX INJ NEW DRUG ADDON: CPT

## 2020-06-02 PROCEDURE — 96417 CHEMO IV INFUS EACH ADDL SEQ: CPT

## 2020-06-02 PROCEDURE — 80053 COMPREHEN METABOLIC PANEL: CPT

## 2020-06-02 PROCEDURE — 85025 COMPLETE CBC W/AUTO DIFF WBC: CPT

## 2020-06-02 PROCEDURE — 85027 COMPLETE CBC AUTOMATED: CPT

## 2020-06-02 PROCEDURE — 96366 THER/PROPH/DIAG IV INF ADDON: CPT

## 2020-06-02 PROCEDURE — 85007 BL SMEAR W/DIFF WBC COUNT: CPT

## 2020-06-02 PROCEDURE — 96361 HYDRATE IV INFUSION ADD-ON: CPT

## 2020-06-02 PROCEDURE — 96413 CHEMO IV INFUSION 1 HR: CPT

## 2020-06-02 PROCEDURE — 96367 TX/PROPH/DG ADDL SEQ IV INF: CPT

## 2020-06-02 RX ORDER — SODIUM CHLORIDE 9 MG/ML
1000 INJECTION, SOLUTION INTRAVENOUS ONCE
Status: COMPLETED | OUTPATIENT
Start: 2020-06-02 | End: 2020-06-02

## 2020-06-02 RX ORDER — ONDANSETRON HYDROCHLORIDE 8 MG/1
TABLET, FILM COATED ORAL
Qty: 78 TABLET | Refills: 0 | OUTPATIENT
Start: 2020-06-02

## 2020-06-02 RX ADMIN — SODIUM CHLORIDE 1000 ML: 9 INJECTION, SOLUTION INTRAVENOUS at 13:44:00

## 2020-06-02 NOTE — PROGRESS NOTES
Education Record    Learner:  Patient    Disease / Diagnosis:choleli GRANT     Barriers / Limitations:  None   Comments:    Method:  Discussion   Comments:    General Topics:  Plan of care reviewed   Comments:    Outcome:  Shows understanding   Comments:

## 2020-06-09 ENCOUNTER — HOSPITAL ENCOUNTER (EMERGENCY)
Facility: HOSPITAL | Age: 60
Discharge: HOME | End: 2020-06-09
Attending: EMERGENCY MEDICINE
Payer: MEDICAID

## 2020-06-09 ENCOUNTER — APPOINTMENT (OUTPATIENT)
Dept: CT IMAGING | Facility: HOSPITAL | Age: 60
End: 2020-06-09
Attending: EMERGENCY MEDICINE
Payer: MEDICAID

## 2020-06-09 ENCOUNTER — OFFICE VISIT (OUTPATIENT)
Dept: HEMATOLOGY/ONCOLOGY | Facility: HOSPITAL | Age: 60
End: 2020-06-09
Attending: INTERNAL MEDICINE
Payer: MEDICAID

## 2020-06-09 VITALS
TEMPERATURE: 99 F | OXYGEN SATURATION: 94 % | HEIGHT: 62.01 IN | SYSTOLIC BLOOD PRESSURE: 180 MMHG | HEART RATE: 100 BPM | BODY MASS INDEX: 30.8 KG/M2 | DIASTOLIC BLOOD PRESSURE: 79 MMHG | RESPIRATION RATE: 20 BRPM | WEIGHT: 169.5 LBS

## 2020-06-09 VITALS
WEIGHT: 169 LBS | OXYGEN SATURATION: 96 % | HEART RATE: 79 BPM | BODY MASS INDEX: 31.1 KG/M2 | HEIGHT: 62 IN | SYSTOLIC BLOOD PRESSURE: 144 MMHG | TEMPERATURE: 98 F | DIASTOLIC BLOOD PRESSURE: 86 MMHG | RESPIRATION RATE: 20 BRPM

## 2020-06-09 DIAGNOSIS — D64.9 ANEMIA, UNSPECIFIED TYPE: ICD-10-CM

## 2020-06-09 DIAGNOSIS — T45.1X5A ANEMIA ASSOCIATED WITH CHEMOTHERAPY: ICD-10-CM

## 2020-06-09 DIAGNOSIS — R10.12 ACUTE LUQ PAIN: Primary | ICD-10-CM

## 2020-06-09 DIAGNOSIS — R10.9 ABDOMINAL PAIN OF UNKNOWN ETIOLOGY: Primary | ICD-10-CM

## 2020-06-09 DIAGNOSIS — D64.81 ANEMIA ASSOCIATED WITH CHEMOTHERAPY: ICD-10-CM

## 2020-06-09 DIAGNOSIS — C22.1 CHOLANGIOCARCINOMA (HCC): ICD-10-CM

## 2020-06-09 DIAGNOSIS — C79.51 BONE METASTASES (HCC): ICD-10-CM

## 2020-06-09 DIAGNOSIS — R79.89 ELEVATED LFTS: ICD-10-CM

## 2020-06-09 DIAGNOSIS — C22.1 CHOLANGIOCARCINOMA (HCC): Primary | ICD-10-CM

## 2020-06-09 DIAGNOSIS — C78.7 LIVER METASTASES (HCC): ICD-10-CM

## 2020-06-09 PROCEDURE — 36591 DRAW BLOOD OFF VENOUS DEVICE: CPT

## 2020-06-09 PROCEDURE — 93005 ELECTROCARDIOGRAM TRACING: CPT

## 2020-06-09 PROCEDURE — 85730 THROMBOPLASTIN TIME PARTIAL: CPT | Performed by: EMERGENCY MEDICINE

## 2020-06-09 PROCEDURE — 99285 EMERGENCY DEPT VISIT HI MDM: CPT

## 2020-06-09 PROCEDURE — 93010 ELECTROCARDIOGRAM REPORT: CPT

## 2020-06-09 PROCEDURE — 85025 COMPLETE CBC W/AUTO DIFF WBC: CPT | Performed by: EMERGENCY MEDICINE

## 2020-06-09 PROCEDURE — 71275 CT ANGIOGRAPHY CHEST: CPT | Performed by: EMERGENCY MEDICINE

## 2020-06-09 PROCEDURE — 99214 OFFICE O/P EST MOD 30 MIN: CPT | Performed by: CLINICAL NURSE SPECIALIST

## 2020-06-09 PROCEDURE — 86900 BLOOD TYPING SEROLOGIC ABO: CPT

## 2020-06-09 PROCEDURE — 80053 COMPREHEN METABOLIC PANEL: CPT | Performed by: EMERGENCY MEDICINE

## 2020-06-09 PROCEDURE — 86901 BLOOD TYPING SEROLOGIC RH(D): CPT

## 2020-06-09 PROCEDURE — 74177 CT ABD & PELVIS W/CONTRAST: CPT | Performed by: EMERGENCY MEDICINE

## 2020-06-09 PROCEDURE — 96360 HYDRATION IV INFUSION INIT: CPT

## 2020-06-09 PROCEDURE — 86850 RBC ANTIBODY SCREEN: CPT

## 2020-06-09 PROCEDURE — 85610 PROTHROMBIN TIME: CPT | Performed by: EMERGENCY MEDICINE

## 2020-06-09 PROCEDURE — 83690 ASSAY OF LIPASE: CPT | Performed by: EMERGENCY MEDICINE

## 2020-06-09 PROCEDURE — 85025 COMPLETE CBC W/AUTO DIFF WBC: CPT

## 2020-06-09 PROCEDURE — 96361 HYDRATE IV INFUSION ADD-ON: CPT

## 2020-06-09 RX ORDER — SODIUM CHLORIDE 9 MG/ML
INJECTION, SOLUTION INTRAVENOUS CONTINUOUS
Status: DISCONTINUED | OUTPATIENT
Start: 2020-06-09 | End: 2020-06-09

## 2020-06-09 NOTE — ED PROVIDER NOTES
Patient Seen in: [facility] Emergency Department      History   Patient presents with:  Abdomen/Flank Pain  Dyspnea CASS SOB    Stated Complaint: From [facility] ~ sent over for shortness of breath w/ pain in LUQ of abdome*    HPI    Patient is a Systems    Positive for stated complaint: From [facility] ~ sent over for shortness of breath w/ pain in LUQ of abdome*  Other systems are as noted in HPI. Constitutional and vital signs reviewed.       All other systems reviewed and negative except as PLT 68.0 (*)     RDW 17.2 (*)     RDW-SD 61.1 (*)     All other components within normal limits   LIPASE - Normal   PROTHROMBIN TIME (PT) - Normal   PTT, ACTIVATED - Normal   CBC WITH DIFFERENTIAL WITH PLATELET    Narrative:      The following orders were c PATIENT STATED HISTORY:(As transcribed by Technologist)  from cancer center for sob and luq pain in abdomen   CONTRAST USED:  100cc of Omnipaque 350  FINDINGS:   CHEST:  LUNGS:  Diffuse emphysematous changes of the lungs are again noted and appear similar including lytic expansile changes to the T7 spinous process and sclerotic focus within the L3 vertebral body. No new osseous lesions or interval growth of these above described osseous lesions are noted. CONCLUSION:  1. No acute process.  2. No evidence o [address] [facility]  [phone]    In 3 days            Medications Prescribed:  Current Discharge Medication List

## 2020-06-09 NOTE — PROGRESS NOTES
Education Record    Learner:  Patient    Disease / Diagnosis: Cholangiocarcinoma/Blood Draw    Barriers / Limitations:  None   Comments:    Method:  Brief focused   Comments:    General Topics:  Plan of care reviewed   Comments:    Outcome:  Shows Cherrie

## 2020-06-09 NOTE — PROGRESS NOTES
ANP Visit Note    Patient Name: Micaela Saxena   YOB: 1960   Medical Record Number: [id number]   CSN: [id number]   Date of visit: 6/9/2020       Chief Complaint/Reason for Visit:  Abdominal pain     History of Present Illness:     Ronit Gupta type       Medical History:  Past Medical History:   Diagnosis Date   • Atherosclerosis of coronary artery    • Cancer ([facility] [address]    • COPD (chronic obstructive pulmonary disease) ([facility] [address]     2 1/2 L O2 @ night   • Coronary atherosclerosis    • Diverticulitis lar Sexual activity: Never    Lifestyle      Physical activity:        Days per week: Not on file        Minutes per session: Not on file      Stress: Not on file    Relationships      Social connections:        Talks on phone: Not on file        Gets together Capsule Delayed Release, Take 1 capsule (40 mg total) by mouth daily. , Disp: 90 capsule, Rfl: 1  •  Albuterol Sulfate  (90 Base) MCG/ACT Inhalation Aero Soln, Inhale 2 puffs into the lungs every 4 (four) hours as needed for Wheezing or Shortness of 33.7 26.0 - 34.0 pg    MCHC 33.7 31.0 - 37.0 g/dL    RDW 17.2 (H) 11.0 - 15.0 %    RDW-SD 62.9 (H) 35.1 - 46.3 fL    Neutrophil Absolute Prelim 1.51 1.50 - 7.70 x10 (3) uL   ABORH (BLOOD TYPE)    Collection Time: 06/09/20  9:28 AM   Result Value Ref Range

## 2020-06-09 NOTE — ED INITIAL ASSESSMENT (HPI)
Pt presents to ED with complaint of LUQ abd pain x1 day. Associated with extreme nausea but no vomiting. Reports some CASS but is her norm, that is no better or worse per patient.

## 2020-06-10 ENCOUNTER — OFFICE VISIT (OUTPATIENT)
Dept: HEMATOLOGY/ONCOLOGY | Facility: HOSPITAL | Age: 60
End: 2020-06-10
Attending: INTERNAL MEDICINE
Payer: MEDICAID

## 2020-06-10 ENCOUNTER — TELEPHONE (OUTPATIENT)
Dept: HEMATOLOGY/ONCOLOGY | Facility: HOSPITAL | Age: 60
End: 2020-06-10

## 2020-06-10 VITALS
OXYGEN SATURATION: 91 % | BODY MASS INDEX: 30.67 KG/M2 | SYSTOLIC BLOOD PRESSURE: 141 MMHG | HEART RATE: 98 BPM | DIASTOLIC BLOOD PRESSURE: 75 MMHG | TEMPERATURE: 99 F | WEIGHT: 168.81 LBS | HEIGHT: 62.01 IN | RESPIRATION RATE: 18 BRPM

## 2020-06-10 DIAGNOSIS — R53.1 WEAKNESS: ICD-10-CM

## 2020-06-10 DIAGNOSIS — C22.1 CHOLANGIOCARCINOMA (HCC): ICD-10-CM

## 2020-06-10 DIAGNOSIS — T45.1X5A ANEMIA ASSOCIATED WITH CHEMOTHERAPY: Primary | ICD-10-CM

## 2020-06-10 DIAGNOSIS — R06.00 DOE (DYSPNEA ON EXERTION): ICD-10-CM

## 2020-06-10 DIAGNOSIS — D64.81 ANEMIA ASSOCIATED WITH CHEMOTHERAPY: Primary | ICD-10-CM

## 2020-06-10 DIAGNOSIS — C22.1 CHOLANGIOCARCINOMA (HCC): Primary | ICD-10-CM

## 2020-06-10 PROCEDURE — 80048 BASIC METABOLIC PNL TOTAL CA: CPT

## 2020-06-10 PROCEDURE — 99214 OFFICE O/P EST MOD 30 MIN: CPT | Performed by: CLINICAL NURSE SPECIALIST

## 2020-06-10 PROCEDURE — 96360 HYDRATION IV INFUSION INIT: CPT

## 2020-06-10 PROCEDURE — 85025 COMPLETE CBC W/AUTO DIFF WBC: CPT

## 2020-06-10 RX ORDER — PREDNISONE 20 MG/1
40 TABLET ORAL DAILY
Qty: 10 TABLET | Refills: 0 | Status: SHIPPED | OUTPATIENT
Start: 2020-06-10 | End: 2020-06-15

## 2020-06-10 RX ORDER — SODIUM CHLORIDE 0.9 % (FLUSH) 0.9 %
10 SYRINGE (ML) INJECTION ONCE
Status: CANCELLED | OUTPATIENT
Start: 2020-06-10

## 2020-06-10 NOTE — PROGRESS NOTES
ANP Visit Note    Patient Name: Diane Sapp   YOB: 1960   Medical Record Number: [id number]   CSN: [id number]   Date of visit: 6/10/2020       Chief Complaint/Reason for Visit:  Follow up on abdominal pain, worsening weakness/WILD     Histo History:  Past Medical History:   Diagnosis Date   • Atherosclerosis of coronary artery    • Cancer (HCC)    • COPD (chronic obstructive pulmonary disease) (HCC)     2 1/2 L O2 @ night   • Coronary atherosclerosis    • Diverticulitis large intestine w/o pe Never    Lifestyle      Physical activity:        Days per week: Not on file        Minutes per session: Not on file      Stress: Not on file    Relationships      Social connections:        Talks on phone: Not on file        Gets together: Not on file Release, Take 1 capsule (40 mg total) by mouth daily. , Disp: 90 capsule, Rfl: 1  •  Albuterol Sulfate  (90 Base) MCG/ACT Inhalation Aero Soln, Inhale 2 puffs into the lungs every 4 (four) hours as needed for Wheezing or Shortness of Breath.  CARRY YO 11.0 x10(3) uL    RBC 2.73 (L) 3.80 - 5.30 x10(6)uL    HGB 9.2 (L) 12.0 - 16.0 g/dL    HCT 27.3 (L) 35.0 - 48.0 %    PLT 69.0 (L) 150.0 - 450.0 10(3)uL    .0 80.0 - 100.0 fL    MCH 33.7 26.0 - 34.0 pg    MCHC 33.7 31.0 - 37.0 g/dL    RDW 17.2 (H) 11 0. 88 0.55 - 1.02 mg/dL    BUN/CREA Ratio 13.6 10.0 - 20.0    Calcium, Total 9.1 8.5 - 10.1 mg/dL    Calculated Osmolality 286 275 - 295 mOsm/kg    GFR, Non- 73 >=60    GFR, -American 84 >=60    AST 29 15 - 37 U/L    ALT 48 13 - 56 U/ (Bezet) 444 ms    P Axis 65 degrees    R Axis 32 degrees    T Axis 41 degrees   CBC W/ DIFFERENTIAL    Collection Time: 06/10/20 11:04 AM   Result Value Ref Range    WBC 5.9 4.0 - 11.0 x10(3) uL    RBC 2.61 (L) 3.80 - 5.30 x10(6)uL    HGB 8.8 (L) 12.0 - 16 of care. Emotional Well Being:  I have assessed the patient's emotional well-being and any concerns about anxiety or depression.   We discussed issues of distress, coping difficulties and social support systems and currently there are no active problems

## 2020-06-10 NOTE — PROGRESS NOTES
Education Record    Learner:  Patient    Disease / Diagnosis:    Barriers / Limitations:  None   Comments:    Method:  Brief focused   Comments:    General Topics:  Plan of care reviewed   Comments:    Outcome:  Shows understanding   Comments:      Pt jeremías

## 2020-06-10 NOTE — TELEPHONE ENCOUNTER
Chanelle Spear was in yesterday for tx, Hgb 9.2 she was sent to ER for abdominal pain and HGB 8.7. However she is having increasing SOB, HR rising to 120 with ambulation, feeling weak. His concern is as these symptoms worsen Chanelle Spear is often in need of transfusion.

## 2020-06-16 ENCOUNTER — OFFICE VISIT (OUTPATIENT)
Dept: HEMATOLOGY/ONCOLOGY | Facility: HOSPITAL | Age: 60
End: 2020-06-16
Attending: INTERNAL MEDICINE
Payer: MEDICAID

## 2020-06-16 VITALS
HEIGHT: 62.01 IN | WEIGHT: 171 LBS | HEART RATE: 83 BPM | OXYGEN SATURATION: 94 % | DIASTOLIC BLOOD PRESSURE: 81 MMHG | SYSTOLIC BLOOD PRESSURE: 165 MMHG | RESPIRATION RATE: 18 BRPM | BODY MASS INDEX: 31.07 KG/M2 | TEMPERATURE: 98 F

## 2020-06-16 DIAGNOSIS — C22.1 CHOLANGIOCARCINOMA (HCC): Primary | ICD-10-CM

## 2020-06-16 PROCEDURE — 80053 COMPREHEN METABOLIC PANEL: CPT

## 2020-06-16 PROCEDURE — 85025 COMPLETE CBC W/AUTO DIFF WBC: CPT

## 2020-06-16 PROCEDURE — 96375 TX/PRO/DX INJ NEW DRUG ADDON: CPT

## 2020-06-16 PROCEDURE — 96367 TX/PROPH/DG ADDL SEQ IV INF: CPT

## 2020-06-16 PROCEDURE — 96413 CHEMO IV INFUSION 1 HR: CPT

## 2020-06-16 PROCEDURE — 96417 CHEMO IV INFUS EACH ADDL SEQ: CPT

## 2020-06-16 PROCEDURE — 99215 OFFICE O/P EST HI 40 MIN: CPT | Performed by: INTERNAL MEDICINE

## 2020-06-16 PROCEDURE — S0028 INJECTION, FAMOTIDINE, 20 MG: HCPCS | Performed by: CLINICAL NURSE SPECIALIST

## 2020-06-16 PROCEDURE — 96366 THER/PROPH/DIAG IV INF ADDON: CPT

## 2020-06-16 PROCEDURE — 86301 IMMUNOASSAY TUMOR CA 19-9: CPT

## 2020-06-16 PROCEDURE — 82378 CARCINOEMBRYONIC ANTIGEN: CPT

## 2020-06-16 PROCEDURE — 96361 HYDRATE IV INFUSION ADD-ON: CPT

## 2020-06-16 RX ORDER — SODIUM CHLORIDE 9 MG/ML
1000 INJECTION, SOLUTION INTRAVENOUS ONCE
Status: CANCELLED | OUTPATIENT
Start: 2020-06-16

## 2020-06-16 RX ORDER — ONDANSETRON 8 MG/1
8 TABLET, ORALLY DISINTEGRATING ORAL EVERY 8 HOURS PRN
Qty: 30 TABLET | Refills: 0 | Status: SHIPPED | OUTPATIENT
Start: 2020-06-16 | End: 2020-08-18

## 2020-06-16 RX ORDER — METHYLPREDNISOLONE SODIUM SUCCINATE 40 MG/ML
80 INJECTION, POWDER, LYOPHILIZED, FOR SOLUTION INTRAMUSCULAR; INTRAVENOUS ONCE
Status: COMPLETED | OUTPATIENT
Start: 2020-06-16 | End: 2020-06-16

## 2020-06-16 RX ORDER — SODIUM CHLORIDE 9 MG/ML
1000 INJECTION, SOLUTION INTRAVENOUS ONCE
Status: COMPLETED | OUTPATIENT
Start: 2020-06-16 | End: 2020-06-16

## 2020-06-16 RX ORDER — FAMOTIDINE 10 MG/ML
20 INJECTION, SOLUTION INTRAVENOUS ONCE
Status: COMPLETED | OUTPATIENT
Start: 2020-06-16 | End: 2020-06-16

## 2020-06-16 RX ADMIN — SODIUM CHLORIDE 1000 ML: 9 INJECTION, SOLUTION INTRAVENOUS at 14:10:00

## 2020-06-16 RX ADMIN — METHYLPREDNISOLONE SODIUM SUCCINATE 80 MG: 40 INJECTION, POWDER, LYOPHILIZED, FOR SOLUTION INTRAMUSCULAR; INTRAVENOUS at 12:24:00

## 2020-06-16 RX ADMIN — FAMOTIDINE 20 MG: 10 INJECTION, SOLUTION INTRAVENOUS at 12:23:00

## 2020-06-16 NOTE — PROGRESS NOTES
Outpatient Oncology Care Plan   Problem list:   knowledge deficit   Problems related to:   chemotherapy  disease/disease progression   Interventions:   provided general teaching     Expected outcomes:   understands plan of care   Progress towards outcome:

## 2020-06-16 NOTE — PROGRESS NOTES
Palliative Care Follow Up Note     Patient Name: Ewelina Sinclair   YOB: 1960   Medical Record Number: [id number]   CSN: [id number]   Date of visit: 6/16/2020     Chief Complaint/Reason for Visit:  Pain     History of Present Illness:         L Procedure Laterality Date   • ARTHROSCOPY OF JOINT UNLISTED  05/2019    rotator cuff   • COLECTOMY     • COLONOSCOPY  12/20/2018    polyps   • OTHER  02/26/2018    resection Splenic flexure   • OTHER SURGICAL HISTORY     • XI ROBOT-ASSISTED LAPAROSCOPIC 90 tablet 1   • atorvastatin 20 MG Oral Tab Take 1 tablet (20 mg total) by mouth nightly. 90 tablet 1   • Docusate Sodium (COLACE OR) Take 1 tablet by mouth daily.        • Omeprazole 40 MG Oral Capsule Delayed Release Take 1 capsule (40 mg total) by mouth in counseling and coordination of care for symptom management.     Electronically Signed by:  FREYA Flores   THE [facility] Outpatient Palliative Nurse Practitioner

## 2020-06-16 NOTE — PROGRESS NOTES
[facility] Note    Diagnosis: Metastatic Cholangiocarcinoma: liver, bone: BRAF V600E +    Treatment History:   1. Cisplatin 25 mg/m2 D1/8 + Gemcitabine 1000 mg/m2 D1/8 q21 days.    C1: 3/3/20 and 3/10/20: : 351,594 CEA 43.6 colonic mucosa with reactive lymphoid aggregates. No evidence of malignancy.    · 02/2018: splenic flexure/colon robotic resection: benign   · 09/2019: Mammogram was BIRADS1  · 1/31/20: CT AP: Fatty liver, enlarging right renal cortical lesion 8 x 6 mm (pre Pain., Disp: , Rfl: 0  Prochlorperazine Maleate (COMPAZINE) 10 mg tablet, Take 1 tablet (10 mg total) by mouth every 6 (six) hours as needed for Nausea., Disp: 30 tablet, Rfl: 3  loratadine 10 MG Oral Tab, Take 10 mg by mouth daily. , Disp: , Rfl:   lisinop (EMEND) 150 mg in sodium chloride 0.9% 150 mL IVPB, 150 mg, Intravenous, Once, Winston Washington MD, Stopped at 06/02/20 1111  [COMPLETED] ondansetron HCl (ZOFRAN) 8 mg, Dexamethasone Sodium Phosphate (DECADRON) 10 mg in sodium chloride 0.9% 105 mL IVPB, , Intravenous, Once, Claude Hung, MD, Stopped at 05/26/20 1339  [COMPLETED] acetaminophen (TYLENOL) tab 650 mg, 650 mg, Oral, Once, Claude Hung, MD, 650 mg at 05/19/20 1019  [COMPLETED] diphenhydrAMINE (BENADRYL) cap/tab 25 mg, 25 mg, Oral, Once, Aze 6128)  Pulse: 81 (06/16 0819)  BP: 155/80 (06/16 0819)  Temp: 98.7 °F (37.1 °C) (06/16 0819)  Do Not Use - Resp Rate: --  SpO2: 92 % (06/16 0819)     General: NAD, AOX3  HEENT: clear op, mmm, no jvd, no scleral icterus  CV: RRR S1S2 no murmurs  Extremities Qasim due to an abnormal bone lesion. She was found to have metastatic cholangicarcinoma.      Metastatic Cholangiocarcinoma: liver, bone  - Discussed at TB, clinical presentation and pathology c/w cholangiocarcinoma  - Tempus: BRAF V600E mutation, ARID1A 80 [facility]

## 2020-06-16 NOTE — PROGRESS NOTES
Pt here for C6D1.   Arrives Ambulating independently, accompanied by Self           Patient reports possible pregnancy since last therapy cycle: No    Modifications in dose or schedule: No     Frequency of blood return and site check throughout administrati

## 2020-06-23 ENCOUNTER — APPOINTMENT (OUTPATIENT)
Dept: HEMATOLOGY/ONCOLOGY | Facility: HOSPITAL | Age: 60
End: 2020-06-23
Attending: INTERNAL MEDICINE
Payer: MEDICAID

## 2020-06-23 VITALS
OXYGEN SATURATION: 92 % | BODY MASS INDEX: 30.85 KG/M2 | HEART RATE: 85 BPM | SYSTOLIC BLOOD PRESSURE: 149 MMHG | DIASTOLIC BLOOD PRESSURE: 84 MMHG | WEIGHT: 169.81 LBS | TEMPERATURE: 99 F | HEIGHT: 62.01 IN | RESPIRATION RATE: 18 BRPM

## 2020-06-23 DIAGNOSIS — D64.81 ANTINEOPLASTIC CHEMOTHERAPY INDUCED ANEMIA: ICD-10-CM

## 2020-06-23 DIAGNOSIS — R74.01 TRANSAMINITIS: ICD-10-CM

## 2020-06-23 DIAGNOSIS — C22.1 CHOLANGIOCARCINOMA (HCC): Primary | ICD-10-CM

## 2020-06-23 DIAGNOSIS — T45.1X5A ANTINEOPLASTIC CHEMOTHERAPY INDUCED ANEMIA: ICD-10-CM

## 2020-06-23 DIAGNOSIS — Z51.11 ENCOUNTER FOR CHEMOTHERAPY MANAGEMENT: ICD-10-CM

## 2020-06-23 DIAGNOSIS — D64.9 SYMPTOMATIC ANEMIA: ICD-10-CM

## 2020-06-23 DIAGNOSIS — R91.8 PULMONARY NODULES: ICD-10-CM

## 2020-06-23 DIAGNOSIS — J84.10 PULMONARY FIBROSIS (HCC): ICD-10-CM

## 2020-06-23 DIAGNOSIS — D69.59 CHEMOTHERAPY-INDUCED THROMBOCYTOPENIA: ICD-10-CM

## 2020-06-23 DIAGNOSIS — T45.1X5A CHEMOTHERAPY-INDUCED THROMBOCYTOPENIA: ICD-10-CM

## 2020-06-23 PROCEDURE — 85007 BL SMEAR W/DIFF WBC COUNT: CPT

## 2020-06-23 PROCEDURE — 96366 THER/PROPH/DIAG IV INF ADDON: CPT

## 2020-06-23 PROCEDURE — 96417 CHEMO IV INFUS EACH ADDL SEQ: CPT

## 2020-06-23 PROCEDURE — 96361 HYDRATE IV INFUSION ADD-ON: CPT

## 2020-06-23 PROCEDURE — 96367 TX/PROPH/DG ADDL SEQ IV INF: CPT

## 2020-06-23 PROCEDURE — 85027 COMPLETE CBC AUTOMATED: CPT

## 2020-06-23 PROCEDURE — 99215 OFFICE O/P EST HI 40 MIN: CPT | Performed by: CLINICAL NURSE SPECIALIST

## 2020-06-23 PROCEDURE — 96413 CHEMO IV INFUSION 1 HR: CPT

## 2020-06-23 PROCEDURE — 80053 COMPREHEN METABOLIC PANEL: CPT

## 2020-06-23 PROCEDURE — 96375 TX/PRO/DX INJ NEW DRUG ADDON: CPT

## 2020-06-23 PROCEDURE — 85025 COMPLETE CBC W/AUTO DIFF WBC: CPT

## 2020-06-23 RX ORDER — DEXAMETHASONE 4 MG/1
8 TABLET ORAL 2 TIMES DAILY
Qty: 12 TABLET | Refills: 3 | Status: SHIPPED | OUTPATIENT
Start: 2020-06-23 | End: 2020-06-26

## 2020-06-23 RX ORDER — SODIUM CHLORIDE 9 MG/ML
1000 INJECTION, SOLUTION INTRAVENOUS ONCE
Status: COMPLETED | OUTPATIENT
Start: 2020-06-23 | End: 2020-06-23

## 2020-06-23 RX ADMIN — SODIUM CHLORIDE 1000 ML: 9 INJECTION, SOLUTION INTRAVENOUS at 14:43:00

## 2020-06-23 NOTE — PROGRESS NOTES
ANP Visit Note    Patient Name: Shannon Najera   YOB: 1960   Medical Record Number: [id number]   CSN: [id number]   Date of visit: 6/23/2020   Zoey Fernandez MD   No primary care provider on file.      Chief Complaint/Reason for Visit:  Yann Goodwin Stable Disease,  286,000  C4: 5/5/20, 5/12/20 -->   432,731  C5: 5/26/20, 6/2/20 --> : 134,975  C6: 6/16/20 -->  56,311        History of Present Illness:  Patient states she is doing pretty well today.  She has a bit more neuropathy HISTORY     • XI ROBOT-ASSISTED LAPAROSCOPIC LOW ANTERIOR COLON RESECTION Left 2/26/2018    Performed by Fabiana Schultz MD at [facility] MAIN OR       Allergies:    Simvastatin             MYALGIA    Comment:Cramping  Gemfibrozil             MYALGIA    Comment Other Topics      Concerns:         Service: Not Asked        Blood Transfusions: Not Asked        Caffeine Concern: Yes          1/2 cup of coffee per day         Occupational Exposure: Not Asked        Hobby Hazards: Not Asked        Sleep Concer negatives noted in the the HPI. Performance Status: ECOG 1    Physical Examination:  General: Patient is alert and oriented x 3, not in acute distress.   Vital Signs: LMP 01/15/2013    Oncology Vitals 6/23/2020   Height 5' 2.008\"   Height 158 cm   Weigh 34.0 pg    MCHC 32.9 31.0 - 37.0 g/dL    RDW 17.1 (H) 11.0 - 15.0 %    RDW-SD 65.1 (H) 35.1 - 46.3 fL    Neutrophil Absolute Prelim 2.77 1.50 - 7.70 x10 (3) uL   MANUAL DIFFERENTIAL    Collection Time: 06/23/20  8:53 AM   Result Value Ref Range    [facility]

## 2020-06-23 NOTE — PROGRESS NOTES
Pt here for C6D8.   Arrives Ambulating independently, accompanied by Self           Patient reports possible pregnancy since last therapy cycle: No    Modifications in dose or schedule: No     Frequency of blood return and site check throughout administrati

## 2020-06-23 NOTE — PROGRESS NOTES
Patient presents with: Follow - Up: APN assessment prior to treatment    Pt is here for treatment; C6 D8 gemzar/cisplatin is expected. Pt reports that she is mostly doing fine.  She has some neuropathy in her hands and feet that she describes as transient

## 2020-07-06 NOTE — PROGRESS NOTES
THE [facility] Hematology and Oncology Clinic Note    Diagnosis: Metastatic Cholangiocarcinoma: liver, bone: BRAF V600E +    Treatment History:   1. Cisplatin 25 mg/m2 D1/8 + Gemcitabine 1000 mg/m2 D1/8 q21 days.    C1: 3/3/20 and 3/10/20: : 220,807 CEA 43.6 diverticulitis. No bone lesions  · 10/2019: CT AP:  Diverticular disease. No bone lesions. · 12/2017: Colonoscopy: Right colon: Fragments of colonic mucosa with reactive lymphoid aggregates. No evidence of malignancy.    · 02/2018: splenic flexure/colon r tablet, Rfl: 0  Prochlorperazine Maleate (COMPAZINE) 10 mg tablet, Take 1 tablet (10 mg total) by mouth every 6 (six) hours as needed for Nausea., Disp: 30 tablet, Rfl: 3  loratadine 10 MG Oral Tab, Take 10 mg by mouth daily. , Disp: , Rfl:   lisinopril 40 at 06/23/20 1210  [COMPLETED] 0.9% NaCl infusion, 1,000 mL, Intravenous, Once, Winston Washington MD, Stopped at 06/23/20 1600  [COMPLETED] Gemcitabine HCl (GEMZAR) 1,748 mg in sodium chloride 0.9% 296 mL chemo-infusion, 1,000 mg/m2 (Treatment Plan Recorded) 1224  [COMPLETED] sodium chloride 0.9% IV bolus 1,000 mL, 1,000 mL, Intravenous, Once, Sandra Farnsworth MD, Stopped at 06/10/20 1241  [COMPLETED] sodium chloride 0.9% IV bolus 500 mL, 500 mL, Intravenous, Once, Cecil Arroyo MD, Stopped at 06/09/20 1300 9263)  Weight: 76.4 kg (168 lb 6.4 oz) (07/07 0910)  BSA (Calculated - sq m): 1.78 sq meters (07/07 0910)  Pulse: 87 (07/07 0910)  BP: 161/82 (07/07 0910)  Temp: 97.8 °F (36.6 °C) (07/07 0910)  Do Not Use - Resp Rate: --  SpO2: 90 % (07/07 0910)     Candida Ignacio Assessment and Plan:  59F with a PMH of COPD, Diverticulitis, HTN, HLD and pulmonary fibrosis referred by Dr. Dewayne Gabriel due to an abnormal bone lesion. She was found to have metastatic cholangicarcinoma.      Metastatic Cholangiocarcinoma: liver, bone  - considered for bronchoscopy  - Should follow up with Dr. Brittany Aguilar  - PET/CT as above. Lesions to small to biopsy but appear concerning.   - Uses PRN 02    Right Renal Cortical Lesion: No FDG uptake. Continue to monitor. Pain Control:  Following with Pa

## 2020-07-07 ENCOUNTER — OFFICE VISIT (OUTPATIENT)
Dept: HEMATOLOGY/ONCOLOGY | Facility: HOSPITAL | Age: 60
End: 2020-07-07
Attending: INTERNAL MEDICINE
Payer: MEDICAID

## 2020-07-07 VITALS
TEMPERATURE: 98 F | SYSTOLIC BLOOD PRESSURE: 161 MMHG | BODY MASS INDEX: 30.6 KG/M2 | HEART RATE: 87 BPM | HEIGHT: 62.01 IN | WEIGHT: 168.38 LBS | OXYGEN SATURATION: 90 % | DIASTOLIC BLOOD PRESSURE: 82 MMHG | RESPIRATION RATE: 18 BRPM

## 2020-07-07 DIAGNOSIS — C22.1 CHOLANGIOCARCINOMA (HCC): Primary | ICD-10-CM

## 2020-07-07 DIAGNOSIS — C79.51 OSSEOUS METASTASIS (HCC): ICD-10-CM

## 2020-07-07 DIAGNOSIS — C22.1 METASTATIC CHOLANGIOCARCINOMA TO BILE DUCT (HCC): Primary | ICD-10-CM

## 2020-07-07 DIAGNOSIS — R59.1 LYMPHADENOPATHY: ICD-10-CM

## 2020-07-07 DIAGNOSIS — H93.8X3: ICD-10-CM

## 2020-07-07 DIAGNOSIS — C78.89 METASTATIC CHOLANGIOCARCINOMA TO BILE DUCT (HCC): Primary | ICD-10-CM

## 2020-07-07 DIAGNOSIS — T45.1X5A: ICD-10-CM

## 2020-07-07 LAB
ALBUMIN SERPL-MCNC: 3.6 G/DL (ref 3.4–5)
ALBUMIN/GLOB SERPL: 1 {RATIO} (ref 1–2)
ALP LIVER SERPL-CCNC: 115 U/L (ref 46–118)
ALT SERPL-CCNC: 74 U/L (ref 13–56)
ANION GAP SERPL CALC-SCNC: 8 MMOL/L (ref 0–18)
AST SERPL-CCNC: 48 U/L (ref 15–37)
BASOPHILS # BLD AUTO: 0.02 X10(3) UL (ref 0–0.2)
BASOPHILS NFR BLD AUTO: 0.3 %
BILIRUB SERPL-MCNC: 0.4 MG/DL (ref 0.1–2)
BUN BLD-MCNC: 17 MG/DL (ref 7–18)
BUN/CREAT SERPL: 17.5 (ref 10–20)
CALCIUM BLD-MCNC: 8.9 MG/DL (ref 8.5–10.1)
CANCER AG19-9 SERPL-ACNC: ABNORMAL U/ML (ref ?–37)
CEA SERPL-MCNC: 47.6 NG/ML (ref ?–5)
CHLORIDE SERPL-SCNC: 102 MMOL/L (ref 98–112)
CO2 SERPL-SCNC: 26 MMOL/L (ref 21–32)
CREAT BLD-MCNC: 0.97 MG/DL (ref 0.55–1.02)
DEPRECATED RDW RBC AUTO: 67.1 FL (ref 35.1–46.3)
EOSINOPHIL # BLD AUTO: 0.07 X10(3) UL (ref 0–0.7)
EOSINOPHIL NFR BLD AUTO: 1.1 %
ERYTHROCYTE [DISTWIDTH] IN BLOOD BY AUTOMATED COUNT: 17.2 % (ref 11–15)
GLOBULIN PLAS-MCNC: 3.5 G/DL (ref 2.8–4.4)
GLUCOSE BLD-MCNC: 203 MG/DL (ref 70–99)
HCT VFR BLD AUTO: 34.9 % (ref 35–48)
HGB BLD-MCNC: 11.2 G/DL (ref 12–16)
IMM GRANULOCYTES # BLD AUTO: 0.01 X10(3) UL (ref 0–1)
IMM GRANULOCYTES NFR BLD: 0.2 %
LYMPHOCYTES # BLD AUTO: 1.67 X10(3) UL (ref 1–4)
LYMPHOCYTES NFR BLD AUTO: 27.1 %
M PROTEIN MFR SERPL ELPH: 7.1 G/DL (ref 6.4–8.2)
MCH RBC QN AUTO: 34.1 PG (ref 26–34)
MCHC RBC AUTO-ENTMCNC: 32.1 G/DL (ref 31–37)
MCV RBC AUTO: 106.4 FL (ref 80–100)
MONOCYTES # BLD AUTO: 0.64 X10(3) UL (ref 0.1–1)
MONOCYTES NFR BLD AUTO: 10.4 %
NEUTROPHILS # BLD AUTO: 3.75 X10 (3) UL (ref 1.5–7.7)
NEUTROPHILS # BLD AUTO: 3.75 X10(3) UL (ref 1.5–7.7)
NEUTROPHILS NFR BLD AUTO: 60.9 %
OSMOLALITY SERPL CALC.SUM OF ELEC: 289 MOSM/KG (ref 275–295)
PATIENT FASTING Y/N/NP: NO
PLATELET # BLD AUTO: 127 10(3)UL (ref 150–450)
PLATELET MORPHOLOGY: NORMAL
POTASSIUM SERPL-SCNC: 4.3 MMOL/L (ref 3.5–5.1)
RBC # BLD AUTO: 3.28 X10(6)UL (ref 3.8–5.3)
SODIUM SERPL-SCNC: 136 MMOL/L (ref 136–145)
WBC # BLD AUTO: 6.2 X10(3) UL (ref 4–11)

## 2020-07-07 PROCEDURE — 96417 CHEMO IV INFUS EACH ADDL SEQ: CPT

## 2020-07-07 PROCEDURE — 96366 THER/PROPH/DIAG IV INF ADDON: CPT

## 2020-07-07 PROCEDURE — 96413 CHEMO IV INFUSION 1 HR: CPT

## 2020-07-07 PROCEDURE — 96367 TX/PROPH/DG ADDL SEQ IV INF: CPT

## 2020-07-07 PROCEDURE — 86301 IMMUNOASSAY TUMOR CA 19-9: CPT

## 2020-07-07 PROCEDURE — 80053 COMPREHEN METABOLIC PANEL: CPT

## 2020-07-07 PROCEDURE — 96375 TX/PRO/DX INJ NEW DRUG ADDON: CPT

## 2020-07-07 PROCEDURE — 82378 CARCINOEMBRYONIC ANTIGEN: CPT

## 2020-07-07 PROCEDURE — 99215 OFFICE O/P EST HI 40 MIN: CPT | Performed by: INTERNAL MEDICINE

## 2020-07-07 PROCEDURE — 85025 COMPLETE CBC W/AUTO DIFF WBC: CPT

## 2020-07-07 RX ORDER — SODIUM CHLORIDE 9 MG/ML
1000 INJECTION, SOLUTION INTRAVENOUS ONCE
Status: COMPLETED | OUTPATIENT
Start: 2020-07-07 | End: 2020-07-07

## 2020-07-07 RX ORDER — SODIUM CHLORIDE 9 MG/ML
1000 INJECTION, SOLUTION INTRAVENOUS ONCE
Status: CANCELLED | OUTPATIENT
Start: 2020-07-07

## 2020-07-07 RX ADMIN — SODIUM CHLORIDE 1000 ML: 9 INJECTION, SOLUTION INTRAVENOUS at 14:27:00

## 2020-07-07 NOTE — PROGRESS NOTES
Pt here for C7D1 of Gemzar/Cis[latine.   Arrives Ambulating independently, accompanied by Self           Patient reports possible pregnancy since last therapy cycle: No    Modifications in dose or schedule: No     Frequency of blood return and site check th

## 2020-07-14 ENCOUNTER — OFFICE VISIT (OUTPATIENT)
Dept: HEMATOLOGY/ONCOLOGY | Facility: HOSPITAL | Age: 60
End: 2020-07-14
Attending: INTERNAL MEDICINE
Payer: MEDICAID

## 2020-07-14 VITALS
HEIGHT: 62.01 IN | DIASTOLIC BLOOD PRESSURE: 87 MMHG | HEART RATE: 93 BPM | SYSTOLIC BLOOD PRESSURE: 144 MMHG | TEMPERATURE: 98 F | OXYGEN SATURATION: 95 % | BODY MASS INDEX: 31.1 KG/M2 | RESPIRATION RATE: 20 BRPM | WEIGHT: 171.19 LBS

## 2020-07-14 DIAGNOSIS — C22.1 CHOLANGIOCARCINOMA (HCC): Primary | ICD-10-CM

## 2020-07-14 LAB
ALBUMIN SERPL-MCNC: 3.6 G/DL (ref 3.4–5)
ALBUMIN/GLOB SERPL: 1 {RATIO} (ref 1–2)
ALP LIVER SERPL-CCNC: 124 U/L (ref 46–118)
ALT SERPL-CCNC: 82 U/L (ref 13–56)
ANION GAP SERPL CALC-SCNC: 4 MMOL/L (ref 0–18)
AST SERPL-CCNC: 43 U/L (ref 15–37)
BASOPHILS # BLD AUTO: 0.05 X10(3) UL (ref 0–0.2)
BASOPHILS NFR BLD AUTO: 1 %
BILIRUB SERPL-MCNC: 0.3 MG/DL (ref 0.1–2)
BUN BLD-MCNC: 16 MG/DL (ref 7–18)
BUN/CREAT SERPL: 16 (ref 10–20)
CALCIUM BLD-MCNC: 8.8 MG/DL (ref 8.5–10.1)
CHLORIDE SERPL-SCNC: 104 MMOL/L (ref 98–112)
CO2 SERPL-SCNC: 27 MMOL/L (ref 21–32)
CREAT BLD-MCNC: 1 MG/DL (ref 0.55–1.02)
DEPRECATED RDW RBC AUTO: 55.9 FL (ref 35.1–46.3)
EOSINOPHIL # BLD AUTO: 0.03 X10(3) UL (ref 0–0.7)
EOSINOPHIL NFR BLD AUTO: 0.6 %
ERYTHROCYTE [DISTWIDTH] IN BLOOD BY AUTOMATED COUNT: 14.7 % (ref 11–15)
GLOBULIN PLAS-MCNC: 3.5 G/DL (ref 2.8–4.4)
GLUCOSE BLD-MCNC: 212 MG/DL (ref 70–99)
HCT VFR BLD AUTO: 31.9 % (ref 35–48)
HGB BLD-MCNC: 10.5 G/DL (ref 12–16)
IMM GRANULOCYTES # BLD AUTO: 0.1 X10(3) UL (ref 0–1)
IMM GRANULOCYTES NFR BLD: 2 %
LYMPHOCYTES # BLD AUTO: 1.89 X10(3) UL (ref 1–4)
LYMPHOCYTES NFR BLD AUTO: 37.6 %
M PROTEIN MFR SERPL ELPH: 7.1 G/DL (ref 6.4–8.2)
MCH RBC QN AUTO: 34.4 PG (ref 26–34)
MCHC RBC AUTO-ENTMCNC: 32.9 G/DL (ref 31–37)
MCV RBC AUTO: 104.6 FL (ref 80–100)
MONOCYTES # BLD AUTO: 0.45 X10(3) UL (ref 0.1–1)
MONOCYTES NFR BLD AUTO: 9 %
NEUTROPHILS # BLD AUTO: 2.5 X10 (3) UL (ref 1.5–7.7)
NEUTROPHILS # BLD AUTO: 2.5 X10(3) UL (ref 1.5–7.7)
NEUTROPHILS NFR BLD AUTO: 49.8 %
OSMOLALITY SERPL CALC.SUM OF ELEC: 287 MOSM/KG (ref 275–295)
PATIENT FASTING Y/N/NP: NO
PLATELET # BLD AUTO: 111 10(3)UL (ref 150–450)
POTASSIUM SERPL-SCNC: 4.3 MMOL/L (ref 3.5–5.1)
RBC # BLD AUTO: 3.05 X10(6)UL (ref 3.8–5.3)
SODIUM SERPL-SCNC: 135 MMOL/L (ref 136–145)
WBC # BLD AUTO: 5 X10(3) UL (ref 4–11)

## 2020-07-14 PROCEDURE — 96375 TX/PRO/DX INJ NEW DRUG ADDON: CPT

## 2020-07-14 PROCEDURE — 96367 TX/PROPH/DG ADDL SEQ IV INF: CPT

## 2020-07-14 PROCEDURE — 85025 COMPLETE CBC W/AUTO DIFF WBC: CPT

## 2020-07-14 PROCEDURE — 80053 COMPREHEN METABOLIC PANEL: CPT

## 2020-07-14 PROCEDURE — 96366 THER/PROPH/DIAG IV INF ADDON: CPT

## 2020-07-14 PROCEDURE — 96413 CHEMO IV INFUSION 1 HR: CPT

## 2020-07-14 PROCEDURE — 96361 HYDRATE IV INFUSION ADD-ON: CPT

## 2020-07-14 PROCEDURE — 96417 CHEMO IV INFUS EACH ADDL SEQ: CPT

## 2020-07-14 RX ORDER — SODIUM CHLORIDE 9 MG/ML
1000 INJECTION, SOLUTION INTRAVENOUS ONCE
Status: COMPLETED | OUTPATIENT
Start: 2020-07-14 | End: 2020-07-14

## 2020-07-14 RX ADMIN — SODIUM CHLORIDE 1000 ML: 9 INJECTION, SOLUTION INTRAVENOUS at 14:52:00

## 2020-07-14 NOTE — PROGRESS NOTES
Pt here for C7D8.   Arrives Ambulating independently, accompanied by Self           Patient reports possible pregnancy since last therapy cycle: No    Modifications in dose or schedule: No     Frequency of blood return and site check throughout administrati

## 2020-07-21 ENCOUNTER — TELEPHONE (OUTPATIENT)
Dept: ENDOCRINOLOGY CLINIC | Facility: CLINIC | Age: 60
End: 2020-07-21

## 2020-07-21 ENCOUNTER — OFFICE VISIT (OUTPATIENT)
Dept: INTERNAL MEDICINE CLINIC | Facility: CLINIC | Age: 60
End: 2020-07-21
Payer: MEDICAID

## 2020-07-21 VITALS
OXYGEN SATURATION: 94 % | SYSTOLIC BLOOD PRESSURE: 148 MMHG | HEART RATE: 94 BPM | WEIGHT: 170 LBS | RESPIRATION RATE: 20 BRPM | HEIGHT: 62 IN | TEMPERATURE: 99 F | DIASTOLIC BLOOD PRESSURE: 89 MMHG | BODY MASS INDEX: 31.28 KG/M2

## 2020-07-21 DIAGNOSIS — T38.0X5A STEROID-INDUCED DIABETES MELLITUS, INITIAL ENCOUNTER (HCC): Primary | ICD-10-CM

## 2020-07-21 DIAGNOSIS — I10 BENIGN ESSENTIAL HTN: ICD-10-CM

## 2020-07-21 DIAGNOSIS — E78.5 HYPERLIPIDEMIA, UNSPECIFIED HYPERLIPIDEMIA TYPE: ICD-10-CM

## 2020-07-21 DIAGNOSIS — I25.10 CORONARY ARTERY DISEASE INVOLVING NATIVE CORONARY ARTERY OF NATIVE HEART WITHOUT ANGINA PECTORIS: ICD-10-CM

## 2020-07-21 DIAGNOSIS — E66.9 OBESITY (BMI 30.0-34.9): ICD-10-CM

## 2020-07-21 DIAGNOSIS — E09.9 STEROID-INDUCED DIABETES MELLITUS, INITIAL ENCOUNTER (HCC): Primary | ICD-10-CM

## 2020-07-21 LAB
CARTRIDGE LOT#: 638 NUMERIC
HEMOGLOBIN A1C: 7.1 % (ref 4.3–5.6)

## 2020-07-21 PROCEDURE — 3008F BODY MASS INDEX DOCD: CPT | Performed by: PHYSICIAN ASSISTANT

## 2020-07-21 PROCEDURE — 99214 OFFICE O/P EST MOD 30 MIN: CPT | Performed by: PHYSICIAN ASSISTANT

## 2020-07-21 PROCEDURE — 3077F SYST BP >= 140 MM HG: CPT | Performed by: PHYSICIAN ASSISTANT

## 2020-07-21 PROCEDURE — 83036 HEMOGLOBIN GLYCOSYLATED A1C: CPT | Performed by: PHYSICIAN ASSISTANT

## 2020-07-21 PROCEDURE — 3079F DIAST BP 80-89 MM HG: CPT | Performed by: PHYSICIAN ASSISTANT

## 2020-07-21 RX ORDER — DEXAMETHASONE 4 MG/1
TABLET ORAL
COMMUNITY
Start: 2020-07-14 | End: 2020-10-13

## 2020-07-21 RX ORDER — ROSUVASTATIN CALCIUM 5 MG/1
5 TABLET, COATED ORAL NIGHTLY
Qty: 30 TABLET | Refills: 0 | Status: SHIPPED | OUTPATIENT
Start: 2020-07-21 | End: 2020-10-30

## 2020-07-21 RX ORDER — AMLODIPINE BESYLATE 5 MG/1
5 TABLET ORAL DAILY
Qty: 30 TABLET | Refills: 0 | Status: SHIPPED | OUTPATIENT
Start: 2020-07-21 | End: 2020-08-05

## 2020-07-21 NOTE — PATIENT INSTRUCTIONS
Diabetes:  - start metformin 500 mg - 1 tablet daily with a meal  - check blood sugars -- in the morning (fasting) and 2 hours after a meal  - low carb diet  - regular exercise  - schedule diabetes education    Please see your eye doctor for a DIABETIC eye

## 2020-07-21 NOTE — PROGRESS NOTES
Ajit Baptiste is a 63year old female. HPI:   Patient presents for elevated blood sugar 2/2 recent steroid use which she has been on recently due to chemotherapy treatments. Home BS readings as high as 400 when on steroids.   This morning she was at sweats  SKIN: denies any unusual skin lesions or rashes  HEENT: denies sore throat, cough, congestion  RESPIRATORY: denies SOB, WILD  CARDIOVASCULAR: denies chest pain, palpitations  GI: denies abdominal pain, nausea, vomiting, diarrhea, constipation   : CAD: non-obstructive on cath in 2019. On statin, ASA. Previously on BB - unclear why this was stopped. # Pulmonary fibrosis: follows with pulm. # GERD: stable on PPI.     Eyes - [facility] (Clementine Breed)  Jed Villasenor - Dr. Noe Johnson - through [facility]

## 2020-07-22 PROBLEM — E66.9 OBESITY (BMI 30.0-34.9): Status: ACTIVE | Noted: 2020-07-22

## 2020-07-22 PROBLEM — E78.5 HYPERLIPIDEMIA: Chronic | Status: ACTIVE | Noted: 2017-04-28

## 2020-07-27 NOTE — PROGRESS NOTES
[facility]    Diagnosis: Metastatic Cholangiocarcinoma: liver, bone: BRAF V600E +    Treatment History:   1. Cisplatin 25 mg/m2 D1/8 + Gemcitabine 1000 mg/m2 D1/8 q21 days.    C1: 3/3/20 and 3/10/20: : 066,681 CEA 43.6 lesions  · 10/2019: CT AP:  Diverticular disease. No bone lesions. · 12/2017: Colonoscopy: Right colon: Fragments of colonic mucosa with reactive lymphoid aggregates. No evidence of malignancy.    · 02/2018: splenic flexure/colon robotic resection: benign 4 MG tablet, TAKE 2 TABLETS BY MOUTH TWO TIMES A DAY FOR 3 DAYS, Disp: , Rfl:   metFORMIN HCl 500 MG Oral Tab, Take 1 tablet (500 mg total) by mouth 2 (two) times daily with meals. , Disp: 60 tablet, Rfl: 0  amLODIPine Besylate 5 MG Oral Tab, Take 1 tablet chloride 0.9% 296 mL chemo-infusion, 1,000 mg/m2 (Treatment Plan Recorded), Intravenous, Once, Winston Washington MD, Last Rate: 592 mL/hr at 07/28/20 1135, 1,748 mg at 07/28/20 1135  mannitol 12.5 g in sodium chloride 0.9% 100 mL infusion, 12.5 g, Intraveno 150 mg, Intravenous, Once, Iliana Miller MD, Stopped at 07/07/20 1203  [COMPLETED] ondansetron HCl (ZOFRAN) 16 mg, Dexamethasone Sodium Phosphate (DECADRON) 12 mg in sodium chloride 0.9% 109.2 mL IVPB, , Intravenous, Once, Iliana Miller MD, Stopped a Former Smoker        Packs/day: 1.00        Years: 40.00        Pack years: 36        Quit date: 2017        Years since quitting: 3.5      Smokeless tobacco: Never Used    Substance and Sexual Activity      Alcohol use: No      Drug use: No      Sexual ac Hepatic steatosis  7. Emphysematous changes within the lungs. 6/9/20: CT CAP  1. No acute process. 2. No evidence of pulmonary embolism. 3. Stable diffuse emphysematous changes and stable nodular opacities within the lungs as described above. 4.  Annie Browning mg/m2 left. - Referred to ENT to consider hearing aids     Osseous Lesions (L3 and T7): no longer painful. has poor dentition. Will need dental clearance for zometa (delayed d/t covid). She has already met with NSGY. Monoclonal study is negative.      RSV

## 2020-07-28 ENCOUNTER — OFFICE VISIT (OUTPATIENT)
Dept: HEMATOLOGY/ONCOLOGY | Facility: HOSPITAL | Age: 60
End: 2020-07-28
Attending: INTERNAL MEDICINE
Payer: MEDICAID

## 2020-07-28 VITALS
DIASTOLIC BLOOD PRESSURE: 79 MMHG | HEART RATE: 90 BPM | TEMPERATURE: 98 F | OXYGEN SATURATION: 94 % | HEIGHT: 62.01 IN | BODY MASS INDEX: 30.96 KG/M2 | SYSTOLIC BLOOD PRESSURE: 155 MMHG | RESPIRATION RATE: 18 BRPM | WEIGHT: 170.38 LBS

## 2020-07-28 DIAGNOSIS — C22.1 METASTATIC CHOLANGIOCARCINOMA TO BILE DUCT (HCC): Primary | ICD-10-CM

## 2020-07-28 DIAGNOSIS — C78.89 METASTATIC CHOLANGIOCARCINOMA TO BILE DUCT (HCC): Primary | ICD-10-CM

## 2020-07-28 DIAGNOSIS — C22.1 CHOLANGIOCARCINOMA (HCC): Primary | ICD-10-CM

## 2020-07-28 LAB
ALBUMIN SERPL-MCNC: 3.5 G/DL (ref 3.4–5)
ALBUMIN/GLOB SERPL: 0.9 {RATIO} (ref 1–2)
ALP LIVER SERPL-CCNC: 134 U/L (ref 46–118)
ALT SERPL-CCNC: 44 U/L (ref 13–56)
ANION GAP SERPL CALC-SCNC: 6 MMOL/L (ref 0–18)
AST SERPL-CCNC: 30 U/L (ref 15–37)
BASOPHILS # BLD AUTO: 0.02 X10(3) UL (ref 0–0.2)
BASOPHILS NFR BLD AUTO: 0.4 %
BILIRUB SERPL-MCNC: 0.3 MG/DL (ref 0.1–2)
BUN BLD-MCNC: 18 MG/DL (ref 7–18)
BUN/CREAT SERPL: 17.1 (ref 10–20)
CALCIUM BLD-MCNC: 9.4 MG/DL (ref 8.5–10.1)
CANCER AG19-9 SERPL-ACNC: ABNORMAL U/ML (ref ?–37)
CEA SERPL-MCNC: 33.2 NG/ML (ref ?–5)
CHLORIDE SERPL-SCNC: 105 MMOL/L (ref 98–112)
CO2 SERPL-SCNC: 26 MMOL/L (ref 21–32)
CREAT BLD-MCNC: 1.05 MG/DL (ref 0.55–1.02)
DEPRECATED RDW RBC AUTO: 61.1 FL (ref 35.1–46.3)
EOSINOPHIL # BLD AUTO: 0.05 X10(3) UL (ref 0–0.7)
EOSINOPHIL NFR BLD AUTO: 0.9 %
ERYTHROCYTE [DISTWIDTH] IN BLOOD BY AUTOMATED COUNT: 16.2 % (ref 11–15)
GLOBULIN PLAS-MCNC: 3.7 G/DL (ref 2.8–4.4)
GLUCOSE BLD-MCNC: 134 MG/DL (ref 70–99)
HCT VFR BLD AUTO: 28.9 % (ref 35–48)
HGB BLD-MCNC: 9.4 G/DL (ref 12–16)
IMM GRANULOCYTES # BLD AUTO: 0.01 X10(3) UL (ref 0–1)
IMM GRANULOCYTES NFR BLD: 0.2 %
LYMPHOCYTES # BLD AUTO: 2.07 X10(3) UL (ref 1–4)
LYMPHOCYTES NFR BLD AUTO: 38.7 %
M PROTEIN MFR SERPL ELPH: 7.2 G/DL (ref 6.4–8.2)
MCH RBC QN AUTO: 35.2 PG (ref 26–34)
MCHC RBC AUTO-ENTMCNC: 32.5 G/DL (ref 31–37)
MCV RBC AUTO: 108.2 FL (ref 80–100)
MONOCYTES # BLD AUTO: 0.74 X10(3) UL (ref 0.1–1)
MONOCYTES NFR BLD AUTO: 13.8 %
NEUTROPHILS # BLD AUTO: 2.46 X10 (3) UL (ref 1.5–7.7)
NEUTROPHILS # BLD AUTO: 2.46 X10(3) UL (ref 1.5–7.7)
NEUTROPHILS NFR BLD AUTO: 46 %
OSMOLALITY SERPL CALC.SUM OF ELEC: 288 MOSM/KG (ref 275–295)
PLATELET # BLD AUTO: 143 10(3)UL (ref 150–450)
POTASSIUM SERPL-SCNC: 4.1 MMOL/L (ref 3.5–5.1)
RBC # BLD AUTO: 2.67 X10(6)UL (ref 3.8–5.3)
SODIUM SERPL-SCNC: 137 MMOL/L (ref 136–145)
WBC # BLD AUTO: 5.4 X10(3) UL (ref 4–11)

## 2020-07-28 PROCEDURE — 96417 CHEMO IV INFUS EACH ADDL SEQ: CPT

## 2020-07-28 PROCEDURE — 96366 THER/PROPH/DIAG IV INF ADDON: CPT

## 2020-07-28 PROCEDURE — 99215 OFFICE O/P EST HI 40 MIN: CPT | Performed by: INTERNAL MEDICINE

## 2020-07-28 PROCEDURE — 80053 COMPREHEN METABOLIC PANEL: CPT

## 2020-07-28 PROCEDURE — 86301 IMMUNOASSAY TUMOR CA 19-9: CPT

## 2020-07-28 PROCEDURE — 96375 TX/PRO/DX INJ NEW DRUG ADDON: CPT

## 2020-07-28 PROCEDURE — 85025 COMPLETE CBC W/AUTO DIFF WBC: CPT

## 2020-07-28 PROCEDURE — 82378 CARCINOEMBRYONIC ANTIGEN: CPT

## 2020-07-28 PROCEDURE — 96413 CHEMO IV INFUSION 1 HR: CPT

## 2020-07-28 PROCEDURE — 96361 HYDRATE IV INFUSION ADD-ON: CPT

## 2020-07-28 PROCEDURE — 96367 TX/PROPH/DG ADDL SEQ IV INF: CPT

## 2020-07-28 RX ORDER — SODIUM CHLORIDE 9 MG/ML
1000 INJECTION, SOLUTION INTRAVENOUS ONCE
Status: CANCELLED | OUTPATIENT
Start: 2020-07-28

## 2020-07-28 RX ORDER — SODIUM CHLORIDE 9 MG/ML
1000 INJECTION, SOLUTION INTRAVENOUS ONCE
Status: COMPLETED | OUTPATIENT
Start: 2020-07-28 | End: 2020-07-28

## 2020-07-28 RX ORDER — SODIUM CHLORIDE 0.9 % (FLUSH) 0.9 %
10 SYRINGE (ML) INJECTION ONCE
Status: CANCELLED | OUTPATIENT
Start: 2020-07-28

## 2020-07-28 RX ORDER — SODIUM CHLORIDE 0.9 % (FLUSH) 0.9 %
10 SYRINGE (ML) INJECTION ONCE
Status: COMPLETED | OUTPATIENT
Start: 2020-07-28 | End: 2020-07-28

## 2020-07-28 RX ADMIN — SODIUM CHLORIDE 0.9 % (FLUSH) 10 ML: 0.9 % SYRINGE (ML) INJECTION at 15:37:00

## 2020-07-28 RX ADMIN — SODIUM CHLORIDE 1000 ML: 9 INJECTION, SOLUTION INTRAVENOUS at 13:40:00

## 2020-07-28 NOTE — PROGRESS NOTES
Pt here for C8D1.   Arrives Ambulating independently, accompanied by Spouse           Patient reports possible pregnancy since last therapy cycle: No    Modifications in dose or schedule: No     [facility] and [facility]

## 2020-07-29 ENCOUNTER — TELEPHONE (OUTPATIENT)
Dept: HEMATOLOGY/ONCOLOGY | Facility: HOSPITAL | Age: 60
End: 2020-07-29

## 2020-07-29 NOTE — TELEPHONE ENCOUNTER
Spoke with patient and patient's . Patient is wanting to know if she could have an MRI performed in addition to her PET scan. I informed them that I would discuss this information with MD and update them on what he recommends.

## 2020-08-04 ENCOUNTER — OFFICE VISIT (OUTPATIENT)
Dept: HEMATOLOGY/ONCOLOGY | Facility: HOSPITAL | Age: 60
End: 2020-08-04
Attending: INTERNAL MEDICINE
Payer: MEDICAID

## 2020-08-04 VITALS
DIASTOLIC BLOOD PRESSURE: 84 MMHG | TEMPERATURE: 98 F | RESPIRATION RATE: 18 BRPM | OXYGEN SATURATION: 90 % | WEIGHT: 170.38 LBS | SYSTOLIC BLOOD PRESSURE: 154 MMHG | HEIGHT: 62.01 IN | BODY MASS INDEX: 30.96 KG/M2 | HEART RATE: 112 BPM

## 2020-08-04 DIAGNOSIS — C22.1 CHOLANGIOCARCINOMA (HCC): Primary | ICD-10-CM

## 2020-08-04 LAB
ALBUMIN SERPL-MCNC: 3.4 G/DL (ref 3.4–5)
ALBUMIN/GLOB SERPL: 0.9 {RATIO} (ref 1–2)
ALP LIVER SERPL-CCNC: 128 U/L (ref 46–118)
ALT SERPL-CCNC: 57 U/L (ref 13–56)
ANION GAP SERPL CALC-SCNC: 5 MMOL/L (ref 0–18)
AST SERPL-CCNC: 36 U/L (ref 15–37)
BASOPHILS # BLD AUTO: 0.04 X10(3) UL (ref 0–0.2)
BASOPHILS NFR BLD AUTO: 0.8 %
BILIRUB SERPL-MCNC: 0.3 MG/DL (ref 0.1–2)
BUN BLD-MCNC: 14 MG/DL (ref 7–18)
BUN/CREAT SERPL: 13.3 (ref 10–20)
CALCIUM BLD-MCNC: 9.1 MG/DL (ref 8.5–10.1)
CHLORIDE SERPL-SCNC: 103 MMOL/L (ref 98–112)
CO2 SERPL-SCNC: 29 MMOL/L (ref 21–32)
CREAT BLD-MCNC: 1.05 MG/DL (ref 0.55–1.02)
DEPRECATED RDW RBC AUTO: 54.4 FL (ref 35.1–46.3)
EOSINOPHIL # BLD AUTO: 0.02 X10(3) UL (ref 0–0.7)
EOSINOPHIL NFR BLD AUTO: 0.4 %
ERYTHROCYTE [DISTWIDTH] IN BLOOD BY AUTOMATED COUNT: 14.6 % (ref 11–15)
GLOBULIN PLAS-MCNC: 3.6 G/DL (ref 2.8–4.4)
GLUCOSE BLD-MCNC: 149 MG/DL (ref 70–99)
HCT VFR BLD AUTO: 27.1 % (ref 35–48)
HGB BLD-MCNC: 9 G/DL (ref 12–16)
IMM GRANULOCYTES # BLD AUTO: 0.18 X10(3) UL (ref 0–1)
IMM GRANULOCYTES NFR BLD: 3.6 %
LYMPHOCYTES # BLD AUTO: 2.05 X10(3) UL (ref 1–4)
LYMPHOCYTES NFR BLD AUTO: 40.6 %
M PROTEIN MFR SERPL ELPH: 7 G/DL (ref 6.4–8.2)
MCH RBC QN AUTO: 33.8 PG (ref 26–34)
MCHC RBC AUTO-ENTMCNC: 33.2 G/DL (ref 31–37)
MCV RBC AUTO: 101.9 FL (ref 80–100)
MONOCYTES # BLD AUTO: 0.74 X10(3) UL (ref 0.1–1)
MONOCYTES NFR BLD AUTO: 14.7 %
NEUTROPHILS # BLD AUTO: 2.02 X10 (3) UL (ref 1.5–7.7)
NEUTROPHILS # BLD AUTO: 2.02 X10(3) UL (ref 1.5–7.7)
NEUTROPHILS NFR BLD AUTO: 39.9 %
OSMOLALITY SERPL CALC.SUM OF ELEC: 287 MOSM/KG (ref 275–295)
PLATELET # BLD AUTO: 152 10(3)UL (ref 150–450)
POTASSIUM SERPL-SCNC: 4 MMOL/L (ref 3.5–5.1)
RBC # BLD AUTO: 2.66 X10(6)UL (ref 3.8–5.3)
SODIUM SERPL-SCNC: 137 MMOL/L (ref 136–145)
WBC # BLD AUTO: 5.1 X10(3) UL (ref 4–11)

## 2020-08-04 PROCEDURE — 96366 THER/PROPH/DIAG IV INF ADDON: CPT

## 2020-08-04 PROCEDURE — 96375 TX/PRO/DX INJ NEW DRUG ADDON: CPT

## 2020-08-04 PROCEDURE — 96417 CHEMO IV INFUS EACH ADDL SEQ: CPT

## 2020-08-04 PROCEDURE — 80053 COMPREHEN METABOLIC PANEL: CPT

## 2020-08-04 PROCEDURE — 96367 TX/PROPH/DG ADDL SEQ IV INF: CPT

## 2020-08-04 PROCEDURE — 85025 COMPLETE CBC W/AUTO DIFF WBC: CPT

## 2020-08-04 PROCEDURE — 96413 CHEMO IV INFUSION 1 HR: CPT

## 2020-08-04 PROCEDURE — 96361 HYDRATE IV INFUSION ADD-ON: CPT

## 2020-08-04 RX ORDER — SODIUM CHLORIDE 9 MG/ML
1000 INJECTION, SOLUTION INTRAVENOUS ONCE
Status: COMPLETED | OUTPATIENT
Start: 2020-08-04 | End: 2020-08-04

## 2020-08-04 RX ORDER — IBUPROFEN 200 MG
400 TABLET ORAL ONCE
Status: COMPLETED | OUTPATIENT
Start: 2020-08-04 | End: 2020-08-04

## 2020-08-04 RX ORDER — SODIUM CHLORIDE 0.9 % (FLUSH) 0.9 %
10 SYRINGE (ML) INJECTION ONCE
Status: COMPLETED | OUTPATIENT
Start: 2020-08-04 | End: 2020-08-04

## 2020-08-04 RX ORDER — SODIUM CHLORIDE 0.9 % (FLUSH) 0.9 %
10 SYRINGE (ML) INJECTION ONCE
Status: CANCELLED | OUTPATIENT
Start: 2020-08-04

## 2020-08-04 RX ADMIN — IBUPROFEN 400 MG: 200 MG TABLET ORAL at 13:01:00

## 2020-08-04 RX ADMIN — SODIUM CHLORIDE 1000 ML: 9 INJECTION, SOLUTION INTRAVENOUS at 14:04:00

## 2020-08-04 RX ADMIN — SODIUM CHLORIDE 0.9 % (FLUSH) 10 ML: 0.9 % SYRINGE (ML) INJECTION at 15:55:00

## 2020-08-04 NOTE — PROGRESS NOTES
Pt here for C8D8.   Arrives Ambulating independently, accompanied by Self           Patient reports possible pregnancy since last therapy cycle: No    Modifications in dose or schedule: No     Frequency of blood return and site check throughout administrati

## 2020-08-05 ENCOUNTER — OFFICE VISIT (OUTPATIENT)
Dept: INTERNAL MEDICINE CLINIC | Facility: CLINIC | Age: 60
End: 2020-08-05
Payer: MEDICAID

## 2020-08-05 VITALS
HEART RATE: 100 BPM | HEIGHT: 62 IN | DIASTOLIC BLOOD PRESSURE: 76 MMHG | BODY MASS INDEX: 32.34 KG/M2 | SYSTOLIC BLOOD PRESSURE: 144 MMHG | RESPIRATION RATE: 16 BRPM | OXYGEN SATURATION: 90 % | WEIGHT: 175.75 LBS | TEMPERATURE: 98 F

## 2020-08-05 DIAGNOSIS — T38.0X5D STEROID-INDUCED DIABETES MELLITUS, SUBSEQUENT ENCOUNTER (HCC): Primary | ICD-10-CM

## 2020-08-05 DIAGNOSIS — E78.5 HYPERLIPIDEMIA, UNSPECIFIED HYPERLIPIDEMIA TYPE: ICD-10-CM

## 2020-08-05 DIAGNOSIS — I10 BENIGN ESSENTIAL HTN: ICD-10-CM

## 2020-08-05 DIAGNOSIS — E09.9 STEROID-INDUCED DIABETES MELLITUS, SUBSEQUENT ENCOUNTER (HCC): Primary | ICD-10-CM

## 2020-08-05 PROCEDURE — 99214 OFFICE O/P EST MOD 30 MIN: CPT | Performed by: PHYSICIAN ASSISTANT

## 2020-08-05 PROCEDURE — 3008F BODY MASS INDEX DOCD: CPT | Performed by: PHYSICIAN ASSISTANT

## 2020-08-05 PROCEDURE — 3077F SYST BP >= 140 MM HG: CPT | Performed by: PHYSICIAN ASSISTANT

## 2020-08-05 PROCEDURE — 3078F DIAST BP <80 MM HG: CPT | Performed by: PHYSICIAN ASSISTANT

## 2020-08-05 RX ORDER — AMLODIPINE BESYLATE 10 MG/1
10 TABLET ORAL DAILY
Qty: 90 TABLET | Refills: 0 | Status: SHIPPED | OUTPATIENT
Start: 2020-08-05 | End: 2020-10-27

## 2020-08-05 NOTE — PROGRESS NOTES
Rl Garcia is a 63year old female. HPI:   Patient presents for f/u of steroid induced DM, HTN, HLP. Began metformin a couple weeks ago. Not taking every day -- only on days of and following chemo when she receives steroids.   AM FBS on days off nourished, in no acute distress  SKIN: no rashes, no suspicious lesions  HEENT: normocephalic, atraumatic, conjunctiva clear  NECK: supple, no thyromegaly, no lymphadenopathy  LUNGS: regular breathing rate and effort, clear to auscultation bilaterally  CAR

## 2020-08-05 NOTE — PATIENT INSTRUCTIONS
Diabetes:  - follow up with Tia Minors  - take metformin 500 mg twice a day for now (with food)    High blood pressure:  - increase amlodipine to 10 mg (take TWO 5 mg tablets until gone then fill new Rx for 10 mg tablets)    If BP readings not <130/80 at

## 2020-08-10 ENCOUNTER — HOSPITAL ENCOUNTER (OUTPATIENT)
Dept: NUCLEAR MEDICINE | Facility: HOSPITAL | Age: 60
Discharge: HOME | End: 2020-08-10
Attending: INTERNAL MEDICINE
Payer: MEDICAID

## 2020-08-10 DIAGNOSIS — R59.1 LYMPHADENOPATHY: ICD-10-CM

## 2020-08-10 DIAGNOSIS — C79.51 OSSEOUS METASTASIS (HCC): ICD-10-CM

## 2020-08-10 DIAGNOSIS — C78.89 METASTATIC CHOLANGIOCARCINOMA TO BILE DUCT (HCC): ICD-10-CM

## 2020-08-10 DIAGNOSIS — C22.1 METASTATIC CHOLANGIOCARCINOMA TO BILE DUCT (HCC): ICD-10-CM

## 2020-08-10 LAB — GLUCOSE BLD-MCNC: 135 MG/DL (ref 70–99)

## 2020-08-10 PROCEDURE — 78815 PET IMAGE W/CT SKULL-THIGH: CPT | Performed by: INTERNAL MEDICINE

## 2020-08-10 PROCEDURE — 82962 GLUCOSE BLOOD TEST: CPT

## 2020-08-12 NOTE — PROGRESS NOTES
THE [facility] Hematology and Oncology Clinic Note    Diagnosis: Metastatic Cholangiocarcinoma: liver, bone: BRAF V600E +    Treatment History:   1. Cisplatin 25 mg/m2 D1/8 + Gemcitabine 1000 mg/m2 D1/8 q21 days.    C1: 3/3/20 and 3/10/20: : 245,608 CEA 43.6 disease. No bone lesions. · 12/2017: Colonoscopy: Right colon: Fragments of colonic mucosa with reactive lymphoid aggregates. No evidence of malignancy.    · 02/2018: splenic flexure/colon robotic resection: benign   · 09/2019: Mammogram was BIRADS1  · 1/ hearing loss. Has bilateral G1 LE neuropathy. Diarrhea has resolved. Review of Systems: 12 Point ROS was completed and pertinent positives are in the HPI    amLODIPine Besylate 10 MG Oral Tab, Take 1 tablet (10 mg total) by mouth daily. , Disp: 90 table 150 mg, Intravenous, Once, Karla Baltazar, FREYA, Stopped at 08/04/20 1114  [COMPLETED] ondansetron HCl (ZOFRAN) 8 mg, Dexamethasone Sodium Phosphate (DECADRON) 10 mg in sodium chloride 0.9% 105 mL IVPB, , Intravenous, Once, FREYA Monreal, [address] chloride 0.9% 100 mL infusion, 12.5 g, Intravenous, Once, Allison Griffiths MD, Stopped at 07/28/20 1227  [COMPLETED] CISplatin (PLATINOL) 25 mg/m2 = 44 mg in sodium chloride 0.9% 294 mL chemo-infusion, 25 mg/m2 (Treatment Plan Recorded), Intravenous, Once, [facility])    • COPD (chronic obstructive pulmonary disease) ([facility]     2 1/2 L O2 @ night   • Coronary atherosclerosis    • Diverticulitis large intestine w/o perforation or abscess w/o bleeding 11/14/2017   • High blood pressure    • High cholesterol    • [place] C/D/I      Results:  Lab Results   Component Value Date    WBC 5.1 08/04/2020    HGB 9.0 (L) 08/04/2020    HCT 27.1 (L) 08/04/2020    .9 (H) 08/04/2020    .0 08/04/2020    EOSABS 0.00 06/23/2020     Lab Results   Component Value Date    NA 13 fibrosis referred by Dr. Xavier Dunn due to an abnormal bone lesion. She was found to have metastatic cholangicarcinoma.      Metastatic Cholangiocarcinoma: liver, bone  - Discussed at TB, clinical presentation and pathology c/w cholangiocarcinoma  - Tempus: BR 02    Right Renal Cortical Lesion: No FDG uptake. Continue to monitor. Pain Control: Following with Palliative care. Pain improved after C1 of chemotherapy.      Risk-High: IV Chemotherapy     Checklist:  ECOG 1  Chemo on 8/18/20  See me on 8/25/20    M

## 2020-08-13 ENCOUNTER — OFFICE VISIT (OUTPATIENT)
Dept: HEMATOLOGY/ONCOLOGY | Facility: HOSPITAL | Age: 60
End: 2020-08-13
Attending: INTERNAL MEDICINE
Payer: MEDICAID

## 2020-08-13 VITALS
HEIGHT: 62.01 IN | OXYGEN SATURATION: 95 % | TEMPERATURE: 98 F | WEIGHT: 170.38 LBS | HEART RATE: 104 BPM | RESPIRATION RATE: 18 BRPM | DIASTOLIC BLOOD PRESSURE: 80 MMHG | BODY MASS INDEX: 30.96 KG/M2 | SYSTOLIC BLOOD PRESSURE: 138 MMHG

## 2020-08-13 DIAGNOSIS — C22.1 CHOLANGIOCARCINOMA (HCC): Primary | ICD-10-CM

## 2020-08-13 DIAGNOSIS — C22.1 CHOLANGIOCARCINOMA (HCC): ICD-10-CM

## 2020-08-13 LAB
ALBUMIN SERPL-MCNC: 3.3 G/DL (ref 3.4–5)
ALBUMIN/GLOB SERPL: 0.9 {RATIO} (ref 1–2)
ALP LIVER SERPL-CCNC: 123 U/L (ref 46–118)
ALT SERPL-CCNC: 41 U/L (ref 13–56)
ANION GAP SERPL CALC-SCNC: 6 MMOL/L (ref 0–18)
AST SERPL-CCNC: 29 U/L (ref 15–37)
BASOPHILS # BLD AUTO: 0.01 X10(3) UL (ref 0–0.2)
BASOPHILS NFR BLD AUTO: 0.1 %
BILIRUB SERPL-MCNC: 0.4 MG/DL (ref 0.1–2)
BUN BLD-MCNC: 12 MG/DL (ref 7–18)
BUN/CREAT SERPL: 11.5 (ref 10–20)
CALCIUM BLD-MCNC: 8.8 MG/DL (ref 8.5–10.1)
CANCER AG19-9 SERPL-ACNC: ABNORMAL U/ML (ref ?–37)
CEA SERPL-MCNC: 30.7 NG/ML (ref ?–5)
CHLORIDE SERPL-SCNC: 106 MMOL/L (ref 98–112)
CO2 SERPL-SCNC: 26 MMOL/L (ref 21–32)
CREAT BLD-MCNC: 1.04 MG/DL (ref 0.55–1.02)
DEPRECATED RDW RBC AUTO: 54.5 FL (ref 35.1–46.3)
EOSINOPHIL # BLD AUTO: 0.01 X10(3) UL (ref 0–0.7)
EOSINOPHIL NFR BLD AUTO: 0.1 %
ERYTHROCYTE [DISTWIDTH] IN BLOOD BY AUTOMATED COUNT: 14.8 % (ref 11–15)
GLOBULIN PLAS-MCNC: 3.6 G/DL (ref 2.8–4.4)
GLUCOSE BLD-MCNC: 175 MG/DL (ref 70–99)
HCT VFR BLD AUTO: 22.3 % (ref 35–48)
HGB BLD-MCNC: 7.4 G/DL (ref 12–16)
IMM GRANULOCYTES # BLD AUTO: 0.07 X10(3) UL (ref 0–1)
IMM GRANULOCYTES NFR BLD: 1 %
LYMPHOCYTES # BLD AUTO: 2.06 X10(3) UL (ref 1–4)
LYMPHOCYTES NFR BLD AUTO: 30.3 %
M PROTEIN MFR SERPL ELPH: 6.9 G/DL (ref 6.4–8.2)
MCH RBC QN AUTO: 34.1 PG (ref 26–34)
MCHC RBC AUTO-ENTMCNC: 33.2 G/DL (ref 31–37)
MCV RBC AUTO: 102.8 FL (ref 80–100)
MONOCYTES # BLD AUTO: 0.74 X10(3) UL (ref 0.1–1)
MONOCYTES NFR BLD AUTO: 10.9 %
NEUTROPHILS # BLD AUTO: 3.91 X10 (3) UL (ref 1.5–7.7)
NEUTROPHILS # BLD AUTO: 3.91 X10(3) UL (ref 1.5–7.7)
NEUTROPHILS NFR BLD AUTO: 57.6 %
OSMOLALITY SERPL CALC.SUM OF ELEC: 290 MOSM/KG (ref 275–295)
PATIENT FASTING Y/N/NP: NO
PLATELET # BLD AUTO: 17 10(3)UL (ref 150–450)
POTASSIUM SERPL-SCNC: 3.6 MMOL/L (ref 3.5–5.1)
RBC # BLD AUTO: 2.17 X10(6)UL (ref 3.8–5.3)
SODIUM SERPL-SCNC: 138 MMOL/L (ref 136–145)
WBC # BLD AUTO: 6.8 X10(3) UL (ref 4–11)

## 2020-08-13 PROCEDURE — 36591 DRAW BLOOD OFF VENOUS DEVICE: CPT

## 2020-08-13 PROCEDURE — 99215 OFFICE O/P EST HI 40 MIN: CPT | Performed by: INTERNAL MEDICINE

## 2020-08-13 PROCEDURE — 86301 IMMUNOASSAY TUMOR CA 19-9: CPT

## 2020-08-13 PROCEDURE — 80053 COMPREHEN METABOLIC PANEL: CPT

## 2020-08-13 PROCEDURE — 82378 CARCINOEMBRYONIC ANTIGEN: CPT

## 2020-08-13 PROCEDURE — 85025 COMPLETE CBC W/AUTO DIFF WBC: CPT

## 2020-08-18 ENCOUNTER — OFFICE VISIT (OUTPATIENT)
Dept: HEMATOLOGY/ONCOLOGY | Facility: HOSPITAL | Age: 60
End: 2020-08-18
Attending: INTERNAL MEDICINE
Payer: MEDICAID

## 2020-08-18 VITALS
TEMPERATURE: 98 F | HEART RATE: 82 BPM | SYSTOLIC BLOOD PRESSURE: 149 MMHG | BODY MASS INDEX: 31.1 KG/M2 | RESPIRATION RATE: 16 BRPM | DIASTOLIC BLOOD PRESSURE: 84 MMHG | WEIGHT: 171.19 LBS | HEIGHT: 62.01 IN | OXYGEN SATURATION: 94 %

## 2020-08-18 DIAGNOSIS — C22.1 CHOLANGIOCARCINOMA (HCC): Primary | ICD-10-CM

## 2020-08-18 PROCEDURE — 96413 CHEMO IV INFUSION 1 HR: CPT

## 2020-08-18 PROCEDURE — 96375 TX/PRO/DX INJ NEW DRUG ADDON: CPT

## 2020-08-18 RX ORDER — ONDANSETRON 8 MG/1
8 TABLET, ORALLY DISINTEGRATING ORAL EVERY 8 HOURS PRN
Qty: 30 TABLET | Refills: 0 | Status: SHIPPED | OUTPATIENT
Start: 2020-08-18 | End: 2020-12-14

## 2020-08-18 NOTE — PROGRESS NOTES
Pt here for C1D1 Gemzar (patient has had gemzar with previous treatment; dropping cisplat).   Arrives Ambulating independently, accompanied by Self           Patient reports possible pregnancy since last therapy cycle: Not Applicable    Modifications in [facility]

## 2020-08-19 ENCOUNTER — TELEPHONE (OUTPATIENT)
Dept: HEMATOLOGY/ONCOLOGY | Facility: HOSPITAL | Age: 60
End: 2020-08-19

## 2020-08-19 NOTE — TELEPHONE ENCOUNTER
Toxicities: C1 D1 Maintenance Gemcitabine on 8/18/2020    Patient is doing well. No questions or concerns at this time.

## 2020-08-24 NOTE — PROGRESS NOTES
THE [facility] Hematology and Oncology Clinic Note    Diagnosis: Metastatic Cholangiocarcinoma: liver, bone: BRAF V600E +    Treatment History:   1. Cisplatin 25 mg/m2 D1/8 + Gemcitabine 1000 mg/m2 D1/8 q21 days.    C1: 3/3/20 and 3/10/20: : 821,135 CEA 43.6 4.6 and transaminitis. Alk P 166. Cr and Calcium are normal.      Hematology/Oncology History:   · 10/2017: CT AP: Fatty liver, diverticulitis. No bone lesions  · 10/2019: CT AP:  Diverticular disease. No bone lesions.    · 12/2017: Colonoscopy: Right colon disease, but still with osseous metastases. Given mild hearing loss, TB recommendation was to proceed with Gemzar maintenance. She started maintenance Gemzar on 8/18/20. She states that she is baseline fatigued. No bleeding or bruising.  She states that 08/18/20 1212  [COMPLETED] Gemcitabine HCl (GEMZAR) 1,786 mg in sodium chloride 0.9% 297 mL chemo-infusion, 1,000 mg/m2 (Treatment Plan Recorded), Intravenous, Once, Genna Lewis MD, Stopped at 08/18/20 1310  [COMPLETED] Heparin Lock Flush 100 UNIT/ML potassium chloride 20 mEq, magnesium sulfate 4 g in sodium chloride 0.9% 1,018 mL IV, , Intravenous, Once, Winston Washington MD, Stopped at 07/28/20 1041  [COMPLETED] Fosaprepitant Dimeglumine (EMEND) 150 mg in sodium chloride 0.9% 150 mL IVPB, 150 mg, Intr COLECTOMY     • COLONOSCOPY  12/20/2018    polyps   • OTHER  02/26/2018    resection Splenic flexure   • OTHER SURGICAL HISTORY     • XI ROBOT-ASSISTED LAPAROSCOPIC LOW ANTERIOR COLON RESECTION Left 2/26/2018    Performed by Nolan Benitez MD at [facility] DIONTE 4/20/20: PET/CT  1. Stable appearance of lesion within the spinous process of the T7 vertebral body with increased FDG uptake and maximal SUV of 4.24.  2.  Stable appearance of sclerotic lesion within the right portion of the L3 vertebral body   3.   Sta al. Nicky Cowan 2010. Completed 8 cycles of Cis/[place] with reduction in tumor markers. Follow up PET/CT from 8/10/20 with persistent but stable disease in liver and bone (T7 and L3).  Discussed in TB-plan for maintenance Gemcitabine     Plan  - C1D8 of maintenance G

## 2020-08-25 ENCOUNTER — OFFICE VISIT (OUTPATIENT)
Dept: HEMATOLOGY/ONCOLOGY | Facility: HOSPITAL | Age: 60
End: 2020-08-25
Attending: INTERNAL MEDICINE
Payer: MEDICAID

## 2020-08-25 VITALS
BODY MASS INDEX: 30.71 KG/M2 | HEIGHT: 62.01 IN | DIASTOLIC BLOOD PRESSURE: 81 MMHG | OXYGEN SATURATION: 88 % | RESPIRATION RATE: 20 BRPM | HEART RATE: 101 BPM | WEIGHT: 169 LBS | SYSTOLIC BLOOD PRESSURE: 161 MMHG | TEMPERATURE: 99 F

## 2020-08-25 DIAGNOSIS — C22.1 CHOLANGIOCARCINOMA (HCC): Primary | ICD-10-CM

## 2020-08-25 DIAGNOSIS — R11.0 CHEMOTHERAPY-INDUCED NAUSEA: ICD-10-CM

## 2020-08-25 DIAGNOSIS — T45.1X5A CHEMOTHERAPY-INDUCED NAUSEA: ICD-10-CM

## 2020-08-25 LAB
ALBUMIN SERPL-MCNC: 3.7 G/DL (ref 3.4–5)
ALBUMIN/GLOB SERPL: 1 {RATIO} (ref 1–2)
ALP LIVER SERPL-CCNC: 123 U/L (ref 46–118)
ALT SERPL-CCNC: 47 U/L (ref 13–56)
ANION GAP SERPL CALC-SCNC: 4 MMOL/L (ref 0–18)
AST SERPL-CCNC: 41 U/L (ref 15–37)
BASOPHILS # BLD AUTO: 0.06 X10(3) UL (ref 0–0.2)
BASOPHILS NFR BLD AUTO: 1.3 %
BILIRUB SERPL-MCNC: 0.6 MG/DL (ref 0.1–2)
BUN BLD-MCNC: 12 MG/DL (ref 7–18)
BUN/CREAT SERPL: 11.5 (ref 10–20)
CALCIUM BLD-MCNC: 9.5 MG/DL (ref 8.5–10.1)
CHLORIDE SERPL-SCNC: 103 MMOL/L (ref 98–112)
CO2 SERPL-SCNC: 28 MMOL/L (ref 21–32)
CREAT BLD-MCNC: 1.04 MG/DL (ref 0.55–1.02)
DEPRECATED RDW RBC AUTO: 67.4 FL (ref 35.1–46.3)
EOSINOPHIL # BLD AUTO: 0.04 X10(3) UL (ref 0–0.7)
EOSINOPHIL NFR BLD AUTO: 0.8 %
ERYTHROCYTE [DISTWIDTH] IN BLOOD BY AUTOMATED COUNT: 18.6 % (ref 11–15)
GLOBULIN PLAS-MCNC: 3.6 G/DL (ref 2.8–4.4)
GLUCOSE BLD-MCNC: 146 MG/DL (ref 70–99)
HCT VFR BLD AUTO: 27.4 % (ref 35–48)
HGB BLD-MCNC: 9 G/DL (ref 12–16)
IMM GRANULOCYTES # BLD AUTO: 0.11 X10(3) UL (ref 0–1)
IMM GRANULOCYTES NFR BLD: 2.3 %
LDH SERPL L TO P-CCNC: 489 U/L
LYMPHOCYTES # BLD AUTO: 1.92 X10(3) UL (ref 1–4)
LYMPHOCYTES NFR BLD AUTO: 40.4 %
M PROTEIN MFR SERPL ELPH: 7.3 G/DL (ref 6.4–8.2)
MCH RBC QN AUTO: 35 PG (ref 26–34)
MCHC RBC AUTO-ENTMCNC: 32.8 G/DL (ref 31–37)
MCV RBC AUTO: 106.6 FL (ref 80–100)
MONOCYTES # BLD AUTO: 0.85 X10(3) UL (ref 0.1–1)
MONOCYTES NFR BLD AUTO: 17.9 %
NEUTROPHILS # BLD AUTO: 1.77 X10 (3) UL (ref 1.5–7.7)
NEUTROPHILS # BLD AUTO: 1.77 X10(3) UL (ref 1.5–7.7)
NEUTROPHILS NFR BLD AUTO: 37.3 %
OSMOLALITY SERPL CALC.SUM OF ELEC: 282 MOSM/KG (ref 275–295)
PATIENT FASTING Y/N/NP: NO
PLATELET # BLD AUTO: 161 10(3)UL (ref 150–450)
PLATELET MORPHOLOGY: NORMAL
POTASSIUM SERPL-SCNC: 4 MMOL/L (ref 3.5–5.1)
RBC # BLD AUTO: 2.57 X10(6)UL (ref 3.8–5.3)
SODIUM SERPL-SCNC: 135 MMOL/L (ref 136–145)
WBC # BLD AUTO: 4.8 X10(3) UL (ref 4–11)

## 2020-08-25 PROCEDURE — 96375 TX/PRO/DX INJ NEW DRUG ADDON: CPT

## 2020-08-25 PROCEDURE — 83615 LACTATE (LD) (LDH) ENZYME: CPT

## 2020-08-25 PROCEDURE — 85025 COMPLETE CBC W/AUTO DIFF WBC: CPT

## 2020-08-25 PROCEDURE — 96413 CHEMO IV INFUSION 1 HR: CPT

## 2020-08-25 PROCEDURE — 99213 OFFICE O/P EST LOW 20 MIN: CPT | Performed by: INTERNAL MEDICINE

## 2020-08-25 PROCEDURE — 80053 COMPREHEN METABOLIC PANEL: CPT

## 2020-08-25 RX ORDER — OLANZAPINE 5 MG/1
5 TABLET ORAL NIGHTLY
Qty: 30 TABLET | Refills: 3 | Status: SHIPPED | OUTPATIENT
Start: 2020-08-25 | End: 2021-04-21

## 2020-08-25 NOTE — PROGRESS NOTES
Pt here for C1D8 Gemzar.   Arrives Ambulating independently, accompanied by Spouse           Patient reports possible pregnancy since last therapy cycle: No    Modifications in dose or schedule: No     Frequency of blood return and site check throughout adm

## 2020-08-27 ENCOUNTER — DIETICIAN VISIT (OUTPATIENT)
Dept: HEMATOLOGY/ONCOLOGY | Facility: HOSPITAL | Age: 60
End: 2020-08-27

## 2020-09-01 ENCOUNTER — OFFICE VISIT (OUTPATIENT)
Dept: HEMATOLOGY/ONCOLOGY | Facility: HOSPITAL | Age: 60
End: 2020-09-01
Attending: INTERNAL MEDICINE
Payer: MEDICAID

## 2020-09-01 VITALS
WEIGHT: 171.19 LBS | DIASTOLIC BLOOD PRESSURE: 65 MMHG | OXYGEN SATURATION: 92 % | TEMPERATURE: 98 F | HEIGHT: 62.01 IN | HEART RATE: 87 BPM | RESPIRATION RATE: 18 BRPM | SYSTOLIC BLOOD PRESSURE: 144 MMHG | BODY MASS INDEX: 31.1 KG/M2

## 2020-09-01 DIAGNOSIS — C22.1 CHOLANGIOCARCINOMA (HCC): Primary | ICD-10-CM

## 2020-09-01 LAB
BASOPHILS # BLD AUTO: 0.04 X10(3) UL (ref 0–0.2)
BASOPHILS NFR BLD AUTO: 0.8 %
DEPRECATED RDW RBC AUTO: 70 FL (ref 35.1–46.3)
EOSINOPHIL # BLD AUTO: 0.02 X10(3) UL (ref 0–0.7)
EOSINOPHIL NFR BLD AUTO: 0.4 %
ERYTHROCYTE [DISTWIDTH] IN BLOOD BY AUTOMATED COUNT: 18.2 % (ref 11–15)
HCT VFR BLD AUTO: 26.4 % (ref 35–48)
HGB BLD-MCNC: 8.5 G/DL (ref 12–16)
IMM GRANULOCYTES # BLD AUTO: 0.12 X10(3) UL (ref 0–1)
IMM GRANULOCYTES NFR BLD: 2.3 %
LYMPHOCYTES # BLD AUTO: 1.75 X10(3) UL (ref 1–4)
LYMPHOCYTES NFR BLD AUTO: 32.9 %
MCH RBC QN AUTO: 35.1 PG (ref 26–34)
MCHC RBC AUTO-ENTMCNC: 32.2 G/DL (ref 31–37)
MCV RBC AUTO: 109.1 FL (ref 80–100)
MONOCYTES # BLD AUTO: 0.82 X10(3) UL (ref 0.1–1)
MONOCYTES NFR BLD AUTO: 15.4 %
NEUTROPHILS # BLD AUTO: 2.57 X10 (3) UL (ref 1.5–7.7)
NEUTROPHILS # BLD AUTO: 2.57 X10(3) UL (ref 1.5–7.7)
NEUTROPHILS NFR BLD AUTO: 48.2 %
PLATELET # BLD AUTO: 50 10(3)UL (ref 150–450)
PLATELET MORPHOLOGY: NORMAL
RBC # BLD AUTO: 2.42 X10(6)UL (ref 3.8–5.3)
WBC # BLD AUTO: 5.3 X10(3) UL (ref 4–11)

## 2020-09-01 PROCEDURE — 36591 DRAW BLOOD OFF VENOUS DEVICE: CPT

## 2020-09-01 PROCEDURE — 85025 COMPLETE CBC W/AUTO DIFF WBC: CPT

## 2020-09-01 NOTE — PROGRESS NOTES
Pt here for C15D1 of Gemzar.   Arrives Ambulating independently, accompanied by Self           Patient reports possible pregnancy since last therapy cycle: No    Modifications in dose or schedule: No     Frequency of blood return and site check throughout a

## 2020-09-08 ENCOUNTER — OFFICE VISIT (OUTPATIENT)
Dept: HEMATOLOGY/ONCOLOGY | Facility: HOSPITAL | Age: 60
End: 2020-09-08
Attending: INTERNAL MEDICINE
Payer: MEDICAID

## 2020-09-08 ENCOUNTER — TELEPHONE (OUTPATIENT)
Dept: HEMATOLOGY/ONCOLOGY | Facility: HOSPITAL | Age: 60
End: 2020-09-08

## 2020-09-08 ENCOUNTER — HOSPITAL ENCOUNTER (OUTPATIENT)
Dept: ULTRASOUND IMAGING | Facility: HOSPITAL | Age: 60
Discharge: HOME | End: 2020-09-08
Attending: CLINICAL NURSE SPECIALIST
Payer: MEDICAID

## 2020-09-08 VITALS
RESPIRATION RATE: 16 BRPM | SYSTOLIC BLOOD PRESSURE: 145 MMHG | TEMPERATURE: 98 F | DIASTOLIC BLOOD PRESSURE: 81 MMHG | HEART RATE: 75 BPM | BODY MASS INDEX: 31 KG/M2 | OXYGEN SATURATION: 93 % | WEIGHT: 171.38 LBS

## 2020-09-08 DIAGNOSIS — M79.662 BILATERAL CALF PAIN: ICD-10-CM

## 2020-09-08 DIAGNOSIS — R60.0 BILATERAL LEG EDEMA: Primary | ICD-10-CM

## 2020-09-08 DIAGNOSIS — C22.1 CHOLANGIOCARCINOMA (HCC): ICD-10-CM

## 2020-09-08 DIAGNOSIS — D69.6 THROMBOCYTOPENIA (HCC): ICD-10-CM

## 2020-09-08 DIAGNOSIS — C22.1 CHOLANGIOCARCINOMA (HCC): Primary | ICD-10-CM

## 2020-09-08 DIAGNOSIS — R60.0 BILATERAL LEG EDEMA: ICD-10-CM

## 2020-09-08 DIAGNOSIS — M79.661 BILATERAL CALF PAIN: ICD-10-CM

## 2020-09-08 LAB
BASOPHILS # BLD AUTO: 0.04 X10(3) UL (ref 0–0.2)
BASOPHILS NFR BLD AUTO: 0.6 %
DEPRECATED RDW RBC AUTO: 71.5 FL (ref 35.1–46.3)
EOSINOPHIL # BLD AUTO: 0.15 X10(3) UL (ref 0–0.7)
EOSINOPHIL NFR BLD AUTO: 2.2 %
ERYTHROCYTE [DISTWIDTH] IN BLOOD BY AUTOMATED COUNT: 17.2 % (ref 11–15)
HCT VFR BLD AUTO: 31.8 % (ref 35–48)
HGB BLD-MCNC: 9.9 G/DL (ref 12–16)
IMM GRANULOCYTES # BLD AUTO: 0.03 X10(3) UL (ref 0–1)
IMM GRANULOCYTES NFR BLD: 0.4 %
LYMPHOCYTES # BLD AUTO: 1.59 X10(3) UL (ref 1–4)
LYMPHOCYTES NFR BLD AUTO: 23 %
MCH RBC QN AUTO: 35 PG (ref 26–34)
MCHC RBC AUTO-ENTMCNC: 31.1 G/DL (ref 31–37)
MCV RBC AUTO: 112.4 FL (ref 80–100)
MONOCYTES # BLD AUTO: 0.79 X10(3) UL (ref 0.1–1)
MONOCYTES NFR BLD AUTO: 11.4 %
NEUTROPHILS # BLD AUTO: 4.3 X10 (3) UL (ref 1.5–7.7)
NEUTROPHILS # BLD AUTO: 4.3 X10(3) UL (ref 1.5–7.7)
NEUTROPHILS NFR BLD AUTO: 62.4 %
PLATELET # BLD AUTO: 177 10(3)UL (ref 150–450)
PLATELET MORPHOLOGY: NORMAL
RBC # BLD AUTO: 2.83 X10(6)UL (ref 3.8–5.3)
WBC # BLD AUTO: 6.9 X10(3) UL (ref 4–11)

## 2020-09-08 PROCEDURE — 96413 CHEMO IV INFUSION 1 HR: CPT

## 2020-09-08 PROCEDURE — 85025 COMPLETE CBC W/AUTO DIFF WBC: CPT

## 2020-09-08 PROCEDURE — 99214 OFFICE O/P EST MOD 30 MIN: CPT | Performed by: CLINICAL NURSE SPECIALIST

## 2020-09-08 PROCEDURE — 93970 EXTREMITY STUDY: CPT | Performed by: CLINICAL NURSE SPECIALIST

## 2020-09-08 PROCEDURE — 96375 TX/PRO/DX INJ NEW DRUG ADDON: CPT

## 2020-09-08 NOTE — PROGRESS NOTES
[facility] Progress Note    Patient Name: Zara Medeiros   YOB: 1960   Medical Record Number: [id number]   CSN: [id number]   Date of visit: 9/8/2020   Provider: FREYA Cramer  Referring Physician: No ref.  provider found    Proble 5 MG Oral Tab, Take 1 tablet (5 mg total) by mouth nightly., Disp: 30 tablet, Rfl: 3  •  ondansetron 8 MG Oral Tablet Dispersible, Take 1 tablet (8 mg total) by mouth every 8 (eight) hours as needed for Nausea., Disp: 30 tablet, Rfl: 0  •  amLODIPine Besyl DIFFERENTIAL    Collection Time: 09/08/20  8:11 AM   Result Value Ref Range    WBC 6.9 4.0 - 11.0 x10(3) uL    RBC 2.83 (L) 3.80 - 5.30 x10(6)uL    HGB 9.9 (L) 12.0 - 16.0 g/dL    HCT 31.8 (L) 35.0 - 48.0 %    .0 150.0 - 450.0 10(3)uL    .4 (

## 2020-09-08 NOTE — TELEPHONE ENCOUNTER
Left message on pt's voicemail to check My Chart for results. Unable to leave detailed message on vm.   Lopez Valencia, [address] [place] [address] [facility]

## 2020-09-08 NOTE — PROGRESS NOTES
Patient presents with:  Edema: APN assessment - sick call    Pt is here for treatment and pt states she has increased bilateral swelling in her ankles and calf pain by the end of day.  In the morning it is not as severe but worsens over the day; tries to ke

## 2020-09-08 NOTE — PROGRESS NOTES
Pt here for C1D15.   Arrives Ambulating independently, accompanied by Self           Patient reports possible pregnancy since last therapy cycle: No    Modifications in dose or schedule: No     Frequency of blood return and site check throughout administrat

## 2020-09-15 ENCOUNTER — APPOINTMENT (OUTPATIENT)
Dept: HEMATOLOGY/ONCOLOGY | Facility: HOSPITAL | Age: 60
End: 2020-09-15
Attending: INTERNAL MEDICINE
Payer: MEDICAID

## 2020-09-21 NOTE — PROGRESS NOTES
[facility] Note    Diagnosis: Metastatic Cholangiocarcinoma: liver, bone: BRAF V600E +    Treatment History:   1. Cisplatin 25 mg/m2 D1/8 + Gemcitabine 1000 mg/m2 D1/8 q21 days.    C1: 3/3/20 and 3/10/20: : 965,253 CEA 43.6 transaminitis. Alk P 166. Cr and Calcium are normal.      Hematology/Oncology History:   · 10/2017: CT AP: Fatty liver, diverticulitis. No bone lesions  · 10/2019: CT AP:  Diverticular disease. No bone lesions.    · 12/2017: Colonoscopy: Right colon: Verdis Orange but still with osseous metastases. Given mild hearing loss, TB recommendation was to proceed with Gemzar maintenance. She started maintenance Gemzar on 8/18/20. She is here for C2. She met with Dr. Poppy Gordon and is on a trial of Anoro.      She states th Soln, Inhale 2 puffs into the lungs every 4 (four) hours as needed for Wheezing or Shortness of Breath. CARRY YOUR INHALER WITH YOU., Disp: 1 Inhaler, Rfl: 0    •  aspirin 81 MG Oral Tab, Take 81 mg by mouth daily. , Disp: , Rfl:       •  [COMPLETED] ondans file      Years of education: Not on file      Highest education level: Not on file    Tobacco Use      Smoking status: Former Smoker        Packs/day: 1.00        Years: 40.00        Pack years: 36        Start date: 1/17/1977        Quit date: 2017 Stable heterogeneous appearance of the liver with areas of increased FDG uptake, specifically within left lobe, consistent with metastatic disease. 6.  Hepatic steatosis  7. Emphysematous changes within the lungs. 6/9/20: CT CAP  1. No acute process. V600E mutation. In a small subset of 35 patient, there was an CASTANON of 42% alone with a PFS of 9.2 months. We also discussed that she could pursue clinical trials.    - Serial  markers  - May need intermittent lasix for fluid retention     Chemotherapy

## 2020-09-22 ENCOUNTER — OFFICE VISIT (OUTPATIENT)
Dept: HEMATOLOGY/ONCOLOGY | Facility: HOSPITAL | Age: 60
End: 2020-09-22
Attending: INTERNAL MEDICINE
Payer: MEDICAID

## 2020-09-22 VITALS
DIASTOLIC BLOOD PRESSURE: 78 MMHG | HEART RATE: 75 BPM | OXYGEN SATURATION: 98 % | WEIGHT: 172.81 LBS | SYSTOLIC BLOOD PRESSURE: 133 MMHG | HEIGHT: 62.01 IN | BODY MASS INDEX: 31.4 KG/M2 | TEMPERATURE: 99 F | RESPIRATION RATE: 16 BRPM

## 2020-09-22 DIAGNOSIS — C22.1 CHOLANGIOCARCINOMA (HCC): Primary | ICD-10-CM

## 2020-09-22 LAB
ALBUMIN SERPL-MCNC: 3.3 G/DL (ref 3.4–5)
ALBUMIN/GLOB SERPL: 0.8 {RATIO} (ref 1–2)
ALP LIVER SERPL-CCNC: 114 U/L
ALT SERPL-CCNC: 48 U/L
ANION GAP SERPL CALC-SCNC: 4 MMOL/L (ref 0–18)
AST SERPL-CCNC: 40 U/L (ref 15–37)
BASOPHILS # BLD AUTO: 0.04 X10(3) UL (ref 0–0.2)
BASOPHILS NFR BLD AUTO: 0.7 %
BILIRUB SERPL-MCNC: 0.4 MG/DL (ref 0.1–2)
BUN BLD-MCNC: 12 MG/DL (ref 7–18)
BUN/CREAT SERPL: 11.3 (ref 10–20)
CALCIUM BLD-MCNC: 9.2 MG/DL (ref 8.5–10.1)
CANCER AG19-9 SERPL-ACNC: ABNORMAL U/ML (ref ?–37)
CHLORIDE SERPL-SCNC: 105 MMOL/L (ref 98–112)
CO2 SERPL-SCNC: 28 MMOL/L (ref 21–32)
CREAT BLD-MCNC: 1.06 MG/DL
DEPRECATED RDW RBC AUTO: 55.4 FL (ref 35.1–46.3)
EOSINOPHIL # BLD AUTO: 0.1 X10(3) UL (ref 0–0.7)
EOSINOPHIL NFR BLD AUTO: 1.7 %
ERYTHROCYTE [DISTWIDTH] IN BLOOD BY AUTOMATED COUNT: 14.2 % (ref 11–15)
GLOBULIN PLAS-MCNC: 4.1 G/DL (ref 2.8–4.4)
GLUCOSE BLD-MCNC: 223 MG/DL (ref 70–99)
HCT VFR BLD AUTO: 36.1 %
HGB BLD-MCNC: 11.4 G/DL
IMM GRANULOCYTES # BLD AUTO: 0.01 X10(3) UL (ref 0–1)
IMM GRANULOCYTES NFR BLD: 0.2 %
LYMPHOCYTES # BLD AUTO: 1.58 X10(3) UL (ref 1–4)
LYMPHOCYTES NFR BLD AUTO: 26.4 %
M PROTEIN MFR SERPL ELPH: 7.4 G/DL (ref 6.4–8.2)
MCH RBC QN AUTO: 33.5 PG (ref 26–34)
MCHC RBC AUTO-ENTMCNC: 31.6 G/DL (ref 31–37)
MCV RBC AUTO: 106.2 FL
MONOCYTES # BLD AUTO: 0.84 X10(3) UL (ref 0.1–1)
MONOCYTES NFR BLD AUTO: 14 %
NEUTROPHILS # BLD AUTO: 3.41 X10 (3) UL (ref 1.5–7.7)
NEUTROPHILS # BLD AUTO: 3.41 X10(3) UL (ref 1.5–7.7)
NEUTROPHILS NFR BLD AUTO: 57 %
OSMOLALITY SERPL CALC.SUM OF ELEC: 291 MOSM/KG (ref 275–295)
PATIENT FASTING Y/N/NP: NO
PLATELET # BLD AUTO: 152 10(3)UL (ref 150–450)
POTASSIUM SERPL-SCNC: 4 MMOL/L (ref 3.5–5.1)
RBC # BLD AUTO: 3.4 X10(6)UL
SODIUM SERPL-SCNC: 137 MMOL/L (ref 136–145)
WBC # BLD AUTO: 6 X10(3) UL (ref 4–11)

## 2020-09-22 PROCEDURE — 85025 COMPLETE CBC W/AUTO DIFF WBC: CPT

## 2020-09-22 PROCEDURE — 80053 COMPREHEN METABOLIC PANEL: CPT

## 2020-09-22 PROCEDURE — 99215 OFFICE O/P EST HI 40 MIN: CPT | Performed by: INTERNAL MEDICINE

## 2020-09-22 PROCEDURE — 86301 IMMUNOASSAY TUMOR CA 19-9: CPT

## 2020-09-22 PROCEDURE — 96375 TX/PRO/DX INJ NEW DRUG ADDON: CPT

## 2020-09-22 PROCEDURE — 96413 CHEMO IV INFUSION 1 HR: CPT

## 2020-09-22 NOTE — PROGRESS NOTES
Pt here for C2D1 of Gemzar.   Arrives Ambulating independently, accompanied by Self           Patient reports possible pregnancy since last therapy cycle: No    Modifications in dose or schedule: No     Frequency of blood return and site check throughout ad

## 2020-09-28 NOTE — PROGRESS NOTES
[facility] Note    Diagnosis: Metastatic Cholangiocarcinoma: liver, bone: BRAF V600E +    Treatment History:   1. Cisplatin 25 mg/m2 D1/8 + Gemcitabine 1000 mg/m2 D1/8 q21 days.    C1: 3/3/20 and 3/10/20: : 031,353 CEA 43.6 TP 8.9, Globulin 4.6 and transaminitis. Alk P 166. Cr and Calcium are normal.      Hematology/Oncology History:   · 10/2017: CT AP: Fatty liver, diverticulitis. No bone lesions  · 10/2019: CT AP:  Diverticular disease. No bone lesions.    · 12/2017: Iraida Kumar reduced burden of disease, but still with osseous metastases. Given mild hearing loss, TB recommendation was to proceed with Gemzar maintenance. She started maintenance Gemzar on 8/18/20. She is here for C2D8. Her plt count is 107.  She notes that she f Wheezing or Shortness of Breath. CARRY YOUR INHALER WITH YOU., Disp: 1 Inhaler, Rfl: 0    •  aspirin 81 MG Oral Tab, Take 81 mg by mouth daily. , Disp: , Rfl:       •  [COMPLETED] ondansetron HCl (ZOFRAN) 8 mg, Dexamethasone Sodium Phosphate (DECADRON) 10 m level: Not on file    Tobacco Use      Smoking status: Former Smoker        Packs/day: 1.00        Years: 40.00        Pack years: 36        Start date: 1/17/1977        Quit date: 2017        Years since quitting: 3.7      Smokeless tobacco: Never Used FDG uptake, specifically within left lobe, consistent with metastatic disease. 6.  Hepatic steatosis  7. Emphysematous changes within the lungs. 6/9/20: CT CAP  1. No acute process. 2. No evidence of pulmonary embolism.   3. Stable diffuse emphysemat mutation. In a small subset of 35 patient, there was an CASTANON of 42% alone with a PFS of 9.2 months. We also discussed that she could pursue clinical trials.    - Serial  is trending down   - May need intermittent lasix for fluid retention   - Repeat im

## 2020-09-29 ENCOUNTER — OFFICE VISIT (OUTPATIENT)
Dept: HEMATOLOGY/ONCOLOGY | Facility: HOSPITAL | Age: 60
End: 2020-09-29
Attending: INTERNAL MEDICINE
Payer: MEDICAID

## 2020-09-29 VITALS
TEMPERATURE: 99 F | DIASTOLIC BLOOD PRESSURE: 96 MMHG | HEIGHT: 62.01 IN | SYSTOLIC BLOOD PRESSURE: 159 MMHG | WEIGHT: 169.81 LBS | BODY MASS INDEX: 30.85 KG/M2 | HEART RATE: 84 BPM | OXYGEN SATURATION: 95 % | RESPIRATION RATE: 16 BRPM

## 2020-09-29 DIAGNOSIS — C22.1 CHOLANGIOCARCINOMA (HCC): Primary | ICD-10-CM

## 2020-09-29 LAB
BASOPHILS # BLD AUTO: 0.05 X10(3) UL (ref 0–0.2)
BASOPHILS NFR BLD AUTO: 0.9 %
DEPRECATED RDW RBC AUTO: 52.2 FL (ref 35.1–46.3)
EOSINOPHIL # BLD AUTO: 0.04 X10(3) UL (ref 0–0.7)
EOSINOPHIL NFR BLD AUTO: 0.7 %
ERYTHROCYTE [DISTWIDTH] IN BLOOD BY AUTOMATED COUNT: 13.5 % (ref 11–15)
HCT VFR BLD AUTO: 35.1 %
HGB BLD-MCNC: 11.3 G/DL
IMM GRANULOCYTES # BLD AUTO: 0.06 X10(3) UL (ref 0–1)
IMM GRANULOCYTES NFR BLD: 1.1 %
LYMPHOCYTES # BLD AUTO: 1.88 X10(3) UL (ref 1–4)
LYMPHOCYTES NFR BLD AUTO: 32.9 %
MCH RBC QN AUTO: 33.9 PG (ref 26–34)
MCHC RBC AUTO-ENTMCNC: 32.2 G/DL (ref 31–37)
MCV RBC AUTO: 105.4 FL
MONOCYTES # BLD AUTO: 0.83 X10(3) UL (ref 0.1–1)
MONOCYTES NFR BLD AUTO: 14.5 %
NEUTROPHILS # BLD AUTO: 2.85 X10 (3) UL (ref 1.5–7.7)
NEUTROPHILS # BLD AUTO: 2.85 X10(3) UL (ref 1.5–7.7)
NEUTROPHILS NFR BLD AUTO: 49.9 %
PLATELET # BLD AUTO: 107 10(3)UL (ref 150–450)
RBC # BLD AUTO: 3.33 X10(6)UL
WBC # BLD AUTO: 5.7 X10(3) UL (ref 4–11)

## 2020-09-29 PROCEDURE — 96375 TX/PRO/DX INJ NEW DRUG ADDON: CPT

## 2020-09-29 PROCEDURE — 96413 CHEMO IV INFUSION 1 HR: CPT

## 2020-09-29 PROCEDURE — 85025 COMPLETE CBC W/AUTO DIFF WBC: CPT

## 2020-09-29 PROCEDURE — 99213 OFFICE O/P EST LOW 20 MIN: CPT | Performed by: INTERNAL MEDICINE

## 2020-10-06 ENCOUNTER — OFFICE VISIT (OUTPATIENT)
Dept: HEMATOLOGY/ONCOLOGY | Facility: HOSPITAL | Age: 60
End: 2020-10-06
Attending: INTERNAL MEDICINE
Payer: MEDICAID

## 2020-10-06 VITALS
RESPIRATION RATE: 16 BRPM | TEMPERATURE: 98 F | HEIGHT: 62.01 IN | WEIGHT: 172.81 LBS | HEART RATE: 84 BPM | SYSTOLIC BLOOD PRESSURE: 145 MMHG | DIASTOLIC BLOOD PRESSURE: 81 MMHG | BODY MASS INDEX: 31.4 KG/M2 | OXYGEN SATURATION: 91 %

## 2020-10-06 DIAGNOSIS — C22.1 CHOLANGIOCARCINOMA (HCC): Primary | ICD-10-CM

## 2020-10-06 PROCEDURE — 36591 DRAW BLOOD OFF VENOUS DEVICE: CPT

## 2020-10-06 PROCEDURE — 85025 COMPLETE CBC W/AUTO DIFF WBC: CPT

## 2020-10-06 PROCEDURE — 99213 OFFICE O/P EST LOW 20 MIN: CPT | Performed by: INTERNAL MEDICINE

## 2020-10-06 RX ORDER — SODIUM CHLORIDE 0.9 % (FLUSH) 0.9 %
10 SYRINGE (ML) INJECTION ONCE
Status: CANCELLED | OUTPATIENT
Start: 2020-10-06

## 2020-10-06 RX ORDER — SODIUM CHLORIDE 0.9 % (FLUSH) 0.9 %
10 SYRINGE (ML) INJECTION ONCE
Status: COMPLETED | OUTPATIENT
Start: 2020-10-06 | End: 2020-10-06

## 2020-10-06 RX ADMIN — SODIUM CHLORIDE 0.9 % (FLUSH) 10 ML: 0.9 % SYRINGE (ML) INJECTION at 09:46:00

## 2020-10-06 NOTE — PROGRESS NOTES
[facility] Hematology and Oncology Clinic Note    Diagnosis: Metastatic Cholangiocarcinoma: liver, bone: BRAF V600E +    Treatment History:   1. Cisplatin 25 mg/m2 D1/8 + Gemcitabine 1000 mg/m2 D1/8 q21 days.    C1: 3/3/20 and 3/10/20: : 416,285 CEA 43.6 TP 8.9, Globulin 4.6 and transaminitis. Alk P 166. Cr and Calcium are normal.      Hematology/Oncology History:   · 10/2017: CT AP: Fatty liver, diverticulitis. No bone lesions  · 10/2019: CT AP:  Diverticular disease. No bone lesions.    · 12/2017: Cem Timmons reduced burden of disease, but still with osseous metastases. Given mild hearing loss, TB recommendation was to proceed with Gemzar maintenance. She started maintenance Gemzar on 8/18/20. She is here for C2D15. She has mild LUQ pain.  No issues with uri MCG/ACT Inhalation Aero Soln, Inhale 2 puffs into the lungs every 4 (four) hours as needed for Wheezing or Shortness of Breath. CARRY YOUR INHALER WITH YOU., Disp: 1 Inhaler, Rfl: 0    •  aspirin 81 MG Oral Tab, Take 81 mg by mouth daily. , Disp: , Rfl: UNLISTED  05/2019    rotator cuff   • COLECTOMY     • COLONOSCOPY  12/20/2018    polyps   • OTHER  02/26/2018    resection Splenic flexure   • OTHER SURGICAL HISTORY     • XI ROBOT-ASSISTED LAPAROSCOPIC LOW ANTERIOR COLON RESECTION Left 2/26/2018    Perfor PHOS 3.3 02/27/2018    ALB 3.3 (L) 09/22/2020       Radiology: reviewed   4/20/20: PET/CT  1.   Stable appearance of lesion within the spinous process of the T7 vertebral body with increased FDG uptake and maximal SUV of 4.24.  2.  Stable appearance of scle mg/m2 + Gemcitabine D1/8 q21 days x 8 cycles per the ABC-02 trial. Ra Bearden al. Mari Rivera 2010. Completed 8 cycles of Cis/[place] with reduction in tumor markers. Follow up PET/CT from 8/10/20 with persistent but stable disease in liver and bone (T7 and L3).  [facility]

## 2020-10-06 NOTE — PROGRESS NOTES
Patient here for C2D15 of chemotherapy. Patient with platelet count of 33 today. Dr. Jennifer Elaine notified and ordered to hold chemotherapy treatment today. Day 15 to be skipped and patient to follow up in two weeks for next cycle.  Appointments made and patient g

## 2020-10-12 ENCOUNTER — TELEPHONE (OUTPATIENT)
Dept: HEMATOLOGY/ONCOLOGY | Facility: HOSPITAL | Age: 60
End: 2020-10-12

## 2020-10-12 ENCOUNTER — OFFICE VISIT (OUTPATIENT)
Dept: HEMATOLOGY/ONCOLOGY | Facility: HOSPITAL | Age: 60
End: 2020-10-12
Attending: INTERNAL MEDICINE
Payer: MEDICAID

## 2020-10-12 VITALS
RESPIRATION RATE: 18 BRPM | DIASTOLIC BLOOD PRESSURE: 85 MMHG | TEMPERATURE: 98 F | BODY MASS INDEX: 31.55 KG/M2 | SYSTOLIC BLOOD PRESSURE: 146 MMHG | HEIGHT: 62.01 IN | OXYGEN SATURATION: 95 % | HEART RATE: 101 BPM | WEIGHT: 173.63 LBS

## 2020-10-12 DIAGNOSIS — R10.31 RIGHT INGUINAL PAIN: ICD-10-CM

## 2020-10-12 DIAGNOSIS — T45.1X5A CINV (CHEMOTHERAPY-INDUCED NAUSEA AND VOMITING): ICD-10-CM

## 2020-10-12 DIAGNOSIS — G62.0 PERIPHERAL NEUROPATHY DUE TO CHEMOTHERAPY (HCC): ICD-10-CM

## 2020-10-12 DIAGNOSIS — D69.59 CHEMOTHERAPY-INDUCED THROMBOCYTOPENIA: ICD-10-CM

## 2020-10-12 DIAGNOSIS — G89.3 NEOPLASM RELATED PAIN: Primary | ICD-10-CM

## 2020-10-12 DIAGNOSIS — L27.1 HAND FOOT SYNDROME: ICD-10-CM

## 2020-10-12 DIAGNOSIS — R06.00 DYSPNEA ON EXERTION: ICD-10-CM

## 2020-10-12 DIAGNOSIS — T45.1X5A CHEMOTHERAPY-INDUCED THROMBOCYTOPENIA: ICD-10-CM

## 2020-10-12 DIAGNOSIS — R11.2 CINV (CHEMOTHERAPY-INDUCED NAUSEA AND VOMITING): ICD-10-CM

## 2020-10-12 DIAGNOSIS — T45.1X5A PERIPHERAL NEUROPATHY DUE TO CHEMOTHERAPY (HCC): ICD-10-CM

## 2020-10-12 DIAGNOSIS — R09.02 HYPOXIA: ICD-10-CM

## 2020-10-12 DIAGNOSIS — C22.1 CHOLANGIOCARCINOMA (HCC): Primary | ICD-10-CM

## 2020-10-12 DIAGNOSIS — G89.3 NEOPLASM RELATED PAIN: ICD-10-CM

## 2020-10-12 PROCEDURE — 99215 OFFICE O/P EST HI 40 MIN: CPT | Performed by: CLINICAL NURSE SPECIALIST

## 2020-10-12 PROCEDURE — 99214 OFFICE O/P EST MOD 30 MIN: CPT | Performed by: NURSE PRACTITIONER

## 2020-10-12 RX ORDER — GABAPENTIN 100 MG/1
100 CAPSULE ORAL 3 TIMES DAILY
Qty: 90 CAPSULE | Refills: 1 | Status: SHIPPED | OUTPATIENT
Start: 2020-10-12 | End: 2021-01-14

## 2020-10-12 NOTE — PROGRESS NOTES
[facility] Progress Note    Patient Name: Edmar Rachel   YOB: 1960   Medical Record Number: [id number]   CSN: [id number]   Date of visit: 10/12/2020   Provider: FREYA Iqbal  Referring Physician: Khai Johnson    Problem List Signs:   /85 (BP Location: Left arm, Patient Position: Sitting, Cuff Size: adult)   Pulse 101   Temp 97.8 °F (36.6 °C) (Temporal)   Resp 18   Ht 1.575 m (5' 2.01\")   Wt 78.7 kg (173 lb 9.6 oz)   LMP 01/15/2013   SpO2 95%   BMI 31.74 kg/m²     Medicat mouth daily. , Disp: , Rfl:     Review of Systems:   As in HPI    Physical Examination:  General: Awake, alert, oriented x3, no acute distress.     HEENT:  Anicteric, conjunctivae and sclerae clear, no sinus tenderness, no oropharyngeal lesion/thrush, mucous antipsychotic. Discussed rationale for this drug. She is willing to try and continue to use Zofran/Coompazine. 7.  Cholangiocarcinoma: On Gemzar but plts were low requiring delay of chemo. Chemo to resume next week.     8.  Thrombocytopenia:  No blee

## 2020-10-12 NOTE — TELEPHONE ENCOUNTER
Toxicities:  C2 D8 Gemcitabine on 9/29/2020  C2 D15 held due to low platelet count = 33     Fatigue/Dyspnea/Nausea/Abdominal Pain/Back Pain/Bottoms of feet red and painful     Fatigue: Grade 2/Dyspnea: Grade 2 (Increasing fatigue & increasing dyspnea on ex

## 2020-10-13 ENCOUNTER — TELEPHONE (OUTPATIENT)
Dept: HEMATOLOGY/ONCOLOGY | Facility: HOSPITAL | Age: 60
End: 2020-10-13

## 2020-10-13 DIAGNOSIS — M54.50 ACUTE MIDLINE LOW BACK PAIN WITHOUT SCIATICA: ICD-10-CM

## 2020-10-13 DIAGNOSIS — M54.6 ACUTE MIDLINE THORACIC BACK PAIN: ICD-10-CM

## 2020-10-13 DIAGNOSIS — M25.551 RIGHT HIP PAIN: Primary | ICD-10-CM

## 2020-10-13 NOTE — PROGRESS NOTES
Palliative Care Follow Up Note     Patient Name: Ulysses Morton   YOB: 1960   Medical Record Number: [id number]   CSN: [id number]   Date of visit: 10/12/2020     Chief Complaint/Reason for Visit:  Neuropathy Pain     History of Present Illnes • Tobacco abuse    • Visual impairment     glasses     Surgical History:  Past Surgical History:   Procedure Laterality Date   • ARTHROSCOPY OF JOINT UNLISTED  05/2019    rotator cuff   • COLECTOMY     • COLONOSCOPY  12/20/2018    polyps   • OTHER  02/26 MG Oral Tab Take 1 tablet (5 mg total) by mouth nightly. 30 tablet 3   • ondansetron 8 MG Oral Tablet Dispersible Take 1 tablet (8 mg total) by mouth every 8 (eight) hours as needed for Nausea.  30 tablet 0   • amLODIPine Besylate 10 MG Oral Tab Take 1 tabl likely will need to increase to maximize affect. Impression/Plan:   1. Neoplasm related pain   gabapentin 100mg TID    2.  Anxiety/sleep  No meds at this time  Supportive family  Medical cannabis      Planned Follow up: Return in about 1 month (around

## 2020-10-13 NOTE — TELEPHONE ENCOUNTER
Called pt to check on condition following acute visit yesterday. Pt still has pain and complains of low SpO2 when not wearing her O2; down to 77%; encouraged pt to be more compliant with O2.  I instructed pt that we would like for her to get xrays of lumbar

## 2020-10-14 ENCOUNTER — HOSPITAL ENCOUNTER (OUTPATIENT)
Dept: GENERAL RADIOLOGY | Age: 60
Discharge: HOME | End: 2020-10-14
Attending: CLINICAL NURSE SPECIALIST
Payer: MEDICAID

## 2020-10-14 ENCOUNTER — TELEPHONE (OUTPATIENT)
Dept: HEMATOLOGY/ONCOLOGY | Facility: HOSPITAL | Age: 60
End: 2020-10-14

## 2020-10-14 DIAGNOSIS — M54.6 ACUTE MIDLINE THORACIC BACK PAIN: ICD-10-CM

## 2020-10-14 DIAGNOSIS — M25.551 RIGHT HIP PAIN: ICD-10-CM

## 2020-10-14 DIAGNOSIS — C22.1 CHOLANGIOCARCINOMA (HCC): ICD-10-CM

## 2020-10-14 DIAGNOSIS — M54.50 ACUTE MIDLINE LOW BACK PAIN WITHOUT SCIATICA: ICD-10-CM

## 2020-10-14 DIAGNOSIS — G89.3 NEOPLASM RELATED PAIN: Primary | ICD-10-CM

## 2020-10-14 PROCEDURE — 72110 X-RAY EXAM L-2 SPINE 4/>VWS: CPT | Performed by: CLINICAL NURSE SPECIALIST

## 2020-10-14 PROCEDURE — 73502 X-RAY EXAM HIP UNI 2-3 VIEWS: CPT | Performed by: CLINICAL NURSE SPECIALIST

## 2020-10-14 PROCEDURE — 72072 X-RAY EXAM THORAC SPINE 3VWS: CPT | Performed by: CLINICAL NURSE SPECIALIST

## 2020-10-14 NOTE — TELEPHONE ENCOUNTER
Kye Cancino says she wanted to move her appt on 10/20, but decided to leave it where it is at. She wanted to let Mann Stephens know.

## 2020-10-14 NOTE — TELEPHONE ENCOUNTER
LVM for patient informing her that Dr. Radha Jorgensen is okay with delaying treatment due to patient's travel arrangements. Asked for her to call  Office back to reschedule. Contact information provided.

## 2020-10-14 NOTE — TELEPHONE ENCOUNTER
Spoke with patient. She verbalized understanding. Referral for radiation oncology placed. She plans to decide if she will meet with them by her next upcoming infusion appointment. Sim Wong MD  P Edw Maycol [facility]             Results reviewed.  L3

## 2020-10-14 NOTE — TELEPHONE ENCOUNTER
Patient would like to know if they can change their 10/20/20 appointment for Dr. Deniz Byrd and Chemo date to 10/22,or 10/ 23, they want to go out of town.

## 2020-10-19 NOTE — PROGRESS NOTES
[facility]    Diagnosis: Metastatic Cholangiocarcinoma: liver, bone: BRAF V600E +    Treatment History:   1. Cisplatin 25 mg/m2 D1/8 + Gemcitabine 1000 mg/m2 D1/8 q21 days.    C1: 3/3/20 and 3/10/20: : 335,147 CEA 43.6 CBC is normal. CMP with elevated TP 8.9, Globulin 4.6 and transaminitis. Alk P 166. Cr and Calcium are normal.      Hematology/Oncology History:   · 10/2017: CT AP: Fatty liver, diverticulitis. No bone lesions  · 10/2019: CT AP:  Diverticular disease.  No b 8/4/20. A follow up PET/CT showed a reduced burden of disease, but still with osseous metastases. Given mild hearing loss, TB recommendation was to proceed with Gemzar maintenance. She started maintenance Gemzar on 8/18/20. She is here for C3D1.  Her C2 Inhale 2 puffs into the lungs every 4 (four) hours as needed for Wheezing or Shortness of Breath. CARRY YOUR INHALER WITH YOU., Disp: 1 Inhaler, Rfl: 0    •  aspirin 81 MG Oral Tab, Take 81 mg by mouth daily. , Disp: , Rfl:     •  [] umeclidinium-aury [COMPLETED] Gemcitabine HCl (GEMZAR) 1,786 mg in sodium chloride 0.9% 297 mL chemo-infusion, 1,000 mg/m2 (Treatment Plan Recorded), Intravenous, Once, Genna Lewis MD, Stopped at 09/22/20 0179      Past Medical History:   Diagnosis Date   • Atheroscler (37 °C) (10/20 0906)  Do Not Use - Resp Rate: --  SpO2: 91 % (10/20 0906)     General: NAD, AOX3  HEENT: clear op, mmm, no jvd, no scleral icterus  CV: RRR S1S2 no murmurs  Extremities: No edema   Lungs: mild rhonchi bilaterally   Abd: soft nt nd +BS , mil body lesion are stable. Sclerotic lesion at L3 is mildly increased in size on CT. There is increased sclerosis and callus formation involving T7 spinous lesion. 3. No new abnormal uptake.       Pathology: reviewed     Assessment and Plan:  59F with a in Blood: Discussed with ID, likely contaminant on 3/8/10. Follow up cx are negative. Transaminititis:resolved: likely from metastases and HASTINGS. Follow. Improving.      Pulmonary Fibrosis/Nodules  40-45 pack year smoking history: quit 2018  - Following

## 2020-10-20 ENCOUNTER — OFFICE VISIT (OUTPATIENT)
Dept: HEMATOLOGY/ONCOLOGY | Facility: HOSPITAL | Age: 60
End: 2020-10-20
Attending: INTERNAL MEDICINE
Payer: MEDICAID

## 2020-10-20 VITALS
DIASTOLIC BLOOD PRESSURE: 78 MMHG | HEIGHT: 62.01 IN | WEIGHT: 169.38 LBS | OXYGEN SATURATION: 91 % | TEMPERATURE: 99 F | SYSTOLIC BLOOD PRESSURE: 148 MMHG | BODY MASS INDEX: 30.78 KG/M2 | RESPIRATION RATE: 18 BRPM | HEART RATE: 89 BPM

## 2020-10-20 DIAGNOSIS — R07.89 OTHER CHEST PAIN: ICD-10-CM

## 2020-10-20 DIAGNOSIS — C22.1 CHOLANGIOCARCINOMA (HCC): Primary | ICD-10-CM

## 2020-10-20 PROCEDURE — 86301 IMMUNOASSAY TUMOR CA 19-9: CPT

## 2020-10-20 PROCEDURE — 85025 COMPLETE CBC W/AUTO DIFF WBC: CPT

## 2020-10-20 PROCEDURE — 99214 OFFICE O/P EST MOD 30 MIN: CPT | Performed by: INTERNAL MEDICINE

## 2020-10-20 PROCEDURE — 80053 COMPREHEN METABOLIC PANEL: CPT

## 2020-10-20 PROCEDURE — 96375 TX/PRO/DX INJ NEW DRUG ADDON: CPT

## 2020-10-20 PROCEDURE — 96413 CHEMO IV INFUSION 1 HR: CPT

## 2020-10-20 NOTE — PROGRESS NOTES
Outpatient Oncology Care Plan  Problem list:  pain  respiratory  fatigue  knowledge deficit  peripheral neuropathy    Problems related to:    chemotherapy  disease/disease progression  side effect of treatment    Interventions:  maintain safe environment

## 2020-10-20 NOTE — PROGRESS NOTES
Pt here for C3D1.   Arrives Ambulating independently, accompanied by Self           Patient reports possible pregnancy since last therapy cycle: No    Modifications in dose or schedule: No     Frequency of blood return and site check throughout administrati

## 2020-10-26 NOTE — CONSULTS
[facility]    Diagnosis: Metastatic Cholangiocarcinoma: liver, bone: BRAF V600E +    Treatment History:   1. Cisplatin 25 mg/m2 D1/8 + Gemcitabine 1000 mg/m2 D1/8 q21 days.    C1: 3/3/20 and 3/10/20: : 380,193 CEA 43.6 brother. Labs from 1/31/20: CBC is normal. CMP with elevated TP 8.9, Globulin 4.6 and transaminitis. Alk P 166. Cr and Calcium are normal.      Hematology/Oncology History:   · 10/2017: CT AP: Fatty liver, diverticulitis.  No bone lesions  · 10/2019: CT completed 8 cycles of Cis/[place] on 8/4/20. A follow up PET/CT showed a reduced burden of disease, but still with osseous metastases. Given mild hearing loss, TB recommendation was to proceed with Gemzar maintenance.      She started maintenance Gemzar on 8/18 Disp: 1 Inhaler, Rfl: 0    •  aspirin 81 MG Oral Tab, Take 81 mg by mouth daily. , Disp: , Rfl:     •  [] umeclidinium-vilanterol (ANORO ELLIPTA) 62.5-25 MCG/INH Inhalation Aerosol Powder, Breath Activated, Inhale 1 puff into the lungs daily for 30 d COLONOSCOPY  12/20/2018    polyps   • OTHER  02/26/2018    resection Splenic flexure   • OTHER SURGICAL HISTORY     • XI ROBOT-ASSISTED LAPAROSCOPIC LOW ANTERIOR COLON RESECTION Left 2/26/2018    Performed by Rk Rivera MD at [address] [place] [address] Pathology: reviewed     Assessment and Plan:  59F with a PMH of COPD, Diverticulitis, HTN, HLD and pulmonary fibrosis referred by Dr. Jasmyn Alonzo due to an abnormal bone lesion. She was found to have metastatic cholangicarcinoma.      Metastatic Cholangioca sinusitis. Gram Positive Rods in Blood: Discussed with ID, likely contaminant on 3/8/10. Follow up cx are negative. Transaminititis:resolved: likely from metastases and HASTINGS. Follow. Improving.      Pulmonary Fibrosis/Nodules  40-45 pack year smokin

## 2020-10-27 ENCOUNTER — OFFICE VISIT (OUTPATIENT)
Dept: HEMATOLOGY/ONCOLOGY | Facility: HOSPITAL | Age: 60
End: 2020-10-27
Attending: INTERNAL MEDICINE
Payer: MEDICAID

## 2020-10-27 ENCOUNTER — TELEPHONE (OUTPATIENT)
Dept: CARDIOLOGY | Age: 60
End: 2020-10-27

## 2020-10-27 VITALS
RESPIRATION RATE: 18 BRPM | HEIGHT: 62.01 IN | SYSTOLIC BLOOD PRESSURE: 164 MMHG | OXYGEN SATURATION: 90 % | WEIGHT: 171.63 LBS | DIASTOLIC BLOOD PRESSURE: 72 MMHG | BODY MASS INDEX: 31.18 KG/M2 | HEART RATE: 85 BPM | TEMPERATURE: 98 F

## 2020-10-27 DIAGNOSIS — C22.1 CHOLANGIOCARCINOMA (HCC): Primary | ICD-10-CM

## 2020-10-27 DIAGNOSIS — R06.00 DYSPNEA, UNSPECIFIED TYPE: Primary | ICD-10-CM

## 2020-10-27 DIAGNOSIS — C22.1 CHOLANGIOCARCINOMA (HCC): ICD-10-CM

## 2020-10-27 PROCEDURE — 99213 OFFICE O/P EST LOW 20 MIN: CPT | Performed by: INTERNAL MEDICINE

## 2020-10-27 PROCEDURE — 96375 TX/PRO/DX INJ NEW DRUG ADDON: CPT

## 2020-10-27 PROCEDURE — 85025 COMPLETE CBC W/AUTO DIFF WBC: CPT

## 2020-10-27 PROCEDURE — 96413 CHEMO IV INFUSION 1 HR: CPT

## 2020-10-27 RX ORDER — SODIUM CHLORIDE 0.9 % (FLUSH) 0.9 %
10 SYRINGE (ML) INJECTION ONCE
Status: CANCELLED | OUTPATIENT
Start: 2020-10-27

## 2020-10-27 RX ORDER — SODIUM CHLORIDE 0.9 % (FLUSH) 0.9 %
10 SYRINGE (ML) INJECTION ONCE
Status: COMPLETED | OUTPATIENT
Start: 2020-10-27 | End: 2020-10-27

## 2020-10-27 RX ORDER — AMLODIPINE BESYLATE 10 MG/1
TABLET ORAL
Qty: 90 TABLET | Refills: 0 | Status: SHIPPED | OUTPATIENT
Start: 2020-10-27 | End: 2021-07-16 | Stop reason: CLARIF

## 2020-10-27 RX ADMIN — SODIUM CHLORIDE 0.9 % (FLUSH) 10 ML: 0.9 % SYRINGE (ML) INJECTION at 10:46:00

## 2020-10-27 NOTE — TELEPHONE ENCOUNTER
Amlodipine 10 mg 1 tab daily filled 8/5/20 90 with 0 refills     LOV 8/5/20  return here in 3 months   No upcoming apt on file   Labs 10/20/20

## 2020-10-27 NOTE — PROGRESS NOTES
Pt here for C3D8.   Arrives Ambulating independently, accompanied by Self           Patient reports possible pregnancy since last therapy cycle: No    Modifications in dose or schedule: No     Frequency of blood return and site check throughout administrati

## 2020-10-27 NOTE — TELEPHONE ENCOUNTER
Medication(s) to Refill:   Requested Prescriptions     Pending Prescriptions Disp Refills   • ROSUVASTATIN CALCIUM 5 MG Oral Tab [Pharmacy Med Name: Rosuvastatin Calcium Oral Tablet 5 MG] 30 tablet 0     Sig: TAKE 1 TABLET BY MOUTH IN THE EVENING       LOV address is [place] [address], [facility], [place]. Please register at the [address] [facility] on the second floor.   **Important Info for Public Service [facility]** Because the safety and protection of patients, staff, physicians and community

## 2020-10-29 ENCOUNTER — OFFICE VISIT (OUTPATIENT)
Dept: CARDIOLOGY | Age: 60
End: 2020-10-29

## 2020-10-29 VITALS
WEIGHT: 171 LBS | HEART RATE: 60 BPM | BODY MASS INDEX: 31.47 KG/M2 | DIASTOLIC BLOOD PRESSURE: 80 MMHG | SYSTOLIC BLOOD PRESSURE: 128 MMHG | HEIGHT: 62 IN

## 2020-10-29 DIAGNOSIS — R07.9 CHEST PAIN, UNSPECIFIED TYPE: Primary | ICD-10-CM

## 2020-10-29 DIAGNOSIS — E11.9 TYPE 2 DIABETES MELLITUS WITHOUT COMPLICATION, WITHOUT LONG-TERM CURRENT USE OF INSULIN (CMD): ICD-10-CM

## 2020-10-29 DIAGNOSIS — I10 HYPERTENSION, UNSPECIFIED TYPE: ICD-10-CM

## 2020-10-29 DIAGNOSIS — E78.5 HYPERLIPIDEMIA, UNSPECIFIED HYPERLIPIDEMIA TYPE: ICD-10-CM

## 2020-10-29 PROCEDURE — 3079F DIAST BP 80-89 MM HG: CPT | Performed by: INTERNAL MEDICINE

## 2020-10-29 PROCEDURE — 99245 OFF/OP CONSLTJ NEW/EST HI 55: CPT | Performed by: INTERNAL MEDICINE

## 2020-10-29 PROCEDURE — 3074F SYST BP LT 130 MM HG: CPT | Performed by: INTERNAL MEDICINE

## 2020-10-29 RX ORDER — PROCHLORPERAZINE MALEATE 10 MG
10 TABLET ORAL EVERY 6 HOURS PRN
COMMUNITY

## 2020-10-29 RX ORDER — OMEPRAZOLE 40 MG/1
40 CAPSULE, DELAYED RELEASE ORAL DAILY
COMMUNITY

## 2020-10-29 RX ORDER — LORATADINE 10 MG/1
10 TABLET ORAL DAILY
COMMUNITY

## 2020-10-29 RX ORDER — ALBUTEROL SULFATE 90 UG/1
2 AEROSOL, METERED RESPIRATORY (INHALATION) EVERY 4 HOURS PRN
COMMUNITY

## 2020-10-29 RX ORDER — LISINOPRIL 40 MG/1
40 TABLET ORAL DAILY
COMMUNITY
Start: 2020-09-15

## 2020-10-29 RX ORDER — AMLODIPINE BESYLATE 10 MG/1
10 TABLET ORAL DAILY
COMMUNITY
Start: 2020-08-05

## 2020-10-29 SDOH — HEALTH STABILITY: MENTAL HEALTH: HOW OFTEN DO YOU HAVE A DRINK CONTAINING ALCOHOL?: NEVER

## 2020-10-29 ASSESSMENT — PATIENT HEALTH QUESTIONNAIRE - PHQ9
CLINICAL INTERPRETATION OF PHQ9 SCORE: NO FURTHER SCREENING NEEDED
SUM OF ALL RESPONSES TO PHQ9 QUESTIONS 1 AND 2: 0
SUM OF ALL RESPONSES TO PHQ9 QUESTIONS 1 AND 2: 0
2. FEELING DOWN, DEPRESSED OR HOPELESS: NOT AT ALL
CLINICAL INTERPRETATION OF PHQ2 SCORE: NO FURTHER SCREENING NEEDED
1. LITTLE INTEREST OR PLEASURE IN DOING THINGS: NOT AT ALL

## 2020-10-29 ASSESSMENT — ENCOUNTER SYMPTOMS
WEIGHT GAIN: 0
ALLERGIC/IMMUNOLOGIC COMMENTS: NO NEW FOOD ALLERGIES
COUGH: 0
SUSPICIOUS LESIONS: 0
FEVER: 0
HEMATOCHEZIA: 0
BRUISES/BLEEDS EASILY: 0
CHILLS: 0
HEMOPTYSIS: 0
WEIGHT LOSS: 0

## 2020-10-30 RX ORDER — ROSUVASTATIN CALCIUM 5 MG/1
TABLET, COATED ORAL
Qty: 30 TABLET | Refills: 0 | Status: SHIPPED | OUTPATIENT
Start: 2020-10-30 | End: 2021-01-06

## 2020-11-02 NOTE — PROGRESS NOTES
THE [facility] [place] [place] Hematology and Oncology Clinic Note    Diagnosis: Metastatic Cholangiocarcinoma: liver, bone: BRAF V600E +    Treatment History:   1. Cisplatin 25 mg/m2 D1/8 + Gemcitabine 1000 mg/m2 D1/8 q21 days.    C1: 3/3/20 and 3/10/20: : 102,426 CEA 43.6 of throat cancer in her brother. Labs from 1/31/20: CBC is normal. CMP with elevated TP 8.9, Globulin 4.6 and transaminitis. Alk P 166. Cr and Calcium are normal.      Hematology/Oncology History:   · 10/2017: CT AP: Fatty liver, diverticulitis.  No bon cholangiocarcinoma. She completed 8 cycles of Cis/[place] on 8/4/20. A follow up PET/CT showed a reduced burden of disease, but still with osseous metastases. Given mild hearing loss, TB recommendation was to proceed with Gemzar maintenance.      She started ma by mouth daily. , Disp: , Rfl:     •  [] umeclidinium-vilanterol (ANORO ELLIPTA) 62.5-25 MCG/INH Inhalation Aerosol Powder, Breath Activated, Inhale 1 puff into the lungs daily for 30 doses.  (Patient not taking: Reported on 10/20/2020 ), Disp: 3 each • High cholesterol    • Tobacco abuse    • Visual impairment     glasses     Past Surgical History:   Procedure Laterality Date   • ARTHROSCOPY OF JOINT UNLISTED  05/2019    rotator cuff   • COLECTOMY     • COLONOSCOPY  12/20/2018    polyps   • OTHER  02 Value Date     (L) 10/20/2020    K 3.7 10/20/2020    CO2 26.0 10/20/2020     10/20/2020    BUN 12 10/20/2020    PHOS 3.3 02/27/2018    ALB 3.3 (L) 10/20/2020       Radiology: reviewed     Pathology: reviewed     Assessment and Plan:  59F with a long-standing issue but might feel worse to her. Following with cardiology. Hearing loss: 2/2 Cisplatin. - Referred to ENT to consider hearing aids     RSV PNA: s/p steroids. Also completed levaquin for sinusitis.      Gram Positive Rods in Blood: Dis

## 2020-11-04 ENCOUNTER — OFFICE VISIT (OUTPATIENT)
Dept: HEMATOLOGY/ONCOLOGY | Facility: HOSPITAL | Age: 60
End: 2020-11-04
Attending: INTERNAL MEDICINE
Payer: MEDICAID

## 2020-11-04 ENCOUNTER — HOSPITAL ENCOUNTER (OUTPATIENT)
Dept: CT IMAGING | Facility: HOSPITAL | Age: 60
Discharge: HOME | End: 2020-11-04
Attending: INTERNAL MEDICINE
Payer: MEDICAID

## 2020-11-04 VITALS
WEIGHT: 172 LBS | BODY MASS INDEX: 31 KG/M2 | DIASTOLIC BLOOD PRESSURE: 66 MMHG | TEMPERATURE: 97 F | SYSTOLIC BLOOD PRESSURE: 142 MMHG | HEART RATE: 81 BPM | OXYGEN SATURATION: 89 %

## 2020-11-04 DIAGNOSIS — R06.00 DYSPNEA, UNSPECIFIED TYPE: ICD-10-CM

## 2020-11-04 DIAGNOSIS — R09.02 HYPOXIA: ICD-10-CM

## 2020-11-04 DIAGNOSIS — C22.1 CHOLANGIOCARCINOMA (HCC): ICD-10-CM

## 2020-11-04 DIAGNOSIS — R00.2 PALPITATIONS: ICD-10-CM

## 2020-11-04 DIAGNOSIS — C22.1 CHOLANGIOCARCINOMA (HCC): Primary | ICD-10-CM

## 2020-11-04 PROCEDURE — 85025 COMPLETE CBC W/AUTO DIFF WBC: CPT

## 2020-11-04 PROCEDURE — 36591 DRAW BLOOD OFF VENOUS DEVICE: CPT

## 2020-11-04 PROCEDURE — 99213 OFFICE O/P EST LOW 20 MIN: CPT | Performed by: INTERNAL MEDICINE

## 2020-11-04 PROCEDURE — 71275 CT ANGIOGRAPHY CHEST: CPT | Performed by: INTERNAL MEDICINE

## 2020-11-04 RX ORDER — SODIUM CHLORIDE 0.9 % (FLUSH) 0.9 %
10 SYRINGE (ML) INJECTION ONCE
Status: CANCELLED | OUTPATIENT
Start: 2020-11-04

## 2020-11-04 RX ORDER — SODIUM CHLORIDE 0.9 % (FLUSH) 0.9 %
10 SYRINGE (ML) INJECTION ONCE
Status: COMPLETED | OUTPATIENT
Start: 2020-11-04 | End: 2020-11-04

## 2020-11-04 RX ADMIN — SODIUM CHLORIDE 0.9 % (FLUSH) 10 ML: 0.9 % SYRINGE (ML) INJECTION at 11:10:00

## 2020-11-04 NOTE — PROGRESS NOTES
Education Record    Learner:  Patient    Disease / Diagnosis:cholangiocarcinoma     Barriers / Limitations:  None   Comments:    Method:  Discussion   Comments:    General Topics:  Precautions, Procedure, Side effects and symptom management and Plan of car

## 2020-11-05 ENCOUNTER — TELEPHONE (OUTPATIENT)
Dept: HEMATOLOGY/ONCOLOGY | Facility: HOSPITAL | Age: 60
End: 2020-11-05

## 2020-11-05 NOTE — TELEPHONE ENCOUNTER
Spoke with patient regarding results. She verbalized understanding and promises to be more compliant with inhaler and home O2. Monika Leary MD  P [facility]             Results reviewed. NO evidence of blood clot in the lungs.  No new findi

## 2020-11-06 ENCOUNTER — HOSPITAL ENCOUNTER (OUTPATIENT)
Dept: CT IMAGING | Facility: HOSPITAL | Age: 60
End: 2020-11-06
Attending: INTERNAL MEDICINE
Payer: MEDICAID

## 2020-11-06 ENCOUNTER — HOSPITAL ENCOUNTER (OUTPATIENT)
Dept: MRI IMAGING | Facility: HOSPITAL | Age: 60
Discharge: HOME | End: 2020-11-06
Attending: INTERNAL MEDICINE
Payer: MEDICAID

## 2020-11-06 ENCOUNTER — APPOINTMENT (OUTPATIENT)
Dept: CV DIAGNOSTICS | Facility: HOSPITAL | Age: 60
End: 2020-11-06
Attending: INTERNAL MEDICINE
Payer: MEDICAID

## 2020-11-06 ENCOUNTER — HOSPITAL ENCOUNTER (OUTPATIENT)
Dept: NUCLEAR MEDICINE | Facility: HOSPITAL | Age: 60
Discharge: HOME | End: 2020-11-06
Attending: INTERNAL MEDICINE
Payer: MEDICAID

## 2020-11-06 DIAGNOSIS — C22.1 CHOLANGIOCARCINOMA (HCC): ICD-10-CM

## 2020-11-06 PROCEDURE — 72158 MRI LUMBAR SPINE W/O & W/DYE: CPT | Performed by: INTERNAL MEDICINE

## 2020-11-06 PROCEDURE — A9575 INJ GADOTERATE MEGLUMI 0.1ML: HCPCS | Performed by: INTERNAL MEDICINE

## 2020-11-06 PROCEDURE — 82962 GLUCOSE BLOOD TEST: CPT

## 2020-11-12 ENCOUNTER — HOSPITAL ENCOUNTER (OUTPATIENT)
Dept: NUCLEAR MEDICINE | Facility: HOSPITAL | Age: 60
Discharge: HOME | End: 2020-11-12
Attending: INTERNAL MEDICINE
Payer: MEDICAID

## 2020-11-12 PROCEDURE — 78815 PET IMAGE W/CT SKULL-THIGH: CPT | Performed by: INTERNAL MEDICINE

## 2020-11-12 PROCEDURE — 82962 GLUCOSE BLOOD TEST: CPT

## 2020-11-13 ENCOUNTER — TELEPHONE (OUTPATIENT)
Dept: HEMATOLOGY/ONCOLOGY | Facility: HOSPITAL | Age: 60
End: 2020-11-13

## 2020-11-13 ENCOUNTER — DIETICIAN VISIT (OUTPATIENT)
Dept: HEMATOLOGY/ONCOLOGY | Facility: HOSPITAL | Age: 60
End: 2020-11-13

## 2020-11-13 DIAGNOSIS — C22.1 CHOLANGIOCARCINOMA (HCC): ICD-10-CM

## 2020-11-13 DIAGNOSIS — R09.02 HYPOXIA: Primary | ICD-10-CM

## 2020-11-13 NOTE — PROGRESS NOTES
Nutrition rescreen complete as triggered by [facility] [employer] dx of metastatic cholangiocarcinoma to liver and bones. Chart reviewed. Pt appears nutritionally stable. RD available on consult.

## 2020-11-13 NOTE — TELEPHONE ENCOUNTER
LVM requesting call back. Contact information provided. Citlali Moody MD  P [facility]             Results reviewed. MRI show a Lesion in lumbar spine, this is the same spot we saw on the PET/CT.  If its bothering her, she can proceed with the

## 2020-11-13 NOTE — TELEPHONE ENCOUNTER
Spoke with patient. Referral placed.  Transferred to [facility] to further assist.

## 2020-11-17 NOTE — PROGRESS NOTES
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 Cisplatin 25 mg/m2 D1/8 + Gemcitabine 1000 mg/m2 D1/8 q21 days.    C1: 3/3/20 and 3/10/20: : 125,323 CEA 43.6 throat cancer in her brother. Labs from 1/31/20: CBC is normal. CMP with elevated TP 8.9, Globulin 4.6 and transaminitis. Alk P 166. Cr and Calcium are normal.      Hematology/Oncology History:   · 10/2017: CT AP: Fatty liver, diverticulitis.  No bone l L3 concerning for osseous disease. 1.8 x 1.7 x 1,7 cm. · 11/12/20: PET/CT: abnormal FDG uptake in the left hepatic lobe--infiltrative process: SUV 8.6 cs 7.7, heterogeneous liver uptake with SUV 5.1.. L3 SUV 6.6 vs. 4.7, T7 SUV 5.3 vs. 4.0.      Interval Omeprazole 40 MG Oral Capsule Delayed Release, Take 1 capsule (40 mg total) by mouth daily. , Disp: 90 capsule, Rfl: 1    •  Albuterol Sulfate  (90 Base) MCG/ACT Inhalation Aero Soln, Inhale 2 puffs into the lungs every 4 (four) hours as needed for W 297 mL chemo-infusion, 1,000 mg/m2 (Treatment Plan Recorded), Intravenous, Once, David Manning MD, Stopped at 10/20/20 2935      Past Medical History:   Diagnosis Date   • Atherosclerosis of coronary artery    • Cancer [facility])    • COPD (chronic obstructi 0900)     General: NAD, AOX3  HEENT: clear op, mmm, no jvd, no scleral icterus  CV: RRR   Extremities: No edema   Lungs: no increased wob  Abd: non-distended   Neuro: CN: II-XII grossly intact  Port C/D/I    Results:  Lab Results   Component Value Date sclerotic lesion. States that it is worsening. She also has a T7 lesion that is not painful. MRI reviewed with patient, will refer to RT for palliative RT. Dyspnea/Hypoxia: likely 2/2 her COPD and pulmonary fibrosis. CTA negative for PE.    She was Rx in

## 2020-11-18 ENCOUNTER — OFFICE VISIT (OUTPATIENT)
Dept: HEMATOLOGY/ONCOLOGY | Facility: HOSPITAL | Age: 60
End: 2020-11-18
Attending: INTERNAL MEDICINE
Payer: MEDICAID

## 2020-11-18 VITALS
HEIGHT: 62.01 IN | DIASTOLIC BLOOD PRESSURE: 63 MMHG | SYSTOLIC BLOOD PRESSURE: 128 MMHG | WEIGHT: 170.81 LBS | TEMPERATURE: 96 F | OXYGEN SATURATION: 90 % | BODY MASS INDEX: 31.04 KG/M2 | HEART RATE: 80 BPM

## 2020-11-18 DIAGNOSIS — C22.1 CHOLANGIOCARCINOMA (HCC): Primary | ICD-10-CM

## 2020-11-18 PROCEDURE — 99213 OFFICE O/P EST LOW 20 MIN: CPT | Performed by: INTERNAL MEDICINE

## 2020-11-18 PROCEDURE — 85025 COMPLETE CBC W/AUTO DIFF WBC: CPT

## 2020-11-18 PROCEDURE — 80053 COMPREHEN METABOLIC PANEL: CPT

## 2020-11-18 PROCEDURE — 96413 CHEMO IV INFUSION 1 HR: CPT

## 2020-11-18 PROCEDURE — 86301 IMMUNOASSAY TUMOR CA 19-9: CPT

## 2020-11-18 PROCEDURE — 96375 TX/PRO/DX INJ NEW DRUG ADDON: CPT

## 2020-11-24 NOTE — PROGRESS NOTES
[facility] Hematology and Oncology Clinic Note    Diagnosis: Metastatic Cholangiocarcinoma: liver, bone: BRAF V600E +    Treatment History:   1. Cisplatin 25 mg/m2 D1/8 + Gemcitabine 1000 mg/m2 D1/8 q21 days.    C1: 3/3/20 and 3/10/20: : 023,557 CEA 43.6 ago. She works as a . No Etoh abuse. FH of throat cancer in her brother. Labs from 1/31/20: CBC is normal. CMP with elevated TP 8.9, Globulin 4.6 and transaminitis. Alk P 166.  Cr and Calcium are normal.      Hematology/Oncology History: Chest: No PE.   · 11/6/20: MR L Spine: Focal T1/T2 signal at L3 concerning for osseous disease. 1.8 x 1.7 x 1,7 cm.    · 11/12/20: PET/CT: abnormal FDG uptake in the left hepatic lobe--infiltrative process: SUV 8.6 vs. 7.7, heterogeneous liver uptake with S Disp: , Rfl:     •  Omeprazole 40 MG Oral Capsule Delayed Release, Take 1 capsule (40 mg total) by mouth daily. , Disp: 90 capsule, Rfl: 1    •  Albuterol Sulfate  (90 Base) MCG/ACT Inhalation Aero Soln, Inhale 2 puffs into the lungs every 4 (four) h MD Winston, 500 Units at 11/04/20 1110    •  [COMPLETED] iohexol (OMNIPAQUE) 350 MG/ML injection 100 mL, 100 mL, Intravenous, ONCE PRN, Winston Washington MD, 100 mL at 11/04/20 1918    •  [COMPLETED] ondansetron HCl (ZOFRAN) 8 mg, Dexamethasone Sodium Hebert Smoker        Packs/day: 1.00        Years: 40.00        Pack years: 36        Start date: 1/17/1977        Quit date: 2017        Years since quitting: 3.9      Smokeless tobacco: Never Used    Substance and Sexual Activity      Alcohol use: No      Drug of Cis/[place] with reduction in tumor markers. Follow up PET/CT from 8/10/20 with persistent but stable disease in liver and bone (T7 and L3).  Discussed in TB-plan for maintenance Gemcitabine   - Repeat imaging from 11/2020 shows mild increase in FDG uptake h Continue to monitor. Pain Control: Following with Palliative care. Neuropathy: controlled with gabapentin     Risk-High: IV Chemotherapy     Checklist:  ECOG 1  RTC in 1 week    [facility]

## 2020-11-25 ENCOUNTER — HOSPITAL ENCOUNTER (OUTPATIENT)
Dept: RADIATION ONCOLOGY | Facility: HOSPITAL | Age: 60
Discharge: HOME | End: 2020-11-25
Attending: RADIOLOGY
Payer: MEDICAID

## 2020-11-25 ENCOUNTER — OFFICE VISIT (OUTPATIENT)
Dept: HEMATOLOGY/ONCOLOGY | Facility: HOSPITAL | Age: 60
End: 2020-11-25
Attending: INTERNAL MEDICINE
Payer: MEDICAID

## 2020-11-25 VITALS
BODY MASS INDEX: 31 KG/M2 | RESPIRATION RATE: 18 BRPM | TEMPERATURE: 99 F | HEART RATE: 83 BPM | DIASTOLIC BLOOD PRESSURE: 75 MMHG | WEIGHT: 170 LBS | SYSTOLIC BLOOD PRESSURE: 146 MMHG

## 2020-11-25 VITALS
SYSTOLIC BLOOD PRESSURE: 146 MMHG | HEART RATE: 83 BPM | RESPIRATION RATE: 16 BRPM | HEIGHT: 62.01 IN | OXYGEN SATURATION: 95 % | DIASTOLIC BLOOD PRESSURE: 75 MMHG | BODY MASS INDEX: 30.89 KG/M2 | TEMPERATURE: 99 F | WEIGHT: 170 LBS

## 2020-11-25 DIAGNOSIS — Z15.09 MONOALLELIC MUTATION OF BRAF GENE: Primary | ICD-10-CM

## 2020-11-25 DIAGNOSIS — C22.1 CHOLANGIOCARCINOMA (HCC): ICD-10-CM

## 2020-11-25 DIAGNOSIS — C79.51 BONE METASTASES (HCC): Primary | ICD-10-CM

## 2020-11-25 DIAGNOSIS — C22.1 CHOLANGIOCARCINOMA (HCC): Primary | ICD-10-CM

## 2020-11-25 DIAGNOSIS — Z15.89 MONOALLELIC MUTATION OF BRAF GENE: Primary | ICD-10-CM

## 2020-11-25 PROBLEM — E11.9 TYPE 2 DIABETES MELLITUS, WITHOUT LONG-TERM CURRENT USE OF INSULIN (HCC): Status: ACTIVE | Noted: 2020-10-29

## 2020-11-25 PROBLEM — J84.10 FIBROSIS OF LUNG (HCC): Status: ACTIVE | Noted: 2020-11-25

## 2020-11-25 PROBLEM — E78.5 DYSLIPIDEMIA: Status: ACTIVE | Noted: 2020-11-25

## 2020-11-25 PROBLEM — I25.10 ARTERIOSCLEROSIS OF CORONARY ARTERY: Status: ACTIVE | Noted: 2020-11-25

## 2020-11-25 PROBLEM — I10 HYPERTENSION: Status: ACTIVE | Noted: 2020-10-29

## 2020-11-25 PROBLEM — E78.5 HYPERLIPIDEMIA: Status: ACTIVE | Noted: 2020-10-29

## 2020-11-25 PROCEDURE — 99214 OFFICE O/P EST MOD 30 MIN: CPT

## 2020-11-25 PROCEDURE — 99214 OFFICE O/P EST MOD 30 MIN: CPT | Performed by: INTERNAL MEDICINE

## 2020-11-25 PROCEDURE — 86301 IMMUNOASSAY TUMOR CA 19-9: CPT

## 2020-11-25 PROCEDURE — 96413 CHEMO IV INFUSION 1 HR: CPT

## 2020-11-25 PROCEDURE — 85025 COMPLETE CBC W/AUTO DIFF WBC: CPT

## 2020-11-25 PROCEDURE — 96375 TX/PRO/DX INJ NEW DRUG ADDON: CPT

## 2020-11-25 RX ORDER — DEXAMETHASONE 6 MG/1
TABLET ORAL
Qty: 3 TABLET | Refills: 0 | Status: SHIPPED | OUTPATIENT
Start: 2020-11-25 | End: 2020-12-17 | Stop reason: ALTCHOICE

## 2020-11-25 RX ORDER — PROCHLORPERAZINE MALEATE 10 MG
10 TABLET ORAL
COMMUNITY
End: 2020-12-14

## 2020-11-25 RX ORDER — ALBUTEROL SULFATE 90 UG/1
2 AEROSOL, METERED RESPIRATORY (INHALATION)
COMMUNITY
End: 2020-11-25

## 2020-11-25 RX ORDER — AMLODIPINE BESYLATE 10 MG/1
10 TABLET ORAL DAILY
COMMUNITY
Start: 2020-08-05 | End: 2020-11-25

## 2020-11-25 RX ORDER — OMEPRAZOLE 40 MG/1
40 CAPSULE, DELAYED RELEASE ORAL DAILY
COMMUNITY
End: 2020-11-25

## 2020-11-25 RX ORDER — LISINOPRIL 40 MG/1
40 TABLET ORAL DAILY
COMMUNITY
Start: 2020-09-15 | End: 2020-11-25

## 2020-11-25 RX ORDER — ASPIRIN 81 MG/1
81 TABLET ORAL DAILY
COMMUNITY
End: 2020-11-25

## 2020-11-25 NOTE — PATIENT INSTRUCTIONS
- CT SIMULATION SCAN SCHEDULED FOR:  Wednesday 12/2 at 8:00 am    This is the first step in the radiation planning or \"mapping\" process  Once the physician completes your treatment plan & your insurance approves we will call you with a start date

## 2020-11-25 NOTE — PROGRESS NOTES
Pt here for C4D8.   Arrives Ambulating independently, accompanied by Self           Patient reports possible pregnancy since last therapy cycle: No    Modifications in dose or schedule: No     Frequency of blood return and site check throughout administrati

## 2020-11-25 NOTE — CONSULTS
[facility]     PATIENT:   Wellington Fields MD:  Jam Gallegos MD      DIAGNOSIS:   Bone metastases to T7 and L3       CC:    Spinal pain    HPI   56-year-old woman here with her .   Carries a hist Mild diffuse disc bulge with endplate osteophytes. Moderate facet arthropathy. There is no significant spinal canal or neural foraminal stenosis.     PMH/PSH   COPD uses oxygen at night, diverticulosis with past diverticulitis, hypertension, hyperlipidemi control  -Discussed of a skin reaction and potentially mild enteritis symptoms    -We will start next systemic therapy regimen after radiation is completed    Dena Travis MD  Radiation Oncology    CC: Julio Cesar Graff MD

## 2020-11-25 NOTE — PROGRESS NOTES
Nursing Consultation Note  Patient: Jes Plascencia  YOB: 1960  Age: 63year old  Radiation Oncologist: Dr. Renee Felton  Referring Physician: [email or web address]  Consult Date: 11/25/2020      Chemotherapy: maintenance Bryce Lopez Allergic/Immunologic:        Allergy list reconciled   Neurological:        On gabapentin   Hematological: Negative. Psychiatric/Behavioral: Negative.            Allergies:    Simvastatin             MYALGIA    Comment:Cramping  Gemfibrozil Preferred Pharmacy:    [facility] [address], [address] [facility]  [address] [phone], [phone] [facility] [address] [address]  Phone: [phone] Fax: [phone] [facility], [place] [address] Heather Cantu Worry: Not on file        Inability: Not on file      Transportation needs        Medical: Not on file        Non-medical: Not on file    Tobacco Use      Smoking status: Former Smoker        Packs/day: 1.00        Years: 40.00        Pack years: 36 level  Assess knowledge needs  Instruct on treatment planning  Instruct on radiation therapy appointment scheduling  Instruct on basic skin care  Instruct on purpose of radiation therapy  Instruct on side effects of radiation therapy    Expected Outcomes:

## 2020-11-30 ENCOUNTER — TELEPHONE (OUTPATIENT)
Dept: HEMATOLOGY/ONCOLOGY | Facility: HOSPITAL | Age: 60
End: 2020-11-30

## 2020-11-30 NOTE — TELEPHONE ENCOUNTER
Bao Suh, a pharmacist with [employer], called. A prior authorization is needed for a medication. Please call.

## 2020-12-01 NOTE — PROGRESS NOTES
THE [facility] Hematology and Oncology Clinic Note    Diagnosis: Metastatic Cholangiocarcinoma: liver, bone: BRAF V600E +    Treatment History:   1. Cisplatin 25 mg/m2 D1/8 + Gemcitabine 1000 mg/m2 D1/8 q21 days.    C1: 3/3/20 and 3/10/20: : 770,146 CEA 43.6 Etoh abuse. FH of throat cancer in her brother. Labs from 1/31/20: CBC is normal. CMP with elevated TP 8.9, Globulin 4.6 and transaminitis. Alk P 166.  Cr and Calcium are normal.      Hematology/Oncology History:   · 10/2017: CT AP: Fatty liver, diverti Focal T1/T2 signal at L3 concerning for osseous disease. 1.8 x 1.7 x 1,7 cm. · 11/12/20: PET/CT: abnormal FDG uptake in the left hepatic lobe--infiltrative process: SUV 8.6 vs. 7.7, heterogeneous liver uptake with SUV 5.1.  In L3 SUV 6.6 vs. 4.7, T7 SUV 5 DAY , Disp: 90 tablet, Rfl: 0    •  gabapentin 100 MG Oral Cap, Take 1 capsule (100 mg total) by mouth 3 (three) times daily. , Disp: 90 capsule, Rfl: 1    •  OLANZapine 5 MG Oral Tab, Take 1 tablet (5 mg total) by mouth nightly., Disp: 30 tablet, Rfl: 3 MMOL/15ML injection 15 mL, 15 mL, Intravenous, ONCE PRN, Winston Washington MD, 15 mL at 11/06/20 0813    •  [COMPLETED] Normal Saline Flush 0.9 % injection 10 mL, 10 mL, Intravenous, Once, Winston Washington MD, 10 mL at 11/04/20 1110    •  Angela Forrest Heart Disease Father    • Stroke Father    • Heart Disease Mother    • Heart Disease Sister    • Heart Disease Brother    • Cancer Brother        Physical Exam  Height: 157.5 cm (5' 2.01\") (12/02 0855)  Weight: 76.2 kg (168 lb) (12/02 0855)  BSA (Calculat /Trametinib 2 mg daily based of the data from [address]. Lancet 2020. Study showed an CASTANON of 51%. Shipment is pending. Plan  - Hold Gemzar today   - Chemo ed for Dabrafenib/Trametinib in 2 weeks. She will start this 10 days after RT.  Risk and

## 2020-12-01 NOTE — TELEPHONE ENCOUNTER
Spoke with patient. Pharmacy has tried contacting patient to coordinate delivery. Contact information provided to patient. Instructed patient to call pharmacy to facilitate delivery. She verbalized understanding.

## 2020-12-02 ENCOUNTER — OFFICE VISIT (OUTPATIENT)
Dept: HEMATOLOGY/ONCOLOGY | Facility: HOSPITAL | Age: 60
End: 2020-12-02
Attending: INTERNAL MEDICINE
Payer: MEDICAID

## 2020-12-02 ENCOUNTER — HOSPITAL ENCOUNTER (OUTPATIENT)
Dept: RADIATION ONCOLOGY | Facility: HOSPITAL | Age: 60
Discharge: HOME | End: 2020-12-02
Attending: RADIOLOGY
Payer: MEDICAID

## 2020-12-02 VITALS
OXYGEN SATURATION: 92 % | DIASTOLIC BLOOD PRESSURE: 69 MMHG | BODY MASS INDEX: 30.52 KG/M2 | SYSTOLIC BLOOD PRESSURE: 134 MMHG | TEMPERATURE: 98 F | WEIGHT: 168 LBS | HEART RATE: 74 BPM | RESPIRATION RATE: 16 BRPM | HEIGHT: 62.01 IN

## 2020-12-02 DIAGNOSIS — C22.1 CHOLANGIOCARCINOMA (HCC): Primary | ICD-10-CM

## 2020-12-02 PROCEDURE — 36591 DRAW BLOOD OFF VENOUS DEVICE: CPT

## 2020-12-02 PROCEDURE — 99213 OFFICE O/P EST LOW 20 MIN: CPT | Performed by: INTERNAL MEDICINE

## 2020-12-02 PROCEDURE — 77470 SPECIAL RADIATION TREATMENT: CPT | Performed by: RADIOLOGY

## 2020-12-02 PROCEDURE — 77332 RADIATION TREATMENT AID(S): CPT | Performed by: RADIOLOGY

## 2020-12-02 PROCEDURE — 85025 COMPLETE CBC W/AUTO DIFF WBC: CPT

## 2020-12-02 PROCEDURE — 77290 THER RAD SIMULAJ FIELD CPLX: CPT | Performed by: RADIOLOGY

## 2020-12-02 RX ORDER — SODIUM CHLORIDE 0.9 % (FLUSH) 0.9 %
10 SYRINGE (ML) INJECTION ONCE
Status: CANCELLED | OUTPATIENT
Start: 2020-12-02

## 2020-12-02 RX ORDER — SODIUM CHLORIDE 0.9 % (FLUSH) 0.9 %
10 SYRINGE (ML) INJECTION ONCE
Status: COMPLETED | OUTPATIENT
Start: 2020-12-02 | End: 2020-12-02

## 2020-12-02 RX ADMIN — SODIUM CHLORIDE 0.9 % (FLUSH) 10 ML: 0.9 % SYRINGE (ML) INJECTION at 09:53:00

## 2020-12-04 PROCEDURE — 77300 RADIATION THERAPY DOSE PLAN: CPT | Performed by: RADIOLOGY

## 2020-12-04 PROCEDURE — 77306 TELETHX ISODOSE PLAN SIMPLE: CPT | Performed by: RADIOLOGY

## 2020-12-04 PROCEDURE — 77295 3-D RADIOTHERAPY PLAN: CPT | Performed by: RADIOLOGY

## 2020-12-04 PROCEDURE — 77280 THER RAD SIMULAJ FIELD SMPL: CPT | Performed by: RADIOLOGY

## 2020-12-04 PROCEDURE — 77332 RADIATION TREATMENT AID(S): CPT | Performed by: RADIOLOGY

## 2020-12-07 ENCOUNTER — HOSPITAL ENCOUNTER (OUTPATIENT)
Dept: CV DIAGNOSTICS | Facility: HOSPITAL | Age: 60
Discharge: HOME | End: 2020-12-07
Attending: INTERNAL MEDICINE
Payer: MEDICAID

## 2020-12-07 DIAGNOSIS — Z15.89 MONOALLELIC MUTATION OF BRAF GENE: ICD-10-CM

## 2020-12-07 DIAGNOSIS — Z15.09 MONOALLELIC MUTATION OF BRAF GENE: ICD-10-CM

## 2020-12-07 DIAGNOSIS — C22.1 CHOLANGIOCARCINOMA (HCC): ICD-10-CM

## 2020-12-07 PROCEDURE — 93306 TTE W/DOPPLER COMPLETE: CPT | Performed by: INTERNAL MEDICINE

## 2020-12-09 ENCOUNTER — HOSPITAL ENCOUNTER (OUTPATIENT)
Dept: RADIATION ONCOLOGY | Facility: HOSPITAL | Age: 60
Discharge: HOME | End: 2020-12-09
Attending: RADIOLOGY
Payer: MEDICAID

## 2020-12-09 ENCOUNTER — LAB ENCOUNTER (OUTPATIENT)
Dept: LAB | Age: 60
End: 2020-12-09
Attending: RADIOLOGY
Payer: MEDICAID

## 2020-12-09 DIAGNOSIS — C79.51 BONE METASTASES (HCC): ICD-10-CM

## 2020-12-09 PROCEDURE — 77280 THER RAD SIMULAJ FIELD SMPL: CPT | Performed by: RADIOLOGY

## 2020-12-09 PROCEDURE — 77402 RADIATION TX DELIVERY LVL 1: CPT | Performed by: RADIOLOGY

## 2020-12-10 PROCEDURE — 77402 RADIATION TX DELIVERY LVL 1: CPT | Performed by: RADIOLOGY

## 2020-12-10 PROCEDURE — 77331 SPECIAL RADIATION DOSIMETRY: CPT | Performed by: RADIOLOGY

## 2020-12-10 PROCEDURE — 77387 GUIDANCE FOR RADJ TX DLVR: CPT | Performed by: RADIOLOGY

## 2020-12-11 PROCEDURE — 77387 GUIDANCE FOR RADJ TX DLVR: CPT | Performed by: RADIOLOGY

## 2020-12-11 PROCEDURE — 77402 RADIATION TX DELIVERY LVL 1: CPT | Performed by: RADIOLOGY

## 2020-12-14 ENCOUNTER — HOSPITAL ENCOUNTER (OUTPATIENT)
Dept: RADIATION ONCOLOGY | Facility: HOSPITAL | Age: 60
Discharge: HOME | End: 2020-12-14
Attending: RADIOLOGY
Payer: MEDICAID

## 2020-12-14 DIAGNOSIS — C79.51 BONE METASTASES (HCC): Primary | ICD-10-CM

## 2020-12-14 PROCEDURE — 77402 RADIATION TX DELIVERY LVL 1: CPT | Performed by: RADIOLOGY

## 2020-12-14 PROCEDURE — 77387 GUIDANCE FOR RADJ TX DLVR: CPT | Performed by: RADIOLOGY

## 2020-12-14 RX ORDER — PROCHLORPERAZINE MALEATE 10 MG
10 TABLET ORAL EVERY 8 HOURS PRN
Qty: 30 TABLET | Refills: 0 | Status: SHIPPED | OUTPATIENT
Start: 2020-12-14 | End: 2021-01-28

## 2020-12-14 RX ORDER — ONDANSETRON 8 MG/1
8 TABLET, ORALLY DISINTEGRATING ORAL EVERY 8 HOURS PRN
Qty: 30 TABLET | Refills: 0 | Status: SHIPPED | OUTPATIENT
Start: 2020-12-14 | End: 2021-01-28

## 2020-12-14 NOTE — PROGRESS NOTES
[facility] Radiation Treatment Management Note 1-5    Patient:  Mike Segal  Age:  62year old  Visit Diagnosis:  Hx of cholangioCA, bone mets  Primary Rad/Onc:  Dr. Jonathan Farnsworth    Site Delivered Dose (cGy) Prescribed Dose (cGy) Raymond Chau

## 2020-12-15 PROCEDURE — 77387 GUIDANCE FOR RADJ TX DLVR: CPT | Performed by: RADIOLOGY

## 2020-12-15 PROCEDURE — 77402 RADIATION TX DELIVERY LVL 1: CPT | Performed by: RADIOLOGY

## 2020-12-16 ENCOUNTER — APPOINTMENT (OUTPATIENT)
Dept: HEMATOLOGY/ONCOLOGY | Facility: HOSPITAL | Age: 60
End: 2020-12-16
Attending: INTERNAL MEDICINE
Payer: MEDICAID

## 2020-12-16 PROCEDURE — 77387 GUIDANCE FOR RADJ TX DLVR: CPT | Performed by: RADIOLOGY

## 2020-12-16 PROCEDURE — 77402 RADIATION TX DELIVERY LVL 1: CPT | Performed by: RADIOLOGY

## 2020-12-17 ENCOUNTER — OFFICE VISIT (OUTPATIENT)
Dept: HEMATOLOGY/ONCOLOGY | Facility: HOSPITAL | Age: 60
End: 2020-12-17
Attending: INTERNAL MEDICINE
Payer: MEDICAID

## 2020-12-17 DIAGNOSIS — Z71.9 ENCOUNTER FOR EDUCATION: ICD-10-CM

## 2020-12-17 DIAGNOSIS — Z15.09 MONOALLELIC MUTATION OF BRAF GENE: ICD-10-CM

## 2020-12-17 DIAGNOSIS — Z15.89 MONOALLELIC MUTATION OF BRAF GENE: ICD-10-CM

## 2020-12-17 DIAGNOSIS — C22.1 CHOLANGIOCARCINOMA (HCC): Primary | ICD-10-CM

## 2020-12-17 PROCEDURE — 77387 GUIDANCE FOR RADJ TX DLVR: CPT | Performed by: RADIOLOGY

## 2020-12-17 PROCEDURE — 77402 RADIATION TX DELIVERY LVL 1: CPT | Performed by: RADIOLOGY

## 2020-12-17 PROCEDURE — 99215 OFFICE O/P EST HI 40 MIN: CPT | Performed by: NURSE PRACTITIONER

## 2020-12-17 NOTE — PROGRESS NOTES
ORAL CHEMOTHERAPY EDUCATION RECORD  Learner:  Patient and spouse    Barriers / Limitations:  none     Diagnosis:   metastatic cholangiocarcinoma     Medication Name:   Dabrafenib (Tafinlar) 150 mg (2 tabs) twice daily with Trametinib (Mekinist) 2 mg (1 tab provided with a pill box at her request. She did express concern about taking the first dose at home. I offered for to come to the cancer center to take her first dose and be observed for 1 hr following.  She declined but stated that she would let us know i

## 2020-12-18 PROCEDURE — 77336 RADIATION PHYSICS CONSULT: CPT | Performed by: RADIOLOGY

## 2020-12-18 PROCEDURE — 77387 GUIDANCE FOR RADJ TX DLVR: CPT | Performed by: RADIOLOGY

## 2020-12-18 PROCEDURE — 77402 RADIATION TX DELIVERY LVL 1: CPT | Performed by: RADIOLOGY

## 2020-12-18 NOTE — PATIENT INSTRUCTIONS
POST-RADIATION INSTRUCTIONS:   - CALL [phone] FOR A FOLLOW-UP WITH DR. HOUSE IN 1 MONTH  - SIDE EFFECTS OF RADIATION WILL GRADUALLY SUBSIDE, IT MAY TAKE 2-3 WEEKS POST-RADIATION FOR YOU TO NOTICE CHANGES; SUCH AS A DECREASE IN YOUR FATIGUE LEVEL, DEC

## 2020-12-21 ENCOUNTER — HOSPITAL ENCOUNTER (OUTPATIENT)
Dept: RADIATION ONCOLOGY | Facility: HOSPITAL | Age: 60
Discharge: HOME | End: 2020-12-21
Attending: RADIOLOGY
Payer: MEDICAID

## 2020-12-21 VITALS
RESPIRATION RATE: 18 BRPM | TEMPERATURE: 98 F | SYSTOLIC BLOOD PRESSURE: 149 MMHG | HEART RATE: 78 BPM | OXYGEN SATURATION: 91 % | DIASTOLIC BLOOD PRESSURE: 83 MMHG | WEIGHT: 168.38 LBS | BODY MASS INDEX: 31 KG/M2

## 2020-12-21 DIAGNOSIS — C79.51 BONE METASTASES (HCC): Primary | ICD-10-CM

## 2020-12-21 PROCEDURE — 77402 RADIATION TX DELIVERY LVL 1: CPT | Performed by: RADIOLOGY

## 2020-12-21 PROCEDURE — 77387 GUIDANCE FOR RADJ TX DLVR: CPT | Performed by: RADIOLOGY

## 2020-12-21 NOTE — PROGRESS NOTES
[facility] Radiation Treatment Management Note 6-10    Patient:  Gladys Newman  Age:  62year old  Visit Diagnosis:  Hx of cholangioCA, bone mets  Primary Rad/Onc:  Dr. Anne Marie New    Site Delivered Dose (cGy) Prescribed Dose (cGy) Fr

## 2020-12-22 ENCOUNTER — DOCUMENTATION ONLY (OUTPATIENT)
Dept: RADIATION ONCOLOGY | Facility: HOSPITAL | Age: 60
End: 2020-12-22

## 2020-12-22 PROCEDURE — 77387 GUIDANCE FOR RADJ TX DLVR: CPT | Performed by: RADIOLOGY

## 2020-12-22 PROCEDURE — 77402 RADIATION TX DELIVERY LVL 1: CPT | Performed by: RADIOLOGY

## 2020-12-22 NOTE — PROGRESS NOTES
[facility] RADIATION ONCOLOGY  TREATMENT SUMMARY     PATIENT:  Dionna Sky MD: Anand Carlson  DIAGNOSIS:  Bone metastases    HISTORY   61-year-old woman with history of metastatic cholangiocarcinoma involving liver and bone, diagnose

## 2020-12-28 ENCOUNTER — TELEPHONE (OUTPATIENT)
Dept: HEMATOLOGY/ONCOLOGY | Facility: HOSPITAL | Age: 60
End: 2020-12-28

## 2020-12-28 NOTE — TELEPHONE ENCOUNTER
Harini Pelaez requesting a call back about chemo medication and insurance, one authorized the other not. Please call.

## 2020-12-30 NOTE — PROGRESS NOTES
[facility] Clinic Note    Diagnosis: Metastatic Cholangiocarcinoma: liver, bone: BRAF V600E +    Treatment History:   1. Cisplatin 25 mg/m2 D1/8 + Gemcitabine 1000 mg/m2 D1/8 q21 days.    C1: 3/3/20 and 3/10/20: : 094,095 CEA 43.6 sweats. Aside from her Low back, no other focal areas of bone pain. She is a previous smoker, she smoked for about 40 years and quit 2 years ago. She works as a . No Etoh abuse. FH of throat cancer in her brother.      Labs from 1/31/20: CBC i lobe (stable SUV 7.3), infiltrative hepatic lesions. · 8/12/20: Discussed at TB: proceed with maintenance Gemcitabine   · 11/4/20: CTA Chest: No PE.   · 11/6/20: MR L Spine: Focal T1/T2 signal at L3 concerning for osseous disease. 1.8 x 1.7 x 1,7 cm.    · Disp: 30 tablet, Rfl: 0    •  AMLODIPINE BESYLATE 10 MG Oral Tab, TAKE 1 TABLET BY MOUTH ONE TIME A DAY , Disp: 90 tablet, Rfl: 0    •  gabapentin 100 MG Oral Cap, Take 1 capsule (100 mg total) by mouth 3 (three) times daily. , Disp: 90 capsule, Rfl: 1    • Laterality Date   • ARTHROSCOPY OF JOINT UNLISTED  05/2019    rotator cuff   • COLECTOMY     • COLONOSCOPY  12/20/2018    polyps   • OTHER  02/26/2018    resection Splenic flexure   • OTHER SURGICAL HISTORY     • XI ROBOT-ASSISTED LAPAROSCOPIC LOW ANTERIOR 3.2 (L) 11/18/2020       Radiology: reviewed     Pathology: reviewed     Assessment and Plan:  60F with a PMH of COPD, Diverticulitis, HTN, HLD and pulmonary fibrosis referred by Dr. Nellie Almanzar due to an abnormal bone lesion.  She was found to have metastatic Following with pulmonary   - PET/CT as above. Lesions to small to biopsy but appear concerning.   - Uses PRN 02    Right Renal Cortical Lesion: No FDG uptake. Continue to monitor. Pain Control: Following with Palliative care.     Neuropathy: controlled

## 2020-12-31 ENCOUNTER — OFFICE VISIT (OUTPATIENT)
Dept: HEMATOLOGY/ONCOLOGY | Facility: HOSPITAL | Age: 60
End: 2020-12-31
Attending: INTERNAL MEDICINE
Payer: MEDICAID

## 2020-12-31 VITALS
RESPIRATION RATE: 16 BRPM | OXYGEN SATURATION: 94 % | SYSTOLIC BLOOD PRESSURE: 128 MMHG | DIASTOLIC BLOOD PRESSURE: 79 MMHG | TEMPERATURE: 98 F | HEART RATE: 96 BPM

## 2020-12-31 VITALS — WEIGHT: 166 LBS | BODY MASS INDEX: 30.16 KG/M2 | HEIGHT: 62.01 IN

## 2020-12-31 DIAGNOSIS — C22.1 CHOLANGIOCARCINOMA (HCC): ICD-10-CM

## 2020-12-31 DIAGNOSIS — Z15.09 MONOALLELIC MUTATION OF BRAF GENE: ICD-10-CM

## 2020-12-31 DIAGNOSIS — C22.1 CHOLANGIOCARCINOMA (HCC): Primary | ICD-10-CM

## 2020-12-31 DIAGNOSIS — Z15.89 MONOALLELIC MUTATION OF BRAF GENE: ICD-10-CM

## 2020-12-31 PROCEDURE — 80053 COMPREHEN METABOLIC PANEL: CPT

## 2020-12-31 PROCEDURE — 86301 IMMUNOASSAY TUMOR CA 19-9: CPT

## 2020-12-31 PROCEDURE — 36591 DRAW BLOOD OFF VENOUS DEVICE: CPT

## 2020-12-31 PROCEDURE — 99213 OFFICE O/P EST LOW 20 MIN: CPT | Performed by: INTERNAL MEDICINE

## 2020-12-31 PROCEDURE — 85025 COMPLETE CBC W/AUTO DIFF WBC: CPT

## 2021-01-01 ENCOUNTER — TELEPHONE (OUTPATIENT)
Dept: HEMATOLOGY/ONCOLOGY | Facility: HOSPITAL | Age: 61
End: 2021-01-01

## 2021-01-01 DIAGNOSIS — Z15.09 MONOALLELIC MUTATION OF BRAF GENE: ICD-10-CM

## 2021-01-01 DIAGNOSIS — C22.1 CHOLANGIOCARCINOMA (HCC): ICD-10-CM

## 2021-01-01 DIAGNOSIS — Z15.89 MONOALLELIC MUTATION OF BRAF GENE: ICD-10-CM

## 2021-01-01 DIAGNOSIS — I10 BENIGN ESSENTIAL HTN: Primary | ICD-10-CM

## 2021-01-01 RX ORDER — LISINOPRIL 40 MG/1
40 TABLET ORAL DAILY
Qty: 90 TABLET | Refills: 1 | Status: ON HOLD | OUTPATIENT
Start: 2021-01-01 | End: 2022-01-01

## 2021-01-01 RX ORDER — PANTOPRAZOLE SODIUM 40 MG/1
TABLET, DELAYED RELEASE ORAL
Qty: 30 TABLET | Refills: 0 | Status: SHIPPED | OUTPATIENT
Start: 2021-01-01 | End: 2021-01-01

## 2021-01-01 RX ORDER — PREDNISONE 5 MG/1
TABLET ORAL
Qty: 30 TABLET | Refills: 0 | Status: SHIPPED | OUTPATIENT
Start: 2021-01-01 | End: 2021-01-01

## 2021-01-01 RX ORDER — TRAMETINIB 2 MG/1
TABLET, FILM COATED ORAL
Qty: 30 TABLET | Refills: 0 | Status: SHIPPED | OUTPATIENT
Start: 2021-01-01 | End: 2021-01-01

## 2021-01-01 RX ORDER — TRAMETINIB 2 MG/1
TABLET, FILM COATED ORAL
Qty: 30 TABLET | Refills: 0 | Status: SHIPPED | OUTPATIENT
Start: 2021-01-01 | End: 2021-01-01 | Stop reason: CLARIF

## 2021-01-04 DIAGNOSIS — I10 ESSENTIAL HYPERTENSION: Chronic | ICD-10-CM

## 2021-01-05 RX ORDER — LISINOPRIL 40 MG/1
TABLET ORAL
Qty: 90 TABLET | Refills: 0 | Status: SHIPPED | OUTPATIENT
Start: 2021-01-05 | End: 2021-05-13

## 2021-01-05 NOTE — TELEPHONE ENCOUNTER
LOV: 8/5/2020 with Marian Caballero PA-C  RTC: 3 months  Last Relevant Labs: 12/31/2020  Filled: 10/30/2020  #30 with 0 refills    Future Appointments   Date Time Provider Shelly Barrera   1/14/2021 10:00 AM [address] [place] [address]   1/14/2021 10:3

## 2021-01-06 ENCOUNTER — TELEPHONE (OUTPATIENT)
Dept: HEMATOLOGY/ONCOLOGY | Facility: HOSPITAL | Age: 61
End: 2021-01-06

## 2021-01-06 ENCOUNTER — HOSPITAL ENCOUNTER (EMERGENCY)
Facility: HOSPITAL | Age: 61
Discharge: HOME | End: 2021-01-06
Attending: EMERGENCY MEDICINE
Payer: MEDICAID

## 2021-01-06 VITALS
HEIGHT: 62 IN | DIASTOLIC BLOOD PRESSURE: 76 MMHG | BODY MASS INDEX: 30.36 KG/M2 | RESPIRATION RATE: 18 BRPM | TEMPERATURE: 98 F | WEIGHT: 165 LBS | OXYGEN SATURATION: 97 % | SYSTOLIC BLOOD PRESSURE: 133 MMHG | HEART RATE: 88 BPM

## 2021-01-06 DIAGNOSIS — T50.905A ADVERSE EFFECT OF DRUG, INITIAL ENCOUNTER: Primary | ICD-10-CM

## 2021-01-06 LAB
ALBUMIN SERPL-MCNC: 3.1 G/DL (ref 3.4–5)
ALBUMIN/GLOB SERPL: 0.6 {RATIO} (ref 1–2)
ALP LIVER SERPL-CCNC: 158 U/L
ALT SERPL-CCNC: 42 U/L
ANION GAP SERPL CALC-SCNC: 9 MMOL/L (ref 0–18)
AST SERPL-CCNC: 53 U/L (ref 15–37)
BASOPHILS # BLD AUTO: 0.05 X10(3) UL (ref 0–0.2)
BASOPHILS NFR BLD AUTO: 0.7 %
BILIRUB SERPL-MCNC: 0.6 MG/DL (ref 0.1–2)
BILIRUB UR QL STRIP.AUTO: NEGATIVE
BUN BLD-MCNC: 16 MG/DL (ref 7–18)
BUN/CREAT SERPL: 16.2 (ref 10–20)
CALCIUM BLD-MCNC: 9.1 MG/DL (ref 8.5–10.1)
CHLORIDE SERPL-SCNC: 102 MMOL/L (ref 98–112)
CLARITY UR REFRACT.AUTO: CLEAR
CO2 SERPL-SCNC: 23 MMOL/L (ref 21–32)
CREAT BLD-MCNC: 0.99 MG/DL
DEPRECATED RDW RBC AUTO: 51.5 FL (ref 35.1–46.3)
EOSINOPHIL # BLD AUTO: 0.15 X10(3) UL (ref 0–0.7)
EOSINOPHIL NFR BLD AUTO: 2 %
ERYTHROCYTE [DISTWIDTH] IN BLOOD BY AUTOMATED COUNT: 14.8 % (ref 11–15)
GLOBULIN PLAS-MCNC: 4.9 G/DL (ref 2.8–4.4)
GLUCOSE BLD-MCNC: 114 MG/DL (ref 70–99)
GLUCOSE UR STRIP.AUTO-MCNC: NEGATIVE MG/DL
HCT VFR BLD AUTO: 42.6 %
HGB BLD-MCNC: 14.2 G/DL
HYALINE CASTS #/AREA URNS AUTO: PRESENT /LPF
IMM GRANULOCYTES # BLD AUTO: 0.02 X10(3) UL (ref 0–1)
IMM GRANULOCYTES NFR BLD: 0.3 %
KETONES UR STRIP.AUTO-MCNC: NEGATIVE MG/DL
LACTATE SERPL-SCNC: 1.1 MMOL/L (ref 0.4–2)
LEUKOCYTE ESTERASE UR QL STRIP.AUTO: NEGATIVE
LYMPHOCYTES # BLD AUTO: 2.18 X10(3) UL (ref 1–4)
LYMPHOCYTES NFR BLD AUTO: 28.7 %
M PROTEIN MFR SERPL ELPH: 8 G/DL (ref 6.4–8.2)
MCH RBC QN AUTO: 31.1 PG (ref 26–34)
MCHC RBC AUTO-ENTMCNC: 33.3 G/DL (ref 31–37)
MCV RBC AUTO: 93.4 FL
MONOCYTES # BLD AUTO: 0.9 X10(3) UL (ref 0.1–1)
MONOCYTES NFR BLD AUTO: 11.9 %
NEUTROPHILS # BLD AUTO: 4.29 X10 (3) UL (ref 1.5–7.7)
NEUTROPHILS # BLD AUTO: 4.29 X10(3) UL (ref 1.5–7.7)
NEUTROPHILS NFR BLD AUTO: 56.4 %
NITRITE UR QL STRIP.AUTO: NEGATIVE
OSMOLALITY SERPL CALC.SUM OF ELEC: 280 MOSM/KG (ref 275–295)
PH UR STRIP.AUTO: 5 [PH] (ref 4.5–8)
PLATELET # BLD AUTO: 101 10(3)UL (ref 150–450)
POTASSIUM SERPL-SCNC: 4 MMOL/L (ref 3.5–5.1)
PROT UR STRIP.AUTO-MCNC: 30 MG/DL
RBC # BLD AUTO: 4.56 X10(6)UL
RBC UR QL AUTO: NEGATIVE
SARS-COV-2 RNA RESP QL NAA+PROBE: NOT DETECTED
SODIUM SERPL-SCNC: 134 MMOL/L (ref 136–145)
SP GR UR STRIP.AUTO: 1.03 (ref 1–1.03)
UROBILINOGEN UR STRIP.AUTO-MCNC: <2 MG/DL
WBC # BLD AUTO: 7.6 X10(3) UL (ref 4–11)

## 2021-01-06 PROCEDURE — 99283 EMERGENCY DEPT VISIT LOW MDM: CPT | Performed by: EMERGENCY MEDICINE

## 2021-01-06 PROCEDURE — 85025 COMPLETE CBC W/AUTO DIFF WBC: CPT | Performed by: EMERGENCY MEDICINE

## 2021-01-06 PROCEDURE — 81001 URINALYSIS AUTO W/SCOPE: CPT | Performed by: EMERGENCY MEDICINE

## 2021-01-06 PROCEDURE — 83605 ASSAY OF LACTIC ACID: CPT | Performed by: EMERGENCY MEDICINE

## 2021-01-06 PROCEDURE — 87040 BLOOD CULTURE FOR BACTERIA: CPT | Performed by: EMERGENCY MEDICINE

## 2021-01-06 PROCEDURE — 80053 COMPREHEN METABOLIC PANEL: CPT | Performed by: EMERGENCY MEDICINE

## 2021-01-06 PROCEDURE — 36415 COLL VENOUS BLD VENIPUNCTURE: CPT | Performed by: EMERGENCY MEDICINE

## 2021-01-06 RX ORDER — ROSUVASTATIN CALCIUM 5 MG/1
TABLET, COATED ORAL
Qty: 30 TABLET | Refills: 0 | Status: SHIPPED | OUTPATIENT
Start: 2021-01-06 | End: 2021-01-30

## 2021-01-06 NOTE — ED INITIAL ASSESSMENT (HPI)
Pt here for rash after chemo. Pt notes having a fever Monday. Pt notes she took motrin. Pt notes this is a new medicine and that these side effects are normal but was encouraged to come for evaluation.

## 2021-01-06 NOTE — ED PROVIDER NOTES
Patient Seen in: [facility] Emergency Department      History   Patient presents with:  Fever    Stated Complaint: Temp 102, on chemo     HPI/Subjective:   HPI    The patient is a 64-year-old female with a history of cholangiocarcinoma, diagnosed ab Left 2/26/2018    Performed by Juliette Bullock MD at [facility] MAIN OR                Social History    Tobacco Use      Smoking status: Former Smoker        Packs/day: 1.00        Years: 40.00        Pack years: 36        Start date: 1/17/1977        Quit long quadrants, epigastrium and suprapubic regions. No CVA tenderness. Extremities: No deformity, nontender throughout, and normal active range of motion of all 4 extremities. No pain with movement of the shoulders, elbows, forearms or hands or wrists.   Dista CBC W/ DIFFERENTIAL[[id number]]          Abnormal            Final result                 Please view results for these tests on the individual orders.    BLOOD CULTURE   BLOOD CULTURE          Blood was obtained and peripheral IV access w

## 2021-01-06 NOTE — TELEPHONE ENCOUNTER
Toxicities: Dabrafenib Mesylate/Trametinib-Dimethyl Sulfoxide started on 1/1/2021     Fever/Joint Pain/Fatigue/Abdominal Pain/Dyspnea/Rash     Fever: (Patient reports she had a fever of 102 on Tuesday. She was chilled last night and then felt very warm.  Sh

## 2021-01-06 NOTE — TELEPHONE ENCOUNTER
Patient called and said that she is on a new chemo and that she is experiencing a rash. She was looking for some guidance.

## 2021-01-14 ENCOUNTER — TELEPHONE (OUTPATIENT)
Dept: HEMATOLOGY/ONCOLOGY | Facility: HOSPITAL | Age: 61
End: 2021-01-14

## 2021-01-14 ENCOUNTER — OFFICE VISIT (OUTPATIENT)
Dept: HEMATOLOGY/ONCOLOGY | Facility: HOSPITAL | Age: 61
End: 2021-01-14
Attending: INTERNAL MEDICINE
Payer: MEDICAID

## 2021-01-14 VITALS
DIASTOLIC BLOOD PRESSURE: 78 MMHG | TEMPERATURE: 97 F | HEART RATE: 88 BPM | OXYGEN SATURATION: 95 % | BODY MASS INDEX: 30.34 KG/M2 | WEIGHT: 167 LBS | HEIGHT: 62.01 IN | RESPIRATION RATE: 18 BRPM | SYSTOLIC BLOOD PRESSURE: 121 MMHG

## 2021-01-14 DIAGNOSIS — C22.1 CHOLANGIOCARCINOMA (HCC): Primary | ICD-10-CM

## 2021-01-14 DIAGNOSIS — C22.1 CHOLANGIOCARCINOMA (HCC): ICD-10-CM

## 2021-01-14 DIAGNOSIS — Z71.89 GOALS OF CARE, COUNSELING/DISCUSSION: ICD-10-CM

## 2021-01-14 DIAGNOSIS — G89.3 NEOPLASM RELATED PAIN: Primary | ICD-10-CM

## 2021-01-14 LAB
ALBUMIN SERPL-MCNC: 2.8 G/DL (ref 3.4–5)
ALBUMIN/GLOB SERPL: 0.6 {RATIO} (ref 1–2)
ALP LIVER SERPL-CCNC: 155 U/L
ALT SERPL-CCNC: 48 U/L
ANION GAP SERPL CALC-SCNC: 5 MMOL/L (ref 0–18)
AST SERPL-CCNC: 55 U/L (ref 15–37)
BASOPHILS # BLD AUTO: 0.03 X10(3) UL (ref 0–0.2)
BASOPHILS NFR BLD AUTO: 0.6 %
BILIRUB SERPL-MCNC: 0.4 MG/DL (ref 0.1–2)
BUN BLD-MCNC: 14 MG/DL (ref 7–18)
BUN/CREAT SERPL: 13.3 (ref 10–20)
CALCIUM BLD-MCNC: 8.6 MG/DL (ref 8.5–10.1)
CANCER AG19-9 SERPL-ACNC: ABNORMAL U/ML (ref ?–37)
CHLORIDE SERPL-SCNC: 104 MMOL/L (ref 98–112)
CO2 SERPL-SCNC: 27 MMOL/L (ref 21–32)
CREAT BLD-MCNC: 1.05 MG/DL
DEPRECATED RDW RBC AUTO: 51 FL (ref 35.1–46.3)
EOSINOPHIL # BLD AUTO: 0.17 X10(3) UL (ref 0–0.7)
EOSINOPHIL NFR BLD AUTO: 3.3 %
ERYTHROCYTE [DISTWIDTH] IN BLOOD BY AUTOMATED COUNT: 14.3 % (ref 11–15)
FOLATE SERPL-MCNC: 10.6 NG/ML (ref 8.7–?)
GLOBULIN PLAS-MCNC: 4.4 G/DL (ref 2.8–4.4)
GLUCOSE BLD-MCNC: 164 MG/DL (ref 70–99)
HCT VFR BLD AUTO: 37.7 %
HGB BLD-MCNC: 12.4 G/DL
IMM GRANULOCYTES # BLD AUTO: 0.01 X10(3) UL (ref 0–1)
IMM GRANULOCYTES NFR BLD: 0.2 %
LYMPHOCYTES # BLD AUTO: 2.2 X10(3) UL (ref 1–4)
LYMPHOCYTES NFR BLD AUTO: 42.3 %
M PROTEIN MFR SERPL ELPH: 7.2 G/DL (ref 6.4–8.2)
MCH RBC QN AUTO: 31.8 PG (ref 26–34)
MCHC RBC AUTO-ENTMCNC: 32.9 G/DL (ref 31–37)
MCV RBC AUTO: 96.7 FL
MONOCYTES # BLD AUTO: 0.46 X10(3) UL (ref 0.1–1)
MONOCYTES NFR BLD AUTO: 8.8 %
NEUTROPHILS # BLD AUTO: 2.33 X10 (3) UL (ref 1.5–7.7)
NEUTROPHILS # BLD AUTO: 2.33 X10(3) UL (ref 1.5–7.7)
NEUTROPHILS NFR BLD AUTO: 44.8 %
OSMOLALITY SERPL CALC.SUM OF ELEC: 286 MOSM/KG (ref 275–295)
PATIENT FASTING Y/N/NP: NO
PLATELET # BLD AUTO: 95 10(3)UL (ref 150–450)
POTASSIUM SERPL-SCNC: 4.1 MMOL/L (ref 3.5–5.1)
RBC # BLD AUTO: 3.9 X10(6)UL
SODIUM SERPL-SCNC: 136 MMOL/L (ref 136–145)
TSI SER-ACNC: 1.28 MIU/ML (ref 0.36–3.74)
VIT B12 SERPL-MCNC: 970 PG/ML (ref 193–986)
WBC # BLD AUTO: 5.2 X10(3) UL (ref 4–11)

## 2021-01-14 PROCEDURE — 80053 COMPREHEN METABOLIC PANEL: CPT

## 2021-01-14 PROCEDURE — 85025 COMPLETE CBC W/AUTO DIFF WBC: CPT

## 2021-01-14 PROCEDURE — 99213 OFFICE O/P EST LOW 20 MIN: CPT | Performed by: INTERNAL MEDICINE

## 2021-01-14 PROCEDURE — 82746 ASSAY OF FOLIC ACID SERUM: CPT

## 2021-01-14 PROCEDURE — 82607 VITAMIN B-12: CPT

## 2021-01-14 PROCEDURE — 36591 DRAW BLOOD OFF VENOUS DEVICE: CPT

## 2021-01-14 PROCEDURE — 99214 OFFICE O/P EST MOD 30 MIN: CPT | Performed by: NURSE PRACTITIONER

## 2021-01-14 PROCEDURE — 84443 ASSAY THYROID STIM HORMONE: CPT

## 2021-01-14 PROCEDURE — 86301 IMMUNOASSAY TUMOR CA 19-9: CPT

## 2021-01-14 RX ORDER — GABAPENTIN 100 MG/1
200 CAPSULE ORAL 2 TIMES DAILY
Qty: 120 CAPSULE | Refills: 1 | Status: SHIPPED | OUTPATIENT
Start: 2021-01-14 | End: 2021-04-29

## 2021-01-14 NOTE — PROGRESS NOTES
Palliative Care Follow Up Note     Patient Name: Rl Garcia   YOB: 1960   Medical Record Number: [id number]   CSN: [id number]   Date of visit: 1/14/2021     Chief Complaint/Reason for Visit:  Follow up for neuropathy     History of Ta night   • Coronary atherosclerosis    • Diverticulitis large intestine w/o perforation or abscess w/o bleeding 11/14/2017   • GERD (gastroesophageal reflux disease)    • High blood pressure    • High cholesterol    • Tobacco abuse    • Visual impairment % External Cream Apply 1 Application topically daily.  1 Tube 0   • ROSUVASTATIN CALCIUM 5 MG Oral Tab TAKE 1 TABLET BY MOUTH IN THE EVENING  30 tablet 0   • LISINOPRIL 40 MG Oral Tab TAKE 1 TABLET BY MOUTH ONE TIME A DAY  90 tablet 0   • umeclidinium-vilan Regular rate and rhythm. No edema  Abdomen: Soft, non tender . Musculoskeletal: Normal gait. Psych:  Mood/Affect appropriate    Advanced Directives/GOC discussion:   HCPOA:  Completed and scanned into chart.   Names Edmond, her spouse    Patient is feeling

## 2021-01-14 NOTE — PROGRESS NOTES
THE [facility] Hematology and Oncology Clinic Note    Diagnosis: Metastatic Cholangiocarcinoma: liver, bone: BRAF V600E +    Treatment History:   1. Cisplatin 25 mg/m2 D1/8 + Gemcitabine 1000 mg/m2 D1/8 q21 days.    C1: 3/3/20 and 3/10/20: : 246,006 CEA 43.6 other focal areas of bone pain. She is a previous smoker, she smoked for about 40 years and quit 2 years ago. She works as a . No Etoh abuse. FH of throat cancer in her brother.      Labs from 1/31/20: CBC is normal. CMP with elevated TP 8.9, hepatic lesions. · 8/12/20: Discussed at TB: proceed with maintenance Gemcitabine   · 11/4/20: CTA Chest: No PE.   · 11/6/20: MR L Spine: Focal T1/T2 signal at L3 concerning for osseous disease. 1.8 x 1.7 x 1,7 cm.    · 11/12/20: PET/CT: abnormal FDG upta 1 tablet (10 mg total) by mouth every 8 (eight) hours as needed for Nausea., Disp: 30 tablet, Rfl: 0    •  Nebulizers (DEVILBISS TRAVELER NEBULIZER) Does not apply Misc, by Misc. (Non-Drug; Combo Route) route., Disp: , Rfl:     •  AMLODIPINE BESYLATE 10 MG reflux disease)    • High blood pressure    • High cholesterol    • Tobacco abuse    • Visual impairment     glasses     Past Surgical History:   Procedure Laterality Date   • ARTHROSCOPY OF JOINT UNLISTED  05/2019    rotator cuff   • COLECTOMY     • COLON 06/23/2020     Lab Results   Component Value Date     01/14/2021    K 4.1 01/14/2021    CO2 27.0 01/14/2021     01/14/2021    BUN 14 01/14/2021    PHOS 3.3 02/27/2018    ALB 2.8 (L) 01/14/2021       Radiology: reviewed     Pathology: reviewed consider hearing aids Not done since not covered by insurance. RSV PNA: s/p steroids. Also completed levaquin for sinusitis. Transaminititis:mild likely from metastases and HASTINGS. Follow. Stable.      Pulmonary Fibrosis/Nodules  40-45 pack year smoki

## 2021-01-14 NOTE — TELEPHONE ENCOUNTER
Spoke with patient regarding results. She verbalized understanding. Trina Justice MD  [facility]             Results reviewed. Can you let her know that her platelets are stable at 95.  Thanks

## 2021-01-14 NOTE — TELEPHONE ENCOUNTER
Adrien Barfield from pharmacy called and stated that the medcatiion - Clobetasol Propionate 0.025 % External Cream will cost 1,000, however if she uses a generic brand at (0.5%) will have a zero co-pay. Thank you.  Mili

## 2021-01-14 NOTE — PROGRESS NOTES
Education Record    Learner:  Patient    Disease / Diagnosis: Cholangiocarcinoma    Barriers / Limitations:  None   Comments:    Method:  Brief focused   Comments:    General Topics:  Plan of care reviewed   Comments:    Outcome:  Shows understanding   Com

## 2021-01-21 ENCOUNTER — HOSPITAL ENCOUNTER (OUTPATIENT)
Dept: RADIATION ONCOLOGY | Facility: HOSPITAL | Age: 61
Discharge: HOME | End: 2021-01-21
Attending: RADIOLOGY
Payer: MEDICAID

## 2021-01-21 VITALS
SYSTOLIC BLOOD PRESSURE: 107 MMHG | HEART RATE: 93 BPM | OXYGEN SATURATION: 94 % | RESPIRATION RATE: 20 BRPM | WEIGHT: 165.63 LBS | BODY MASS INDEX: 30 KG/M2 | TEMPERATURE: 98 F | DIASTOLIC BLOOD PRESSURE: 49 MMHG

## 2021-01-21 DIAGNOSIS — C79.51 BONE METASTASES (HCC): Primary | ICD-10-CM

## 2021-01-21 NOTE — PATIENT INSTRUCTIONS
- CALL [phone] FOR A FOLLOW-UP WITH DR. HOUSE    - CALL THE NURSES' LINE AT [phone] IF YOU HAVE ANY QUESTIONS/CONCERNS REGARDING RADIATION THERAPY

## 2021-01-21 NOTE — PROGRESS NOTES
Nursing Follow-Up Note    Patient: Diane Sapp  YOB: 1960  Age: 62year old  Radiation Oncologist: Dr. Ford Morrison  Referring Physician: Dr. Karlos Dumont  Chief Complaint: Patient presents with:   Follow - Up    Date: 1/21/2021    Toxicities: N

## 2021-01-21 NOTE — PROGRESS NOTES
[facility] RADIATION ONCOLOGY  FOLLOW UP     PATIENT:   Jes Plascencia      12/7/1960    DIAGNOSIS:   Metastatic cholangiocarcinoma      CANCER HISTORY   52-year-old woman with history of metastatic cholangiocarcinoma involving liver and bone, diag

## 2021-01-25 ENCOUNTER — TELEPHONE (OUTPATIENT)
Dept: HEMATOLOGY/ONCOLOGY | Facility: HOSPITAL | Age: 61
End: 2021-01-25

## 2021-01-25 NOTE — TELEPHONE ENCOUNTER
Dabrafenib - Cholangiocarcinoma      Fevers - Grade 2 - Terrie having temp of 102 for 3 days, today temp 104, vomiting, and rash to feet. To ER for evaluation.

## 2021-01-26 NOTE — TELEPHONE ENCOUNTER
Kye Cancino called back she had decided not to go to ER yesterday with temp of 104 and red spot to joints. She had been taking Motrin for the fever which did not work and changed to Tylenol.  Fevers continued to come down now 98.8, redness to joints also improving

## 2021-01-28 ENCOUNTER — TELEPHONE (OUTPATIENT)
Dept: HEMATOLOGY/ONCOLOGY | Facility: HOSPITAL | Age: 61
End: 2021-01-28

## 2021-01-28 ENCOUNTER — OFFICE VISIT (OUTPATIENT)
Dept: HEMATOLOGY/ONCOLOGY | Facility: HOSPITAL | Age: 61
End: 2021-01-28
Attending: INTERNAL MEDICINE
Payer: MEDICAID

## 2021-01-28 VITALS
DIASTOLIC BLOOD PRESSURE: 68 MMHG | SYSTOLIC BLOOD PRESSURE: 116 MMHG | HEART RATE: 83 BPM | OXYGEN SATURATION: 96 % | RESPIRATION RATE: 18 BRPM | TEMPERATURE: 98 F

## 2021-01-28 DIAGNOSIS — C22.1 CHOLANGIOCARCINOMA (HCC): Primary | ICD-10-CM

## 2021-01-28 LAB
ALBUMIN SERPL-MCNC: 2.4 G/DL (ref 3.4–5)
ALBUMIN/GLOB SERPL: 0.5 {RATIO} (ref 1–2)
ALP LIVER SERPL-CCNC: 151 U/L
ALT SERPL-CCNC: 52 U/L
ANION GAP SERPL CALC-SCNC: 6 MMOL/L (ref 0–18)
AST SERPL-CCNC: 67 U/L (ref 15–37)
BASOPHILS # BLD AUTO: 0.03 X10(3) UL (ref 0–0.2)
BASOPHILS NFR BLD AUTO: 0.9 %
BILIRUB SERPL-MCNC: 0.5 MG/DL (ref 0.1–2)
BUN BLD-MCNC: 14 MG/DL (ref 7–18)
BUN/CREAT SERPL: 11.7 (ref 10–20)
CALCIUM BLD-MCNC: 8.4 MG/DL (ref 8.5–10.1)
CANCER AG19-9 SERPL-ACNC: 3791.6 U/ML (ref ?–37)
CHLORIDE SERPL-SCNC: 103 MMOL/L (ref 98–112)
CO2 SERPL-SCNC: 24 MMOL/L (ref 21–32)
CREAT BLD-MCNC: 1.2 MG/DL
DEPRECATED RDW RBC AUTO: 50.4 FL (ref 35.1–46.3)
EOSINOPHIL # BLD AUTO: 0.02 X10(3) UL (ref 0–0.7)
EOSINOPHIL NFR BLD AUTO: 0.6 %
ERYTHROCYTE [DISTWIDTH] IN BLOOD BY AUTOMATED COUNT: 14.6 % (ref 11–15)
GLOBULIN PLAS-MCNC: 4.4 G/DL (ref 2.8–4.4)
GLUCOSE BLD-MCNC: 289 MG/DL (ref 70–99)
HCT VFR BLD AUTO: 34.3 %
HGB BLD-MCNC: 11.8 G/DL
IMM GRANULOCYTES # BLD AUTO: 0 X10(3) UL (ref 0–1)
IMM GRANULOCYTES NFR BLD: 0 %
LYMPHOCYTES # BLD AUTO: 1.34 X10(3) UL (ref 1–4)
LYMPHOCYTES NFR BLD AUTO: 42.1 %
M PROTEIN MFR SERPL ELPH: 6.8 G/DL (ref 6.4–8.2)
MCH RBC QN AUTO: 32.5 PG (ref 26–34)
MCHC RBC AUTO-ENTMCNC: 34.4 G/DL (ref 31–37)
MCV RBC AUTO: 94.5 FL
MONOCYTES # BLD AUTO: 0.3 X10(3) UL (ref 0.1–1)
MONOCYTES NFR BLD AUTO: 9.4 %
NEUTROPHILS # BLD AUTO: 1.49 X10 (3) UL (ref 1.5–7.7)
NEUTROPHILS # BLD AUTO: 1.49 X10(3) UL (ref 1.5–7.7)
NEUTROPHILS NFR BLD AUTO: 47 %
OSMOLALITY SERPL CALC.SUM OF ELEC: 287 MOSM/KG (ref 275–295)
PATIENT FASTING Y/N/NP: NO
PLATELET # BLD AUTO: 54 10(3)UL (ref 150–450)
POTASSIUM SERPL-SCNC: 3.7 MMOL/L (ref 3.5–5.1)
RBC # BLD AUTO: 3.63 X10(6)UL
SODIUM SERPL-SCNC: 133 MMOL/L (ref 136–145)
WBC # BLD AUTO: 3.2 X10(3) UL (ref 4–11)

## 2021-01-28 PROCEDURE — 96360 HYDRATION IV INFUSION INIT: CPT

## 2021-01-28 PROCEDURE — 99215 OFFICE O/P EST HI 40 MIN: CPT | Performed by: INTERNAL MEDICINE

## 2021-01-28 PROCEDURE — 80053 COMPREHEN METABOLIC PANEL: CPT

## 2021-01-28 PROCEDURE — 86301 IMMUNOASSAY TUMOR CA 19-9: CPT

## 2021-01-28 PROCEDURE — 85025 COMPLETE CBC W/AUTO DIFF WBC: CPT

## 2021-01-28 RX ORDER — PROCHLORPERAZINE MALEATE 10 MG
10 TABLET ORAL EVERY 8 HOURS PRN
Qty: 60 TABLET | Refills: 11 | Status: SHIPPED | OUTPATIENT
Start: 2021-01-28

## 2021-01-28 RX ORDER — SODIUM CHLORIDE 0.9 % (FLUSH) 0.9 %
10 SYRINGE (ML) INJECTION ONCE
Status: COMPLETED | OUTPATIENT
Start: 2021-01-28 | End: 2021-01-28

## 2021-01-28 RX ORDER — PREDNISONE 10 MG/1
10 TABLET ORAL DAILY
Qty: 30 TABLET | Refills: 3 | Status: SHIPPED | OUTPATIENT
Start: 2021-01-28 | End: 2021-02-11

## 2021-01-28 RX ORDER — SODIUM CHLORIDE 0.9 % (FLUSH) 0.9 %
10 SYRINGE (ML) INJECTION ONCE
Status: CANCELLED | OUTPATIENT
Start: 2021-01-28

## 2021-01-28 RX ORDER — ONDANSETRON 8 MG/1
8 TABLET, ORALLY DISINTEGRATING ORAL EVERY 8 HOURS PRN
Qty: 60 TABLET | Refills: 11 | Status: SHIPPED | OUTPATIENT
Start: 2021-01-28

## 2021-01-28 RX ADMIN — SODIUM CHLORIDE 0.9 % (FLUSH) 10 ML: 0.9 % SYRINGE (ML) INJECTION at 12:40:00

## 2021-01-28 NOTE — PROGRESS NOTES
Education Record    Learner:  Patient    Disease / Diagnosis: cholangiocarcinoma    Barriers / Limitations:  None   Comments:    Method:  Brief focused and Discussion   Comments:    General Topics:  Plan of care reviewed   Comments:    Outcome:  Shows unde

## 2021-01-28 NOTE — PROGRESS NOTES
THE [facility] Hematology and Oncology Clinic Note    Diagnosis: Metastatic Cholangiocarcinoma: liver, bone: BRAF V600E +    Treatment History:   1. Cisplatin 25 mg/m2 D1/8 + Gemcitabine 1000 mg/m2 D1/8 q21 days.    C1: 3/3/20 and 3/10/20: : 181,612 CEA 43.6 other focal areas of bone pain. She is a previous smoker, she smoked for about 40 years and quit 2 years ago. She works as a . No Etoh abuse. FH of throat cancer in her brother.      Labs from 1/31/20: CBC is normal. CMP with elevated TP 8.9, hepatic lesions. · 8/12/20: Discussed at TB: proceed with maintenance Gemcitabine   · 11/4/20: CTA Chest: No PE.   · 11/6/20: MR L Spine: Focal T1/T2 signal at L3 concerning for osseous disease. 1.8 x 1.7 x 1,7 cm.    · 11/12/20: PET/CT: abnormal FDG upta mouth 2 (two) times daily. (Patient taking differently: Take 200 mg by mouth 2 (two) times daily. Taking 200m in AM, 100mg at HS ), Disp: 120 capsule, Rfl: 1    •  Clobetasol Propionate 0.025 % External Cream, Apply 1 Application topically daily. , Disp: 1 MD Winston    •  Heparin Lock Flush 100 UNIT/ML lock flush 500 Units, 5 mL, Intracatheter, Once, Winston Washington MD    •  sodium chloride 0.9% IV bolus 1,000 mL, 1,000 mL, Intravenous, Once, Winston Washington MD    •  [COMPLETED] Heparin Lock Flush 100 U Exam  Height: --  Weight: --  BSA (Calculated - sq m): --  Pulse: 83 (01/28 1000)  BP: 116/68 (01/28 1000)  Temp: 97.7 °F (36.5 °C) (01/28 1000)  Do Not Use - Resp Rate: --  SpO2: 96 % (01/28 1000)     General: NAD, AOX3  HEENT: clear op, mmm, no jvd, no s /Trametinib 2 mg daily (1/1/21-current)  - We discussed that with her fevers and thrombocytopenia we could consider a dose reduction. However, given the rapid drop in her , she would like to proceed with full doses.  We will start her on prednisone an

## 2021-01-28 NOTE — TELEPHONE ENCOUNTER
Spoke with patient. She verbalized understanding. Cabrera Stack MD  P Edw [facility]             Results reviewed. Can you let her know that her  is down to [phone]!  Thanks

## 2021-01-30 RX ORDER — ROSUVASTATIN CALCIUM 5 MG/1
TABLET, COATED ORAL
Qty: 30 TABLET | Refills: 0 | Status: SHIPPED | OUTPATIENT
Start: 2021-01-30 | End: 2021-03-31

## 2021-01-30 NOTE — TELEPHONE ENCOUNTER
Medication(s) to Refill:   Requested Prescriptions     Pending Prescriptions Disp Refills   • ROSUVASTATIN CALCIUM 5 MG Oral Tab [Pharmacy Med Name: Rosuvastatin Calcium Oral Tablet 5 MG] 30 tablet 0     Sig: TAKE 1 TABLET BY MOUTH IN THE EVENING       --- TO SCHEDULE.**  Your appointment is on the [address] [facility] in the [facility]. The address is [address] [place] [address], [address] [place] [address]. Please register at the [address] [facility] on the second floor.   **Important Info for [address] [place] [address]** [address]

## 2021-02-10 NOTE — PROGRESS NOTES
THE [facility] Hematology and Oncology Clinic Note    Diagnosis: Metastatic Cholangiocarcinoma: liver, bone: BRAF V600E +    Treatment History:   1. Cisplatin 25 mg/m2 D1/8 + Gemcitabine 1000 mg/m2 D1/8 q21 days.    C1: 3/3/20 and 3/10/20: : 695,954 CEA 43.6 other focal areas of bone pain. She is a previous smoker, she smoked for about 40 years and quit 2 years ago. She works as a . No Etoh abuse. FH of throat cancer in her brother.      Labs from 1/31/20: CBC is normal. CMP with elevated TP 8.9, hepatic lesions. · 8/12/20: Discussed at TB: proceed with maintenance Gemcitabine   · 11/4/20: CTA Chest: No PE.   · 11/6/20: MR L Spine: Focal T1/T2 signal at L3 concerning for osseous disease. 1.8 x 1.7 x 1,7 cm.    · 11/12/20: PET/CT: abnormal FDG upta (COMPAZINE) 10 mg tablet, Take 1 tablet (10 mg total) by mouth every 8 (eight) hours as needed for Nausea., Disp: 60 tablet, Rfl: 11    •  gabapentin 100 MG Oral Cap, Take 2 capsules (200 mg total) by mouth 2 (two) times daily.  (Patient taking differently: daily., Disp: 30 tablet, Rfl: 5      •  [COMPLETED] Normal Saline Flush 0.9 % injection 10 mL, 10 mL, Intravenous, Once, Winston Washington MD, 10 mL at 01/28/21 1240    •  [COMPLETED] Heparin Lock Flush 100 UNIT/ML lock flush 500 Units, 5 mL, Intracatheter, Disease Sister    • Heart Disease Brother    • Cancer Brother        Physical Exam  Height: 157.5 cm (5' 2.01\") (02/11 0908)  Weight: 76.7 kg (169 lb) (02/11 0908)  BSA (Calculated - sq m): 1.78 sq meters (02/11 0908)  Pulse: 85 (02/11 0908)  BP: 138/72 ( BID /Trametinib 2 mg daily based of the data from [address]. Lancet 2020. Study showed an CASTANON of 51%. Plan  - Dabrafenib 150 mg BID /Trametinib 2 mg daily (1/1/21-current).  continues to drop.    - Will repeat Imaging after March 1 with

## 2021-02-11 ENCOUNTER — NURSE ONLY (OUTPATIENT)
Dept: HEMATOLOGY/ONCOLOGY | Facility: HOSPITAL | Age: 61
End: 2021-02-11
Attending: INTERNAL MEDICINE
Payer: MEDICAID

## 2021-02-11 ENCOUNTER — TELEPHONE (OUTPATIENT)
Dept: HEMATOLOGY/ONCOLOGY | Facility: HOSPITAL | Age: 61
End: 2021-02-11

## 2021-02-11 VITALS
WEIGHT: 169 LBS | DIASTOLIC BLOOD PRESSURE: 72 MMHG | HEART RATE: 85 BPM | HEIGHT: 62.01 IN | RESPIRATION RATE: 16 BRPM | OXYGEN SATURATION: 91 % | TEMPERATURE: 98 F | BODY MASS INDEX: 30.71 KG/M2 | SYSTOLIC BLOOD PRESSURE: 138 MMHG

## 2021-02-11 DIAGNOSIS — C22.1 CHOLANGIOCARCINOMA (HCC): ICD-10-CM

## 2021-02-11 DIAGNOSIS — C22.1 CHOLANGIOCARCINOMA (HCC): Primary | ICD-10-CM

## 2021-02-11 LAB
ALBUMIN SERPL-MCNC: 2.5 G/DL (ref 3.4–5)
ALBUMIN/GLOB SERPL: 0.5 {RATIO} (ref 1–2)
ALP LIVER SERPL-CCNC: 139 U/L
ALT SERPL-CCNC: 33 U/L
ANION GAP SERPL CALC-SCNC: 5 MMOL/L (ref 0–18)
AST SERPL-CCNC: 34 U/L (ref 15–37)
BASOPHILS # BLD AUTO: 0.04 X10(3) UL (ref 0–0.2)
BASOPHILS NFR BLD AUTO: 0.7 %
BILIRUB SERPL-MCNC: 0.2 MG/DL (ref 0.1–2)
BUN BLD-MCNC: 16 MG/DL (ref 7–18)
BUN/CREAT SERPL: 18.2 (ref 10–20)
CALCIUM BLD-MCNC: 8.8 MG/DL (ref 8.5–10.1)
CANCER AG19-9 SERPL-ACNC: 1516.6 U/ML (ref ?–37)
CHLORIDE SERPL-SCNC: 105 MMOL/L (ref 98–112)
CO2 SERPL-SCNC: 28 MMOL/L (ref 21–32)
CREAT BLD-MCNC: 0.88 MG/DL
DEPRECATED RDW RBC AUTO: 56.5 FL (ref 35.1–46.3)
EOSINOPHIL # BLD AUTO: 0.26 X10(3) UL (ref 0–0.7)
EOSINOPHIL NFR BLD AUTO: 4.3 %
ERYTHROCYTE [DISTWIDTH] IN BLOOD BY AUTOMATED COUNT: 15.4 % (ref 11–15)
GLOBULIN PLAS-MCNC: 4.8 G/DL (ref 2.8–4.4)
GLUCOSE BLD-MCNC: 194 MG/DL (ref 70–99)
HCT VFR BLD AUTO: 36.1 %
HGB BLD-MCNC: 11.8 G/DL
IMM GRANULOCYTES # BLD AUTO: 0.03 X10(3) UL (ref 0–1)
IMM GRANULOCYTES NFR BLD: 0.5 %
LYMPHOCYTES # BLD AUTO: 2.02 X10(3) UL (ref 1–4)
LYMPHOCYTES NFR BLD AUTO: 33.8 %
M PROTEIN MFR SERPL ELPH: 7.3 G/DL (ref 6.4–8.2)
MCH RBC QN AUTO: 32.3 PG (ref 26–34)
MCHC RBC AUTO-ENTMCNC: 32.7 G/DL (ref 31–37)
MCV RBC AUTO: 98.9 FL
MONOCYTES # BLD AUTO: 0.6 X10(3) UL (ref 0.1–1)
MONOCYTES NFR BLD AUTO: 10 %
NEUTROPHILS # BLD AUTO: 3.03 X10 (3) UL (ref 1.5–7.7)
NEUTROPHILS # BLD AUTO: 3.03 X10(3) UL (ref 1.5–7.7)
NEUTROPHILS NFR BLD AUTO: 50.7 %
OSMOLALITY SERPL CALC.SUM OF ELEC: 292 MOSM/KG (ref 275–295)
PATIENT FASTING Y/N/NP: NO
PLATELET # BLD AUTO: 110 10(3)UL (ref 150–450)
POTASSIUM SERPL-SCNC: 3.9 MMOL/L (ref 3.5–5.1)
RBC # BLD AUTO: 3.65 X10(6)UL
SODIUM SERPL-SCNC: 138 MMOL/L (ref 136–145)
WBC # BLD AUTO: 6 X10(3) UL (ref 4–11)

## 2021-02-11 PROCEDURE — 36591 DRAW BLOOD OFF VENOUS DEVICE: CPT

## 2021-02-11 PROCEDURE — 80053 COMPREHEN METABOLIC PANEL: CPT

## 2021-02-11 PROCEDURE — 85025 COMPLETE CBC W/AUTO DIFF WBC: CPT

## 2021-02-11 PROCEDURE — 86301 IMMUNOASSAY TUMOR CA 19-9: CPT

## 2021-02-11 PROCEDURE — 99214 OFFICE O/P EST MOD 30 MIN: CPT | Performed by: INTERNAL MEDICINE

## 2021-02-11 RX ORDER — PREDNISONE 10 MG/1
10 TABLET ORAL DAILY
Qty: 30 TABLET | Refills: 3 | Status: SHIPPED | OUTPATIENT
Start: 2021-02-11 | End: 2021-04-29

## 2021-02-11 NOTE — TELEPHONE ENCOUNTER
Devin Hinojosa MD  P [facility]             Results reviewed. Can you let her know that her  is down to [phone]. Called to pt she v/u.

## 2021-02-11 NOTE — TELEPHONE ENCOUNTER
MD Selam Machado, RN; P [facility]   Caller: Unspecified (Today,  9:38 AM)             I put in my order ok for pharmacy to substitute. I am ok with any steroid cream that's covered.       Called pharmacy they had already gotten the

## 2021-02-11 NOTE — PROGRESS NOTES
Outpatient Oncology Care Plan  Problem list:  diarrhea  constipation  fatigue  knowledge deficit  rash    Problems related to:    chemotherapy  side effect of treatment    Interventions:  provided general teaching    Expected outcomes:  understands plan of

## 2021-02-11 NOTE — PROGRESS NOTES
Pt came in for cll/md. Needle left in for Dr nona Smith it was needed. Port was not needed, and pulled. Heparin instilled. Pt discharged home.

## 2021-02-17 ENCOUNTER — TELEPHONE (OUTPATIENT)
Dept: HEMATOLOGY/ONCOLOGY | Facility: HOSPITAL | Age: 61
End: 2021-02-17

## 2021-02-17 RX ORDER — TRIAMCINOLONE ACETONIDE 5 MG/G
15 CREAM TOPICAL 3 TIMES DAILY
Qty: 1 TUBE | Refills: 0 | Status: SHIPPED | OUTPATIENT
Start: 2021-02-17

## 2021-02-17 NOTE — TELEPHONE ENCOUNTER
Spoke with patient. MD sent topical to preferred pharmacy. Patient will also resume OTC creams. She will call if symptoms worsen.

## 2021-02-17 NOTE — TELEPHONE ENCOUNTER
Ashlee Key calling having a reaction to medication she is itching everywhere she's bleeding from scratching so much.  Dr Abe Weinberg was to send something to [facility] in [place] [employer] but nothing is there for her, stacy

## 2021-02-17 NOTE — TELEPHONE ENCOUNTER
Marquis Shah from [facility] [address] ([phone])  called regarding the prescription for the Triamcinolone cream for your patient. Marquis Shah stated that the directions came over to apply 15 grams which is the entire tube.     Pharmacy needs to verify if those directi

## 2021-02-17 NOTE — TELEPHONE ENCOUNTER
Returned call to Jo Martinez, she is having continuing itching from her medication and Dr Alicia Ponce had her on steroid cream and a benadryl cream both of which she is out of and the itching is all over and she is scratching until she is bleeding, MD was going to send

## 2021-03-02 ENCOUNTER — OFFICE VISIT (OUTPATIENT)
Dept: HEMATOLOGY/ONCOLOGY | Facility: HOSPITAL | Age: 61
End: 2021-03-02
Attending: INTERNAL MEDICINE
Payer: MEDICAID

## 2021-03-02 ENCOUNTER — TELEPHONE (OUTPATIENT)
Dept: HEMATOLOGY/ONCOLOGY | Facility: HOSPITAL | Age: 61
End: 2021-03-02

## 2021-03-02 VITALS
OXYGEN SATURATION: 92 % | HEART RATE: 87 BPM | RESPIRATION RATE: 16 BRPM | WEIGHT: 170 LBS | TEMPERATURE: 97 F | BODY MASS INDEX: 31 KG/M2 | SYSTOLIC BLOOD PRESSURE: 139 MMHG | DIASTOLIC BLOOD PRESSURE: 80 MMHG

## 2021-03-02 DIAGNOSIS — C22.1 CHOLANGIOCARCINOMA (HCC): Primary | ICD-10-CM

## 2021-03-02 DIAGNOSIS — L29.8 ITCHING DUE TO DRUG: ICD-10-CM

## 2021-03-02 DIAGNOSIS — T50.905A ITCHING DUE TO DRUG: ICD-10-CM

## 2021-03-02 LAB
ALBUMIN SERPL-MCNC: 2.6 G/DL (ref 3.4–5)
ALBUMIN/GLOB SERPL: 0.6 {RATIO} (ref 1–2)
ALP LIVER SERPL-CCNC: 133 U/L
ALT SERPL-CCNC: 37 U/L
ANION GAP SERPL CALC-SCNC: 6 MMOL/L (ref 0–18)
AST SERPL-CCNC: 34 U/L (ref 15–37)
BASOPHILS # BLD AUTO: 0.04 X10(3) UL (ref 0–0.2)
BASOPHILS NFR BLD AUTO: 0.5 %
BILIRUB SERPL-MCNC: 0.3 MG/DL (ref 0.1–2)
BUN BLD-MCNC: 17 MG/DL (ref 7–18)
BUN/CREAT SERPL: 15 (ref 10–20)
CALCIUM BLD-MCNC: 8.6 MG/DL (ref 8.5–10.1)
CANCER AG19-9 SERPL-ACNC: 576.6 U/ML (ref ?–37)
CHLORIDE SERPL-SCNC: 105 MMOL/L (ref 98–112)
CO2 SERPL-SCNC: 26 MMOL/L (ref 21–32)
CREAT BLD-MCNC: 1.13 MG/DL
DEPRECATED RDW RBC AUTO: 52.9 FL (ref 35.1–46.3)
EOSINOPHIL # BLD AUTO: 0.45 X10(3) UL (ref 0–0.7)
EOSINOPHIL NFR BLD AUTO: 6.1 %
ERYTHROCYTE [DISTWIDTH] IN BLOOD BY AUTOMATED COUNT: 14.4 % (ref 11–15)
GLOBULIN PLAS-MCNC: 4.5 G/DL (ref 2.8–4.4)
GLUCOSE BLD-MCNC: 242 MG/DL (ref 70–99)
HCT VFR BLD AUTO: 37.3 %
HGB BLD-MCNC: 12.4 G/DL
IMM GRANULOCYTES # BLD AUTO: 0.03 X10(3) UL (ref 0–1)
IMM GRANULOCYTES NFR BLD: 0.4 %
LYMPHOCYTES # BLD AUTO: 2.04 X10(3) UL (ref 1–4)
LYMPHOCYTES NFR BLD AUTO: 27.5 %
M PROTEIN MFR SERPL ELPH: 7.1 G/DL (ref 6.4–8.2)
MCH RBC QN AUTO: 33.2 PG (ref 26–34)
MCHC RBC AUTO-ENTMCNC: 33.2 G/DL (ref 31–37)
MCV RBC AUTO: 100 FL
MONOCYTES # BLD AUTO: 0.74 X10(3) UL (ref 0.1–1)
MONOCYTES NFR BLD AUTO: 10 %
NEUTROPHILS # BLD AUTO: 4.11 X10 (3) UL (ref 1.5–7.7)
NEUTROPHILS # BLD AUTO: 4.11 X10(3) UL (ref 1.5–7.7)
NEUTROPHILS NFR BLD AUTO: 55.5 %
OSMOLALITY SERPL CALC.SUM OF ELEC: 294 MOSM/KG (ref 275–295)
PATIENT FASTING Y/N/NP: NO
PLATELET # BLD AUTO: 122 10(3)UL (ref 150–450)
POTASSIUM SERPL-SCNC: 3.9 MMOL/L (ref 3.5–5.1)
RBC # BLD AUTO: 3.73 X10(6)UL
SODIUM SERPL-SCNC: 137 MMOL/L (ref 136–145)
WBC # BLD AUTO: 7.4 X10(3) UL (ref 4–11)

## 2021-03-02 PROCEDURE — 99214 OFFICE O/P EST MOD 30 MIN: CPT | Performed by: INTERNAL MEDICINE

## 2021-03-02 PROCEDURE — 36591 DRAW BLOOD OFF VENOUS DEVICE: CPT

## 2021-03-02 RX ORDER — PREDNISONE 20 MG/1
80 TABLET ORAL DAILY
Qty: 28 TABLET | Refills: 0 | Status: SHIPPED | OUTPATIENT
Start: 2021-03-02 | End: 2021-03-09

## 2021-03-02 RX ORDER — PANTOPRAZOLE SODIUM 40 MG/1
40 TABLET, DELAYED RELEASE ORAL DAILY
Qty: 30 TABLET | Refills: 0 | Status: SHIPPED | OUTPATIENT
Start: 2021-03-02 | End: 2021-03-30

## 2021-03-02 NOTE — TELEPHONE ENCOUNTER
Spoke with patient. She verbalized understanding. Coleen Lewis MD  P Jeramy[facility]             Results reviewed. Can you let her know that her  is coming down, its 576.  Thanks

## 2021-03-02 NOTE — PROGRESS NOTES
Outpatient Oncology Care Plan   Problem list:    knowledge deficit   Problems related to:   chemotherapy  disease/disease progression   Interventions:   provided general teaching   Expected outcomes:   understands plan of care  Progress towards outcome: ma

## 2021-03-12 ENCOUNTER — HOSPITAL ENCOUNTER (OUTPATIENT)
Dept: NUCLEAR MEDICINE | Facility: HOSPITAL | Age: 61
Discharge: HOME | End: 2021-03-12
Attending: INTERNAL MEDICINE
Payer: MEDICAID

## 2021-03-12 ENCOUNTER — TELEPHONE (OUTPATIENT)
Dept: HEMATOLOGY/ONCOLOGY | Facility: HOSPITAL | Age: 61
End: 2021-03-12

## 2021-03-12 DIAGNOSIS — C22.1 CHOLANGIOCARCINOMA (HCC): ICD-10-CM

## 2021-03-12 LAB — GLUCOSE BLD-MCNC: 163 MG/DL (ref 70–99)

## 2021-03-12 PROCEDURE — 82962 GLUCOSE BLOOD TEST: CPT

## 2021-03-12 PROCEDURE — 78815 PET IMAGE W/CT SKULL-THIGH: CPT | Performed by: INTERNAL MEDICINE

## 2021-03-12 NOTE — TELEPHONE ENCOUNTER
Spoke with patient regarding results. She verbalized understanding. Juna Councilman, MD  P [facility]  Results reviewed. Overall PET/CT shows significant improvement in disease. Will repeat imaging in 2-3 months. Thanks!

## 2021-03-14 NOTE — PROGRESS NOTES
[facility] On[facility] Clinic Note    Diagnosis: Metastatic Cholangiocarcinoma: liver, bone: BRAF V600E +    Treatment History:   1. Cisplatin 25 mg/m2 D1/8 + Gemcitabine 1000 mg/m2 D1/8 q21 days.    C1: 3/3/20 and 3/10/20: : 035,503 CEA 43.6 other focal areas of bone pain. She is a previous smoker, she smoked for about 40 years and quit 2 years ago. She works as a . No Etoh abuse. FH of throat cancer in her brother.      Labs from 1/31/20: CBC is normal. CMP with elevated TP 8.9, hepatic lesions. · 8/12/20: Discussed at TB: proceed with maintenance Gemcitabine   · 11/4/20: CTA Chest: No PE.   · 11/6/20: MR L Spine: Focal T1/T2 signal at L3 concerning for osseous disease. 1.8 x 1.7 x 1,7 cm.    · 11/12/20: PET/CT: abnormal FDG upta 0  predniSONE 10 MG Oral Tab, Take 1 tablet (10 mg total) by mouth daily. , Disp: 30 tablet, Rfl: 3  Clobetasol Propionate 0.025 % External Cream, Apply 1 Application topically daily. , Disp: 1 Tube, Rfl: 11  ROSUVASTATIN CALCIUM 5 MG Oral Tab, TAKE 1 TABLET facility-administered medications on file prior to visit.     Past Medical History:   Diagnosis Date   • Atherosclerosis of coronary artery    • Cancer [facility])    • COPD (chronic obstructive pulmonary disease) (HCC)     2 1/2 L O2 @ night   • Coronary atheros 92 % (03/16 0932)     General: NAD, AOX3  HEENT: clear op, mmm, no jvd, no scleral icterus  CV: RRR s1s2 no murmurs  Extremities: No edema   Lungs: no increased wob, ctab  Abd: non-distended, soft nt + BS  Neuro: CN: II-XII grossly intact  Port C/D/I  Skin TB-plan for maintenance Gemcitabine   - Repeat imaging from 11/2020 shows mild increase in FDG uptake however,  started to rise.  Due to progression and BRAF positivity, we started  Dabrafenib 150 mg BID /Trametinib 2 mg daily based of the data from S - Uses PRN 02    Right Renal Cortical Lesion: No FDG uptake. Continue to monitor. Pain Control: Following with Palliative care.     Neuropathy: controlled with gabapentin     Risk-High: Cholangiocarcinoma-Rx for Chemotherapy    Checklist:  ECOG 2  RTC

## 2021-03-16 ENCOUNTER — NURSE ONLY (OUTPATIENT)
Dept: HEMATOLOGY/ONCOLOGY | Facility: HOSPITAL | Age: 61
End: 2021-03-16
Attending: INTERNAL MEDICINE
Payer: MEDICAID

## 2021-03-16 VITALS
HEART RATE: 85 BPM | SYSTOLIC BLOOD PRESSURE: 142 MMHG | WEIGHT: 170.38 LBS | OXYGEN SATURATION: 92 % | DIASTOLIC BLOOD PRESSURE: 66 MMHG | BODY MASS INDEX: 30.96 KG/M2 | TEMPERATURE: 97 F | RESPIRATION RATE: 16 BRPM | HEIGHT: 62.01 IN

## 2021-03-16 DIAGNOSIS — C22.1 CHOLANGIOCARCINOMA (HCC): Primary | ICD-10-CM

## 2021-03-16 DIAGNOSIS — C22.1 CHOLANGIOCARCINOMA (HCC): ICD-10-CM

## 2021-03-16 LAB
ALBUMIN SERPL-MCNC: 2.9 G/DL (ref 3.4–5)
ALBUMIN/GLOB SERPL: 0.7 {RATIO} (ref 1–2)
ALP LIVER SERPL-CCNC: 140 U/L
ALT SERPL-CCNC: 39 U/L
ANION GAP SERPL CALC-SCNC: 5 MMOL/L (ref 0–18)
AST SERPL-CCNC: 29 U/L (ref 15–37)
BASOPHILS # BLD AUTO: 0.04 X10(3) UL (ref 0–0.2)
BASOPHILS NFR BLD AUTO: 0.6 %
BILIRUB SERPL-MCNC: 0.4 MG/DL (ref 0.1–2)
BUN BLD-MCNC: 18 MG/DL (ref 7–18)
BUN/CREAT SERPL: 17.6 (ref 10–20)
CALCIUM BLD-MCNC: 8.9 MG/DL (ref 8.5–10.1)
CANCER AG19-9 SERPL-ACNC: 511.3 U/ML (ref ?–37)
CHLORIDE SERPL-SCNC: 102 MMOL/L (ref 98–112)
CO2 SERPL-SCNC: 26 MMOL/L (ref 21–32)
CREAT BLD-MCNC: 1.02 MG/DL
DEPRECATED RDW RBC AUTO: 50.7 FL (ref 35.1–46.3)
EOSINOPHIL # BLD AUTO: 0.1 X10(3) UL (ref 0–0.7)
EOSINOPHIL NFR BLD AUTO: 1.5 %
ERYTHROCYTE [DISTWIDTH] IN BLOOD BY AUTOMATED COUNT: 13.7 % (ref 11–15)
GLOBULIN PLAS-MCNC: 4.4 G/DL (ref 2.8–4.4)
GLUCOSE BLD-MCNC: 309 MG/DL (ref 70–99)
HCT VFR BLD AUTO: 40.4 %
HGB BLD-MCNC: 13.5 G/DL
IMM GRANULOCYTES # BLD AUTO: 0.03 X10(3) UL (ref 0–1)
IMM GRANULOCYTES NFR BLD: 0.4 %
LYMPHOCYTES # BLD AUTO: 1.38 X10(3) UL (ref 1–4)
LYMPHOCYTES NFR BLD AUTO: 20.4 %
M PROTEIN MFR SERPL ELPH: 7.3 G/DL (ref 6.4–8.2)
MCH RBC QN AUTO: 33.6 PG (ref 26–34)
MCHC RBC AUTO-ENTMCNC: 33.4 G/DL (ref 31–37)
MCV RBC AUTO: 100.5 FL
MONOCYTES # BLD AUTO: 0.44 X10(3) UL (ref 0.1–1)
MONOCYTES NFR BLD AUTO: 6.5 %
NEUTROPHILS # BLD AUTO: 4.77 X10 (3) UL (ref 1.5–7.7)
NEUTROPHILS # BLD AUTO: 4.77 X10(3) UL (ref 1.5–7.7)
NEUTROPHILS NFR BLD AUTO: 70.6 %
OSMOLALITY SERPL CALC.SUM OF ELEC: 290 MOSM/KG (ref 275–295)
PLATELET # BLD AUTO: 114 10(3)UL (ref 150–450)
POTASSIUM SERPL-SCNC: 4.5 MMOL/L (ref 3.5–5.1)
RBC # BLD AUTO: 4.02 X10(6)UL
SODIUM SERPL-SCNC: 133 MMOL/L (ref 136–145)
WBC # BLD AUTO: 6.8 X10(3) UL (ref 4–11)

## 2021-03-16 PROCEDURE — 99214 OFFICE O/P EST MOD 30 MIN: CPT | Performed by: INTERNAL MEDICINE

## 2021-03-16 PROCEDURE — 36591 DRAW BLOOD OFF VENOUS DEVICE: CPT

## 2021-03-16 PROCEDURE — 86301 IMMUNOASSAY TUMOR CA 19-9: CPT

## 2021-03-17 DIAGNOSIS — Z23 NEED FOR VACCINATION: ICD-10-CM

## 2021-03-25 ENCOUNTER — IMMUNIZATION (OUTPATIENT)
Dept: LAB | Age: 61
End: 2021-03-25
Attending: HOSPITALIST
Payer: MEDICAID

## 2021-03-25 ENCOUNTER — HOSPITAL ENCOUNTER (OUTPATIENT)
Age: 61
Discharge: HOME | End: 2021-03-25
Payer: MEDICAID

## 2021-03-25 ENCOUNTER — APPOINTMENT (OUTPATIENT)
Dept: GENERAL RADIOLOGY | Age: 61
End: 2021-03-25
Attending: PHYSICIAN ASSISTANT
Payer: MEDICAID

## 2021-03-25 VITALS
OXYGEN SATURATION: 95 % | DIASTOLIC BLOOD PRESSURE: 67 MMHG | HEART RATE: 95 BPM | RESPIRATION RATE: 16 BRPM | TEMPERATURE: 98 F | SYSTOLIC BLOOD PRESSURE: 134 MMHG
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
## 2021-03-25 DIAGNOSIS — Z23 NEED FOR VACCINATION: Primary | ICD-10-CM

## 2021-03-25 DIAGNOSIS — S83.91XA SPRAIN OF RIGHT KNEE, UNSPECIFIED LIGAMENT, INITIAL ENCOUNTER: Primary | ICD-10-CM

## 2021-03-25 DIAGNOSIS — S60.211A CONTUSION OF RIGHT WRIST, INITIAL ENCOUNTER: ICD-10-CM

## 2021-03-25 LAB
#MXD IC: 0.5 X10ˆ3/UL (ref 0.1–1)
HCT VFR BLD AUTO: 37.9 %
HGB BLD-MCNC: 12.5 G/DL
LYMPHOCYTES # BLD AUTO: 1.3 X10ˆ3/UL (ref 1–4)
LYMPHOCYTES NFR BLD AUTO: 23.8 %
MCH RBC QN AUTO: 32.5 PG (ref 26–34)
MCHC RBC AUTO-ENTMCNC: 33 G/DL (ref 31–37)
MCV RBC AUTO: 98.4 FL (ref 80–100)
MIXED CELL %: 9.1 %
NEUTROPHILS # BLD AUTO: 3.5 X10ˆ3/UL (ref 1.5–7.7)
NEUTROPHILS NFR BLD AUTO: 67.1 %
PLATELET # BLD AUTO: 121 X10ˆ3/UL (ref 150–450)
RBC # BLD AUTO: 3.85 X10ˆ6/UL
WBC # BLD AUTO: 5.3 X10ˆ3/UL (ref 4–11)

## 2021-03-25 PROCEDURE — 99214 OFFICE O/P EST MOD 30 MIN: CPT

## 2021-03-25 PROCEDURE — 36415 COLL VENOUS BLD VENIPUNCTURE: CPT

## 2021-03-25 PROCEDURE — 73560 X-RAY EXAM OF KNEE 1 OR 2: CPT | Performed by: PHYSICIAN ASSISTANT

## 2021-03-25 PROCEDURE — 99213 OFFICE O/P EST LOW 20 MIN: CPT

## 2021-03-25 PROCEDURE — 85025 COMPLETE CBC W/AUTO DIFF WBC: CPT | Performed by: PHYSICIAN ASSISTANT

## 2021-03-25 PROCEDURE — 0001A SARSCOV2 VAC 30MCG/0.3ML IM: CPT

## 2021-03-25 RX ORDER — ACETAMINOPHEN 500 MG
1000 TABLET ORAL ONCE
Status: COMPLETED | OUTPATIENT
Start: 2021-03-25 | End: 2021-03-25

## 2021-03-25 NOTE — ED INITIAL ASSESSMENT (HPI)
C/o tripped and fell at home landed right knee and right wrist to a hardwood floor. Swelling and pain to right knee, with bruising to right wrist area. Denies hitting head.

## 2021-03-25 NOTE — ED PROVIDER NOTES
Patient Seen in: [facility]      History   Patient presents with:  Fall    Stated Complaint: pt fell hit right knee and right wrist    HPI/Subjective:   HPI    Chely Apolinar is a 41-year-old female who presents today for evaluation of right knee p Alcohol use: No    Drug use: No             Review of Systems    Positive for stated complaint: pt fell hit right knee and right wrist  Other systems are as noted in HPI. Constitutional and vital signs reviewed.       All other systems reviewed and negativ and decreased range of motion. Small contusion to the ulnar side of the right wrist that is nontender to palpation with normal range of motion. Patient declines x-ray of the wrist, but would like an x-ray of the knee.   She is also requesting that we chec [phone]  [phone]    Schedule an appointment as soon as possible for a visit             Medications Prescribed:  Discharge Medication List as of 3/25/2021  1:56 PM

## 2021-03-29 NOTE — TELEPHONE ENCOUNTER
Medication(s) to Refill:   Requested Prescriptions     Pending Prescriptions Disp Refills   • ROSUVASTATIN CALCIUM 5 MG Oral Tab [Pharmacy Med Name: Rosuvastatin Calcium Oral Tablet 5 MG] 30 tablet 0     Sig: TAKE 1 TABLET BY MOUTH IN THE EVENING       LOV appointment is on the [address] [facility] in the [employer]. The address is [address] [place] [address], [place]. Please register at the [address] [facility] on the second floor.   **Important Info for [employer]** Because the safety and pro

## 2021-03-30 RX ORDER — PANTOPRAZOLE SODIUM 40 MG/1
TABLET, DELAYED RELEASE ORAL
Qty: 30 TABLET | Refills: 0 | Status: SHIPPED | OUTPATIENT
Start: 2021-03-30 | End: 2021-05-13

## 2021-03-31 RX ORDER — ROSUVASTATIN CALCIUM 5 MG/1
TABLET, COATED ORAL
Qty: 30 TABLET | Refills: 0 | Status: SHIPPED | OUTPATIENT
Start: 2021-03-31 | End: 2021-05-13

## 2021-04-15 ENCOUNTER — IMMUNIZATION (OUTPATIENT)
Dept: LAB | Age: 61
End: 2021-04-15
Attending: HOSPITALIST
Payer: MEDICAID

## 2021-04-15 DIAGNOSIS — Z23 NEED FOR VACCINATION: Primary | ICD-10-CM

## 2021-04-15 PROCEDURE — 0002A SARSCOV2 VAC 30MCG/0.3ML IM: CPT

## 2021-04-17 NOTE — PROGRESS NOTES
[facility] Hematology and Oncology Clinic Note    Diagnosis: Metastatic Cholangiocarcinoma: liver, bone: BRAF V600E +    Treatment History:   1. Cisplatin 25 mg/m2 D1/8 + Gemcitabine 1000 mg/m2 D1/8 q21 days.    C1: 3/3/20 and 3/10/20: : 693,374 CEA 43.6 other focal areas of bone pain. She is a previous smoker, she smoked for about 40 years and quit 2 years ago. She works as a . No Etoh abuse. FH of throat cancer in her brother.      Labs from 1/31/20: CBC is normal. CMP with elevated TP 8.9, hepatic lesions. · 8/12/20: Discussed at TB: proceed with maintenance Gemcitabine   · 11/4/20: CTA Chest: No PE.   · 11/6/20: MR L Spine: Focal T1/T2 signal at L3 concerning for osseous disease. 1.8 x 1.7 x 1,7 cm.    · 11/12/20: PET/CT: abnormal FDG upta MG Oral Cap, , Disp: , Rfl:   ROSUVASTATIN CALCIUM 5 MG Oral Tab, TAKE 1 TABLET BY MOUTH IN THE EVENING , Disp: 30 tablet, Rfl: 0  PANTOPRAZOLE SODIUM 40 MG Oral Tab EC, TAKE 1 TABLET BY MOUTH EVERY DAY , Disp: 30 tablet, Rfl: 0  predniSONE 10 MG Oral Tab, 2 (two) times daily.  (Patient not taking: Reported on 3/25/2021 ), Disp: 120 capsule, Rfl: 1    [COMPLETED] iohexol (OMNIPAQUE) 350 MG/ML injection 100 mL, 100 mL, Intravenous, ONCE PRN, Inocente Escalera MD, 100 mL at 04/19/21 1337  [COMPLETED] acetaminoph 1546)  BSA (Calculated - sq m): 1.8 sq meters (04/20 0942)  Pulse: 99 (04/20 0942)  BP: 141/86 (04/20 0942)  Temp: 97.3 °F (36.3 °C) (04/20 0942)  Do Not Use - Resp Rate: --  SpO2: 92 % (04/20 0942)     General: NAD, AOX3  HEENT: clear op, mmm, no jvd, no al. [place] 2010. Completed 8 cycles of Cis/[place] with reduction in tumor markers. Follow up PET/CT from 8/10/20 with persistent but stable disease in liver and bone (T7 and L3).  Discussed in TB-plan for maintenance Gemcitabine   · Repeat imaging from 11/2020 s consider hearing aids Not done since not covered by insurance. RSV PNA: s/p steroids. Also completed levaquin for sinusitis. Transaminititis:mild likely from metastases and HASTINGS. Follow. Improving.      Pulmonary Fibrosis/Nodules  40-45 pack year sm

## 2021-04-19 ENCOUNTER — APPOINTMENT (OUTPATIENT)
Dept: CT IMAGING | Facility: HOSPITAL | Age: 61
End: 2021-04-19
Attending: EMERGENCY MEDICINE
Payer: MEDICAID

## 2021-04-19 ENCOUNTER — HOSPITAL ENCOUNTER (EMERGENCY)
Facility: HOSPITAL | Age: 61
Discharge: HOME | End: 2021-04-19
Attending: EMERGENCY MEDICINE
Payer: MEDICAID

## 2021-04-19 VITALS
BODY MASS INDEX: 30.91 KG/M2 | SYSTOLIC BLOOD PRESSURE: 115 MMHG | HEIGHT: 62 IN | OXYGEN SATURATION: 94 % | WEIGHT: 168 LBS | RESPIRATION RATE: 24 BRPM | DIASTOLIC BLOOD PRESSURE: 62 MMHG | HEART RATE: 89 BPM | TEMPERATURE: 97 F

## 2021-04-19 DIAGNOSIS — R10.9 ABDOMINAL PAIN, ACUTE: Primary | ICD-10-CM

## 2021-04-19 PROCEDURE — 74177 CT ABD & PELVIS W/CONTRAST: CPT | Performed by: EMERGENCY MEDICINE

## 2021-04-19 PROCEDURE — 80053 COMPREHEN METABOLIC PANEL: CPT | Performed by: EMERGENCY MEDICINE

## 2021-04-19 PROCEDURE — 99284 EMERGENCY DEPT VISIT MOD MDM: CPT

## 2021-04-19 PROCEDURE — 83690 ASSAY OF LIPASE: CPT | Performed by: EMERGENCY MEDICINE

## 2021-04-19 PROCEDURE — 36415 COLL VENOUS BLD VENIPUNCTURE: CPT

## 2021-04-19 PROCEDURE — 85025 COMPLETE CBC W/AUTO DIFF WBC: CPT | Performed by: EMERGENCY MEDICINE

## 2021-04-19 NOTE — ED PROVIDER NOTES
Patient Seen in: [facility] Emergency Department      History   Patient presents with:  Abdomen/Flank Pain    Stated Complaint: abd pain    HPI/Subjective:   HPI    52-year-old female complaining of abdominal pain patient has a history of diverticul HPI.  Constitutional and vital signs reviewed. All other systems reviewed and negative except as noted above.     Physical Exam     ED Triage Vitals [04/19/21 1111]   /86   Pulse 98   Resp 20   Temp 97.4 °F (36.3 °C)   Temp src Oral   SpO2 94 % KNEE (1 OR 2 VIEWS), RIGHT (CPT=73560)    Result Date: 3/25/2021  CONCLUSION:  Soft tissue swelling. No acute fracture or other acute bony process.   Dictated by (CST): Adela Becerra MD on 3/25/2021 at 1:44 PM     Finalized by (CST): Adela Becerra MD on

## 2021-04-19 NOTE — ED INITIAL ASSESSMENT (HPI)
PT TO ED FROM HOME WITH C/O LLQ PAIN THAT STARTED LAST NIGHT, HAS BEEN CONSTIPATED AND TREATING HERSELF WITH OTC MEDICATIONS, LAST BM LAST NIGHT.

## 2021-04-20 ENCOUNTER — TELEPHONE (OUTPATIENT)
Dept: HEMATOLOGY/ONCOLOGY | Facility: HOSPITAL | Age: 61
End: 2021-04-20

## 2021-04-20 ENCOUNTER — NURSE ONLY (OUTPATIENT)
Dept: HEMATOLOGY/ONCOLOGY | Facility: HOSPITAL | Age: 61
End: 2021-04-20
Attending: INTERNAL MEDICINE
Payer: MEDICAID

## 2021-04-20 VITALS
DIASTOLIC BLOOD PRESSURE: 86 MMHG | WEIGHT: 173 LBS | SYSTOLIC BLOOD PRESSURE: 141 MMHG | RESPIRATION RATE: 16 BRPM | OXYGEN SATURATION: 92 % | HEIGHT: 62.01 IN | HEART RATE: 99 BPM | BODY MASS INDEX: 31.43 KG/M2 | TEMPERATURE: 97 F

## 2021-04-20 DIAGNOSIS — C22.1 CHOLANGIOCARCINOMA (HCC): Primary | ICD-10-CM

## 2021-04-20 DIAGNOSIS — C22.1 CHOLANGIOCARCINOMA (HCC): ICD-10-CM

## 2021-04-20 PROCEDURE — 86301 IMMUNOASSAY TUMOR CA 19-9: CPT

## 2021-04-20 PROCEDURE — 99215 OFFICE O/P EST HI 40 MIN: CPT | Performed by: INTERNAL MEDICINE

## 2021-04-20 PROCEDURE — 36591 DRAW BLOOD OFF VENOUS DEVICE: CPT

## 2021-04-20 RX ORDER — DABRAFENIB 75 MG/1
CAPSULE ORAL
COMMUNITY
Start: 2021-04-07 | End: 2021-05-28

## 2021-04-20 RX ORDER — LEVOFLOXACIN 750 MG/1
750 TABLET ORAL DAILY
Qty: 10 TABLET | Refills: 0 | Status: SHIPPED | OUTPATIENT
Start: 2021-04-20 | End: 2021-07-16

## 2021-04-20 RX ORDER — TRAMETINIB 2 MG/1
TABLET, FILM COATED ORAL
COMMUNITY
Start: 2021-04-07 | End: 2021-05-28

## 2021-04-20 RX ORDER — PREDNISONE 1 MG/1
5 TABLET ORAL DAILY
Qty: 30 TABLET | Refills: 1 | Status: SHIPPED | OUTPATIENT
Start: 2021-04-20 | End: 2021-07-27

## 2021-04-20 NOTE — PROGRESS NOTES
MD f/u for cholangiocarcinoma. Continues on taflinar and mekinist daily as directed. Was seen in ED yesterday for abdominal pain. States severe abd pain on Sunday evening that turned into tenderness after solid BM that turned into diarrhea.  Diarrhea now re

## 2021-04-20 NOTE — TELEPHONE ENCOUNTER
----- Message from Dolan Harada, MD sent at 4/20/2021  3:00 PM CDT -----  Results reviewed. CA 19-9 is down to 189. Good news. Thanks!

## 2021-04-21 ENCOUNTER — OFFICE VISIT (OUTPATIENT)
Dept: INTERNAL MEDICINE CLINIC | Facility: CLINIC | Age: 61
End: 2021-04-21
Payer: MEDICAID

## 2021-04-21 VITALS
RESPIRATION RATE: 16 BRPM | HEART RATE: 105 BPM | BODY MASS INDEX: 30.37 KG/M2 | OXYGEN SATURATION: 87 % | WEIGHT: 171.38 LBS | DIASTOLIC BLOOD PRESSURE: 80 MMHG | TEMPERATURE: 98 F | HEIGHT: 63 IN | SYSTOLIC BLOOD PRESSURE: 110 MMHG

## 2021-04-21 DIAGNOSIS — Z00.00 ANNUAL PHYSICAL EXAM: Primary | ICD-10-CM

## 2021-04-21 DIAGNOSIS — Z12.4 CERVICAL CANCER SCREENING: ICD-10-CM

## 2021-04-21 DIAGNOSIS — K21.9 GASTROESOPHAGEAL REFLUX DISEASE, UNSPECIFIED WHETHER ESOPHAGITIS PRESENT: ICD-10-CM

## 2021-04-21 DIAGNOSIS — N95.1 VAGINAL DRYNESS, MENOPAUSAL: ICD-10-CM

## 2021-04-21 DIAGNOSIS — E11.65 TYPE 2 DIABETES MELLITUS WITH HYPERGLYCEMIA, WITHOUT LONG-TERM CURRENT USE OF INSULIN (HCC): ICD-10-CM

## 2021-04-21 DIAGNOSIS — C22.1 CHOLANGIOCARCINOMA (HCC): ICD-10-CM

## 2021-04-21 DIAGNOSIS — I10 ESSENTIAL HYPERTENSION: ICD-10-CM

## 2021-04-21 DIAGNOSIS — E78.2 MIXED HYPERLIPIDEMIA: ICD-10-CM

## 2021-04-21 PROCEDURE — 99396 PREV VISIT EST AGE 40-64: CPT | Performed by: PHYSICIAN ASSISTANT

## 2021-04-21 PROCEDURE — 3079F DIAST BP 80-89 MM HG: CPT | Performed by: PHYSICIAN ASSISTANT

## 2021-04-21 PROCEDURE — 3008F BODY MASS INDEX DOCD: CPT | Performed by: PHYSICIAN ASSISTANT

## 2021-04-21 PROCEDURE — 83036 HEMOGLOBIN GLYCOSYLATED A1C: CPT | Performed by: PHYSICIAN ASSISTANT

## 2021-04-21 PROCEDURE — 3074F SYST BP LT 130 MM HG: CPT | Performed by: PHYSICIAN ASSISTANT

## 2021-04-21 PROCEDURE — 99214 OFFICE O/P EST MOD 30 MIN: CPT | Performed by: PHYSICIAN ASSISTANT

## 2021-04-21 RX ORDER — INSULIN LISPRO 100 [IU]/ML
INJECTION, SOLUTION INTRAVENOUS; SUBCUTANEOUS
Qty: 3 PEN | Refills: 1 | Status: SHIPPED | OUTPATIENT
Start: 2021-04-21 | End: 2021-04-29 | Stop reason: ALTCHOICE

## 2021-04-21 RX ORDER — INSULIN GLARGINE 100 [IU]/ML
10 INJECTION, SOLUTION SUBCUTANEOUS NIGHTLY
Qty: 3 PEN | Refills: 1 | Status: SHIPPED | OUTPATIENT
Start: 2021-04-21 | End: 2021-04-29

## 2021-04-21 NOTE — PATIENT INSTRUCTIONS
Please schedule a nurse visit for the following vaccines at least 2 weeks after your 2nd COVID shot:  - TdaP (tetanus, whooping cough booster)  - Shingrix #2 (shingles)    Start over the counter vitamin D3 -- 2,000 IU daily.     Diabetes:  Start long acting

## 2021-04-21 NOTE — PROGRESS NOTES
Shannon Najera is a 62year old female who presents for a complete physical exam.   HPI:   Pt presents for f/u med visit. DM - take metformin BID. AM FBS in the 250s. Randomly later in the day in the 400s.   Still on prednisone and currently doing andreia (three) times daily. (Patient not taking: Reported on 4/20/2021 ) 1 Tube 0   • predniSONE 10 MG Oral Tab Take 1 tablet (10 mg total) by mouth daily. 30 tablet 3   • Clobetasol Propionate 0.025 % External Cream Apply 1 Application topically daily.  (Patient 05/2019    rotator cuff   • COLECTOMY     • COLONOSCOPY  12/20/2018    polyps   • OTHER  02/26/2018    resection Splenic flexure   • OTHER SURGICAL HISTORY        Family History   Problem Relation Age of Onset   • Heart Disease Father    • Stroke Father clear to auscultation bilaterally  CARDIO: RRR, normal S1 & S2, no murmur  GI: soft, non-tender, non-distended, no hepatoplenomegaly  : deferred  BREAST: deferred  MUSCULOSKELETAL: no spinal tenderness, no CVA tenderness  EXTREMITIES: no cyanosis, clubbi on 4/21/21): # Breast CA screening: regular scans through onc, pt declines MMG at this time. # Cervical CA screening: last pap 10 years ago - normal per pt. Will f/u with GYN. # Colon CA screening: declines c-scope at this time.   # Bone density screeni

## 2021-04-22 RX ORDER — INSULIN LISPRO 100 [IU]/ML
INJECTION, SOLUTION INTRAVENOUS; SUBCUTANEOUS
Qty: 1 PEN | Refills: 0 | COMMUNITY
Start: 2021-04-22 | End: 2021-04-29

## 2021-04-22 RX ORDER — INSULIN GLARGINE 100 [IU]/ML
10 INJECTION, SOLUTION SUBCUTANEOUS NIGHTLY
Qty: 1 PEN | Refills: 0 | COMMUNITY
Start: 2021-04-22 | End: 2021-07-16 | Stop reason: CLARIF

## 2021-04-23 ENCOUNTER — PATIENT MESSAGE (OUTPATIENT)
Dept: INTERNAL MEDICINE CLINIC | Facility: CLINIC | Age: 61
End: 2021-04-23

## 2021-04-23 DIAGNOSIS — Z79.4 INSULIN DEPENDENT TYPE 2 DIABETES MELLITUS, UNCONTROLLED (HCC): ICD-10-CM

## 2021-04-23 DIAGNOSIS — E11.65 TYPE 2 DIABETES MELLITUS WITH HYPERGLYCEMIA, WITHOUT LONG-TERM CURRENT USE OF INSULIN (HCC): Primary | ICD-10-CM

## 2021-04-23 DIAGNOSIS — E11.65 INSULIN DEPENDENT TYPE 2 DIABETES MELLITUS, UNCONTROLLED (HCC): ICD-10-CM

## 2021-04-23 NOTE — TELEPHONE ENCOUNTER
From: Ulysses Morton  To: YIN Ybarra  Sent: 4/23/2021 6:25 AM CDT  Subject: Prescription Question    Tramaine Sánchez this insulin seems to be working well I feel better already. Could you please send a prescription in for a meter and all the trimings. Cheo Crystal

## 2021-04-25 RX ORDER — LANCETS
EACH MISCELLANEOUS
Qty: 400 EACH | Refills: 3 | Status: SHIPPED | OUTPATIENT
Start: 2021-04-25 | End: 2021-07-16

## 2021-04-25 RX ORDER — BLOOD-GLUCOSE METER
EACH MISCELLANEOUS
Qty: 1 KIT | Refills: 0 | Status: SHIPPED | OUTPATIENT
Start: 2021-04-25 | End: 2021-04-29 | Stop reason: ALTCHOICE

## 2021-04-25 RX ORDER — BLOOD SUGAR DIAGNOSTIC
STRIP MISCELLANEOUS
Qty: 400 STRIP | Refills: 3 | Status: SHIPPED | OUTPATIENT
Start: 2021-04-25 | End: 2021-04-29 | Stop reason: ALTCHOICE

## 2021-04-26 ENCOUNTER — TELEPHONE (OUTPATIENT)
Dept: INTERNAL MEDICINE CLINIC | Facility: CLINIC | Age: 61
End: 2021-04-26

## 2021-04-26 DIAGNOSIS — E11.65 TYPE 2 DIABETES MELLITUS WITH HYPERGLYCEMIA, WITHOUT LONG-TERM CURRENT USE OF INSULIN (HCC): Primary | ICD-10-CM

## 2021-04-26 NOTE — TELEPHONE ENCOUNTER
[address], [address] [facility] [place] [address]  [phone]    Tawastintie 6 not covered by patient insurance - One Touch only brand covered.   New rx neede for device, lancets and strips

## 2021-04-27 ENCOUNTER — PATIENT MESSAGE (OUTPATIENT)
Dept: INTERNAL MEDICINE CLINIC | Facility: CLINIC | Age: 61
End: 2021-04-27

## 2021-04-27 RX ORDER — LANCETS 33 GAUGE
1 EACH MISCELLANEOUS 4 TIMES DAILY
Qty: 400 EACH | Refills: 1 | Status: SHIPPED | OUTPATIENT
Start: 2021-04-27 | End: 2021-07-16 | Stop reason: CLARIF

## 2021-04-27 RX ORDER — BLOOD-GLUCOSE METER
1 EACH MISCELLANEOUS 2 TIMES DAILY
Qty: 1 KIT | Refills: 0 | Status: SHIPPED | OUTPATIENT
Start: 2021-04-27 | End: 2021-07-16 | Stop reason: CLARIF

## 2021-04-27 RX ORDER — INSULIN GLARGINE 100 [IU]/ML
10 INJECTION, SOLUTION SUBCUTANEOUS NIGHTLY
Qty: 3 PEN | Refills: 1 | Status: CANCELLED | OUTPATIENT
Start: 2021-04-27

## 2021-04-27 RX ORDER — BLOOD SUGAR DIAGNOSTIC
STRIP MISCELLANEOUS
Qty: 400 STRIP | Refills: 1 | Status: SHIPPED | OUTPATIENT
Start: 2021-04-27 | End: 2021-05-05

## 2021-04-27 NOTE — TELEPHONE ENCOUNTER
From: Rasta Mcdonald  To: YIN Street  Sent: 4/27/2021 7:37 AM CDT  Subject: Prescription Question    Good morning Erin Cristiana, If you could send a persciption into [facility] for the needles for the Insulin pens I would appreciate it.  Thanks Aleks Duncan

## 2021-04-29 ENCOUNTER — OFFICE VISIT (OUTPATIENT)
Dept: ENDOCRINOLOGY CLINIC | Facility: CLINIC | Age: 61
End: 2021-04-29
Payer: MEDICAID

## 2021-04-29 VITALS
WEIGHT: 176.81 LBS | DIASTOLIC BLOOD PRESSURE: 70 MMHG | HEART RATE: 87 BPM | OXYGEN SATURATION: 98 % | BODY MASS INDEX: 31.33 KG/M2 | HEIGHT: 63 IN | SYSTOLIC BLOOD PRESSURE: 114 MMHG

## 2021-04-29 DIAGNOSIS — E11.65 TYPE 2 DIABETES MELLITUS WITH HYPERGLYCEMIA, WITH LONG-TERM CURRENT USE OF INSULIN (HCC): ICD-10-CM

## 2021-04-29 DIAGNOSIS — Z79.4 TYPE 2 DIABETES MELLITUS WITH HYPERGLYCEMIA, WITH LONG-TERM CURRENT USE OF INSULIN (HCC): ICD-10-CM

## 2021-04-29 DIAGNOSIS — E78.2 MIXED HYPERLIPIDEMIA: Primary | ICD-10-CM

## 2021-04-29 DIAGNOSIS — I10 BENIGN ESSENTIAL HTN: ICD-10-CM

## 2021-04-29 PROCEDURE — 82043 UR ALBUMIN QUANTITATIVE: CPT | Performed by: NURSE PRACTITIONER

## 2021-04-29 PROCEDURE — 99214 OFFICE O/P EST MOD 30 MIN: CPT | Performed by: NURSE PRACTITIONER

## 2021-04-29 PROCEDURE — 3008F BODY MASS INDEX DOCD: CPT | Performed by: NURSE PRACTITIONER

## 2021-04-29 PROCEDURE — 3074F SYST BP LT 130 MM HG: CPT | Performed by: NURSE PRACTITIONER

## 2021-04-29 PROCEDURE — 3078F DIAST BP <80 MM HG: CPT | Performed by: NURSE PRACTITIONER

## 2021-04-29 PROCEDURE — 82570 ASSAY OF URINE CREATININE: CPT | Performed by: NURSE PRACTITIONER

## 2021-04-29 PROCEDURE — 82962 GLUCOSE BLOOD TEST: CPT | Performed by: NURSE PRACTITIONER

## 2021-04-29 RX ORDER — INSULIN LISPRO 100 [IU]/ML
INJECTION, SOLUTION INTRAVENOUS; SUBCUTANEOUS
Qty: 15 ML | Refills: 0 | Status: ON HOLD | OUTPATIENT
Start: 2021-04-29 | End: 2021-07-20

## 2021-04-29 RX ORDER — GLIMEPIRIDE 2 MG/1
2 TABLET ORAL
Qty: 30 TABLET | Refills: 0 | Status: SHIPPED | OUTPATIENT
Start: 2021-04-29 | End: 2021-07-12

## 2021-04-29 NOTE — PROGRESS NOTES
Marcelo Potter is a 62year old female who presents today to establish for  Type 2 diabetes management.    Primary care physician: Genny Moreno MD  In the past 3m her DM  control has not been well controlled: A1C  8.7% on 4--   Still undergoing c no    Macrovascular:  PVD: no  CAD: no  Stroke/CVA: no    Modifying factors:  Medication adherence: yes   Recent steroids, illness or infections: yes , see above     Allergies: Simvastatin and Gemfibrozil    Past Medical History:   Diagnosis Date   • Ather (ONETOUCH ULTRA 2) w/Device Does not apply Kit 1 Device by Other route 2 (two) times daily. Use as directed. 1 kit 0   • Glucose Blood (ONETOUCH VERIO) In Vitro Strip Use as directed.  400 strip 1   • OneTouch Delica Lancets 10M Does not apply Misc 1 lancet every 4 (four) hours as needed for Wheezing or Shortness of Breath.  CARRY YOUR INHALER WITH YOU. (Patient not taking: Reported on 4/29/2021 ) 1 Inhaler 0       DM associated review of  symptoms:   Endocrine: Polyuria, polyphagia, polydipsia: no  Neurologic significance of A1c, adverse effects of suboptimal glucose control, and goals of therapy   Reviewed the A1C test, what the value reflects and the goal for the patient.    Reminded pt on A1C and blood sugar targets (Fasting < 130 and post prandial <180 ) and rx.   LIPID screening: rosuvastatin rx. PCP ordered update. Last dilated eye exam: No data recorded Exam shows retinopathy?  No data recorded  Last diabetic foot exam: Last Foot Exam: 04/21/21  Date of last PHQ-2 depression screen: PHQ-2 - Date of last de

## 2021-04-29 NOTE — ASSESSMENT & PLAN NOTE
A1C 8.7%   Weight: 176 lb   Diabetes is improving  Concerned about insulin before PET scan.    Will see if + response to Glimepiride 2mg once daily   Take Glimepiride 2mg today - once you  rx  If BG over 180 mg/dl at 2 am: ok to take another dose  HO

## 2021-04-29 NOTE — PATIENT INSTRUCTIONS
Your A1C: 8.7%   This is  high for you and we will work together on lowering your blood sugars to help improve your health  The A1C test provides us with your average blood sugar for the past 3 months.  Keeping an A1C less than 7% helps reduce or delay heal 110)  2 hours After meals: less than 180 (preferably less than 150)   Call for persistent blood sugars less than  75 or more than  200.    Blood sugars greater than 200 are not acceptable to reach your goal of improving diabetes    Watch for low blood sugar diabetes.     [facility] Refill policy :     · Allow 2-3 business days for refills  · Contact your pharmacy at least 5 days prior to running out of medication and have them send an electronic request or submit request through the “request refill” optio

## 2021-04-30 ENCOUNTER — HOSPITAL ENCOUNTER (OUTPATIENT)
Dept: NUCLEAR MEDICINE | Facility: HOSPITAL | Age: 61
Discharge: HOME | End: 2021-04-30
Attending: INTERNAL MEDICINE
Payer: MEDICAID

## 2021-04-30 ENCOUNTER — TELEPHONE (OUTPATIENT)
Dept: HEMATOLOGY/ONCOLOGY | Facility: HOSPITAL | Age: 61
End: 2021-04-30

## 2021-04-30 DIAGNOSIS — C22.1 CHOLANGIOCARCINOMA (HCC): ICD-10-CM

## 2021-04-30 PROCEDURE — 78815 PET IMAGE W/CT SKULL-THIGH: CPT | Performed by: INTERNAL MEDICINE

## 2021-04-30 PROCEDURE — 82962 GLUCOSE BLOOD TEST: CPT

## 2021-04-30 NOTE — TELEPHONE ENCOUNTER
Spoke with patient. She verbalized understanding. Allison Griffiths MD  P [facility] Felix Rns  Results reviewed. PET/CT shows stable disease.  Thanks

## 2021-05-03 ENCOUNTER — OFFICE VISIT (OUTPATIENT)
Dept: CARDIOLOGY | Age: 61
End: 2021-05-03

## 2021-05-03 VITALS
HEIGHT: 62 IN | WEIGHT: 172 LBS | SYSTOLIC BLOOD PRESSURE: 120 MMHG | HEART RATE: 60 BPM | BODY MASS INDEX: 31.65 KG/M2 | DIASTOLIC BLOOD PRESSURE: 74 MMHG

## 2021-05-03 DIAGNOSIS — E78.2 MIXED HYPERLIPIDEMIA: ICD-10-CM

## 2021-05-03 DIAGNOSIS — E11.9 TYPE 2 DIABETES MELLITUS WITHOUT COMPLICATION, WITHOUT LONG-TERM CURRENT USE OF INSULIN (CMD): ICD-10-CM

## 2021-05-03 DIAGNOSIS — I10 ESSENTIAL HYPERTENSION: Primary | ICD-10-CM

## 2021-05-03 PROCEDURE — 99215 OFFICE O/P EST HI 40 MIN: CPT | Performed by: INTERNAL MEDICINE

## 2021-05-03 PROCEDURE — 3078F DIAST BP <80 MM HG: CPT | Performed by: INTERNAL MEDICINE

## 2021-05-03 PROCEDURE — 3074F SYST BP LT 130 MM HG: CPT | Performed by: INTERNAL MEDICINE

## 2021-05-03 RX ORDER — TRAMETINIB 2 MG/1
TABLET, FILM COATED ORAL
COMMUNITY
Start: 2021-04-07

## 2021-05-03 RX ORDER — INSULIN GLARGINE 100 [IU]/ML
10 INJECTION, SOLUTION SUBCUTANEOUS NIGHTLY
COMMUNITY
Start: 2021-04-21

## 2021-05-03 RX ORDER — INSULIN LISPRO 100 [IU]/ML
INJECTION, SOLUTION INTRAVENOUS; SUBCUTANEOUS
COMMUNITY
Start: 2021-04-29

## 2021-05-03 RX ORDER — ONDANSETRON 8 MG/1
8 TABLET, ORALLY DISINTEGRATING ORAL
COMMUNITY
Start: 2021-01-28

## 2021-05-03 RX ORDER — DABRAFENIB 75 MG/1
75 CAPSULE ORAL 4 TIMES DAILY
COMMUNITY
Start: 2021-04-07

## 2021-05-03 ASSESSMENT — ENCOUNTER SYMPTOMS
SUSPICIOUS LESIONS: 0
ALLERGIC/IMMUNOLOGIC COMMENTS: NO NEW FOOD ALLERGIES
HEMOPTYSIS: 0
WEIGHT GAIN: 0
FEVER: 0
WEIGHT LOSS: 0
HEMATOCHEZIA: 0
CHILLS: 0
COUGH: 0
BRUISES/BLEEDS EASILY: 0

## 2021-05-03 ASSESSMENT — PATIENT HEALTH QUESTIONNAIRE - PHQ9
1. LITTLE INTEREST OR PLEASURE IN DOING THINGS: NOT AT ALL
SUM OF ALL RESPONSES TO PHQ9 QUESTIONS 1 AND 2: 0
2. FEELING DOWN, DEPRESSED OR HOPELESS: NOT AT ALL
SUM OF ALL RESPONSES TO PHQ9 QUESTIONS 1 AND 2: 0
CLINICAL INTERPRETATION OF PHQ9 SCORE: NO FURTHER SCREENING NEEDED
CLINICAL INTERPRETATION OF PHQ2 SCORE: NO FURTHER SCREENING NEEDED

## 2021-05-05 ENCOUNTER — PATIENT MESSAGE (OUTPATIENT)
Dept: ENDOCRINOLOGY CLINIC | Facility: CLINIC | Age: 61
End: 2021-05-05

## 2021-05-05 ENCOUNTER — TELEPHONE (OUTPATIENT)
Dept: ENDOCRINOLOGY CLINIC | Facility: CLINIC | Age: 61
End: 2021-05-05

## 2021-05-05 DIAGNOSIS — Z79.4 TYPE 2 DIABETES MELLITUS WITH HYPERGLYCEMIA, WITH LONG-TERM CURRENT USE OF INSULIN (HCC): Primary | ICD-10-CM

## 2021-05-05 DIAGNOSIS — E11.65 TYPE 2 DIABETES MELLITUS WITH HYPERGLYCEMIA, WITH LONG-TERM CURRENT USE OF INSULIN (HCC): Primary | ICD-10-CM

## 2021-05-05 DIAGNOSIS — E11.65 TYPE 2 DIABETES MELLITUS WITH HYPERGLYCEMIA, WITHOUT LONG-TERM CURRENT USE OF INSULIN (HCC): ICD-10-CM

## 2021-05-05 RX ORDER — BLOOD-GLUCOSE,RECEIVER,CONT
1 EACH MISCELLANEOUS ONCE
Qty: 1 DEVICE | Refills: 0 | Status: SHIPPED | OUTPATIENT
Start: 2021-05-05 | End: 2021-05-05

## 2021-05-05 RX ORDER — BLOOD-GLUCOSE TRANSMITTER
EACH MISCELLANEOUS
Qty: 1 EACH | Refills: 3 | Status: SHIPPED | OUTPATIENT
Start: 2021-05-05 | End: 2021-07-16 | Stop reason: CLARIF

## 2021-05-05 RX ORDER — BLOOD SUGAR DIAGNOSTIC
STRIP MISCELLANEOUS
Qty: 400 STRIP | Refills: 1 | Status: SHIPPED | OUTPATIENT
Start: 2021-05-05 | End: 2021-07-16 | Stop reason: CLARIF

## 2021-05-05 RX ORDER — BLOOD-GLUCOSE SENSOR
1 EACH MISCELLANEOUS
Qty: 3 EACH | Refills: 12 | Status: SHIPPED | OUTPATIENT
Start: 2021-05-05 | End: 2021-07-16 | Stop reason: CLARIF

## 2021-05-05 NOTE — TELEPHONE ENCOUNTER
Orders Placed This Encounter      Continuous Blood Gluc Sensor (DEXCOM G6 SENSOR) Does not apply Misc          Si Device by Does not apply route Every 10 days.           Dispense:  3 each          Refill:  12      Continuous Blood Gluc Transmit (DEXCOM

## 2021-05-05 NOTE — TELEPHONE ENCOUNTER
From: FREYA Spears  To: Diane Sapp  Sent: 5/5/2021 9:49 AM CDT  Subject: diabetes    Hi Cami Mcfadden  I was checking in to see how your blood sugars are doing     Thanks  Grover Giron NP

## 2021-05-05 NOTE — TELEPHONE ENCOUNTER
Regarding: RE:diabetes  Contact: [phone]    I am all for the dexacom. I have bruises on my fingers. I need the strips for my meter i know the pharmacy sent Waqasgerardo Moralesr a message but so far no response.  So let me know what I need to do.  ----- Message ----

## 2021-05-07 ENCOUNTER — TELEPHONE (OUTPATIENT)
Dept: ENDOCRINOLOGY CLINIC | Facility: CLINIC | Age: 61
End: 2021-05-07

## 2021-05-07 NOTE — TELEPHONE ENCOUNTER
Received PA request for Dexcom G6, completed on cover my meds. Key: [id number]    Your information has been submitted to [employer]. [employer] is reviewing the PA request and you will receive an electronic response.  You may check for the updated outc

## 2021-05-07 NOTE — TELEPHONE ENCOUNTER
PA approved through 11/07/2021. Pharmacy notified. Will send to scan. Pt notified, she will call us once she receives supplies to schedule with educator.

## 2021-05-07 NOTE — TELEPHONE ENCOUNTER
Dexcom approved. Additional TE open, pt will call once supplies are received, to schedule with educator.

## 2021-05-12 ENCOUNTER — DIABETIC EDUCATION (OUTPATIENT)
Dept: ENDOCRINOLOGY CLINIC | Facility: CLINIC | Age: 61
End: 2021-05-12
Payer: MEDICAID

## 2021-05-12 DIAGNOSIS — I10 ESSENTIAL HYPERTENSION: Chronic | ICD-10-CM

## 2021-05-12 DIAGNOSIS — E11.65 TYPE 2 DIABETES MELLITUS WITH HYPERGLYCEMIA, WITH LONG-TERM CURRENT USE OF INSULIN (HCC): Primary | ICD-10-CM

## 2021-05-12 DIAGNOSIS — Z79.4 TYPE 2 DIABETES MELLITUS WITH HYPERGLYCEMIA, WITH LONG-TERM CURRENT USE OF INSULIN (HCC): Primary | ICD-10-CM

## 2021-05-12 PROCEDURE — 95249 CONT GLUC MNTR PT PROV EQP: CPT | Performed by: DIETITIAN, REGISTERED

## 2021-05-12 NOTE — PROGRESS NOTES
Zara Medeiros  12/7/1960 was started on Dexcom G6 Continuous Glucose Sensor    5/12/2021  Start time: 2:30 End time: 3:00  Referring Provider: Kate Ovalle    Comments: Pt doesn't have a computer with USB available at home.      Explained sensor to start r

## 2021-05-13 RX ORDER — LISINOPRIL 40 MG/1
TABLET ORAL
Qty: 90 TABLET | Refills: 0 | Status: SHIPPED | OUTPATIENT
Start: 2021-05-13 | End: 2021-07-16 | Stop reason: CLARIF

## 2021-05-13 RX ORDER — PANTOPRAZOLE SODIUM 40 MG/1
TABLET, DELAYED RELEASE ORAL
Qty: 30 TABLET | Refills: 0 | Status: SHIPPED | OUTPATIENT
Start: 2021-05-13 | End: 2021-06-28

## 2021-05-13 RX ORDER — ROSUVASTATIN CALCIUM 5 MG/1
TABLET, COATED ORAL
Qty: 90 TABLET | Refills: 2 | Status: SHIPPED | OUTPATIENT
Start: 2021-05-13 | End: 2021-07-16 | Stop reason: CLARIF

## 2021-05-13 NOTE — TELEPHONE ENCOUNTER
Medication(s) to Refill:   Requested Prescriptions     Pending Prescriptions Disp Refills   • ROSUVASTATIN CALCIUM 5 MG Oral Tab [Pharmacy Med Name: Rosuvastatin Calcium Oral Tablet 5 MG] 30 tablet 0     Sig: TAKE 1 TABLET BY MOUTH IN THE EVENING       LOV CALL TO SCHEDULE.**  Your appointment is on the [facility] in the [facility]. The address is [address] [place] [address], [place]. Please register at the [facility] on the second floor.   **Important Info for Public Service [employer]**

## 2021-05-13 NOTE — TELEPHONE ENCOUNTER
Passed protocol     Last refill:  LISINOPRIL 40 MG Oral Tab 90 tablet 0 1/5/2021    Sig:   TAKE 1 TABLET BY MOUTH ONE TIME A DAY        LOV:   4/21/2021 Obinna Sky RTC January   # HTN: at goal.  Cont lisinopril 40 mg and amlodipine 10 mg.   No FOV schedu

## 2021-05-18 ENCOUNTER — OFFICE VISIT (OUTPATIENT)
Dept: HEMATOLOGY/ONCOLOGY | Facility: HOSPITAL | Age: 61
End: 2021-05-18
Attending: INTERNAL MEDICINE
Payer: MEDICAID

## 2021-05-18 VITALS
DIASTOLIC BLOOD PRESSURE: 73 MMHG | SYSTOLIC BLOOD PRESSURE: 120 MMHG | WEIGHT: 174.81 LBS | TEMPERATURE: 97 F | HEART RATE: 98 BPM | RESPIRATION RATE: 18 BRPM | HEIGHT: 62.01 IN | OXYGEN SATURATION: 94 % | BODY MASS INDEX: 31.76 KG/M2

## 2021-05-18 DIAGNOSIS — C22.1 CHOLANGIOCARCINOMA (HCC): Primary | ICD-10-CM

## 2021-05-18 DIAGNOSIS — C22.1 CHOLANGIOCARCINOMA (HCC): ICD-10-CM

## 2021-05-18 PROCEDURE — 36591 DRAW BLOOD OFF VENOUS DEVICE: CPT

## 2021-05-18 PROCEDURE — 85025 COMPLETE CBC W/AUTO DIFF WBC: CPT

## 2021-05-18 PROCEDURE — 99215 OFFICE O/P EST HI 40 MIN: CPT | Performed by: INTERNAL MEDICINE

## 2021-05-18 PROCEDURE — 80053 COMPREHEN METABOLIC PANEL: CPT

## 2021-05-18 PROCEDURE — 86301 IMMUNOASSAY TUMOR CA 19-9: CPT

## 2021-05-18 NOTE — PROGRESS NOTES
Education Record    Learner:  Patient    Disease / Diagnosis: cholangiocarcinoma/port draw    Barriers / Limitations:  None   Comments:    Method:  Brief focused   Comments:    General Topics:  Plan of care reviewed   Comments:    Outcome:  Shows Deweese

## 2021-05-18 NOTE — PROGRESS NOTES
THE [facility] Hematology and Oncology Clinic Note    Diagnosis: Metastatic Cholangiocarcinoma: liver, bone: BRAF V600E +    Treatment History:   1. Cisplatin 25 mg/m2 D1/8 + Gemcitabine 1000 mg/m2 D1/8 q21 days.    C1: 3/3/20 and 3/10/20: : 754,851 CEA 43.6 other focal areas of bone pain. She is a previous smoker, she smoked for about 40 years and quit 2 years ago. She works as a . No Etoh abuse. FH of throat cancer in her brother.      Labs from 1/31/20: CBC is normal. CMP with elevated TP 8.9, hepatic lesions. · 8/12/20: Discussed at TB: proceed with maintenance Gemcitabine   · 11/4/20: CTA Chest: No PE.   · 11/6/20: MR L Spine: Focal T1/T2 signal at L3 concerning for osseous disease. 1.8 x 1.7 x 1,7 cm.    · 11/12/20: PET/CT: abnormal FDG upta IN THE EVENING , Disp: 90 tablet, Rfl: 2  LISINOPRIL 40 MG Oral Tab, TAKE 1 TABLET BY MOUTH EVERY DAY , Disp: 90 tablet, Rfl: 0  Continuous Blood Gluc Sensor (DEXCOM G6 SENSOR) Does not apply Misc, 1 Device by Does not apply route Every 10 days. , Disp: 3 e Oral Tablet Dispersible, Take 1 tablet (8 mg total) by mouth every 8 (eight) hours as needed for Nausea., Disp: 60 tablet, Rfl: 11  Prochlorperazine Maleate (COMPAZINE) 10 mg tablet, Take 1 tablet (10 mg total) by mouth every 8 (eight) hours as needed for       Spouse name: Not on file      Number of children: Not on file      Years of education: Not on file      Highest education level: Not on file    Tobacco Use      Smoking status: Former Smoker        Packs/day: 1.00        Years: 40.00        Pa maximum of 3.9 (previously 5.3). 2. Interval increase in size of sclerotic lesion in the L3 vertebral body with decrease in metabolic activity (3.6 currently versus is 5.3 previously).    3. Interval decrease in metabolic activity in sclerotic T7 spinous off. No longer with fevers. Rash/Facial Rash: 2/2 Dabrafenib. Appears to be improving   · Benadryl and Prednisone 10 mg.  No improvement with topical steroids  · s/p Prednisone 80 mg (1 mg/kg) x 7 days  · Claritin daily  · Reduce steroids as above    LL

## 2021-05-28 DIAGNOSIS — C22.1 CHOLANGIOCARCINOMA (HCC): Primary | ICD-10-CM

## 2021-05-28 DIAGNOSIS — Z15.09 MONOALLELIC MUTATION OF BRAF GENE: ICD-10-CM

## 2021-05-28 DIAGNOSIS — Z15.89 MONOALLELIC MUTATION OF BRAF GENE: ICD-10-CM

## 2021-05-28 RX ORDER — DABRAFENIB 75 MG/1
150 CAPSULE ORAL 2 TIMES DAILY
Qty: 60 CAPSULE | Refills: 1 | Status: SHIPPED | OUTPATIENT
Start: 2021-05-28 | End: 2021-06-14

## 2021-05-28 RX ORDER — TRAMETINIB 2 MG/1
2 TABLET, FILM COATED ORAL DAILY
Qty: 30 TABLET | Refills: 1 | Status: SHIPPED | OUTPATIENT
Start: 2021-05-28 | End: 2021-08-13

## 2021-06-14 ENCOUNTER — TELEPHONE (OUTPATIENT)
Dept: HEMATOLOGY/ONCOLOGY | Facility: HOSPITAL | Age: 61
End: 2021-06-14

## 2021-06-14 DIAGNOSIS — Z15.89 MONOALLELIC MUTATION OF BRAF GENE: ICD-10-CM

## 2021-06-14 DIAGNOSIS — C22.1 CHOLANGIOCARCINOMA (HCC): ICD-10-CM

## 2021-06-14 DIAGNOSIS — Z15.09 MONOALLELIC MUTATION OF BRAF GENE: ICD-10-CM

## 2021-06-14 RX ORDER — DABRAFENIB 75 MG/1
150 CAPSULE ORAL 2 TIMES DAILY
Qty: 120 CAPSULE | Refills: 3 | Status: SHIPPED | OUTPATIENT
Start: 2021-06-14 | End: 2021-07-14

## 2021-06-14 NOTE — TELEPHONE ENCOUNTER
She is only getting half of her prescription. They are sending her 27. [place] said they need a new script. Tafinlar 75mg sje takes 4 per day.

## 2021-06-15 ENCOUNTER — APPOINTMENT (OUTPATIENT)
Dept: HEMATOLOGY/ONCOLOGY | Facility: HOSPITAL | Age: 61
End: 2021-06-15
Attending: INTERNAL MEDICINE
Payer: MEDICAID

## 2021-06-17 ENCOUNTER — NURSE ONLY (OUTPATIENT)
Dept: ENDOCRINOLOGY CLINIC | Facility: CLINIC | Age: 61
End: 2021-06-17
Payer: MEDICAID

## 2021-06-17 VITALS — WEIGHT: 170.81 LBS | BODY MASS INDEX: 31 KG/M2

## 2021-06-17 DIAGNOSIS — E11.65 TYPE 2 DIABETES MELLITUS WITH HYPERGLYCEMIA, WITH LONG-TERM CURRENT USE OF INSULIN (HCC): ICD-10-CM

## 2021-06-17 DIAGNOSIS — Z79.4 TYPE 2 DIABETES MELLITUS WITH HYPERGLYCEMIA, WITH LONG-TERM CURRENT USE OF INSULIN (HCC): ICD-10-CM

## 2021-06-17 PROCEDURE — 99211 OFF/OP EST MAY X REQ PHY/QHP: CPT | Performed by: DIETITIAN, REGISTERED

## 2021-06-17 NOTE — PROGRESS NOTES
THE [facility] Hematology and Oncology Clinic Note    Diagnosis: Metastatic Cholangiocarcinoma: liver, bone: BRAF V600E +    Treatment History:   1. Cisplatin 25 mg/m2 D1/8 + Gemcitabine 1000 mg/m2 D1/8 q21 days.    C1: 3/3/20 and 3/10/20: : 268,274 CEA 43.6 other focal areas of bone pain. She is a previous smoker, she smoked for about 40 years and quit 2 years ago. She works as a . No Etoh abuse. FH of throat cancer in her brother.      Labs from 1/31/20: CBC is normal. CMP with elevated TP 8.9, hepatic lesions. · 8/12/20: Discussed at TB: proceed with maintenance Gemcitabine   · 11/4/20: CTA Chest: No PE.   · 11/6/20: MR L Spine: Focal T1/T2 signal at L3 concerning for osseous disease. 1.8 x 1.7 x 1,7 cm.    · 11/12/20: PET/CT: abnormal FDG upta 1  PANTOPRAZOLE SODIUM 40 MG Oral Tab EC, TAKE 1 TABLET BY MOUTH EVERY DAY , Disp: 30 tablet, Rfl: 0  ROSUVASTATIN CALCIUM 5 MG Oral Tab, TAKE 1 TABLET BY MOUTH IN THE EVENING , Disp: 90 tablet, Rfl: 2  LISINOPRIL 40 MG Oral Tab, TAKE 1 TABLET BY MOUTH KIRTI Take 1 tablet (8 mg total) by mouth every 8 (eight) hours as needed for Nausea., Disp: 60 tablet, Rfl: 11  Prochlorperazine Maleate (COMPAZINE) 10 mg tablet, Take 1 tablet (10 mg total) by mouth every 8 (eight) hours as needed for Nausea., Disp: 60 tablet, file      Highest education level: Not on file    Tobacco Use      Smoking status: Former Smoker        Packs/day: 1.00        Years: 40.00        Pack years: 36        Start date: 1977        Quit date:         Years since quittin.4      Smok versus is 5.3 previously). 3. Interval decrease in metabolic activity in sclerotic T7 spinous process (currently 3.9 versus 5.3 previously). 4. There are no new metabolically active lesions.      Pathology: reviewed     Assessment and Plan:  60F with a prednisone 10 mg daily. Given hyperglycemia, she tapered off. No longer with fevers. Rash/Facial Rash: 2/2 Dabrafenib. Appears to be improving   · Benadryl and Prednisone 10 mg.  No improvement with topical steroids  · s/p Prednisone 80 mg (1 mg/kg) x 7

## 2021-06-17 NOTE — PROGRESS NOTES
Marcelo Potter  [id number] was seen for Continuous Glucose Sensor Follow-up Visit:    Date: 6/17/2021  Referring Provider: KRYSTLE Shoemaker  Start time: 10:30am End time: 11:00am    Sensor Type: Dexcom G6    Site Assessment: Pt. was having trouble starting

## 2021-06-18 ENCOUNTER — OFFICE VISIT (OUTPATIENT)
Dept: HEMATOLOGY/ONCOLOGY | Facility: HOSPITAL | Age: 61
End: 2021-06-18
Attending: INTERNAL MEDICINE
Payer: MEDICAID

## 2021-06-18 ENCOUNTER — SOCIAL WORK SERVICES (OUTPATIENT)
Dept: HEMATOLOGY/ONCOLOGY | Facility: HOSPITAL | Age: 61
End: 2021-06-18

## 2021-06-18 ENCOUNTER — TELEPHONE (OUTPATIENT)
Dept: HEMATOLOGY/ONCOLOGY | Facility: HOSPITAL | Age: 61
End: 2021-06-18

## 2021-06-18 VITALS
BODY MASS INDEX: 31 KG/M2 | WEIGHT: 169.81 LBS | SYSTOLIC BLOOD PRESSURE: 116 MMHG | TEMPERATURE: 98 F | OXYGEN SATURATION: 90 % | DIASTOLIC BLOOD PRESSURE: 78 MMHG | RESPIRATION RATE: 16 BRPM | HEART RATE: 102 BPM

## 2021-06-18 DIAGNOSIS — C22.1 CHOLANGIOCARCINOMA (HCC): Primary | ICD-10-CM

## 2021-06-18 PROCEDURE — 86301 IMMUNOASSAY TUMOR CA 19-9: CPT | Performed by: INTERNAL MEDICINE

## 2021-06-18 PROCEDURE — 99215 OFFICE O/P EST HI 40 MIN: CPT | Performed by: INTERNAL MEDICINE

## 2021-06-18 PROCEDURE — 85025 COMPLETE CBC W/AUTO DIFF WBC: CPT | Performed by: INTERNAL MEDICINE

## 2021-06-18 PROCEDURE — 36591 DRAW BLOOD OFF VENOUS DEVICE: CPT

## 2021-06-18 PROCEDURE — 80053 COMPREHEN METABOLIC PANEL: CPT | Performed by: INTERNAL MEDICINE

## 2021-06-18 NOTE — TELEPHONE ENCOUNTER
Coleen Lewis MD  P Edw Bcn Felix Rns  Results reviewed. Ca19-9 is 120 which is only slightly up. Proceed with PET/CT as planned and we can discuss next steps after imaging. Radiation to the primary liver mass is still an option. Thanks         Pt inform

## 2021-06-28 RX ORDER — PANTOPRAZOLE SODIUM 40 MG/1
TABLET, DELAYED RELEASE ORAL
Qty: 30 TABLET | Refills: 0 | Status: SHIPPED | OUTPATIENT
Start: 2021-06-28 | End: 2021-01-01 | Stop reason: CLARIF

## 2021-07-01 ENCOUNTER — HOSPITAL ENCOUNTER (OUTPATIENT)
Dept: RADIATION ONCOLOGY | Facility: HOSPITAL | Age: 61
Discharge: HOME | End: 2021-07-01
Attending: RADIOLOGY
Payer: MEDICAID

## 2021-07-01 VITALS
RESPIRATION RATE: 20 BRPM | HEART RATE: 86 BPM | TEMPERATURE: 99 F | OXYGEN SATURATION: 93 % | DIASTOLIC BLOOD PRESSURE: 83 MMHG | SYSTOLIC BLOOD PRESSURE: 127 MMHG

## 2021-07-01 DIAGNOSIS — C78.7 LIVER METASTASIS (HCC): Primary | ICD-10-CM

## 2021-07-01 PROCEDURE — 99213 OFFICE O/P EST LOW 20 MIN: CPT

## 2021-07-01 NOTE — PROGRESS NOTES
Nursing Re- Consult Note    Patient: Thalia Smith  YOB: 1960  Age: 62year old  Marlena Mckeon MD  Referring Physician: Mady Gowers  Diagnosis:No diagnosis found.   Consult Date: 7/1/2021    History of Pr

## 2021-07-01 NOTE — PROGRESS NOTES
[facility]  FOLLOW UP     PATIENT:   [facility]      12/7/1960    DIAGNOSIS:   Metastatic cholangiocarcinoma      CANCER HISTORY   81-year-old woman with history of metastatic cholangiocarcinoma involving liver and bone, indirag IGRT to treat a liver lesion that moves w/ breathing    Will do NPO x 2-3 hrs pre to lower stomach dose  No glucose monitor during RT  Will use zofran odt pre only if she develops RT induced nausea    Discussed risks to stomach, intestine, liver - all will

## 2021-07-05 ENCOUNTER — HOSPITAL ENCOUNTER (OUTPATIENT)
Dept: CV DIAGNOSTICS | Facility: HOSPITAL | Age: 61
Discharge: HOME | End: 2021-07-05
Attending: INTERNAL MEDICINE
Payer: MEDICAID

## 2021-07-05 DIAGNOSIS — C22.1 CHOLANGIOCARCINOMA (HCC): ICD-10-CM

## 2021-07-05 PROCEDURE — 93306 TTE W/DOPPLER COMPLETE: CPT | Performed by: INTERNAL MEDICINE

## 2021-07-09 ENCOUNTER — DOCUMENTATION ONLY (OUTPATIENT)
Dept: HEMATOLOGY/ONCOLOGY | Facility: HOSPITAL | Age: 61
End: 2021-07-09

## 2021-07-12 ENCOUNTER — TELEPHONE (OUTPATIENT)
Dept: ENDOCRINOLOGY CLINIC | Facility: CLINIC | Age: 61
End: 2021-07-12

## 2021-07-12 NOTE — TELEPHONE ENCOUNTER
Without more data, it is hard to tell if she needs more basal or prandial insulin   Have her check some before and 2 hour after meals BG trends today     Is she dosing the insulin at meals and taking glimepiride rx     It may take a few days to see respons

## 2021-07-12 NOTE — TELEPHONE ENCOUNTER
Pt called in today. Has PET scan on Wednesday. Per pt, they won't allow procedure unless BG is under 160. Her fasting BG this am was 188. She is taking Lantus 18 units daily and mealtime sliding scale:   If blood sugar is under 150: NO INSULIN   If bl

## 2021-07-16 ENCOUNTER — APPOINTMENT (OUTPATIENT)
Dept: HEMATOLOGY/ONCOLOGY | Facility: HOSPITAL | Age: 61
End: 2021-07-16
Attending: INTERNAL MEDICINE
Payer: MEDICAID

## 2021-07-16 ENCOUNTER — HOSPITAL ENCOUNTER (INPATIENT)
Facility: HOSPITAL | Age: 61
LOS: 4 days | Discharge: HOME | DRG: 190 | End: 2021-07-20
Attending: EMERGENCY MEDICINE | Admitting: INTERNAL MEDICINE
Payer: MEDICAID

## 2021-07-16 ENCOUNTER — APPOINTMENT (OUTPATIENT)
Dept: GENERAL RADIOLOGY | Facility: HOSPITAL | Age: 61
DRG: 190 | End: 2021-07-16
Attending: EMERGENCY MEDICINE
Payer: MEDICAID

## 2021-07-16 ENCOUNTER — APPOINTMENT (OUTPATIENT)
Dept: CT IMAGING | Facility: HOSPITAL | Age: 61
DRG: 190 | End: 2021-07-16
Attending: EMERGENCY MEDICINE
Payer: MEDICAID

## 2021-07-16 VITALS
BODY MASS INDEX: 32 KG/M2 | DIASTOLIC BLOOD PRESSURE: 80 MMHG | WEIGHT: 173.38 LBS | RESPIRATION RATE: 20 BRPM | HEART RATE: 114 BPM | SYSTOLIC BLOOD PRESSURE: 138 MMHG | TEMPERATURE: 98 F | OXYGEN SATURATION: 86 %

## 2021-07-16 DIAGNOSIS — R09.02 HYPOXEMIA: Primary | ICD-10-CM

## 2021-07-16 DIAGNOSIS — J44.1 COPD EXACERBATION (HCC): ICD-10-CM

## 2021-07-16 DIAGNOSIS — C22.1 CHOLANGIOCARCINOMA (HCC): ICD-10-CM

## 2021-07-16 DIAGNOSIS — C22.1 CHOLANGIOCARCINOMA (HCC): Primary | ICD-10-CM

## 2021-07-16 DIAGNOSIS — R06.00 DYSPNEA, UNSPECIFIED TYPE: ICD-10-CM

## 2021-07-16 LAB
ALBUMIN SERPL-MCNC: 2.9 G/DL (ref 3.4–5)
ALBUMIN/GLOB SERPL: 0.6 {RATIO} (ref 1–2)
ALP LIVER SERPL-CCNC: 119 U/L
ALT SERPL-CCNC: 47 U/L
ANION GAP SERPL CALC-SCNC: 4 MMOL/L (ref 0–18)
AST SERPL-CCNC: 51 U/L (ref 15–37)
ATRIAL RATE: 99 BPM
BASOPHILS # BLD AUTO: 0.03 X10(3) UL (ref 0–0.2)
BASOPHILS NFR BLD AUTO: 0.4 %
BILIRUB SERPL-MCNC: 0.4 MG/DL (ref 0.1–2)
BILIRUB UR QL STRIP.AUTO: NEGATIVE
BUN BLD-MCNC: 16 MG/DL (ref 7–18)
BUN/CREAT SERPL: 18.4 (ref 10–20)
CALCIUM BLD-MCNC: 9 MG/DL (ref 8.5–10.1)
CANCER AG19-9 SERPL-ACNC: 126.4 U/ML (ref ?–37)
CHLORIDE SERPL-SCNC: 104 MMOL/L (ref 98–112)
CLARITY UR REFRACT.AUTO: CLEAR
CO2 SERPL-SCNC: 28 MMOL/L (ref 21–32)
COLOR UR AUTO: YELLOW
CREAT BLD-MCNC: 0.87 MG/DL
D-DIMER: 0.96 UG/ML FEU (ref ?–0.59)
DEPRECATED RDW RBC AUTO: 44.7 FL (ref 35.1–46.3)
EOSINOPHIL # BLD AUTO: 0.09 X10(3) UL (ref 0–0.7)
EOSINOPHIL NFR BLD AUTO: 1.3 %
ERYTHROCYTE [DISTWIDTH] IN BLOOD BY AUTOMATED COUNT: 12.8 % (ref 11–15)
GLOBULIN PLAS-MCNC: 4.8 G/DL (ref 2.8–4.4)
GLUCOSE BLD-MCNC: 158 MG/DL (ref 70–99)
GLUCOSE BLD-MCNC: 204 MG/DL (ref 70–99)
GLUCOSE BLD-MCNC: 347 MG/DL (ref 70–99)
GLUCOSE BLD-MCNC: 364 MG/DL (ref 70–99)
GLUCOSE UR STRIP.AUTO-MCNC: NEGATIVE MG/DL
HCT VFR BLD AUTO: 35.5 %
HGB BLD-MCNC: 12 G/DL
IMM GRANULOCYTES # BLD AUTO: 0.03 X10(3) UL (ref 0–1)
IMM GRANULOCYTES NFR BLD: 0.4 %
KETONES UR STRIP.AUTO-MCNC: NEGATIVE MG/DL
LEUKOCYTE ESTERASE UR QL STRIP.AUTO: NEGATIVE
LYMPHOCYTES # BLD AUTO: 0.99 X10(3) UL (ref 1–4)
LYMPHOCYTES NFR BLD AUTO: 14.5 %
M PROTEIN MFR SERPL ELPH: 7.7 G/DL (ref 6.4–8.2)
MCH RBC QN AUTO: 32.4 PG (ref 26–34)
MCHC RBC AUTO-ENTMCNC: 33.8 G/DL (ref 31–37)
MCV RBC AUTO: 95.9 FL
MONOCYTES # BLD AUTO: 0.57 X10(3) UL (ref 0.1–1)
MONOCYTES NFR BLD AUTO: 8.3 %
NEUTROPHILS # BLD AUTO: 5.14 X10 (3) UL (ref 1.5–7.7)
NEUTROPHILS # BLD AUTO: 5.14 X10(3) UL (ref 1.5–7.7)
NEUTROPHILS NFR BLD AUTO: 75.1 %
NITRITE UR QL STRIP.AUTO: NEGATIVE
NT-PROBNP SERPL-MCNC: 46 PG/ML (ref ?–125)
OSMOLALITY SERPL CALC.SUM OF ELEC: 289 MOSM/KG (ref 275–295)
P AXIS: -3 DEGREES
P-R INTERVAL: 134 MS
PH UR STRIP.AUTO: 6 [PH] (ref 5–8)
PLATELET # BLD AUTO: 121 10(3)UL (ref 150–450)
POTASSIUM SERPL-SCNC: 4.3 MMOL/L (ref 3.5–5.1)
PROT UR STRIP.AUTO-MCNC: NEGATIVE MG/DL
Q-T INTERVAL: 346 MS
QRS DURATION: 86 MS
QTC CALCULATION (BEZET): 444 MS
R AXIS: 22 DEGREES
RBC # BLD AUTO: 3.7 X10(6)UL
SARS-COV-2 RNA RESP QL NAA+PROBE: NOT DETECTED
SODIUM SERPL-SCNC: 136 MMOL/L (ref 136–145)
SP GR UR STRIP.AUTO: 1.01 (ref 1–1.03)
T AXIS: 25 DEGREES
TROPONIN I SERPL-MCNC: <0.045 NG/ML (ref ?–0.04)
TROPONIN I SERPL-MCNC: <0.045 NG/ML (ref ?–0.04)
UROBILINOGEN UR STRIP.AUTO-MCNC: <2 MG/DL
VENTRICULAR RATE: 99 BPM
WBC # BLD AUTO: 6.9 X10(3) UL (ref 4–11)

## 2021-07-16 PROCEDURE — 86301 IMMUNOASSAY TUMOR CA 19-9: CPT

## 2021-07-16 PROCEDURE — 99215 OFFICE O/P EST HI 40 MIN: CPT | Performed by: INTERNAL MEDICINE

## 2021-07-16 PROCEDURE — 71045 X-RAY EXAM CHEST 1 VIEW: CPT | Performed by: EMERGENCY MEDICINE

## 2021-07-16 PROCEDURE — 36591 DRAW BLOOD OFF VENOUS DEVICE: CPT

## 2021-07-16 PROCEDURE — 85025 COMPLETE CBC W/AUTO DIFF WBC: CPT

## 2021-07-16 PROCEDURE — 80053 COMPREHEN METABOLIC PANEL: CPT

## 2021-07-16 PROCEDURE — 71260 CT THORAX DX C+: CPT | Performed by: EMERGENCY MEDICINE

## 2021-07-16 PROCEDURE — 99223 1ST HOSP IP/OBS HIGH 75: CPT | Performed by: INTERNAL MEDICINE

## 2021-07-16 RX ORDER — METHYLPREDNISOLONE SODIUM SUCCINATE 125 MG/2ML
125 INJECTION, POWDER, LYOPHILIZED, FOR SOLUTION INTRAMUSCULAR; INTRAVENOUS ONCE
Status: COMPLETED | OUTPATIENT
Start: 2021-07-16 | End: 2021-07-16

## 2021-07-16 RX ORDER — MELATONIN
3 NIGHTLY PRN
Status: DISCONTINUED | OUTPATIENT
Start: 2021-07-16 | End: 2021-07-20

## 2021-07-16 RX ORDER — DIPHENHYDRAMINE HYDROCHLORIDE 50 MG/ML
25 INJECTION INTRAMUSCULAR; INTRAVENOUS ONCE
Status: COMPLETED | OUTPATIENT
Start: 2021-07-16 | End: 2021-07-16

## 2021-07-16 RX ORDER — PANTOPRAZOLE SODIUM 40 MG/1
40 TABLET, DELAYED RELEASE ORAL DAILY
Status: DISCONTINUED | OUTPATIENT
Start: 2021-07-16 | End: 2021-07-20

## 2021-07-16 RX ORDER — ACETAMINOPHEN 500 MG
1000 TABLET ORAL EVERY 8 HOURS PRN
Status: DISCONTINUED | OUTPATIENT
Start: 2021-07-16 | End: 2021-07-20

## 2021-07-16 RX ORDER — DIPHENHYDRAMINE HYDROCHLORIDE 50 MG/ML
INJECTION INTRAMUSCULAR; INTRAVENOUS
Status: COMPLETED
Start: 2021-07-16 | End: 2021-07-16

## 2021-07-16 RX ORDER — IPRATROPIUM BROMIDE AND ALBUTEROL SULFATE 2.5; .5 MG/3ML; MG/3ML
3 SOLUTION RESPIRATORY (INHALATION) EVERY 4 HOURS PRN
Status: DISCONTINUED | OUTPATIENT
Start: 2021-07-16 | End: 2021-07-20

## 2021-07-16 RX ORDER — ONDANSETRON 2 MG/ML
4 INJECTION INTRAMUSCULAR; INTRAVENOUS EVERY 6 HOURS PRN
Status: DISCONTINUED | OUTPATIENT
Start: 2021-07-16 | End: 2021-07-20

## 2021-07-16 RX ORDER — PANTOPRAZOLE SODIUM 40 MG/1
40 TABLET, DELAYED RELEASE ORAL
COMMUNITY
End: 2021-07-27

## 2021-07-16 RX ORDER — METHYLPREDNISOLONE SODIUM SUCCINATE 125 MG/2ML
60 INJECTION, POWDER, LYOPHILIZED, FOR SOLUTION INTRAMUSCULAR; INTRAVENOUS EVERY 8 HOURS
Status: COMPLETED | OUTPATIENT
Start: 2021-07-16 | End: 2021-07-17

## 2021-07-16 RX ORDER — PROCHLORPERAZINE EDISYLATE 5 MG/ML
5 INJECTION INTRAMUSCULAR; INTRAVENOUS EVERY 8 HOURS PRN
Status: DISCONTINUED | OUTPATIENT
Start: 2021-07-16 | End: 2021-07-20

## 2021-07-16 RX ORDER — ROSUVASTATIN CALCIUM 5 MG/1
5 TABLET, COATED ORAL DAILY
COMMUNITY

## 2021-07-16 RX ORDER — ASPIRIN 81 MG/1
81 TABLET ORAL DAILY
Status: DISCONTINUED | OUTPATIENT
Start: 2021-07-17 | End: 2021-07-20

## 2021-07-16 RX ORDER — ENOXAPARIN SODIUM 100 MG/ML
40 INJECTION SUBCUTANEOUS DAILY
Status: DISCONTINUED | OUTPATIENT
Start: 2021-07-16 | End: 2021-07-20

## 2021-07-16 RX ORDER — DOCUSATE SODIUM 100 MG/1
100 CAPSULE, LIQUID FILLED ORAL 2 TIMES DAILY
Status: DISCONTINUED | OUTPATIENT
Start: 2021-07-16 | End: 2021-07-20

## 2021-07-16 RX ORDER — DABRAFENIB 75 MG/1
150 CAPSULE ORAL 2 TIMES DAILY
COMMUNITY
End: 2021-08-10

## 2021-07-16 RX ORDER — DEXTROSE MONOHYDRATE 25 G/50ML
50 INJECTION, SOLUTION INTRAVENOUS
Status: DISCONTINUED | OUTPATIENT
Start: 2021-07-16 | End: 2021-07-19

## 2021-07-16 RX ORDER — ROSUVASTATIN CALCIUM 5 MG/1
5 TABLET, COATED ORAL EVERY EVENING
Status: DISCONTINUED | OUTPATIENT
Start: 2021-07-16 | End: 2021-07-20

## 2021-07-16 RX ORDER — LISINOPRIL 40 MG/1
40 TABLET ORAL DAILY
COMMUNITY
End: 2021-09-10

## 2021-07-16 RX ORDER — LISINOPRIL 40 MG/1
40 TABLET ORAL DAILY
Status: DISCONTINUED | OUTPATIENT
Start: 2021-07-16 | End: 2021-07-20

## 2021-07-16 RX ORDER — AMLODIPINE BESYLATE 10 MG/1
10 TABLET ORAL DAILY
COMMUNITY
End: 2021-07-28

## 2021-07-16 NOTE — ED QUICK NOTES
Patient back from CT. C/o chest pain s/p CT. No respiratory distress or rash observed. MD notified. Benadryl ordered.

## 2021-07-16 NOTE — ED PROVIDER NOTES
Patient Seen in: [facility] Emergency Department      History   Patient presents with:  Difficulty Breathing  Arrythmia/Palpitations    Stated Complaint: hx cancer, sat in 70's today, HR elevated    HPI/Subjective:   HPI    18-year-old female presen Never Used    Vaping Use      Vaping Use: Never used    Alcohol use: No    Drug use: No      Comment: CBD gummies in the evening             Review of Systems    Positive for stated complaint: hx cancer, sat in 70's today, HR elevated  Other systems are as obtained.   COMPARISON:  [facility] , XR, XR CHEST AP PORTABLE  (CPT=71045), 3/09/2020, 9:21 AM.  INDICATIONS:  hx cancer, sat in 70's today, HR elevated, , sat 97% on 4l/nc upon arrival  PATIENT STATED HISTORY: (As transcribed by Technologist)  patient o aneurysm. CHEST WALL:  No mass or axillary adenopathy. LIMITED ABDOMEN:  Fatty infiltration of the liver is noted. Nodular contour of the liver is noted. BONES:  No bony lesion or fracture. CONCLUSION:   1. No evidence of pulmonary embolus.

## 2021-07-16 NOTE — ED QUICK NOTES
Orders for admission, patient is aware of plan and ready to go upstairs. Any questions, please call ED DOREEN Nuñez  at extension [phone].      Vaccinated Yes  Type of COVID test sent: PCR  COVID Suspicion level: Low      Titratable drug(s) infusing: None  Rate:

## 2021-07-16 NOTE — CONSULTS
Pulmonary H&P/Consult       NAME: Collettchantal Alison - ROOM: [facility] - MRN: [id number] - Age: 62year old - :  1960    Date of Admission: 2021 10:59 AM  Admission Diagnosis: No admission diagnoses are documented for this encounter.   Reason for consu Occupational History      Not on file    Tobacco Use      Smoking status: Former Smoker        Packs/day: 1.00        Years: 40.00        Pack years: 36        Start date: 1977        Quit date:         Years since quittin.5      Smokeless to Relation Age of Onset   • Heart Disease Father    • Stroke Father    • Heart Disease Mother    • Heart Disease Sister    • Heart Disease Brother    • Cancer Brother         Home Medications:  Pantoprazole Sodium 40 MG Oral Tab EC, Take 40 mg by mouth every 07/16/21  1300 07/16/21  1415 07/16/21  1607   BP: 117/73 99/58 121/63 113/70   Pulse: 96 87 88 84   Resp: 20 20 (!) 28 20   Temp:       TempSrc:       SpO2: 95% 94% 95% 95%   Weight:       Height:           Oxygen Therapy  SpO2: 95 %  O2 Device: Nasal can exacerbation  -steroids  -anoro  -BD protocol                     Monika Mcfarlane [facility]  [facility] Pulmonary and Critical Care

## 2021-07-16 NOTE — H&P
[facility]  History and Physical     Ata Almeida Patient Status:  Emergency    1960 MRN [id number]   Location [address] [facility] Attending Henok Napoles MD   Hosp Day # 0 PCP Vincent Collins MD     Chief Complaint: that she quit smoking about 4 years ago. She started smoking about 44 years ago. She has a 40.00 pack-year smoking history. She has never used smokeless tobacco. She reports that she does not drink alcohol and does not use drugs.     Family History:   Famil 1  Accu-Chek FastClix Lancets Does not apply Misc, Use as directed four times daily. , Disp: 400 each, Rfl: 3  insulin glargine (LANTUS SOLOSTAR) 100 UNIT/ML Subcutaneous Solution Pen-injector, Inject 10 Units into the skin nightly.  Lot #:, Disp: 1 pen, Rfl 3.  HEENT: Normocephalic atraumatic. Moist mucous membranes. EOM-I. PERRLA. Anicteric. Neck: No lymphadenopathy. No JVD. No carotid bruits. Respiratory: Clear to auscultation bilaterally. Cardiovascular: S1, S2. Regular rate and rhythm.  No murmurs, rub Amlodipine   5. CAD - nonobstructive   1. ASA/Statin  2. ECHO done 7/5 with preserved EF with normal diastolic function, no RWMA   3.  Will repeat a Troponin given complaint of chest discomfort but suspected related to #1     Quality:  · DVT Prophylaxis: Lo

## 2021-07-16 NOTE — PROGRESS NOTES
THE [facility] Hematology and Oncology Clinic Note    Diagnosis: Metastatic Cholangiocarcinoma: liver, bone: BRAF V600E +    Treatment History:   1. Cisplatin 25 mg/m2 D1/8 + Gemcitabine 1000 mg/m2 D1/8 q21 days.    C1: 3/3/20 and 3/10/20: : 998,038 CEA 43.6 other focal areas of bone pain. She is a previous smoker, she smoked for about 40 years and quit 2 years ago. She works as a . No Etoh abuse. FH of throat cancer in her brother.      Labs from 1/31/20: CBC is normal. CMP with elevated TP 8.9, hepatic lesions. · 8/12/20: Discussed at TB: proceed with maintenance Gemcitabine   · 11/4/20: CTA Chest: No PE.   · 11/6/20: MR L Spine: Focal T1/T2 signal at L3 concerning for osseous disease. 1.8 x 1.7 x 1,7 cm.    · 11/12/20: PET/CT: abnormal FDG upta sat has been dropping to low 70s with ambulation. No fevers, chills, cough, abdominal pain.      Review of Systems: 12 Point ROS was completed and pertinent positives are in the HPI    PANTOPRAZOLE SODIUM 40 MG Oral Tab EC, TAKE 1 TABLET BY MOUTH EVERY DAY 0.5 % External Cream, Apply 15 g topically 3 (three) times daily. , Disp: 1 Tube, Rfl: 0  Clobetasol Propionate 0.025 % External Cream, Apply 1 Application topically daily. , Disp: 1 Tube, Rfl: 11  ondansetron 8 MG Oral Tablet Dispersible, Take 1 tablet (8 m OTHER SURGICAL HISTORY       Social History    Socioeconomic History      Marital status:       Spouse name: Not on file      Number of children: Not on file      Years of education: Not on file      Highest education level: Not on file    Tobacco U described with SUV only slightly greater than that of background liver measuring maximum of 3.9 (previously 5.3).    2. Interval increase in size of sclerotic lesion in the L3 vertebral body with decrease in metabolic activity (3.6 currently versus is 5.3 p consider RT. She discussed her case with Dr. Andrew Thompson. Given proximity to stomach, IMRT or IGRT might be considered pending PET/CT results. · Her  is slowly going up. PET/CT is pending. We will request HRD testing from [facility] given her ARID1A mutation.

## 2021-07-16 NOTE — ED INITIAL ASSESSMENT (HPI)
Patient to ED with c/o SOB and elevated HR. Patient sent from oncology office for worsening CASS for one week. Patient's SpO2 of 75% while ambulating to washroom and felt palpitations. Patient is currently being treated for cholangiocarcinoma.

## 2021-07-17 LAB
ANION GAP SERPL CALC-SCNC: 6 MMOL/L (ref 0–18)
BASOPHILS # BLD AUTO: 0.01 X10(3) UL (ref 0–0.2)
BASOPHILS NFR BLD AUTO: 0.1 %
BUN BLD-MCNC: 18 MG/DL (ref 7–18)
BUN/CREAT SERPL: 24.7 (ref 10–20)
CALCIUM BLD-MCNC: 9.7 MG/DL (ref 8.5–10.1)
CHLORIDE SERPL-SCNC: 104 MMOL/L (ref 98–112)
CO2 SERPL-SCNC: 25 MMOL/L (ref 21–32)
CREAT BLD-MCNC: 0.73 MG/DL
DEPRECATED RDW RBC AUTO: 44.2 FL (ref 35.1–46.3)
EOSINOPHIL # BLD AUTO: 0 X10(3) UL (ref 0–0.7)
EOSINOPHIL NFR BLD AUTO: 0 %
ERYTHROCYTE [DISTWIDTH] IN BLOOD BY AUTOMATED COUNT: 12.4 % (ref 11–15)
GLUCOSE BLD-MCNC: 223 MG/DL (ref 70–99)
GLUCOSE BLD-MCNC: 240 MG/DL (ref 70–99)
GLUCOSE BLD-MCNC: 257 MG/DL (ref 70–99)
GLUCOSE BLD-MCNC: 307 MG/DL (ref 70–99)
GLUCOSE BLD-MCNC: 322 MG/DL (ref 70–99)
GLUCOSE BLD-MCNC: 327 MG/DL (ref 70–99)
GLUCOSE BLD-MCNC: 329 MG/DL (ref 70–99)
HCT VFR BLD AUTO: 34.9 %
HGB BLD-MCNC: 11.8 G/DL
IMM GRANULOCYTES # BLD AUTO: 0.03 X10(3) UL (ref 0–1)
IMM GRANULOCYTES NFR BLD: 0.4 %
LYMPHOCYTES # BLD AUTO: 1.1 X10(3) UL (ref 1–4)
LYMPHOCYTES NFR BLD AUTO: 15.3 %
MCH RBC QN AUTO: 32.8 PG (ref 26–34)
MCHC RBC AUTO-ENTMCNC: 33.8 G/DL (ref 31–37)
MCV RBC AUTO: 96.9 FL
MONOCYTES # BLD AUTO: 0.52 X10(3) UL (ref 0.1–1)
MONOCYTES NFR BLD AUTO: 7.2 %
NEUTROPHILS # BLD AUTO: 5.55 X10 (3) UL (ref 1.5–7.7)
NEUTROPHILS # BLD AUTO: 5.55 X10(3) UL (ref 1.5–7.7)
NEUTROPHILS NFR BLD AUTO: 77 %
OSMOLALITY SERPL CALC.SUM OF ELEC: 290 MOSM/KG (ref 275–295)
PLATELET # BLD AUTO: 120 10(3)UL (ref 150–450)
POTASSIUM SERPL-SCNC: 4.4 MMOL/L (ref 3.5–5.1)
RBC # BLD AUTO: 3.6 X10(6)UL
SODIUM SERPL-SCNC: 135 MMOL/L (ref 136–145)
WBC # BLD AUTO: 7.2 X10(3) UL (ref 4–11)

## 2021-07-17 PROCEDURE — 99255 IP/OBS CONSLTJ NEW/EST HI 80: CPT | Performed by: INTERNAL MEDICINE

## 2021-07-17 PROCEDURE — 99232 SBSQ HOSP IP/OBS MODERATE 35: CPT | Performed by: INTERNAL MEDICINE

## 2021-07-17 RX ORDER — PREDNISONE 20 MG/1
40 TABLET ORAL
Status: DISCONTINUED | OUTPATIENT
Start: 2021-07-18 | End: 2021-07-20

## 2021-07-17 NOTE — PLAN OF CARE
Assumed pt care at 2330. Pt sleeping in no apparent distress. VSS. Afebrile. O2 sats > 92% on 2L O2 via nc. Resp easy and non labored. NSR on tele. Repeat blood glucose 322  at 0100. Dr. Omer Navarro notified and  additional Novolog 10U given as ordered. Pt on s INTERVENTIONS:  - Assess for changes in respiratory status  - Assess for changes in mentation and behavior  - Position to facilitate oxygenation and minimize respiratory effort  - Oxygen supplementation based on oxygen saturation or ABGs  - Provide Smoking

## 2021-07-17 NOTE — PLAN OF CARE
Pt denies c/o pain, malaise, or cardiac symptoms. A&Ox4. Lungs clear on left, diminished on right, equal expansion, on 2L O2 NC. Pt NSR on monitor with regular rate. Abdomen soft and non-tender with active bowel sounds in all four quadrants.  Continent of B vital signs, obtain 12 lead EKG if indicated  - Evaluate effectiveness of antiarrhythmic and heart rate control medications as ordered  - Initiate emergency measures for life threatening arrhythmias  - Monitor electrolytes and administer replacement therap

## 2021-07-17 NOTE — PROGRESS NOTES
Pulmonary Progress Note        NAME: Solomon Estevez - ROOM: 5480/0488-X - MRN: [id number] - Age: 62year old - : 1960        Last 24hrs: No events overnight, noting hyperglycemia due to steroids , feels better today, sats are better but not normali service lines     Rajinder Tracy  [address] Pulmonary and Critical Care

## 2021-07-17 NOTE — PLAN OF CARE
New admission for shortness of breath. ox4. Patient wears 2L PRN o2 at home. Felt increasing shortness of breath. Minimal shortness of breath at rest, more with exertion. . Diminished lung sounds.  at bedside. Chemo home meds in fridge.  Hem notif

## 2021-07-17 NOTE — PROGRESS NOTES
[facility]  Progress Note     Ulysses Gregg Patient Status:  Inpatient    1960 MRN [id number]   [facility] 2NE-A Attending Earl Stewart, DO   Hosp Day # 1 PCP Pilar Pa MD     Chief Complaint: dyspnea    S: Patient mervin 07/16/2021    COVID19 Not Detected 01/06/2021    COVID19 Not Detected 12/09/2020       Pro-Calcitonin  No results for input(s): PCT in the last 168 hours.     Cardiac  Recent Labs   Lab 07/16/21  1219 07/16/21  1926   TROP <0.045 <0.045   PBNP 46  -- Luis Enrique Calderon, DO

## 2021-07-18 LAB
GLUCOSE BLD-MCNC: 240 MG/DL (ref 70–99)
GLUCOSE BLD-MCNC: 393 MG/DL (ref 70–99)
GLUCOSE BLD-MCNC: 419 MG/DL (ref 70–99)
GLUCOSE BLD-MCNC: 443 MG/DL (ref 70–99)
GLUCOSE BLD-MCNC: 463 MG/DL (ref 70–99)

## 2021-07-18 PROCEDURE — 99232 SBSQ HOSP IP/OBS MODERATE 35: CPT | Performed by: INTERNAL MEDICINE

## 2021-07-18 NOTE — PROGRESS NOTES
Heme/Onc Progress Note - [facility]      Chief Complaint:    Follow up for evaluation and management of metastatic cholangiocarcinoma. Interim History:      The patient overall is feeling better with better breathing. She has no pain at this visit.      Physic infiltration of the liver is noted.  Nodular contour of the liver is noted. BONES:  No bony lesion or fracture.        Impression   CONCLUSION:         1.  No evidence of pulmonary embolus.       2. Mild mediastinal and right hilar lymphadenopathy.

## 2021-07-18 NOTE — PROGRESS NOTES
07/18/21 1130   Mobility   O2 walk?  Yes   SPO2% on Room Air at Rest 84   SPO2% on Oxygen at Rest 97   At rest oxygen flow (liters per minute) 2   SPO2% Ambulation on Room Air 88   SPO2% Ambulation on Oxygen 93   Ambulation oxygen flow (liters per minute

## 2021-07-18 NOTE — CONSULTS
Cancer Center Report of Consultation    Patient Name: Anitra Bosworth   YOB: 1960   Medical Record Number: [id number]   CSN: [id number]   Consulting Physician: Evan Dela Cruz MD  Referring Physician(s): No ref.  provider found  Date of Consulta Disease Sister    • Heart Disease Brother    • Cancer Brother        Gyne History:  OB History     T0    L0    SAB0  TAB0  Ectopic0  Multiple0  Live Births0     Psychosocial History:  Social History    Socioeconomic History      Marital status: Communication with Friends and Family:       Frequency of Social Gatherings with Friends and Family:       Attends Muslim Services:       Active Member of Clubs or Organizations:       Attends Club or Organization Meetings:       Marital Status:   Intim  (L) 07/17/2021    K 4.4 07/17/2021     07/17/2021    CO2 25.0 07/17/2021    BUN 18 07/17/2021    CREATSERUM 0.73 07/17/2021     (H) 07/17/2021    CA 9.7 07/17/2021    ALKPHO 119 (H) 07/16/2021    ALT 47 07/16/2021    AST 51 (H) 07/16/20 metastases    4. Anemia complicating neoplastic disease:    Mild anemia. Follow for now. 5. Mild thrombocytopenia: At baseline. Follow.       Ester Gill MD

## 2021-07-18 NOTE — PLAN OF CARE
Alert & oriented x4. On 2L nasal cannula oxygen saturations in low 90s. Nonproductive, congested cough noted. Normal sinus rhythm. Subcutaneous lovenox for dvt prophylaxis. Continent of bowel & bladder. Denies pain at this time. Up ad karl.  QIharinder Collier

## 2021-07-18 NOTE — PROGRESS NOTES
Pulmonary Progress Note        NAME: Kirill Faria - ROOM: 5501/8730-B - MRN: [id number] - Age: 62year old - : 1960        Last 24hrs: No events overnight, still on O2, feels a little better again today    OBJECTIVE:   21  0614 21  06 exacerbation  -steroids, prednisone  -anoro  -BD protocol  3.  Dispo  -await ambulatory O2 assessment       Jose David [facility] man[facility]  [facility] Pulmonary and Critical Care

## 2021-07-18 NOTE — PROGRESS NOTES
[facility]  Progress Note     Gilberto Andres Patient Status:  Inpatient    1960 MRN [id number]   [facility] 2NE-A Attending Jodi Vo, DO   Hosp Day # 2 PCP Hemal Alva MD     Chief Complaint: dyspnea    S: Patient sti 07/16/2021    COVID19 Not Detected 01/06/2021    COVID19 Not Detected 12/09/2020       Pro-Calcitonin  No results for input(s): PCT in the last 168 hours.     Cardiac  Recent Labs   Lab 07/16/21  1219 07/16/21  1926   TROP <0.045 <0.045   PBNP 46  -- this point Ms. Geoff Juan is expected to be discharge to: home    Plan of care discussed with patient, RN    Danilo Jamison, DO

## 2021-07-18 NOTE — PLAN OF CARE
Pt denies c/o pain, malaise, or cardiac symptoms. A&Ox4. Lungs diminished bilaterally with equal expansion, on 2L O2 NC. Pt NSR on monitor with regular rate. Abdomen soft and non-tender with active bowel sounds in all four quadrants. Continent of B&B.  Pt u signs, obtain 12 lead EKG if indicated  - Evaluate effectiveness of antiarrhythmic and heart rate control medications as ordered  - Initiate emergency measures for life threatening arrhythmias  - Monitor electrolytes and administer replacement therapy as o

## 2021-07-19 LAB
GLUCOSE BLD-MCNC: 199 MG/DL (ref 70–99)
GLUCOSE BLD-MCNC: 231 MG/DL (ref 70–99)
GLUCOSE BLD-MCNC: 391 MG/DL (ref 70–99)
GLUCOSE BLD-MCNC: 487 MG/DL (ref 70–99)

## 2021-07-19 PROCEDURE — 99232 SBSQ HOSP IP/OBS MODERATE 35: CPT | Performed by: INTERNAL MEDICINE

## 2021-07-19 RX ORDER — DEXTROSE MONOHYDRATE 25 G/50ML
50 INJECTION, SOLUTION INTRAVENOUS
Status: DISCONTINUED | OUTPATIENT
Start: 2021-07-19 | End: 2021-07-20

## 2021-07-19 RX ORDER — PREDNISONE 10 MG/1
TABLET ORAL
Qty: 22 TABLET | Refills: 0 | Status: SHIPPED | OUTPATIENT
Start: 2021-07-20 | End: 2021-08-10

## 2021-07-19 NOTE — PROGRESS NOTES
THE MEDICAL [facility] Hematology and Oncology Progress Note   Length of Stay: 3    Subjective: NAEO. She states that breathing is better. Denies any new pain. ROS: 12 Point ROS completed and pertinent positives are above     Treatment history  1.  Cisplatin 25 mg/m2 D · 2/20/20: US guided Liver Bx: positive for metastatic adenocarcinoma in background of HASTINGS. Only positive for CK7. DDx lung, upper GI and pancreatobiliary. · 2/26/20: Discussed at TB: Cholangiocarcinoma favored .  IgG is normal.   · Tempus: BRAF V600E Units Subcutaneous TID CC   • Trametinib Dimethyl Sulfoxide  2 mg Oral Daily   • predniSONE  40 mg Oral Daily with breakfast   • Dabrafenib Mesylate  150 mg Oral BID   • aspirin  81 mg Oral Daily   • docusate sodium  100 mg Oral BID   • lisinopril  40 mg O MSI-S, TMB 2.8  · Received Cisplatin 25 mg/m2 + Gemcitabine D1/8 q21 days x 8 cycles per the ABC-02 trial. Erica mancera. Arvin Shelby 2010. Completed 8 cycles of Cis/[place] with reduction in tumor markers.  Follow up PET/CT from 8/10/20 with persistent but stable disea MD  [facility] Hematology and Oncology

## 2021-07-19 NOTE — PAYOR COMM NOTE
--------------  CONTINUED STAY REVIEW    Payor: Elvin Rivera #:  [id number]  Authorization Number: [id number]    Admit date: 7/16/21  Admit time:  6:33 PM    Admitting Physician: Ernesto Jose DO  Attending Physici umeclidinium-vilanterol  1 puff Inhalation Daily      Continuous Infusing Medication:     Lungs: clear to auscultation bilaterally  Heart: S1, S2 normal, no murmur, click, rub or gallop, regular rate and rhythm  Abdomen: soft, non-tender; bowel sounds norm (Right Leg) Rut Esparza, RN      Insulin Aspart Pen (NOVOLOG) 100 UNIT/ML flexpen 1-10 Units     Date Action Dose Route User    7/19/2021 1236 Given 5 Units Subcutaneous (Left Anterior Thigh) Ellen Ramon RN    7/19/2021 0851 Given 4 Units Subcut

## 2021-07-19 NOTE — PROGRESS NOTES
Pulmonary Progress Note        NAME: Ravi Monteiro - ROOM: 1843/8808-Z - MRN: [id number] - Age: 62year old - : 1960        Last 24hrs: No events overnight, still on O2, walked yesterday noted increased SOB and unsteadiness, this AM she feels much below    ASSESSMENT/PLAN:    1. Acute on Chronic Hypoxia  -suspect COPD exacerbation  -baseline is 2L w/ activity, currently needs 2L ATC  -wean O2 as tolerated  2. COPD exacerbation  -steroids, prednisone  -anoro  -BD protocol  3.  Dispo  -stable to discha

## 2021-07-19 NOTE — PROGRESS NOTES
LOVE HOSPITALIST  Progress Note     Jes Plascencia Patient Status:  Inpatient    1960 MRN [id number]   [facility] 2NE-A Attending Sheryl Cleary, DO   Hosp Day # 3 PCP Rafaela Beckham MD     Chief Complaint: dyspnea  p  S: Patient fe Detected 01/06/2021    COVID19 Not Detected 12/09/2020       Pro-Calcitonin  No results for input(s): PCT in the last 168 hours.     Cardiac  Recent Labs   Lab 07/16/21  1219 07/16/21  1926   TROP <0.045 <0.045   PBNP 46  --        Creatinine Kinase  No res on discharge?: no  Estimated date of discharge: tomorrow  Discharge is dependent on: progress  At this point Ms. Yovani Staton is expected to be discharge to: home    Plan of care discussed with patient, DOREEN Fairbanks, DO

## 2021-07-19 NOTE — PLAN OF CARE
Patient alert and oriented x4. Up ad karl. On 2 L per NC. NSR on tele. Continent of bowel and bladder. No complaints of pain, shortness of breath, or chest pain/discomfort. POC MRI. Fall precautions in place. Call light within reach.  Will continue to [facility] EKG if indicated  - Evaluate effectiveness of antiarrhythmic and heart rate control medications as ordered  - Initiate emergency measures for life threatening arrhythmias  - Monitor electrolytes and administer replacement therapy as ordered  Outcome: Progr

## 2021-07-19 NOTE — PLAN OF CARE
Assumed care of patient at 1. Patient AOX4. O2 sat sat >90% on 2L nasal cannula (patient's home O2 baseline). Will continue to wean as able. NSR on tele- tachycardic with ambulation. VSS. Denies pain.  Patient on home doses of chemo meds- on chemo precau artery perfusion - ex.  Angina  - Evaluate fluid balance, assess for edema, trend weights  Outcome: Progressing  Goal: Absence of cardiac arrhythmias or at baseline  Description: INTERVENTIONS:  - Continuous cardiac monitoring, monitor vital signs, obtain 1

## 2021-07-19 NOTE — PAYOR COMM NOTE
--------------  ADMISSION REVIEW     Payor: Elvin Rivera #:  [id number]  Authorization Number: [id number]    Admit date: 7/16/21  Admit time:  6:33 PM       Admitting Physician: Colleen Conrad DO  Attending Physicia 97.5 °F (36.4 °C)   Temp src Temporal   SpO2 96 %   O2 Device Nasal cannula       Current:/70   Pulse 84   Temp 97.5 °F (36.4 °C) (Temporal)   Resp 20   Ht 157.5 cm (5' 2\")   Wt 77.1 kg   LMP 01/15/2013   SpO2 95%   BMI 31.09 kg/m²         Physical pneumothorax. CONCLUSION:  No lobar pneumonia or overt congestive failure. Minimal scarring/atelectasis in the lower lungs.     Dictated by (CST): Paige Levy MD on 7/16/2021 at 1:01 PM     Finalized by (CST): Paige Levy MD on 7/16/2021 lobe that may be due to cirrhotic changes noted.     Dictated by (CST): Leroy Tamez MD on 7/16/2021 at 3:03 PM     Finalized by (CST): Leroy Tamez MD on 7/16/2021 at 3:09 PM             MDM      26-year-old female presents with increased oxyge DEPARTMENT Attending Sha Nayak MD   Hosp Day # 0 PCP Beatriz Harada, MD     Chief Complaint: Shortness of Breath, Hypoxia    History of Present Illness: Solomon Estevez is a 62year old female with past medical history of COPD on chronic nasal cannul units daily as directed, Disp: 15 mL, Rfl: 0  Insulin Pen Needle 32G X 4 MM Does not apply Misc, Use as directed., Disp: 100 each, Rfl: 5  Blood Glucose Monitoring Suppl (ONETOUCH ULTRA 2) w/Device Does not apply Kit, 1 Device by Other route 2 (two) times Tab, Take 81 mg by mouth daily. , Disp: , Rfl:         Review of Systems:   A comprehensive 14 point review of systems was completed. Pertinent positives and negatives noted in the HPI.     Physical Exam:    BP 99/58   Pulse 87   Temp 97.5 °F (36.4 °C) (T hours.    Inflammatory Markers  Recent Labs   Lab 07/16/21  1219   DDIMER 0.96*       Imaging: Imaging data reviewed in Epic. ASSESSMENT / PLAN:     1. Acute on chronic hypoxic respiratory failure, COPD/Pulmonary fibrosis  1. Pulmonary on consult  2. 100 mg Oral BID   • lisinopril  40 mg Oral Daily   • Pantoprazole Sodium  40 mg Oral Daily   • Rosuvastatin Calcium  5 mg Oral QPM   • enoxaparin  40 mg Subcutaneous Daily   • Insulin Aspart Pen  2-10 Units Subcutaneous TID CC and HS   • umeclidinium-horacio nontender, nondistended. Positive bowel sounds. No rebound or guarding. Neurologic: No focal neurological deficits. Musculoskeletal: Moves all extremities.   Extremities: No edema.     Diagnostic Data:       Labs:       Recent Labs   Lab 07/16/21  8612 Inhalation Daily         ASSESSMENT / PLAN:      4. Acute on chronic hypoxic respiratory failure, COPD/Pulmonary fibrosis  1. Pulmonary on consult  2. Systemic steroids for COPD exacerbation   5. Metastatic Cholangiocarcinoma   1. Onc on consult  1.  Elva Lowry

## 2021-07-20 ENCOUNTER — TELEPHONE (OUTPATIENT)
Dept: HEMATOLOGY/ONCOLOGY | Facility: HOSPITAL | Age: 61
End: 2021-07-20

## 2021-07-20 VITALS
OXYGEN SATURATION: 96 % | BODY MASS INDEX: 31.32 KG/M2 | RESPIRATION RATE: 18 BRPM | WEIGHT: 170.19 LBS | DIASTOLIC BLOOD PRESSURE: 72 MMHG | HEIGHT: 62 IN | SYSTOLIC BLOOD PRESSURE: 121 MMHG | TEMPERATURE: 98 F | HEART RATE: 77 BPM

## 2021-07-20 DIAGNOSIS — R74.01 TRANSAMINITIS: ICD-10-CM

## 2021-07-20 DIAGNOSIS — C22.1 CHOLANGIOCARCINOMA (HCC): Primary | ICD-10-CM

## 2021-07-20 LAB
GLUCOSE BLD-MCNC: 118 MG/DL (ref 70–99)
GLUCOSE BLD-MCNC: 123 MG/DL (ref 70–99)

## 2021-07-20 PROCEDURE — 99239 HOSP IP/OBS DSCHRG MGMT >30: CPT | Performed by: HOSPITALIST

## 2021-07-20 RX ORDER — INSULIN GLARGINE 100 [IU]/ML
20 INJECTION, SOLUTION SUBCUTANEOUS 2 TIMES DAILY
Qty: 15 ML | Refills: 0 | Status: SHIPPED | OUTPATIENT
Start: 2021-07-20 | End: 2022-01-17

## 2021-07-20 RX ORDER — INSULIN LISPRO 100 [IU]/ML
INJECTION, SOLUTION INTRAVENOUS; SUBCUTANEOUS
Qty: 15 ML | Refills: 0 | Status: SHIPPED | OUTPATIENT
Start: 2021-07-20 | End: 2021-07-28 | Stop reason: ALTCHOICE

## 2021-07-20 NOTE — PLAN OF CARE
Tele monitoring. I/o.  paged regarding blood sugar this evening of 485. Pt may need to add sliding scale carb coverage at home per  note. Awaiting return call. A1c 8.7-pt on po prednisone.  Sliding scale per medium coverage with new orders to increase color and temperature  - Assess for signs of decreased coronary artery perfusion - ex.  Angina  - Evaluate fluid balance, assess for edema, trend weights  Outcome: Progressing  Goal: Absence of cardiac arrhythmias or at baseline  Description: INTERVENTIONS:

## 2021-07-20 NOTE — PROGRESS NOTES
L[facility] HOSPITALIST  Progress Note     Lacie Summers Patient Status:  Inpatient    1960 MRN [id number]   [facility] 2NE-A Attending Shae Knapp, DO   Hosp Day # 4 PCP David Rodriguez MD     Chief Complaint: dyspnea    S: Patient fee Detected 01/06/2021    COVID19 Not Detected 12/09/2020       Pro-Calcitonin  No results for input(s): PCT in the last 168 hours.     Cardiac  Recent Labs   Lab 07/16/21  1219 07/16/21  1926   TROP <0.045 <0.045   PBNP 46  --        Creatinine Kinase  No res no  Estimated date of discharge: Today  Discharge is dependent on: progress  At this point Ms. Hillary Cadena is expected to be discharge to: home    Plan of care discussed with patient, RN    Deon Wilhelm MD  [facility]

## 2021-07-20 NOTE — PROGRESS NOTES
Up walking in the hallway. Denies c/o discomfort. O2 in use as at home.  on bedside. DC instruction given-verbalized understanding. DC tele. DC hl.

## 2021-07-20 NOTE — PROGRESS NOTES
Pulmonary Progress Note        NAME: Lacie Summers - ROOM: 5878/6474-[id number] - MRN: [id number] - Age: 62year old - : 1960        Last 24hrs: No events overnight, awaiting MRI    OBJECTIVE:   21  2041 21  2300 21  0513 21  5876 exacerbation  -baseline is 2L w/ activity, currently needs 2L ATC  -wean O2 as tolerated  2. COPD exacerbation  -steroids, prednisone  -anoro  -BD protocol  3.  Dispo  -stable to discharge home  -f/u w/ Dr. Sony Stern in 1-2 weeks       Kim DUONG P

## 2021-07-20 NOTE — PROGRESS NOTES
Description: INTERVENTIONS:  - Monitor Blood Glucose as ordered  - Assess for signs and symptoms of hyperglycemia and hypoglycemia  - Administer ordered medications to maintain glucose within target range  - Assess barriers to adequate nutritional intake a oxygenation  Description: INTERVENTIONS:  - Assess for changes in respiratory status  - Assess for changes in mentation and behavior  - Position to facilitate oxygenation and minimize respiratory effort  - Oxygen supplementation based on oxygen saturation

## 2021-07-21 NOTE — PAYOR COMM NOTE
--------------  DISCHARGE REVIEW    Payor: Elvin Rivera #:  [id number]  Authorization Number: [id number]    Admit date: 7/16/21  Admit time:   6:33 PM  Discharge Date: 7/20/2021  3:29 PM     Admitting Physician: Raine Dawkins

## 2021-07-21 NOTE — DISCHARGE SUMMARY
[facility] HOSPITALIST  DISCHARGE SUMMARY     Shannon Najera Patient Status:  Inpatient    1960 MRN [id number]   [facility] 2NE-A Attending No att. providers found   [address] Day # 4 PCP Zoey Fernandez MD     Date of Admission: 2021  Da during hospitalization:   • None    Incidental or significant findings and recommendations (brief descriptions):  • None    Lab/Test results pending at Discharge:   · None    Consultants:  • Shelbi Choudhary 33    Discharge Medication List:     Discharge Medications NORVASC      Take 10 mg by mouth daily. Refills: 0     aspirin 81 MG Tabs      Take 81 mg by mouth daily. Refills: 0     Clobetasol Propionate 0.025 % Crea      Apply 1 Application topically daily.    Quantity: 1 Tube  Refills: 11     Colace 100 MG Caps directed by your doctor or nurse    Bring a paper prescription for each of these medications  · Insulin Lispro (1 Unit Dial) 100 UNIT/ML Sopn  · Lantus SoloStar 100 UNIT/ML Sopn         [place]    Follow-up appointment:   [facility] MRI  [phone]. Masks are required to be worn upon entering any [address] facility. Please bring your insurance card and photo ID.  You will also need to bring your doctor's order unless your physician's office submitted the order electr injection. DIABETIC PATIENTS ONLY  -If you are taking Metformin, please withhold for at least 48 hours.   -Do not take any insulin within 4 hours of your appointment.   If you are taking long-acting insulin, take only half the normal dose at your normal [facility] Townsend    Patient Instructions:                    8/2/2021  7:00 AM  [facility] MRI ABDOMEN [employer] [0287] 42 min [facility] MRI [facility] MR RM3 (3T WIDE)    Patient Instructions:     Please arrive 30 minutes prior to you physician's office submitted the order electronically or faxed the order. Without the order, your test may be delayed or postponed. &nbsp; &nbsp;   Children: Children under the age of 15 must have another adult caregiver with them.&nbsp; Please do not bring be dropped off at the [facility] door and picked up afterward, unless patient assistance is required. -Staff will be available to escort patients inside as needed. Family/friends will be notified when your loved one is ready for pick-up.   Thank you for [facility] [address]. Please register at the [facility] on the first floor.   **Important Info for Public Service [place] [facility]** Because the safety and protection of patients, staff, physicians and community is an absolute priority for [facility]

## 2021-07-22 ENCOUNTER — TELEPHONE (OUTPATIENT)
Dept: HEMATOLOGY/ONCOLOGY | Facility: HOSPITAL | Age: 61
End: 2021-07-22

## 2021-07-22 ENCOUNTER — HOSPITAL ENCOUNTER (OUTPATIENT)
Dept: NUCLEAR MEDICINE | Facility: HOSPITAL | Age: 61
Discharge: HOME | End: 2021-07-22
Attending: INTERNAL MEDICINE
Payer: MEDICAID

## 2021-07-22 ENCOUNTER — OFFICE VISIT (OUTPATIENT)
Dept: HEMATOLOGY/ONCOLOGY | Facility: HOSPITAL | Age: 61
End: 2021-07-22
Attending: INTERNAL MEDICINE
Payer: MEDICAID

## 2021-07-22 VITALS
RESPIRATION RATE: 16 BRPM | TEMPERATURE: 98 F | HEART RATE: 85 BPM | OXYGEN SATURATION: 93 % | HEIGHT: 62.01 IN | DIASTOLIC BLOOD PRESSURE: 80 MMHG | WEIGHT: 171.63 LBS | SYSTOLIC BLOOD PRESSURE: 138 MMHG | BODY MASS INDEX: 31.18 KG/M2

## 2021-07-22 DIAGNOSIS — C22.1 CHOLANGIOCARCINOMA (HCC): ICD-10-CM

## 2021-07-22 DIAGNOSIS — I80.8 SUPERFICIAL PHLEBITIS OF ARM: Primary | ICD-10-CM

## 2021-07-22 LAB — GLUCOSE BLD-MCNC: 128 MG/DL (ref 70–99)

## 2021-07-22 PROCEDURE — 99214 OFFICE O/P EST MOD 30 MIN: CPT | Performed by: NURSE PRACTITIONER

## 2021-07-22 PROCEDURE — 78815 PET IMAGE W/CT SKULL-THIGH: CPT | Performed by: INTERNAL MEDICINE

## 2021-07-22 PROCEDURE — 82962 GLUCOSE BLOOD TEST: CPT

## 2021-07-22 RX ORDER — BLOOD-GLUCOSE,RECEIVER,CONT
1 EACH MISCELLANEOUS AS DIRECTED
COMMUNITY
Start: 2021-05-07 | End: 2021-09-04 | Stop reason: CLARIF

## 2021-07-22 NOTE — PROGRESS NOTES
Patient presents with:  Skin: APN assessment - sick call    Pt is here for a sick call - skin irritation s/p peripheral IV placement inpatient. She was discharged from [facility] on 7/20/2021; IV in left [facility] now with redness and soreness. Denies fever.     Pt ar

## 2021-07-22 NOTE — TELEPHONE ENCOUNTER
Spoke with patient. She verbalized understanding. Radiation Oncology notified to assist patient with scheduling needs. Yi Pina MD  P [facility] R[facility]  Results reviewed. PET/CT only shows spot in the liver. I forwarded the results to Dr. Camila Jack.

## 2021-07-22 NOTE — PROGRESS NOTES
ANP Visit Note    Patient Name: Micaela Saxena   YOB: 1960   Medical Record Number: [id number]   CSN: [id number]   Date of visit: 7/22/2021       Chief Complaint/Reason for Visit:  Patient presents with:  Skin: APN assessment - sick call daily with breakfast for 3 days. , Disp: 22 tablet, Rfl: 0  •  umeclidinium-vilanterol 62.5-25 MCG/INH Inhalation Aerosol Powder, Breath Activated, Inhale 1 puff into the lungs daily. , Disp: 3 each, Rfl: 3  •  Pantoprazole Sodium 40 MG Oral Tab EC, Take 40 31.38 kg/m²   General: Well developed, not in acute distress. HEENT: Anicteric, conjunctivae and sclerae clear   Chest: respirations unlabored. Extremities: No edema. Neurological: Grossly intact.    Skin: Warm, Dry, ~1 cm area of erythema with central w [facility]

## 2021-07-26 NOTE — PROGRESS NOTES
[address] [facility]ology [facility] On[facility] Clinic Note    Diagnosis: Metastatic Cholangiocarcinoma: liver, bone: BRAF V600E +    Treatment History:   1. Cisplatin 25 mg/m2 D1/8 + Gemcitabine 1000 mg/m2 D1/8 q21 days.    C1: 3/3/20 and 3/10/20: : 130,662 CEA 43.6 other focal areas of bone pain. She is a previous smoker, she smoked for about 40 years and quit 2 years ago. She works as a . No Etoh abuse. FH of throat cancer in her brother.      Labs from 1/31/20: CBC is normal. CMP with elevated TP 8.9, hepatic lesions. · 8/12/20: Discussed at TB: proceed with maintenance Gemcitabine   · 11/4/20: CTA Chest: No PE.   · 11/6/20: MR L Spine: Focal T1/T2 signal at L3 concerning for osseous disease. 1.8 x 1.7 x 1,7 cm.    · 11/12/20: PET/CT: abnormal FDG upta there are plans for 15 days of treatment. She is also scheduled for an MRI on 8/2/21. She states that she is doing well. She denies any new pain or rashes. 8 more days of prednisone left for her COPD flare.  S    Review of Systems: 12 Point ROS was comp Propionate 0.025 % External Cream, Apply 1 Application topically daily. , Disp: 1 Tube, Rfl: 11  ondansetron 8 MG Oral Tablet Dispersible, Take 1 tablet (8 mg total) by mouth every 8 (eight) hours as needed for Nausea., Disp: 60 tablet, Rfl: 11  [place]ron obstructive pulmonary disease) ([facility] [address]     2 1/2 L O2 @ night   • Coronary atherosclerosis    • Diabetes ([facility] [address]    • Diverticulitis large intestine w/o perforation or abscess w/o bleeding 11/14/2017   • Essential hypertension    • GERD (gastroesophageal reflu scattered  on legs    Results:  Lab Results   Component Value Date    WBC 8.9 07/27/2021    HGB 12.4 07/27/2021    HCT 37.8 07/27/2021    MCV 99.2 07/27/2021    .0 (L) 07/27/2021    EOSABS 0.00 06/23/2020     Lab Results   Component Value Date    NA response to treatment   · Continue Dabrafenib 150 mg BID /Trametinib 2 mg daily (1/1/21-current). She will hold treatment once RT starts. ·  stable at 126 but slightly up. Repeat PET/CT is stable. MRI is pending.   Given that she only has localized Cholangiocarcinoma-Rx for Chemotherapy    Follow up 1 week after RT starts for repeat blood work.      [facility]

## 2021-07-27 ENCOUNTER — OFFICE VISIT (OUTPATIENT)
Dept: HEMATOLOGY/ONCOLOGY | Facility: HOSPITAL | Age: 61
End: 2021-07-27
Attending: INTERNAL MEDICINE
Payer: MEDICAID

## 2021-07-27 ENCOUNTER — HOSPITAL ENCOUNTER (OUTPATIENT)
Dept: RADIATION ONCOLOGY | Facility: HOSPITAL | Age: 61
Discharge: HOME | End: 2021-07-27
Attending: RADIOLOGY
Payer: MEDICAID

## 2021-07-27 VITALS
WEIGHT: 171.38 LBS | SYSTOLIC BLOOD PRESSURE: 131 MMHG | OXYGEN SATURATION: 92 % | RESPIRATION RATE: 16 BRPM | TEMPERATURE: 98 F | HEART RATE: 96 BPM | DIASTOLIC BLOOD PRESSURE: 82 MMHG | BODY MASS INDEX: 31 KG/M2

## 2021-07-27 DIAGNOSIS — C22.1 CHOLANGIOCARCINOMA (HCC): Primary | ICD-10-CM

## 2021-07-27 LAB
ALBUMIN SERPL-MCNC: 3.2 G/DL (ref 3.4–5)
ALBUMIN/GLOB SERPL: 0.8 {RATIO} (ref 1–2)
ALP LIVER SERPL-CCNC: 108 U/L
ALT SERPL-CCNC: 59 U/L
ANION GAP SERPL CALC-SCNC: 6 MMOL/L (ref 0–18)
AST SERPL-CCNC: 34 U/L (ref 15–37)
BASOPHILS # BLD AUTO: 0.02 X10(3) UL (ref 0–0.2)
BASOPHILS NFR BLD AUTO: 0.2 %
BILIRUB SERPL-MCNC: 0.4 MG/DL (ref 0.1–2)
BUN BLD-MCNC: 16 MG/DL (ref 7–18)
BUN/CREAT SERPL: 14.2 (ref 10–20)
CALCIUM BLD-MCNC: 9.2 MG/DL (ref 8.5–10.1)
CANCER AG19-9 SERPL-ACNC: 186 U/ML (ref ?–37)
CHLORIDE SERPL-SCNC: 103 MMOL/L (ref 98–112)
CO2 SERPL-SCNC: 26 MMOL/L (ref 21–32)
CREAT BLD-MCNC: 1.13 MG/DL
DEPRECATED RDW RBC AUTO: 48.4 FL (ref 35.1–46.3)
EOSINOPHIL # BLD AUTO: 0.03 X10(3) UL (ref 0–0.7)
EOSINOPHIL NFR BLD AUTO: 0.3 %
ERYTHROCYTE [DISTWIDTH] IN BLOOD BY AUTOMATED COUNT: 13.4 % (ref 11–15)
GLOBULIN PLAS-MCNC: 4.2 G/DL (ref 2.8–4.4)
GLUCOSE BLD-MCNC: 311 MG/DL (ref 70–99)
HCT VFR BLD AUTO: 37.8 %
HGB BLD-MCNC: 12.4 G/DL
IMM GRANULOCYTES # BLD AUTO: 0.04 X10(3) UL (ref 0–1)
IMM GRANULOCYTES NFR BLD: 0.5 %
LYMPHOCYTES # BLD AUTO: 0.87 X10(3) UL (ref 1–4)
LYMPHOCYTES NFR BLD AUTO: 9.8 %
M PROTEIN MFR SERPL ELPH: 7.4 G/DL (ref 6.4–8.2)
MCH RBC QN AUTO: 32.5 PG (ref 26–34)
MCHC RBC AUTO-ENTMCNC: 32.8 G/DL (ref 31–37)
MCV RBC AUTO: 99.2 FL
MONOCYTES # BLD AUTO: 0.35 X10(3) UL (ref 0.1–1)
MONOCYTES NFR BLD AUTO: 4 %
NEUTROPHILS # BLD AUTO: 7.55 X10 (3) UL (ref 1.5–7.7)
NEUTROPHILS # BLD AUTO: 7.55 X10(3) UL (ref 1.5–7.7)
NEUTROPHILS NFR BLD AUTO: 85.2 %
OSMOLALITY SERPL CALC.SUM OF ELEC: 293 MOSM/KG (ref 275–295)
PATIENT FASTING Y/N/NP: NO
PLATELET # BLD AUTO: 109 10(3)UL (ref 150–450)
POTASSIUM SERPL-SCNC: 4 MMOL/L (ref 3.5–5.1)
RBC # BLD AUTO: 3.81 X10(6)UL
SODIUM SERPL-SCNC: 135 MMOL/L (ref 136–145)
WBC # BLD AUTO: 8.9 X10(3) UL (ref 4–11)

## 2021-07-27 PROCEDURE — 36591 DRAW BLOOD OFF VENOUS DEVICE: CPT

## 2021-07-27 PROCEDURE — 77399 UNLISTED PX MED RADJ PHYSICS: CPT | Performed by: RADIOLOGY

## 2021-07-27 PROCEDURE — 77470 SPECIAL RADIATION TREATMENT: CPT | Performed by: RADIOLOGY

## 2021-07-27 PROCEDURE — 99213 OFFICE O/P EST LOW 20 MIN: CPT | Performed by: INTERNAL MEDICINE

## 2021-07-27 PROCEDURE — 77334 RADIATION TREATMENT AID(S): CPT | Performed by: RADIOLOGY

## 2021-07-28 ENCOUNTER — OFFICE VISIT (OUTPATIENT)
Dept: ENDOCRINOLOGY CLINIC | Facility: CLINIC | Age: 61
End: 2021-07-28
Payer: MEDICAID

## 2021-07-28 VITALS
RESPIRATION RATE: 18 BRPM | BODY MASS INDEX: 31.28 KG/M2 | DIASTOLIC BLOOD PRESSURE: 68 MMHG | WEIGHT: 170 LBS | OXYGEN SATURATION: 94 % | HEART RATE: 110 BPM | SYSTOLIC BLOOD PRESSURE: 116 MMHG | HEIGHT: 62 IN

## 2021-07-28 DIAGNOSIS — Z79.4 TYPE 2 DIABETES MELLITUS WITH HYPERGLYCEMIA, WITH LONG-TERM CURRENT USE OF INSULIN (HCC): Primary | ICD-10-CM

## 2021-07-28 DIAGNOSIS — E78.2 MIXED HYPERLIPIDEMIA: ICD-10-CM

## 2021-07-28 DIAGNOSIS — I10 ESSENTIAL HYPERTENSION: ICD-10-CM

## 2021-07-28 DIAGNOSIS — E11.65 TYPE 2 DIABETES MELLITUS WITH HYPERGLYCEMIA, WITH LONG-TERM CURRENT USE OF INSULIN (HCC): Primary | ICD-10-CM

## 2021-07-28 LAB
CARTRIDGE LOT#: 788 NUMERIC
HEMOGLOBIN A1C: 7.8 % (ref 4.3–5.6)

## 2021-07-28 PROCEDURE — 95251 CONT GLUC MNTR ANALYSIS I&R: CPT | Performed by: NURSE PRACTITIONER

## 2021-07-28 PROCEDURE — 3008F BODY MASS INDEX DOCD: CPT | Performed by: NURSE PRACTITIONER

## 2021-07-28 PROCEDURE — 83036 HEMOGLOBIN GLYCOSYLATED A1C: CPT | Performed by: NURSE PRACTITIONER

## 2021-07-28 PROCEDURE — 3074F SYST BP LT 130 MM HG: CPT | Performed by: NURSE PRACTITIONER

## 2021-07-28 PROCEDURE — 99215 OFFICE O/P EST HI 40 MIN: CPT | Performed by: NURSE PRACTITIONER

## 2021-07-28 PROCEDURE — 3078F DIAST BP <80 MM HG: CPT | Performed by: NURSE PRACTITIONER

## 2021-07-28 RX ORDER — PEN NEEDLE, DIABETIC 32GX 5/32"
NEEDLE, DISPOSABLE MISCELLANEOUS
COMMUNITY
Start: 2021-07-27 | End: 2021-09-04 | Stop reason: CLARIF

## 2021-07-28 RX ORDER — INSULIN GLARGINE 100 [IU]/ML
INJECTION, SOLUTION SUBCUTANEOUS
Qty: 15 ML | Refills: 0 | OUTPATIENT
Start: 2021-07-28

## 2021-07-28 RX ORDER — BLOOD SUGAR DIAGNOSTIC
STRIP MISCELLANEOUS
Qty: 200 EACH | Refills: 11 | Status: SHIPPED | OUTPATIENT
Start: 2021-07-28 | End: 2021-09-04 | Stop reason: CLARIF

## 2021-07-28 RX ORDER — INSULIN LISPRO 200 [IU]/ML
INJECTION, SOLUTION SUBCUTANEOUS
Qty: 12 ML | Refills: 3 | Status: SHIPPED | OUTPATIENT
Start: 2021-07-28 | End: 2021-09-04 | Stop reason: CLARIF

## 2021-07-28 RX ORDER — AMLODIPINE BESYLATE 10 MG/1
TABLET ORAL
Qty: 90 TABLET | Refills: 1 | Status: SHIPPED | OUTPATIENT
Start: 2021-07-28 | End: 2021-09-04 | Stop reason: CLARIF

## 2021-07-28 RX ORDER — LANCETS 33 GAUGE
EACH MISCELLANEOUS
Qty: 200 EACH | Refills: 11 | Status: SHIPPED | OUTPATIENT
Start: 2021-07-28 | End: 2021-09-04 | Stop reason: CLARIF

## 2021-07-28 RX ORDER — INSULIN GLARGINE 100 [IU]/ML
INJECTION, SOLUTION SUBCUTANEOUS
Qty: 15 ML | Refills: 3 | Status: SHIPPED | OUTPATIENT
Start: 2021-07-28 | End: 2021-09-04 | Stop reason: CLARIF

## 2021-07-28 NOTE — TELEPHONE ENCOUNTER
Medication(s) to Refill:   Requested Prescriptions     Pending Prescriptions Disp Refills   • AMLODIPINE BESYLATE 10 MG Oral Tab [Pharmacy Med Name: amLODIPine Besylate Oral Tablet 10 MG] 90 tablet 0     Sig: TAKE 1 TABLET BY MOUTH ONE TIME A DAY       LOV [facility] [address] MRI [facility] MR RM3 (3T WIDE)    Patient Instructions:     Please arrive 30 minutes prior to your scheduled appointment time.   You will need time to change your clothes, fill out screening forms, use the restroom, and may need an IV if you Children: Children under the age of 15 must have another adult caregiver with them.&nbsp; Please do not bring your child/children without a caregiver. &nbsp; Because of the highly sensitive equipment and privacy of all our patients, children will not be per your loved one is ready for pick-up. Thank you for your understanding.                8/12/2021  1:00 PM  FOLLOW UP-RAD/ONC [facility] [[id number]] 15 min [facility] [place] [address] [place] [address] Drive RN     8/12/2021  1:15 PM  FOLLOW UP-RAD/ONC  [5749] 15 mi

## 2021-07-28 NOTE — PATIENT INSTRUCTIONS
We are here to help you manage your diabetes.  Please continue with your primary care physician/provider for your routine health care maintenance     Your A1C: 7.8% ( last A1C 8.7%)     This is an improvement for you and we will continue to work together on insulin  If blood sugar is 176-200, take 6 units of insulin   If blood sugar is 201-225, take 7 units of insulin   If blood sugar is 226-250, take 8 units of insulin   If blood sugar is 251-275, take 9  units of insulin  If blood sugar is 276- 300, take 10 or tingling in the lips or tongue    Treatment of Low Blood sugar Action Plan  1. Check blood glucose to be sure that it is low. You can’t always go by symptoms. If in doubt, treat your low blood glucose anyway. 2. Take 15 grams of carbohydrate (carb).  He you   [facility]   [phone]

## 2021-07-28 NOTE — TELEPHONE ENCOUNTER
Name from pharmacy: Lantus SoloStar Subcutaneous Solution Pen-injector 100 UNIT/ML          Will file in chart as: LANTUS SOLOSTAR 100 UNIT/ML Subcutaneous Solution Pen-injector     Possible duplicate: Suze to review recent actions on this medication    S

## 2021-07-29 PROCEDURE — 77301 RADIOTHERAPY DOSE PLAN IMRT: CPT | Performed by: RADIOLOGY

## 2021-07-29 PROCEDURE — 77300 RADIATION THERAPY DOSE PLAN: CPT | Performed by: RADIOLOGY

## 2021-07-29 PROCEDURE — 77293 RESPIRATOR MOTION MGMT SIMUL: CPT | Performed by: RADIOLOGY

## 2021-07-29 PROCEDURE — 77338 DESIGN MLC DEVICE FOR IMRT: CPT | Performed by: RADIOLOGY

## 2021-08-01 ENCOUNTER — HOSPITAL ENCOUNTER (OUTPATIENT)
Dept: RADIATION ONCOLOGY | Facility: HOSPITAL | Age: 61
Discharge: HOME | End: 2021-08-01
Attending: RADIOLOGY
Payer: MEDICAID

## 2021-08-02 ENCOUNTER — HOSPITAL ENCOUNTER (OUTPATIENT)
Dept: MRI IMAGING | Facility: HOSPITAL | Age: 61
Discharge: HOME | End: 2021-08-02
Attending: CLINICAL NURSE SPECIALIST
Payer: MEDICAID

## 2021-08-02 ENCOUNTER — TELEPHONE (OUTPATIENT)
Dept: HEMATOLOGY/ONCOLOGY | Facility: HOSPITAL | Age: 61
End: 2021-08-02

## 2021-08-02 DIAGNOSIS — C22.1 CHOLANGIOCARCINOMA (HCC): ICD-10-CM

## 2021-08-02 DIAGNOSIS — R74.01 TRANSAMINITIS: ICD-10-CM

## 2021-08-02 PROCEDURE — 74183 MRI ABD W/O CNTR FLWD CNTR: CPT | Performed by: CLINICAL NURSE SPECIALIST

## 2021-08-02 PROCEDURE — A9581 GADOXETATE DISODIUM INJ: HCPCS | Performed by: CLINICAL NURSE SPECIALIST

## 2021-08-02 PROCEDURE — 77399 UNLISTED PX MED RADJ PHYSICS: CPT | Performed by: RADIOLOGY

## 2021-08-05 ENCOUNTER — HOSPITAL ENCOUNTER (OUTPATIENT)
Dept: RADIATION ONCOLOGY | Facility: HOSPITAL | Age: 61
Discharge: HOME | End: 2021-08-05
Attending: RADIOLOGY
Payer: MEDICAID

## 2021-08-05 PROCEDURE — 77386 HC IMRT COMPLEX: CPT | Performed by: RADIOLOGY

## 2021-08-06 PROCEDURE — 77386 HC IMRT COMPLEX: CPT | Performed by: RADIOLOGY

## 2021-08-09 ENCOUNTER — HOSPITAL ENCOUNTER (OUTPATIENT)
Dept: RADIATION ONCOLOGY | Facility: HOSPITAL | Age: 61
Discharge: HOME | End: 2021-08-09
Attending: RADIOLOGY
Payer: MEDICAID

## 2021-08-09 ENCOUNTER — TELEPHONE (OUTPATIENT)
Dept: HEMATOLOGY/ONCOLOGY | Facility: HOSPITAL | Age: 61
End: 2021-08-09

## 2021-08-09 ENCOUNTER — NURSE ONLY (OUTPATIENT)
Dept: HEMATOLOGY/ONCOLOGY | Facility: HOSPITAL | Age: 61
End: 2021-08-09
Attending: INTERNAL MEDICINE
Payer: MEDICAID

## 2021-08-09 VITALS
SYSTOLIC BLOOD PRESSURE: 95 MMHG | DIASTOLIC BLOOD PRESSURE: 54 MMHG | OXYGEN SATURATION: 87 % | BODY MASS INDEX: 32 KG/M2 | RESPIRATION RATE: 19 BRPM | HEART RATE: 98 BPM | WEIGHT: 172.63 LBS | TEMPERATURE: 100 F

## 2021-08-09 DIAGNOSIS — C78.7 LIVER METASTASIS (HCC): ICD-10-CM

## 2021-08-09 DIAGNOSIS — C78.7 LIVER METASTASIS (HCC): Primary | ICD-10-CM

## 2021-08-09 LAB
ALBUMIN SERPL-MCNC: 2.8 G/DL (ref 3.4–5)
ALBUMIN/GLOB SERPL: 0.6 {RATIO} (ref 1–2)
ALP LIVER SERPL-CCNC: 111 U/L
ALT SERPL-CCNC: 49 U/L
ANION GAP SERPL CALC-SCNC: 4 MMOL/L (ref 0–18)
AST SERPL-CCNC: 54 U/L (ref 15–37)
BASOPHILS # BLD AUTO: 0.03 X10(3) UL (ref 0–0.2)
BASOPHILS NFR BLD AUTO: 0.5 %
BILIRUB SERPL-MCNC: 0.4 MG/DL (ref 0.1–2)
BILIRUB UR QL STRIP.AUTO: NEGATIVE
BUN BLD-MCNC: 13 MG/DL (ref 7–18)
CALCIUM BLD-MCNC: 8.4 MG/DL (ref 8.5–10.1)
CHLORIDE SERPL-SCNC: 106 MMOL/L (ref 98–112)
CO2 SERPL-SCNC: 28 MMOL/L (ref 21–32)
COLOR UR AUTO: YELLOW
CREAT BLD-MCNC: 1.24 MG/DL
EOSINOPHIL # BLD AUTO: 0.16 X10(3) UL (ref 0–0.7)
EOSINOPHIL NFR BLD AUTO: 2.6 %
ERYTHROCYTE [DISTWIDTH] IN BLOOD BY AUTOMATED COUNT: 13.6 %
GLOBULIN PLAS-MCNC: 4.4 G/DL (ref 2.8–4.4)
GLUCOSE BLD-MCNC: 99 MG/DL (ref 70–99)
GLUCOSE UR STRIP.AUTO-MCNC: NEGATIVE MG/DL
HCT VFR BLD AUTO: 34.8 %
HGB BLD-MCNC: 11.5 G/DL
IMM GRANULOCYTES # BLD AUTO: 0.01 X10(3) UL (ref 0–1)
IMM GRANULOCYTES NFR BLD: 0.2 %
KETONES UR STRIP.AUTO-MCNC: NEGATIVE MG/DL
LYMPHOCYTES # BLD AUTO: 1.15 X10(3) UL (ref 1–4)
LYMPHOCYTES NFR BLD AUTO: 18.6 %
M PROTEIN MFR SERPL ELPH: 7.2 G/DL (ref 6.4–8.2)
MCH RBC QN AUTO: 32.8 PG (ref 26–34)
MCHC RBC AUTO-ENTMCNC: 33 G/DL (ref 31–37)
MCV RBC AUTO: 99.1 FL
MONOCYTES # BLD AUTO: 0.58 X10(3) UL (ref 0.1–1)
MONOCYTES NFR BLD AUTO: 9.4 %
NEUTROPHILS # BLD AUTO: 4.24 X10 (3) UL (ref 1.5–7.7)
NEUTROPHILS # BLD AUTO: 4.24 X10(3) UL (ref 1.5–7.7)
NEUTROPHILS NFR BLD AUTO: 68.7 %
NITRITE UR QL STRIP.AUTO: NEGATIVE
OSMOLALITY SERPL CALC.SUM OF ELEC: 286 MOSM/KG (ref 275–295)
PATIENT FASTING Y/N/NP: NO
PH UR STRIP.AUTO: 5 [PH] (ref 5–8)
PLATELET # BLD AUTO: 103 10(3)UL (ref 150–450)
POTASSIUM SERPL-SCNC: 4 MMOL/L (ref 3.5–5.1)
PROT UR STRIP.AUTO-MCNC: NEGATIVE MG/DL
RBC # BLD AUTO: 3.51 X10(6)UL
RBC UR QL AUTO: NEGATIVE
SODIUM SERPL-SCNC: 138 MMOL/L (ref 136–145)
SP GR UR STRIP.AUTO: 1.02 (ref 1–1.03)
UROBILINOGEN UR STRIP.AUTO-MCNC: <2 MG/DL
WBC # BLD AUTO: 6.2 X10(3) UL (ref 4–11)
WBC #/AREA URNS AUTO: >50 /HPF

## 2021-08-09 PROCEDURE — 81001 URINALYSIS AUTO W/SCOPE: CPT

## 2021-08-09 PROCEDURE — 36591 DRAW BLOOD OFF VENOUS DEVICE: CPT

## 2021-08-09 PROCEDURE — 77386 HC IMRT COMPLEX: CPT | Performed by: RADIOLOGY

## 2021-08-09 PROCEDURE — 87086 URINE CULTURE/COLONY COUNT: CPT

## 2021-08-09 PROCEDURE — 87077 CULTURE AEROBIC IDENTIFY: CPT

## 2021-08-09 PROCEDURE — 80053 COMPREHEN METABOLIC PANEL: CPT

## 2021-08-09 PROCEDURE — 85025 COMPLETE CBC W/AUTO DIFF WBC: CPT

## 2021-08-09 PROCEDURE — 87186 SC STD MICRODIL/AGAR DIL: CPT

## 2021-08-09 RX ORDER — LEVOFLOXACIN 750 MG/1
750 TABLET ORAL DAILY
Qty: 10 TABLET | Refills: 0 | Status: SHIPPED | OUTPATIENT
Start: 2021-08-09 | End: 2021-08-16

## 2021-08-09 NOTE — TELEPHONE ENCOUNTER
Called and spoke to patient about apts for tomorrow; she will be seen in RT and if still not feeling well she will come upstairs to be evaluated by KRYSTLE Paredes with possible fluids. Pt agreed to and understood the plan.

## 2021-08-09 NOTE — PATIENT INSTRUCTIONS
Please hold blood pressure medication unless your systolic pressure (top number) is greater than or equal to 110. Continue with zofran prn for nausea. Drink plenty of fluids. Have lab work done today in the lab upstairs.

## 2021-08-09 NOTE — PROGRESS NOTES
[facility] Radiation Treatment Management Note 1-5    Patient:  Rasta Mcdonald  Age:  62year old  Visit Diagnosis:  Liver met  Primary Rad/Onc:  Dr. William Henderson    Site Delivered Dose (cGy) Prescribed Dose (cGy) Fraction #   Liver 9 lung clear  - abd soft      Treatment setup imaging have been reviewed:   Yes    Assessment/Plan:  - check labs and ua  - low grade temp could be from chemo per dr Adler Chamber  - advised hold bp med, unless systolic >303  - to see apn tomorrow unless doing better

## 2021-08-10 ENCOUNTER — OFFICE VISIT (OUTPATIENT)
Dept: HEMATOLOGY/ONCOLOGY | Facility: HOSPITAL | Age: 61
End: 2021-08-10
Attending: INTERNAL MEDICINE
Payer: MEDICAID

## 2021-08-10 ENCOUNTER — HOSPITAL ENCOUNTER (OUTPATIENT)
Dept: RADIATION ONCOLOGY | Facility: HOSPITAL | Age: 61
Discharge: HOME | End: 2021-08-10
Attending: RADIOLOGY
Payer: MEDICAID

## 2021-08-10 ENCOUNTER — TELEPHONE (OUTPATIENT)
Dept: HEMATOLOGY/ONCOLOGY | Facility: HOSPITAL | Age: 61
End: 2021-08-10

## 2021-08-10 VITALS
DIASTOLIC BLOOD PRESSURE: 72 MMHG | OXYGEN SATURATION: 92 % | WEIGHT: 174.19 LBS | SYSTOLIC BLOOD PRESSURE: 106 MMHG | RESPIRATION RATE: 18 BRPM | TEMPERATURE: 99 F | HEART RATE: 90 BPM | HEIGHT: 62.01 IN | BODY MASS INDEX: 31.65 KG/M2

## 2021-08-10 VITALS
HEART RATE: 84 BPM | TEMPERATURE: 98 F | DIASTOLIC BLOOD PRESSURE: 63 MMHG | SYSTOLIC BLOOD PRESSURE: 88 MMHG | OXYGEN SATURATION: 93 % | RESPIRATION RATE: 18 BRPM

## 2021-08-10 DIAGNOSIS — C22.1 CHOLANGIOCARCINOMA (HCC): Primary | ICD-10-CM

## 2021-08-10 DIAGNOSIS — C78.7 LIVER METASTASIS (HCC): Primary | ICD-10-CM

## 2021-08-10 DIAGNOSIS — N30.00 ACUTE CYSTITIS WITHOUT HEMATURIA: ICD-10-CM

## 2021-08-10 DIAGNOSIS — E86.0 DEHYDRATION: ICD-10-CM

## 2021-08-10 DIAGNOSIS — C78.7 LIVER METASTASIS (HCC): ICD-10-CM

## 2021-08-10 DIAGNOSIS — R50.9 FEVER, UNSPECIFIED FEVER CAUSE: ICD-10-CM

## 2021-08-10 PROCEDURE — 96360 HYDRATION IV INFUSION INIT: CPT

## 2021-08-10 PROCEDURE — 77386 HC IMRT COMPLEX: CPT | Performed by: RADIOLOGY

## 2021-08-10 PROCEDURE — 99215 OFFICE O/P EST HI 40 MIN: CPT | Performed by: NURSE PRACTITIONER

## 2021-08-10 NOTE — TELEPHONE ENCOUNTER
[facility]. calling regarding medication      levofloxacin 750 MG Oral Tab    Directions state 1 tab a day for 7 days. But prescription is for 10 tablets.     Please call Pharmacy

## 2021-08-10 NOTE — PROGRESS NOTES
ANP Visit Note    Patient Name: Robin Kohli   YOB: 1960   Medical Record Number: [id number]   CSN: [id number]   Date of visit: 8/10/2021       Chief Complaint/Reason for Visit:  Patient presents with:  Fatigue: APN assessment - sick call not apply Misc, Test 6 x daily, Disp: 200 each, Rfl: 11  •  PANTOPRAZOLE 40 MG Oral Tab EC, TAKE 1 TABLET BY MOUTH EVERY DAY , Disp: 30 tablet, Rfl: 0  •  insulin glargine (LANTUS SOLOSTAR) 100 UNIT/ML Subcutaneous Solution Pen-injector, Inject 20 Units in (m2) 1.8 m2   /72   Pulse 90   Resp 18   Temp 98.7   SpO2 92   Pain Score 0     General: Well developed, casually dressed, appropriately groomed, alert and oriented x 3, not in acute distress.   HEENT: Anicteric, conjunctivae and sclerae clear  Chest: mmol/L    CO2 28.0 21.0 - 32.0 mmol/L    Anion Gap 4 0 - 18 mmol/L    BUN 13 7 - 18 mg/dL    Creatinine 1.24 (H) 0.55 - 1.02 mg/dL    Calcium, Total 8.4 (L) 8.5 - 10.1 mg/dL    Calculated Osmolality 286 275 - 295 mOsm/kg    GFR, Non- 48 (L) problems.     Planned Follow Up: follow up with [facility] at the completion of RT    Risk Level: HIGH - cholangiocarcinoma receiving radiation with systemic effects requiring intervention and close monitoring       Electronically Signed by:    Radha De Paz

## 2021-08-10 NOTE — PROGRESS NOTES
Education Record    Learner:  Patient    Disease / Diagnosis: Cholangiocarcinoma/hypotension - IVF    Barriers / Limitations:  None   Comments:    Method:  Brief focused and Reinforcement   Comments:    General Topics:  Plan of care reviewed   Comments:

## 2021-08-10 NOTE — PROGRESS NOTES
Patient presents with:  Fatigue: APN assessment - sick call  Urinary Symptoms    Pt is here for a sick call - UTI/fatigue/fever. She is currently undergoing RT; oral chemo is on hold. Pt feels much better today than she has; no longer having fevers.  Labs f

## 2021-08-10 NOTE — PATIENT INSTRUCTIONS
Miralax tonight, then again in the AM if no BM. If no BM then call the office at [phone]. It is very important that you complete the entire prescribed course of antibiotics.   Do not stop taking the medication early (even if you are feeling better),

## 2021-08-11 PROCEDURE — 77386 HC IMRT COMPLEX: CPT | Performed by: RADIOLOGY

## 2021-08-12 ENCOUNTER — APPOINTMENT (OUTPATIENT)
Dept: HEMATOLOGY/ONCOLOGY | Facility: HOSPITAL | Age: 61
End: 2021-08-12
Attending: INTERNAL MEDICINE
Payer: MEDICAID

## 2021-08-12 ENCOUNTER — APPOINTMENT (OUTPATIENT)
Dept: RADIATION ONCOLOGY | Facility: HOSPITAL | Age: 61
End: 2021-08-12
Attending: RADIOLOGY
Payer: MEDICAID

## 2021-08-12 ENCOUNTER — GENETICS ENCOUNTER (OUTPATIENT)
Dept: GENETICS | Facility: HOSPITAL | Age: 61
End: 2021-08-12
Attending: INTERNAL MEDICINE
Payer: MEDICAID

## 2021-08-12 PROCEDURE — 96040 HC GENETIC COUNSELING EA 30 MIN: CPT | Performed by: GENETIC COUNSELOR, MS

## 2021-08-12 PROCEDURE — 36591 DRAW BLOOD OFF VENOUS DEVICE: CPT

## 2021-08-12 PROCEDURE — 77386 HC IMRT COMPLEX: CPT | Performed by: RADIOLOGY

## 2021-08-12 NOTE — PROGRESS NOTES
Referring Provider:  PAUL Lee MD    Additional Provider(s):  YIN Perera MD    Reason for Referral:  Elijah Hernandez was referred for genetic counseling because of a personal diagnosis of cholangiocarcinoma and a family hist 75 from heart failure. Ms. Marylen Poot mother had two brothers and no sisters. One of Ms. Mckinley’s maternal uncles  from lung cancer. Ms. Marylen Poot father  at age 67 from heart disease.   Ms. Marylen Poot father had one brother and four siste recommend that testing be performed as part of a multigene panel. Genetic Testing (Panel): The pros, cons, and limitations of genetic testing were discussed including the potential implications of test results on clinical management.      If a pathoge her genetic test results with her biological family members so that they may have their risk assessed. Genetic Information Non-Discrimination Act:   The legal protections of the Genetic Information Nondiscrimination Act (EVA) for health insurance and e

## 2021-08-13 PROCEDURE — 77386 HC IMRT COMPLEX: CPT | Performed by: RADIOLOGY

## 2021-08-13 PROCEDURE — 77336 RADIATION PHYSICS CONSULT: CPT | Performed by: RADIOLOGY

## 2021-08-16 ENCOUNTER — HOSPITAL ENCOUNTER (OUTPATIENT)
Dept: RADIATION ONCOLOGY | Facility: HOSPITAL | Age: 61
Discharge: HOME | End: 2021-08-16
Attending: RADIOLOGY
Payer: MEDICAID

## 2021-08-16 VITALS
BODY MASS INDEX: 32 KG/M2 | DIASTOLIC BLOOD PRESSURE: 74 MMHG | OXYGEN SATURATION: 95 % | TEMPERATURE: 97 F | WEIGHT: 172.63 LBS | RESPIRATION RATE: 19 BRPM | HEART RATE: 70 BPM | SYSTOLIC BLOOD PRESSURE: 129 MMHG

## 2021-08-16 DIAGNOSIS — C78.7 LIVER METASTASIS (HCC): Primary | ICD-10-CM

## 2021-08-16 PROCEDURE — 77386 HC IMRT COMPLEX: CPT | Performed by: RADIOLOGY

## 2021-08-16 NOTE — PROGRESS NOTES
[facility] Radiation Treatment Management Note 6-10    Patient:  Zara Medeiros  Age:  62year old  Visit Diagnosis:  Liver met  Primary Rad/Onc:  Dr. Joann Woods    Site Delivered Dose (cGy) Prescribed Dose (cGy) Fraction #   Liver

## 2021-08-17 PROCEDURE — 77386 HC IMRT COMPLEX: CPT | Performed by: RADIOLOGY

## 2021-08-18 ENCOUNTER — DOCUMENTATION ONLY (OUTPATIENT)
Dept: RADIATION ONCOLOGY | Facility: HOSPITAL | Age: 61
End: 2021-08-18

## 2021-08-18 PROCEDURE — 77336 RADIATION PHYSICS CONSULT: CPT | Performed by: RADIOLOGY

## 2021-08-18 PROCEDURE — 77386 HC IMRT COMPLEX: CPT | Performed by: RADIOLOGY

## 2021-08-24 ENCOUNTER — TELEPHONE (OUTPATIENT)
Dept: HEMATOLOGY/ONCOLOGY | Facility: HOSPITAL | Age: 61
End: 2021-08-24

## 2021-08-24 ENCOUNTER — GENETICS ENCOUNTER (OUTPATIENT)
Dept: HEMATOLOGY/ONCOLOGY | Facility: HOSPITAL | Age: 61
End: 2021-08-24

## 2021-08-24 NOTE — TELEPHONE ENCOUNTER
Patient calling. Did not se Dr Garcia Angry during Radiation. PT starts Chemo tomorrow.   When would Dr Garcia Angry like to see her for a follow-up

## 2021-08-24 NOTE — PROGRESS NOTES
Referring Provider:                    Crystal Crain MD     Additional Provider(s):              YIN Gracia MD     Reason for Referral:  Lucas Ortiz had genetic testing perform

## 2021-08-31 NOTE — PROGRESS NOTES
THE [facility] Hematology and Oncology Clinic Note    Diagnosis: Metastatic Cholangiocarcinoma: liver, bone: BRAF V600E +    Treatment History:   1. Cisplatin 25 mg/m2 D1/8 + Gemcitabine 1000 mg/m2 D1/8 q21 days.    C1: 3/3/20 and 3/10/20: : 733,809 CEA 43.6 chills. Weight is stable. She does feel more \"sweaty\" but no drenching night sweats. Aside from her Low back, no other focal areas of bone pain. She is a previous smoker, she smoked for about 40 years and quit 2 years ago. She works as a . after C8 of [employer]is/[place]:   · Bone: L3 (SUC 4.7) and T7 (sclerosis)  · Liver: Left lobe (stable SUV 7.3), infiltrative hepatic lesions. · 8/12/20: Discussed at TB: proceed with maintenance Gemcitabine   · 11/4/20: CTA Chest: No PE.   · 11/6/20: MR L Spine:  Fo palliative RT to T7-T9 and L3 in 12/2020. She started dabrafenib 150 mg BID + Trametinib 2 mg daily on 1/1/21 with a baseline  of 44,161. She stopped oral chemotherapy from 8/5/21 to 8/18/21 for palliative RT to primary liver mass 3000 cGy.  She resta (eight) hours as needed for Nausea., Disp: 60 tablet, Rfl: 11  Prochlorperazine Maleate (COMPAZINE) 10 mg tablet, Take 1 tablet (10 mg total) by mouth every 8 (eight) hours as needed for Nausea., Disp: 60 tablet, Rfl: 11  docusate sodium (COLACE) 100 MG Or Packs/day: 1.00        Years: 40.00        Pack years: 36        Start date: 1977        Quit date:         Years since quittin.6      Smokeless tobacco: Never Used    Vaping Use      Vaping Use: Never used    Substance and Sexual Activity of imaging.  The appearance is highly suspicious for multifocal cholangiocarcinoma.    2. No evidence of biliary ductal dilatation.  New line simple cyst along the inferior pole left lobe of liver measuring 2.1 cm.   3. Borderline hepatomegaly and splenomeg appears to be 1 dominant lesion with smaller treated lesions. Received Palliative RT to dominant L hepatic lobe mass (3000 cGy) as above and oral chemo was on hold during treatment (8/5 to 8/18/21).  She restarted D/T on 8/25/21  · CBC, CMP,  today  · Chemotherapy    Follow up 1 month    [facility]

## 2021-09-01 ENCOUNTER — NURSE ONLY (OUTPATIENT)
Dept: HEMATOLOGY/ONCOLOGY | Facility: HOSPITAL | Age: 61
End: 2021-09-01
Attending: INTERNAL MEDICINE
Payer: MEDICAID

## 2021-09-01 VITALS
DIASTOLIC BLOOD PRESSURE: 77 MMHG | SYSTOLIC BLOOD PRESSURE: 119 MMHG | WEIGHT: 167 LBS | HEIGHT: 62.01 IN | RESPIRATION RATE: 16 BRPM | BODY MASS INDEX: 30.34 KG/M2 | OXYGEN SATURATION: 90 % | HEART RATE: 102 BPM | TEMPERATURE: 98 F

## 2021-09-01 DIAGNOSIS — R68.83 CHILLS: Primary | ICD-10-CM

## 2021-09-01 DIAGNOSIS — C22.1 CHOLANGIOCARCINOMA (HCC): Primary | ICD-10-CM

## 2021-09-01 DIAGNOSIS — R68.83 CHILLS: ICD-10-CM

## 2021-09-01 LAB
ALBUMIN SERPL-MCNC: 3.3 G/DL (ref 3.4–5)
ALBUMIN/GLOB SERPL: 0.8 {RATIO} (ref 1–2)
ALP LIVER SERPL-CCNC: 128 U/L
ALT SERPL-CCNC: 35 U/L
ANION GAP SERPL CALC-SCNC: 8 MMOL/L (ref 0–18)
AST SERPL-CCNC: 36 U/L (ref 15–37)
BASOPHILS # BLD AUTO: 0.03 X10(3) UL (ref 0–0.2)
BASOPHILS NFR BLD AUTO: 0.4 %
BILIRUB SERPL-MCNC: 0.8 MG/DL (ref 0.1–2)
BUN BLD-MCNC: 15 MG/DL (ref 7–18)
CALCIUM BLD-MCNC: 8.8 MG/DL (ref 8.5–10.1)
CANCER AG19-9 SERPL-ACNC: 1412.9 U/ML (ref ?–37)
CHLORIDE SERPL-SCNC: 105 MMOL/L (ref 98–112)
CO2 SERPL-SCNC: 22 MMOL/L (ref 21–32)
CREAT BLD-MCNC: 0.95 MG/DL
EOSINOPHIL # BLD AUTO: 0.02 X10(3) UL (ref 0–0.7)
EOSINOPHIL NFR BLD AUTO: 0.3 %
ERYTHROCYTE [DISTWIDTH] IN BLOOD BY AUTOMATED COUNT: 14 %
GLOBULIN PLAS-MCNC: 4.3 G/DL (ref 2.8–4.4)
GLUCOSE BLD-MCNC: 153 MG/DL (ref 70–99)
HCT VFR BLD AUTO: 38.8 %
HGB BLD-MCNC: 13.3 G/DL
IMM GRANULOCYTES # BLD AUTO: 0.03 X10(3) UL (ref 0–1)
IMM GRANULOCYTES NFR BLD: 0.4 %
LYMPHOCYTES # BLD AUTO: 0.82 X10(3) UL (ref 1–4)
LYMPHOCYTES NFR BLD AUTO: 10.8 %
M PROTEIN MFR SERPL ELPH: 7.6 G/DL (ref 6.4–8.2)
MCH RBC QN AUTO: 32.6 PG (ref 26–34)
MCHC RBC AUTO-ENTMCNC: 34.3 G/DL (ref 31–37)
MCV RBC AUTO: 95.1 FL
MONOCYTES # BLD AUTO: 0.78 X10(3) UL (ref 0.1–1)
MONOCYTES NFR BLD AUTO: 10.3 %
NEUTROPHILS # BLD AUTO: 5.91 X10 (3) UL (ref 1.5–7.7)
NEUTROPHILS # BLD AUTO: 5.91 X10(3) UL (ref 1.5–7.7)
NEUTROPHILS NFR BLD AUTO: 77.8 %
OSMOLALITY SERPL CALC.SUM OF ELEC: 284 MOSM/KG (ref 275–295)
PATIENT FASTING Y/N/NP: NO
PLATELET # BLD AUTO: 122 10(3)UL (ref 150–450)
POTASSIUM SERPL-SCNC: 3.9 MMOL/L (ref 3.5–5.1)
RBC # BLD AUTO: 4.08 X10(6)UL
SODIUM SERPL-SCNC: 135 MMOL/L (ref 136–145)
TSI SER-ACNC: 0.86 MIU/ML (ref 0.36–3.74)
WBC # BLD AUTO: 7.6 X10(3) UL (ref 4–11)

## 2021-09-01 PROCEDURE — 99214 OFFICE O/P EST MOD 30 MIN: CPT | Performed by: INTERNAL MEDICINE

## 2021-09-01 PROCEDURE — 36591 DRAW BLOOD OFF VENOUS DEVICE: CPT

## 2021-09-01 RX ORDER — PANTOPRAZOLE SODIUM 40 MG/1
40 TABLET, DELAYED RELEASE ORAL DAILY
Qty: 30 TABLET | Refills: 0 | Status: SHIPPED | OUTPATIENT
Start: 2021-09-01 | End: 2021-09-10

## 2021-09-02 ENCOUNTER — APPOINTMENT (OUTPATIENT)
Dept: CT IMAGING | Facility: HOSPITAL | Age: 61
End: 2021-09-02
Attending: EMERGENCY MEDICINE
Payer: MEDICAID

## 2021-09-02 ENCOUNTER — TELEPHONE (OUTPATIENT)
Dept: HEMATOLOGY/ONCOLOGY | Facility: HOSPITAL | Age: 61
End: 2021-09-02

## 2021-09-02 ENCOUNTER — HOSPITAL ENCOUNTER (EMERGENCY)
Facility: HOSPITAL | Age: 61
Discharge: HOME | End: 2021-09-02
Attending: EMERGENCY MEDICINE
Payer: MEDICAID

## 2021-09-02 VITALS
TEMPERATURE: 98 F | OXYGEN SATURATION: 97 % | WEIGHT: 160 LBS | BODY MASS INDEX: 29.44 KG/M2 | HEART RATE: 94 BPM | DIASTOLIC BLOOD PRESSURE: 58 MMHG | RESPIRATION RATE: 18 BRPM | SYSTOLIC BLOOD PRESSURE: 99 MMHG | HEIGHT: 62 IN

## 2021-09-02 DIAGNOSIS — N30.00 ACUTE CYSTITIS WITHOUT HEMATURIA: Primary | ICD-10-CM

## 2021-09-02 LAB
ALBUMIN SERPL-MCNC: 3.3 G/DL (ref 3.4–5)
ALBUMIN/GLOB SERPL: 0.8 {RATIO} (ref 1–2)
ALP LIVER SERPL-CCNC: 128 U/L
ALT SERPL-CCNC: 45 U/L
ANION GAP SERPL CALC-SCNC: 5 MMOL/L (ref 0–18)
AST SERPL-CCNC: 58 U/L (ref 15–37)
BASOPHILS # BLD AUTO: 0.04 X10(3) UL (ref 0–0.2)
BASOPHILS NFR BLD AUTO: 0.5 %
BILIRUB SERPL-MCNC: 0.8 MG/DL (ref 0.1–2)
BILIRUB UR QL STRIP.AUTO: NEGATIVE
BUN BLD-MCNC: 18 MG/DL (ref 7–18)
CALCIUM BLD-MCNC: 8.8 MG/DL (ref 8.5–10.1)
CHLORIDE SERPL-SCNC: 106 MMOL/L (ref 98–112)
CO2 SERPL-SCNC: 23 MMOL/L (ref 21–32)
COLOR UR AUTO: YELLOW
CREAT BLD-MCNC: 0.92 MG/DL
EOSINOPHIL # BLD AUTO: 0.01 X10(3) UL (ref 0–0.7)
EOSINOPHIL NFR BLD AUTO: 0.1 %
ERYTHROCYTE [DISTWIDTH] IN BLOOD BY AUTOMATED COUNT: 13.8 %
GLOBULIN PLAS-MCNC: 4.2 G/DL (ref 2.8–4.4)
GLUCOSE BLD-MCNC: 193 MG/DL (ref 70–99)
GLUCOSE UR STRIP.AUTO-MCNC: NEGATIVE MG/DL
HCT VFR BLD AUTO: 38.8 %
HGB BLD-MCNC: 13 G/DL
IMM GRANULOCYTES # BLD AUTO: 0.02 X10(3) UL (ref 0–1)
IMM GRANULOCYTES NFR BLD: 0.2 %
KETONES UR STRIP.AUTO-MCNC: NEGATIVE MG/DL
LIPASE SERPL-CCNC: 223 U/L (ref 73–393)
LYMPHOCYTES # BLD AUTO: 0.76 X10(3) UL (ref 1–4)
LYMPHOCYTES NFR BLD AUTO: 9.3 %
M PROTEIN MFR SERPL ELPH: 7.5 G/DL (ref 6.4–8.2)
MCH RBC QN AUTO: 31.9 PG (ref 26–34)
MCHC RBC AUTO-ENTMCNC: 33.5 G/DL (ref 31–37)
MCV RBC AUTO: 95.1 FL
MONOCYTES # BLD AUTO: 0.52 X10(3) UL (ref 0.1–1)
MONOCYTES NFR BLD AUTO: 6.4 %
NEUTROPHILS # BLD AUTO: 6.82 X10 (3) UL (ref 1.5–7.7)
NEUTROPHILS # BLD AUTO: 6.82 X10(3) UL (ref 1.5–7.7)
NEUTROPHILS NFR BLD AUTO: 83.5 %
NITRITE UR QL STRIP.AUTO: NEGATIVE
OSMOLALITY SERPL CALC.SUM OF ELEC: 285 MOSM/KG (ref 275–295)
PH UR STRIP.AUTO: 5 [PH] (ref 5–8)
PLATELET # BLD AUTO: 118 10(3)UL (ref 150–450)
POTASSIUM SERPL-SCNC: 4.1 MMOL/L (ref 3.5–5.1)
PROT UR STRIP.AUTO-MCNC: 30 MG/DL
RBC # BLD AUTO: 4.08 X10(6)UL
RBC UR QL AUTO: NEGATIVE
SODIUM SERPL-SCNC: 134 MMOL/L (ref 136–145)
SP GR UR STRIP.AUTO: 1.03 (ref 1–1.03)
UROBILINOGEN UR STRIP.AUTO-MCNC: <2 MG/DL
WBC # BLD AUTO: 8.2 X10(3) UL (ref 4–11)

## 2021-09-02 PROCEDURE — 80053 COMPREHEN METABOLIC PANEL: CPT | Performed by: EMERGENCY MEDICINE

## 2021-09-02 PROCEDURE — 87086 URINE CULTURE/COLONY COUNT: CPT | Performed by: EMERGENCY MEDICINE

## 2021-09-02 PROCEDURE — 85025 COMPLETE CBC W/AUTO DIFF WBC: CPT | Performed by: EMERGENCY MEDICINE

## 2021-09-02 PROCEDURE — 99284 EMERGENCY DEPT VISIT MOD MDM: CPT

## 2021-09-02 PROCEDURE — 96374 THER/PROPH/DIAG INJ IV PUSH: CPT

## 2021-09-02 PROCEDURE — 74177 CT ABD & PELVIS W/CONTRAST: CPT | Performed by: EMERGENCY MEDICINE

## 2021-09-02 PROCEDURE — 83690 ASSAY OF LIPASE: CPT | Performed by: EMERGENCY MEDICINE

## 2021-09-02 PROCEDURE — 81001 URINALYSIS AUTO W/SCOPE: CPT | Performed by: EMERGENCY MEDICINE

## 2021-09-02 RX ORDER — MORPHINE SULFATE 4 MG/ML
4 INJECTION, SOLUTION INTRAMUSCULAR; INTRAVENOUS ONCE
Status: COMPLETED | OUTPATIENT
Start: 2021-09-02 | End: 2021-09-02

## 2021-09-02 RX ORDER — SULFAMETHOXAZOLE AND TRIMETHOPRIM 800; 160 MG/1; MG/1
1 TABLET ORAL 2 TIMES DAILY
Qty: 14 TABLET | Refills: 0 | Status: ON HOLD | OUTPATIENT
Start: 2021-09-02 | End: 2021-09-06

## 2021-09-02 NOTE — ED INITIAL ASSESSMENT (HPI)
Pt reprots left lower abdominal pain, +diarrhea, 1 episode of vomitting on Tuesday, had radiation 3 weeks ago and chemo for liver cancer.  Also treated for uti 2 weeks ago

## 2021-09-02 NOTE — ED PROVIDER NOTES
Patient Seen in: [facility] Emergency Department      History   Patient presents with:  Abdomen/Flank Pain    Stated Complaint:     HPI/Subjective:   HPI    Patient is 62years old, history is a cholangiocarcinoma, currently undergoing chemo and rad 1905 (!) 136/92   Pulse 09/02/21 0955 100   Resp 09/02/21 0955 20   Temp 09/02/21 0955 98.1 °F (36.7 °C)   Temp src 09/02/21 0955 Temporal   SpO2 09/02/21 0955 99 %   O2 Device 09/02/21 0955 None (Room air)       Current:BP 99/58   Pulse 94   Temp 98.1 °F The following orders were created for panel order CBC With Differential With Platelet.   Procedure                               Abnormality         Status                     ---------                               -----------         ------

## 2021-09-02 NOTE — TELEPHONE ENCOUNTER
Mandy Johnson was in yesterday to see Dr Calderon Person and he told her if left side abdominal pain worsens to call and it has gotten worse. Pain lower left side, 6/10 with sitting; 10/10 with standing, so can't stand.   Took Gas Ex as instructed with no improvement, has no

## 2021-09-04 ENCOUNTER — HOSPITAL ENCOUNTER (OUTPATIENT)
Facility: HOSPITAL | Age: 61
Setting detail: OBSERVATION
Discharge: HOME | End: 2021-09-06
Attending: EMERGENCY MEDICINE | Admitting: HOSPITALIST
Payer: MEDICAID

## 2021-09-04 ENCOUNTER — APPOINTMENT (OUTPATIENT)
Dept: CT IMAGING | Facility: HOSPITAL | Age: 61
End: 2021-09-04
Attending: EMERGENCY MEDICINE
Payer: MEDICAID

## 2021-09-04 ENCOUNTER — APPOINTMENT (OUTPATIENT)
Dept: GENERAL RADIOLOGY | Facility: HOSPITAL | Age: 61
End: 2021-09-04
Attending: EMERGENCY MEDICINE
Payer: MEDICAID

## 2021-09-04 DIAGNOSIS — R50.9 FEVER, UNSPECIFIED FEVER CAUSE: Primary | ICD-10-CM

## 2021-09-04 DIAGNOSIS — R09.02 HYPOXIA: ICD-10-CM

## 2021-09-04 DIAGNOSIS — C22.1 CHOLANGIOCARCINOMA (HCC): ICD-10-CM

## 2021-09-04 DIAGNOSIS — J84.10 FIBROSIS OF LUNG (HCC): ICD-10-CM

## 2021-09-04 DIAGNOSIS — R00.0 TACHYCARDIA, UNSPECIFIED: ICD-10-CM

## 2021-09-04 PROBLEM — D64.9 ANEMIA: Status: ACTIVE | Noted: 2021-09-04

## 2021-09-04 PROBLEM — D64.9 ANEMIA: Status: ACTIVE | Noted: 2021-01-01

## 2021-09-04 PROBLEM — E87.1 HYPONATREMIA: Status: ACTIVE | Noted: 2021-09-04

## 2021-09-04 PROBLEM — E87.1 HYPONATREMIA: Status: ACTIVE | Noted: 2021-01-01

## 2021-09-04 LAB
ALBUMIN SERPL-MCNC: 3.1 G/DL (ref 3.4–5)
ALBUMIN/GLOB SERPL: 0.7 {RATIO} (ref 1–2)
ALP LIVER SERPL-CCNC: 132 U/L
ALT SERPL-CCNC: 46 U/L
ANION GAP SERPL CALC-SCNC: 6 MMOL/L (ref 0–18)
AST SERPL-CCNC: 54 U/L (ref 15–37)
BASOPHILS # BLD AUTO: 0.03 X10(3) UL (ref 0–0.2)
BASOPHILS NFR BLD AUTO: 0.6 %
BILIRUB SERPL-MCNC: 0.4 MG/DL (ref 0.1–2)
BILIRUB UR QL STRIP.AUTO: NEGATIVE
BUN BLD-MCNC: 18 MG/DL (ref 7–18)
CALCIUM BLD-MCNC: 8.5 MG/DL (ref 8.5–10.1)
CHLORIDE SERPL-SCNC: 102 MMOL/L (ref 98–112)
CLARITY UR REFRACT.AUTO: CLEAR
CO2 SERPL-SCNC: 22 MMOL/L (ref 21–32)
COLOR UR AUTO: YELLOW
CREAT BLD-MCNC: 1.15 MG/DL
EOSINOPHIL # BLD AUTO: 0 X10(3) UL (ref 0–0.7)
EOSINOPHIL NFR BLD AUTO: 0 %
ERYTHROCYTE [DISTWIDTH] IN BLOOD BY AUTOMATED COUNT: 13.7 %
GLOBULIN PLAS-MCNC: 4.2 G/DL (ref 2.8–4.4)
GLUCOSE BLD-MCNC: 181 MG/DL (ref 70–99)
GLUCOSE BLD-MCNC: 95 MG/DL (ref 70–99)
GLUCOSE UR STRIP.AUTO-MCNC: NEGATIVE MG/DL
HCT VFR BLD AUTO: 34 %
HGB BLD-MCNC: 11.9 G/DL
IMM GRANULOCYTES # BLD AUTO: 0.02 X10(3) UL (ref 0–1)
IMM GRANULOCYTES NFR BLD: 0.4 %
KETONES UR STRIP.AUTO-MCNC: NEGATIVE MG/DL
LACTATE SERPL-SCNC: 1.9 MMOL/L (ref 0.4–2)
LEUKOCYTE ESTERASE UR QL STRIP.AUTO: NEGATIVE
LIPASE SERPL-CCNC: 304 U/L (ref 73–393)
LYMPHOCYTES # BLD AUTO: 0.64 X10(3) UL (ref 1–4)
LYMPHOCYTES NFR BLD AUTO: 12.3 %
M PROTEIN MFR SERPL ELPH: 7.3 G/DL (ref 6.4–8.2)
MCH RBC QN AUTO: 32.2 PG (ref 26–34)
MCHC RBC AUTO-ENTMCNC: 35 G/DL (ref 31–37)
MCV RBC AUTO: 92.1 FL
MONOCYTES # BLD AUTO: 0.68 X10(3) UL (ref 0.1–1)
MONOCYTES NFR BLD AUTO: 13.1 %
NEUTROPHILS # BLD AUTO: 3.82 X10 (3) UL (ref 1.5–7.7)
NEUTROPHILS # BLD AUTO: 3.82 X10(3) UL (ref 1.5–7.7)
NEUTROPHILS NFR BLD AUTO: 73.6 %
NITRITE UR QL STRIP.AUTO: NEGATIVE
OSMOLALITY SERPL CALC.SUM OF ELEC: 276 MOSM/KG (ref 275–295)
PH UR STRIP.AUTO: 5 [PH] (ref 5–8)
PLATELET # BLD AUTO: 111 10(3)UL (ref 150–450)
POTASSIUM SERPL-SCNC: 4.3 MMOL/L (ref 3.5–5.1)
PROT UR STRIP.AUTO-MCNC: NEGATIVE MG/DL
RBC # BLD AUTO: 3.69 X10(6)UL
RBC UR QL AUTO: NEGATIVE
SARS-COV-2 RNA RESP QL NAA+PROBE: NOT DETECTED
SODIUM SERPL-SCNC: 130 MMOL/L (ref 136–145)
SP GR UR STRIP.AUTO: 1.02 (ref 1–1.03)
UROBILINOGEN UR STRIP.AUTO-MCNC: <2 MG/DL
WBC # BLD AUTO: 5.2 X10(3) UL (ref 4–11)

## 2021-09-04 PROCEDURE — 71275 CT ANGIOGRAPHY CHEST: CPT | Performed by: EMERGENCY MEDICINE

## 2021-09-04 PROCEDURE — 99220 INITIAL OBSERVATION CARE,LEVL III: CPT | Performed by: HOSPITALIST

## 2021-09-04 PROCEDURE — 71045 X-RAY EXAM CHEST 1 VIEW: CPT | Performed by: EMERGENCY MEDICINE

## 2021-09-04 RX ORDER — AMLODIPINE BESYLATE 10 MG/1
10 TABLET ORAL DAILY
COMMUNITY
End: 2021-10-25

## 2021-09-04 RX ORDER — TRAMETINIB 2 MG/1
2 TABLET, FILM COATED ORAL DAILY
COMMUNITY
End: 2021-09-14

## 2021-09-04 RX ORDER — PROCHLORPERAZINE EDISYLATE 5 MG/ML
5 INJECTION INTRAMUSCULAR; INTRAVENOUS EVERY 8 HOURS PRN
Status: DISCONTINUED | OUTPATIENT
Start: 2021-09-04 | End: 2021-09-06

## 2021-09-04 RX ORDER — PROCHLORPERAZINE MALEATE 10 MG
10 TABLET ORAL EVERY 8 HOURS PRN
Status: DISCONTINUED | OUTPATIENT
Start: 2021-09-04 | End: 2021-09-06

## 2021-09-04 RX ORDER — ACETAMINOPHEN 325 MG/1
650 TABLET ORAL EVERY 6 HOURS PRN
Status: DISCONTINUED | OUTPATIENT
Start: 2021-09-04 | End: 2021-09-06

## 2021-09-04 RX ORDER — MELATONIN
3 NIGHTLY PRN
Status: DISCONTINUED | OUTPATIENT
Start: 2021-09-04 | End: 2021-09-06

## 2021-09-04 RX ORDER — ENOXAPARIN SODIUM 100 MG/ML
40 INJECTION SUBCUTANEOUS DAILY
Status: DISCONTINUED | OUTPATIENT
Start: 2021-09-04 | End: 2021-09-06

## 2021-09-04 RX ORDER — PANTOPRAZOLE SODIUM 40 MG/1
40 TABLET, DELAYED RELEASE ORAL
Status: DISCONTINUED | OUTPATIENT
Start: 2021-09-05 | End: 2021-09-06

## 2021-09-04 RX ORDER — PREDNISONE 1 MG/1
5 TABLET ORAL
Status: DISCONTINUED | OUTPATIENT
Start: 2021-09-05 | End: 2021-09-06

## 2021-09-04 RX ORDER — INSULIN LISPRO 200 [IU]/ML
20 INJECTION, SOLUTION SUBCUTANEOUS
COMMUNITY

## 2021-09-04 RX ORDER — SODIUM CHLORIDE, SODIUM LACTATE, POTASSIUM CHLORIDE, CALCIUM CHLORIDE 600; 310; 30; 20 MG/100ML; MG/100ML; MG/100ML; MG/100ML
INJECTION, SOLUTION INTRAVENOUS CONTINUOUS
Status: DISCONTINUED | OUTPATIENT
Start: 2021-09-04 | End: 2021-09-05

## 2021-09-04 RX ORDER — ASPIRIN 81 MG/1
81 TABLET ORAL DAILY
Status: DISCONTINUED | OUTPATIENT
Start: 2021-09-05 | End: 2021-09-06

## 2021-09-04 RX ORDER — DABRAFENIB 75 MG/1
150 CAPSULE ORAL 2 TIMES DAILY
COMMUNITY
End: 2021-11-09

## 2021-09-04 RX ORDER — LISINOPRIL 40 MG/1
40 TABLET ORAL DAILY
Status: DISCONTINUED | OUTPATIENT
Start: 2021-09-05 | End: 2021-09-06

## 2021-09-04 RX ORDER — PREDNISONE 1 MG/1
5 TABLET ORAL DAILY
COMMUNITY
End: 2021-09-10

## 2021-09-04 RX ORDER — ROSUVASTATIN CALCIUM 5 MG/1
5 TABLET, COATED ORAL DAILY
Status: DISCONTINUED | OUTPATIENT
Start: 2021-09-05 | End: 2021-09-06

## 2021-09-04 RX ORDER — DEXTROSE MONOHYDRATE 25 G/50ML
50 INJECTION, SOLUTION INTRAVENOUS
Status: DISCONTINUED | OUTPATIENT
Start: 2021-09-04 | End: 2021-09-06

## 2021-09-04 RX ORDER — ONDANSETRON 2 MG/ML
4 INJECTION INTRAMUSCULAR; INTRAVENOUS EVERY 6 HOURS PRN
Status: DISCONTINUED | OUTPATIENT
Start: 2021-09-04 | End: 2021-09-06

## 2021-09-04 NOTE — PLAN OF CARE
NURSING ADMISSION NOTE      Patient admitted via Cart  Oriented to room. Safety precautions initiated. Bed in low position. Call light in reach. A&O. 2L Oxygen NC. Baseline at home 2-3L. No respiratory distress.  Complains of flank pain but betty

## 2021-09-04 NOTE — H&P
[facility]PITAL[facility]  History and Physical     Chary Finn Patient Status:  Emergency    1960 MRN [id number]   Location [address] [facility] Attending John Hooper MD   Hosp Day # 0 PCP Manuela Marcos MD     Chief Complaint: fev tobacco. She reports current drug use. Drug: Cannabis. She reports that she does not drink alcohol.     Family History:   Family History   Problem Relation Age of Onset   • Heart Disease Father    • Stroke Father    • Heart Disease Mother    • Heart Disease tablet, Rfl: 11  docusate sodium (COLACE) 100 MG Oral Cap, Take 2 tablets by mouth every morning.  , Disp: , Rfl:   aspirin 81 MG Oral Tab, Take 81 mg by mouth daily. , Disp: , Rfl:   sulfamethoxazole-trimethoprim -160 MG Oral Tab per tablet, Take 1 t 128* 128* 132*   AST 36 58* 54*   ALT 35 45 46   BILT 0.8 0.8 0.4   TP 7.6 7.5 7.3       Estimated Creatinine Clearance: 41.7 mL/min (A) (based on SCr of 1.15 mg/dL (H)). No results for input(s): PTP, INR in the last 168 hours.     No results for input(s

## 2021-09-04 NOTE — ED INITIAL ASSESSMENT (HPI)
Patient arrives reporting fever, chills, and UTI (seen in ER 2 days PTA and prescribed bactrim). Hx of stage IV liver w/ metastasis to spine, recently restarted chemo.

## 2021-09-05 ENCOUNTER — APPOINTMENT (OUTPATIENT)
Dept: ULTRASOUND IMAGING | Facility: HOSPITAL | Age: 61
End: 2021-09-05
Attending: HOSPITALIST
Payer: MEDICAID

## 2021-09-05 LAB
ALBUMIN SERPL-MCNC: 2.5 G/DL (ref 3.4–5)
ALBUMIN/GLOB SERPL: 0.7 {RATIO} (ref 1–2)
ALP LIVER SERPL-CCNC: 109 U/L
ALT SERPL-CCNC: 38 U/L
ANION GAP SERPL CALC-SCNC: 2 MMOL/L (ref 0–18)
AST SERPL-CCNC: 45 U/L (ref 15–37)
ATRIAL RATE: 119 BPM
BASOPHILS # BLD AUTO: 0.02 X10(3) UL (ref 0–0.2)
BASOPHILS NFR BLD AUTO: 0.9 %
BILIRUB SERPL-MCNC: 0.2 MG/DL (ref 0.1–2)
BUN BLD-MCNC: 15 MG/DL (ref 7–18)
CALCIUM BLD-MCNC: 8.2 MG/DL (ref 8.5–10.1)
CHLORIDE SERPL-SCNC: 106 MMOL/L (ref 98–112)
CO2 SERPL-SCNC: 27 MMOL/L (ref 21–32)
CREAT BLD-MCNC: 0.84 MG/DL
EOSINOPHIL # BLD AUTO: 0.02 X10(3) UL (ref 0–0.7)
EOSINOPHIL NFR BLD AUTO: 0.9 %
ERYTHROCYTE [DISTWIDTH] IN BLOOD BY AUTOMATED COUNT: 13.8 %
GLOBULIN PLAS-MCNC: 3.7 G/DL (ref 2.8–4.4)
GLUCOSE BLD-MCNC: 109 MG/DL (ref 70–99)
GLUCOSE BLD-MCNC: 116 MG/DL (ref 70–99)
GLUCOSE BLD-MCNC: 139 MG/DL (ref 70–99)
GLUCOSE BLD-MCNC: 173 MG/DL (ref 70–99)
GLUCOSE BLD-MCNC: 226 MG/DL (ref 70–99)
HCT VFR BLD AUTO: 30.2 %
HGB BLD-MCNC: 10.2 G/DL
IMM GRANULOCYTES # BLD AUTO: 0.02 X10(3) UL (ref 0–1)
IMM GRANULOCYTES NFR BLD: 0.9 %
LYMPHOCYTES # BLD AUTO: 0.52 X10(3) UL (ref 1–4)
LYMPHOCYTES NFR BLD AUTO: 22.3 %
M PROTEIN MFR SERPL ELPH: 6.2 G/DL (ref 6.4–8.2)
MCH RBC QN AUTO: 32 PG (ref 26–34)
MCHC RBC AUTO-ENTMCNC: 33.8 G/DL (ref 31–37)
MCV RBC AUTO: 94.7 FL
MONOCYTES # BLD AUTO: 0.36 X10(3) UL (ref 0.1–1)
MONOCYTES NFR BLD AUTO: 15.5 %
NEUTROPHILS # BLD AUTO: 1.39 X10 (3) UL (ref 1.5–7.7)
NEUTROPHILS # BLD AUTO: 1.39 X10(3) UL (ref 1.5–7.7)
NEUTROPHILS NFR BLD AUTO: 59.5 %
OSMOLALITY SERPL CALC.SUM OF ELEC: 281 MOSM/KG (ref 275–295)
P AXIS: 52 DEGREES
P-R INTERVAL: 142 MS
PLATELET # BLD AUTO: 85 10(3)UL (ref 150–450)
POTASSIUM SERPL-SCNC: 4 MMOL/L (ref 3.5–5.1)
Q-T INTERVAL: 302 MS
QRS DURATION: 72 MS
QTC CALCULATION (BEZET): 424 MS
R AXIS: 45 DEGREES
RBC # BLD AUTO: 3.19 X10(6)UL
SODIUM SERPL-SCNC: 135 MMOL/L (ref 136–145)
T AXIS: 33 DEGREES
VENTRICULAR RATE: 119 BPM
WBC # BLD AUTO: 2.3 X10(3) UL (ref 4–11)

## 2021-09-05 PROCEDURE — 99244 OFF/OP CNSLTJ NEW/EST MOD 40: CPT | Performed by: INTERNAL MEDICINE

## 2021-09-05 PROCEDURE — 76775 US EXAM ABDO BACK WALL LIM: CPT | Performed by: HOSPITALIST

## 2021-09-05 PROCEDURE — 99225 SUBSEQUENT OBSERVATION CARE: CPT | Performed by: HOSPITALIST

## 2021-09-05 NOTE — CONSULTS
[facility]    Report of Consultation    Johnney File Patient Status:  Observation    1960 MRN [id number]   [facility] 4NW-A Attending Errol Ruvalcaba MD   Hosp Day # 0 PCP Mike Lindquist MD     Date of Admission:  2021 at TB: proceed with maintenance Gemcitabine   · 11/4/20: CTA Chest: No PE.   · 11/6/20: MR L Spine: Focal T1/T2 signal at L3 concerning for osseous disease. 1.8 x 1.7 x 1,7 cm.    · 11/12/20: PET/CT: abnormal FDG uptake in the left hepatic lobe--infiltrativ urinary symptoms improved. She felt good on 9/3, but on 9/4 she developed recurrent fever and chills. She came to the ER, where she was afebrile, but tachycardic and had a borderline elevated Lactate. CTA chest was negative for PE and unchanged from prior. Comment:Muscle aches    Medications:    Current Facility-Administered Medications:   •  Heparin Lock Flush 100 UNIT/ML lock flush 500 Units, 5 mL, Intravenous, PRN  •  Dabrafenib Mesylate CAPS 150 mg, 150 mg, Oral, BID  •  aspirin EC tab 81 mg, 81 mg, Oral Blood pressure 116/68, pulse 84, temperature 98 °F (36.7 °C), temperature source Oral, resp. rate 16, height 1.575 m (5' 2\"), weight 73.5 kg (162 lb), last menstrual period 01/15/2013, SpO2 100 %, not currently breastfeeding.    General: Patient is alert minimal ground-glass opacity throughout the bilateral lungs that is unchanged from the previous exam and may represent a combination of changes of emphysema and   chronic interstitial lung disease.  Clinical correlation recommended.       4.  There is mild without a return of her symptoms, I recommend completing a course of abx for her UTI. Metastatic cholangiocarcinoma w/ BRAF V600E mutation: Restart Trametinib and Dabrafenib today. Hopeful for discharge tomorrow.        Thank you for allowing me to pa

## 2021-09-05 NOTE — PROGRESS NOTES
[facility]     Hospitalist Progress Note     Marcelo Potter Patient Status:  Observation    1960 MRN [id number]   [facility] 4NW-A Attending Juan Painter MD   Hosp Day # 0 PCP Genny Moreno MD     Chief Complaint: back pain hours.    Creatinine Kinase  No results for input(s): CK in the last 168 hours. Inflammatory Markers  No results for input(s): CRP, BRISEIDA, LDH, DDIMER in the last 168 hours. Imaging: Imaging data reviewed in Epic.     Medications:   • Dabrafenib Mesylat

## 2021-09-05 NOTE — ED PROVIDER NOTES
Patient Seen in: [facility] 4nw-a      History   Patient presents with:  Fever    Stated Complaint: Chills, Fever, Cancer    HPI/Subjective:   HPI    21-year-old female presents emergency department has been having some fever chills and a urinary tr complaint: Chills, Fever, Cancer  Other systems are as noted in HPI. Constitutional and vital signs reviewed. All other systems reviewed and negative except as noted above.     Physical Exam     ED Triage Vitals [09/04/21 1404]   BP (!) 172/95   Pulse the following components:       Result Value    Glucose 181 (*)     Sodium 130 (*)     Creatinine 1.15 (*)     GFR, Non- 52 (*)     AST 54 (*)     Alkaline Phosphatase 132 (*)     Albumin 3.1 (*)     A/G Ratio 0.7 (*)     All other componen REFLEX   CBC WITH DIFFERENTIAL WITH PLATELET    Narrative: The following orders were created for panel order CBC With Differential With Platelet.   Procedure                               Abnormality         Status                     --------- time range)   acetaminophen (TYLENOL) tab 650 mg (650 mg Oral Given 9/5/21 4010)   melatonin tab 3 mg (has no administration in time range)   ondansetron (ZOFRAN) injection 4 mg (has no administration in time range)   Prochlorperazine Edisylate (COMPAZINE) diagnosis)  Tachycardia, unspecified  Hypoxia  Fibrosis of lung ([facility] [address]  Cholangiocarcinoma ([facility]     Disposition:  Admit  9/4/2021  3:51 pm    Follow-up:  No follow-up provider specified.         Medications Prescribed:  Current Discharge Medication List

## 2021-09-05 NOTE — PLAN OF CARE
Pt received alert and oriented x4. Vital signs stable. Reporting R flank pain, declining medication and heat/ice packs at this time. Ambulatory in room. BM today. US kidneys today, pt tolerated well. Insulin coverage per mar. Call light within reach.  Will

## 2021-09-05 NOTE — PROGRESS NOTES
Pt AOX4 with complaints of mild flank pain. Denies need for pain meds. US of kidney to be done. Oncology paged regarding pt chemo medications. Rosmery Doe would like to hold off on giving pt oral chemo at this time. Pt updated on POC. Afebrile overnight.  2L

## 2021-09-06 VITALS
WEIGHT: 162 LBS | BODY MASS INDEX: 29.81 KG/M2 | DIASTOLIC BLOOD PRESSURE: 53 MMHG | TEMPERATURE: 98 F | RESPIRATION RATE: 18 BRPM | HEART RATE: 88 BPM | SYSTOLIC BLOOD PRESSURE: 106 MMHG | HEIGHT: 62 IN | OXYGEN SATURATION: 97 %

## 2021-09-06 LAB — GLUCOSE BLD-MCNC: 145 MG/DL (ref 70–99)

## 2021-09-06 PROCEDURE — 99217 OBSERVATION CARE DISCHARGE: CPT | Performed by: HOSPITALIST

## 2021-09-06 RX ORDER — CEFADROXIL 500 MG/1
500 CAPSULE ORAL 2 TIMES DAILY
Qty: 20 CAPSULE | Refills: 0 | Status: SHIPPED | OUTPATIENT
Start: 2021-09-06 | End: 2021-09-16

## 2021-09-06 NOTE — PLAN OF CARE
Patient alert and oriented X4. Vital signs stable. Afebrile. On 2LO2 with oxygen sats 93-96%. No complaints of pain at this time. No nausea and vomiting. On scheduled IV antibiotics for Pyelonephritis. Up to the bathroom. Fall precautions in placed.  Call l Obtain nutritional consult as needed  - Establish a toileting routine/schedule  - Consider collaborating with pharmacy to review patient's medication profile  Outcome: Progressing

## 2021-09-06 NOTE — PROGRESS NOTES
NURSING DISCHARGE NOTE    Discharged Home via Wheelchair. Accompanied by Spouse  Belongings Taken by patient/family. Pt AOx4. VSS. ok'd for discharge by all services. Pt afebrile, no chills and no pain. Feeling a lot better.  Discussed AVS, f/u's an

## 2021-09-06 NOTE — PROGRESS NOTES
Patient seen and examined. Medically clear to discharge today. Pain resolved. Feels much better today. Off O2. Home on course of ABX.     Randa Balderrama MD

## 2021-09-07 NOTE — DISCHARGE SUMMARY
[facility] [place] HOSPITALIST  DISCHARGE SUMMARY     Mike Segal Patient Status:  Observation    1960 MRN [id number]   [facility] 4NW-A Attending No att. providers found   Hosp Day # 0 PCP Vangie Espinoza MD     Date of Admission: 2021  D START taking these medications      Instructions Prescription details   Cefadroxil 500 MG Caps  Commonly known as: DURICEF      Take 1 capsule (500 mg total) by mouth 2 (two) times daily for 10 days.    Stop taking on: September 16, 2021  Quantity: 20 cap Take 5 mg by mouth daily. Refills: 0     Tafinlar 75 MG Caps  Generic drug: Dabrafenib Mesylate      Take 150 mg by mouth 2 (two) times a day.    Refills: 0     triamcinolone acetonide 0.5 % Crea  Commonly known as: KENALOG      Apply 15 g topically 3 -[facility] is allowed for all provider appointments, and in the infusion area only for the first chemotherapy/immunotherapy appointment. -All other infusion patients should be dropped off at the [facility] door and picked up afterward, unless pat nondistended. Positive bowel sounds.     -----------------------------------------------------------------------------------------------  PATIENT DISCHARGE INSTRUCTIONS: See electronic chart    Renetta Sherwood MD    Time spent:  > 30 minutes

## 2021-09-08 ENCOUNTER — TELEPHONE (OUTPATIENT)
Dept: INTERNAL MEDICINE CLINIC | Facility: CLINIC | Age: 61
End: 2021-09-08

## 2021-09-08 ENCOUNTER — PATIENT OUTREACH (OUTPATIENT)
Dept: CASE MANAGEMENT | Age: 61
End: 2021-09-08

## 2021-09-08 DIAGNOSIS — Z02.9 ENCOUNTERS FOR ADMINISTRATIVE PURPOSE: ICD-10-CM

## 2021-09-08 NOTE — PROGRESS NOTES
Initial Post Discharge Follow Up   Discharge Date: 9/6/21  Contact Date: 9/8/2021    Consent Verification:  Assessment Completed With: Patient  HIPAA Verified? Yes    Discharge Dx:    Fever, unspecified fever cause    Was TCC ordered: no      General: Subcutaneous Solution Pen-injector Inject 20 Units into the skin 3 (three) times daily before meals. Inject up to 20 units per dose, per sliding scale. • pantoprazole 40 MG Oral Tab EC Take 1 tablet (40 mg total) by mouth daily.  30 tablet 0   • insulin concerns you need addressed before your next visit with your PCP?  (DME, meds, disease concerns, Etc): No     Follow up appointments:      Your appointments     Date & Time Appointment Department [facility])    Sep 15, 2021  9:45 AM CDT HEMATOLOGY ONCOLOGY FO urinating. She denies having any n/v/c/d, lightheadedness, HA or any new or worsening symptoms. Med review completed. She denied having any questions or concerns at this time.      CCM referral placed:  No      [x]  Discharge Summary, Discharge medications

## 2021-09-08 NOTE — TELEPHONE ENCOUNTER
[place], Spoke to pt for TCM today. Pt does not have HFU appt scheduled at this time. She declined to schedule stating that she is following up with her oncologist.   TCM/HFU appt recommended by 9/13/2021 as pt is a high risk for readmission.       BOOK BY DATE

## 2021-09-09 ENCOUNTER — TELEPHONE (OUTPATIENT)
Dept: HEMATOLOGY/ONCOLOGY | Facility: HOSPITAL | Age: 61
End: 2021-09-09

## 2021-09-09 DIAGNOSIS — C22.1 CHOLANGIOCARCINOMA (HCC): Primary | ICD-10-CM

## 2021-09-09 NOTE — TELEPHONE ENCOUNTER
Edmond olivier Yunior Guzmán was in the hospital for urinary sepsis, sent home on Monday on abx Cefadroxil 500 mg BID for 10 days, she is taking and currently on day 3.  She is having pain in right back flank(woke her up during night rating about 7/10) using Tylenol for

## 2021-09-09 NOTE — TELEPHONE ENCOUNTER
Called Edmond, set up [address] for tomorrow with Marcela at 21 [phone] per his request.

## 2021-09-09 NOTE — TELEPHONE ENCOUNTER
Called Jey Morel and Edmond back per Dr Mariela Verduzco she should be seeing Dr Jasmyn Alonzo post hospital she refuses to see him \"he missed the cancer all together\". Also that Dr Mariela Verduzco has ordered an MRI of Spine and they will schedule.

## 2021-09-10 NOTE — TELEPHONE ENCOUNTER
LOV: 4/21/2021 with Connie Samson PA-C  RTC: January 2022  Last Relevant Labs: September 2021  Filled: 5/13/2021   #90 with 0 refills    Future Appointments   Date Time Provider Shelly Barrera   9/15/2021  9:45 AM Gurinder Miller MD [facility] HEM [address]   9/23/2021  7:00 AM [facility] MR RM4 (3T WIDE) [facility] MRI Cherrie Troy   9/29/2021  9:30 AM [facility] [place] RN1 [address] [place] [address]   12/1/2021 10:00 AM FREYA Marquez [id number] [facility] [place]

## 2021-09-10 NOTE — TELEPHONE ENCOUNTER
Patient called and said she was feeling much better so she cancelled her appointment with FREYA Cade for today at 10:15. Thank you.

## 2021-09-13 NOTE — PROGRESS NOTES
THE [facility] Hematology and Oncology Clinic Note    Diagnosis: Metastatic Cholangiocarcinoma: liver, bone: BRAF V600E +    Treatment History:   1. Cisplatin 25 mg/m2 D1/8 + Gemcitabine 1000 mg/m2 D1/8 q21 days.    C1: 3/3/20 and 3/10/20: : 675,842 CEA 43.6 chills. Weight is stable. She does feel more \"sweaty\" but no drenching night sweats. Aside from her Low back, no other focal areas of bone pain. She is a previous smoker, she smoked for about 40 years and quit 2 years ago. She works as a . after C8 of Cis/[place]:   · Bone: L3 (SUC 4.7) and T7 (sclerosis)  · Liver: Left lobe (stable SUV 7.3), infiltrative hepatic lesions. · 8/12/20: Discussed at TB: proceed with maintenance Gemcitabine   · 11/4/20: CTA Chest: No PE.   · 11/6/20: MR L Spine:  Fo put on gem maintenance. Unfortunately around 11/2020 she started to show evidence of progression and worsening back pain. She received palliative RT to T7-T9 and L3 in 12/2020.  She started dabrafenib 150 mg BID + Trametinib 2 mg daily on 1/1/21 with a base Solution Pen-injector, Inject 20 Units into the skin 2 (two) times daily. , Disp: 15 mL, Rfl: 0  Rosuvastatin Calcium 5 MG Oral Tab, Take 5 mg by mouth daily.   , Disp: , Rfl:   triamcinolone acetonide 0.5 % External Cream, Apply 15 g topically 3 (three) rozina Visual impairment     glasses     Past Surgical History:   Procedure Laterality Date   • ARTHROSCOPY OF JOINT UNLISTED  05/2019    rotator cuff   • COLECTOMY     • COLONOSCOPY  12/20/2018    polyps   • OTHER  02/26/2018    resection Splenic flexure   • OTH Radiology: reviewed   8/2/21: MRI LIVER  1.  There is diffuse fatty infiltration of the liver, with multiple intrahepatic lesions, the largest of which is triangular in shape involving the lateral segment of the left lobe measuring at least 2.8 x 4.5 mg daily based of the data from [address]. Lancet 2020. Study showed an CASTANON of 51%. · Treatment has been complicated by Rash and Fevers which appears to be improve with prednisone.    · Follow up PET/CT from 04/30/31 shows favorable response to L spine pending. Hearing loss: 2/2 Cisplatin. - Referred to ENT to consider hearing aids Not done since not covered by insurance. RSV PNA: s/p steroids. Also completed levaquin for sinusitis.      Transaminititis:mild likely from metastases and NA

## 2021-09-15 ENCOUNTER — OFFICE VISIT (OUTPATIENT)
Dept: HEMATOLOGY/ONCOLOGY | Facility: HOSPITAL | Age: 61
End: 2021-09-15
Attending: INTERNAL MEDICINE
Payer: MEDICAID

## 2021-09-15 VITALS
RESPIRATION RATE: 18 BRPM | DIASTOLIC BLOOD PRESSURE: 76 MMHG | HEART RATE: 93 BPM | HEIGHT: 62.01 IN | OXYGEN SATURATION: 92 % | SYSTOLIC BLOOD PRESSURE: 119 MMHG | BODY MASS INDEX: 30.92 KG/M2 | TEMPERATURE: 98 F | WEIGHT: 170.19 LBS

## 2021-09-15 DIAGNOSIS — C22.1 CHOLANGIOCARCINOMA (HCC): Primary | ICD-10-CM

## 2021-09-15 LAB
ALBUMIN SERPL-MCNC: 2.9 G/DL (ref 3.4–5)
ALBUMIN/GLOB SERPL: 0.7 {RATIO} (ref 1–2)
ALP LIVER SERPL-CCNC: 137 U/L
ALT SERPL-CCNC: 49 U/L
ANION GAP SERPL CALC-SCNC: 4 MMOL/L (ref 0–18)
AST SERPL-CCNC: 68 U/L (ref 15–37)
BASOPHILS # BLD AUTO: 0.02 X10(3) UL (ref 0–0.2)
BASOPHILS NFR BLD AUTO: 0.4 %
BILIRUB SERPL-MCNC: 0.5 MG/DL (ref 0.1–2)
BUN BLD-MCNC: 18 MG/DL (ref 7–18)
CALCIUM BLD-MCNC: 8.6 MG/DL (ref 8.5–10.1)
CANCER AG19-9 SERPL-ACNC: 346.7 U/ML (ref ?–37)
CHLORIDE SERPL-SCNC: 106 MMOL/L (ref 98–112)
CO2 SERPL-SCNC: 25 MMOL/L (ref 21–32)
CREAT BLD-MCNC: 0.93 MG/DL
EOSINOPHIL # BLD AUTO: 0 X10(3) UL (ref 0–0.7)
EOSINOPHIL NFR BLD AUTO: 0 %
ERYTHROCYTE [DISTWIDTH] IN BLOOD BY AUTOMATED COUNT: 15 %
GLOBULIN PLAS-MCNC: 4.4 G/DL (ref 2.8–4.4)
GLUCOSE BLD-MCNC: 226 MG/DL (ref 70–99)
HCT VFR BLD AUTO: 35.3 %
HGB BLD-MCNC: 11.9 G/DL
IMM GRANULOCYTES # BLD AUTO: 0.01 X10(3) UL (ref 0–1)
IMM GRANULOCYTES NFR BLD: 0.2 %
LYMPHOCYTES # BLD AUTO: 0.89 X10(3) UL (ref 1–4)
LYMPHOCYTES NFR BLD AUTO: 18.4 %
MCH RBC QN AUTO: 32.9 PG (ref 26–34)
MCHC RBC AUTO-ENTMCNC: 33.7 G/DL (ref 31–37)
MCV RBC AUTO: 97.5 FL
MONOCYTES # BLD AUTO: 0.48 X10(3) UL (ref 0.1–1)
MONOCYTES NFR BLD AUTO: 9.9 %
NEUTROPHILS # BLD AUTO: 3.44 X10 (3) UL (ref 1.5–7.7)
NEUTROPHILS # BLD AUTO: 3.44 X10(3) UL (ref 1.5–7.7)
NEUTROPHILS NFR BLD AUTO: 71.1 %
OSMOLALITY SERPL CALC.SUM OF ELEC: 289 MOSM/KG (ref 275–295)
PATIENT FASTING Y/N/NP: NO
PLATELET # BLD AUTO: 110 10(3)UL (ref 150–450)
POTASSIUM SERPL-SCNC: 4.5 MMOL/L (ref 3.5–5.1)
PROT SERPL-MCNC: 7.3 G/DL (ref 6.4–8.2)
RBC # BLD AUTO: 3.62 X10(6)UL
SODIUM SERPL-SCNC: 135 MMOL/L (ref 136–145)
WBC # BLD AUTO: 4.8 X10(3) UL (ref 4–11)

## 2021-09-15 PROCEDURE — 36591 DRAW BLOOD OFF VENOUS DEVICE: CPT

## 2021-09-15 PROCEDURE — 99214 OFFICE O/P EST MOD 30 MIN: CPT | Performed by: INTERNAL MEDICINE

## 2021-09-23 ENCOUNTER — HOSPITAL ENCOUNTER (OUTPATIENT)
Dept: MRI IMAGING | Facility: HOSPITAL | Age: 61
Discharge: HOME | End: 2021-09-23
Attending: INTERNAL MEDICINE
Payer: MEDICAID

## 2021-09-23 DIAGNOSIS — C22.1 CHOLANGIOCARCINOMA (HCC): ICD-10-CM

## 2021-09-23 PROCEDURE — 72158 MRI LUMBAR SPINE W/O & W/DYE: CPT | Performed by: INTERNAL MEDICINE

## 2021-09-23 PROCEDURE — A9575 INJ GADOTERATE MEGLUMI 0.1ML: HCPCS | Performed by: INTERNAL MEDICINE

## 2021-09-29 ENCOUNTER — NURSE ONLY (OUTPATIENT)
Dept: HEMATOLOGY/ONCOLOGY | Facility: HOSPITAL | Age: 61
End: 2021-09-29
Attending: INTERNAL MEDICINE
Payer: MEDICAID

## 2021-09-29 VITALS
SYSTOLIC BLOOD PRESSURE: 134 MMHG | TEMPERATURE: 98 F | BODY MASS INDEX: 30.85 KG/M2 | DIASTOLIC BLOOD PRESSURE: 83 MMHG | OXYGEN SATURATION: 92 % | WEIGHT: 169.81 LBS | RESPIRATION RATE: 16 BRPM | HEART RATE: 107 BPM | HEIGHT: 62.01 IN

## 2021-09-29 DIAGNOSIS — C22.1 CHOLANGIOCARCINOMA (HCC): ICD-10-CM

## 2021-09-29 LAB
ALBUMIN SERPL-MCNC: 3 G/DL (ref 3.4–5)
ALBUMIN/GLOB SERPL: 0.7 {RATIO} (ref 1–2)
ALP LIVER SERPL-CCNC: 129 U/L
ALT SERPL-CCNC: 55 U/L
ANION GAP SERPL CALC-SCNC: 4 MMOL/L (ref 0–18)
AST SERPL-CCNC: 80 U/L (ref 15–37)
BASOPHILS # BLD AUTO: 0.03 X10(3) UL (ref 0–0.2)
BASOPHILS NFR BLD AUTO: 0.5 %
BILIRUB SERPL-MCNC: 0.6 MG/DL (ref 0.1–2)
BUN BLD-MCNC: 14 MG/DL (ref 7–18)
CALCIUM BLD-MCNC: 9.2 MG/DL (ref 8.5–10.1)
CANCER AG19-9 SERPL-ACNC: 246.9 U/ML (ref ?–37)
CHLORIDE SERPL-SCNC: 104 MMOL/L (ref 98–112)
CO2 SERPL-SCNC: 25 MMOL/L (ref 21–32)
CREAT BLD-MCNC: 0.9 MG/DL
EOSINOPHIL # BLD AUTO: 0.03 X10(3) UL (ref 0–0.7)
EOSINOPHIL NFR BLD AUTO: 0.5 %
ERYTHROCYTE [DISTWIDTH] IN BLOOD BY AUTOMATED COUNT: 14.7 %
GLOBULIN PLAS-MCNC: 4.5 G/DL (ref 2.8–4.4)
GLUCOSE BLD-MCNC: 272 MG/DL (ref 70–99)
HCT VFR BLD AUTO: 36.6 %
HGB BLD-MCNC: 12.4 G/DL
IMM GRANULOCYTES # BLD AUTO: 0.03 X10(3) UL (ref 0–1)
IMM GRANULOCYTES NFR BLD: 0.5 %
LYMPHOCYTES # BLD AUTO: 1.26 X10(3) UL (ref 1–4)
LYMPHOCYTES NFR BLD AUTO: 21.6 %
MCH RBC QN AUTO: 33.1 PG (ref 26–34)
MCHC RBC AUTO-ENTMCNC: 33.9 G/DL (ref 31–37)
MCV RBC AUTO: 97.6 FL
MONOCYTES # BLD AUTO: 0.58 X10(3) UL (ref 0.1–1)
MONOCYTES NFR BLD AUTO: 10 %
NEUTROPHILS # BLD AUTO: 3.89 X10 (3) UL (ref 1.5–7.7)
NEUTROPHILS # BLD AUTO: 3.89 X10(3) UL (ref 1.5–7.7)
NEUTROPHILS NFR BLD AUTO: 66.9 %
OSMOLALITY SERPL CALC.SUM OF ELEC: 286 MOSM/KG (ref 275–295)
PLATELET # BLD AUTO: 109 10(3)UL (ref 150–450)
POTASSIUM SERPL-SCNC: 4.4 MMOL/L (ref 3.5–5.1)
PROT SERPL-MCNC: 7.5 G/DL (ref 6.4–8.2)
RBC # BLD AUTO: 3.75 X10(6)UL
SODIUM SERPL-SCNC: 133 MMOL/L (ref 136–145)
WBC # BLD AUTO: 5.8 X10(3) UL (ref 4–11)

## 2021-09-29 PROCEDURE — 36591 DRAW BLOOD OFF VENOUS DEVICE: CPT

## 2021-09-29 PROCEDURE — 80053 COMPREHEN METABOLIC PANEL: CPT | Performed by: INTERNAL MEDICINE

## 2021-09-29 PROCEDURE — 86301 IMMUNOASSAY TUMOR CA 19-9: CPT

## 2021-09-29 PROCEDURE — 85025 COMPLETE CBC W/AUTO DIFF WBC: CPT | Performed by: INTERNAL MEDICINE

## 2021-10-02 ENCOUNTER — IMMUNIZATION (OUTPATIENT)
Dept: LAB | Facility: HOSPITAL | Age: 61
End: 2021-10-02
Attending: EMERGENCY MEDICINE
Payer: MEDICAID

## 2021-10-02 DIAGNOSIS — Z23 NEED FOR VACCINATION: Primary | ICD-10-CM

## 2021-10-02 PROCEDURE — 0003A SARSCOV2 VAC 30MCG/0.3ML IM: CPT

## 2021-10-04 NOTE — PROGRESS NOTES
[facility]  [facility]ATMENT SUMMARY     PATIENT:  Audra Hopson MD: Ivy Carter. MD Felix  DIAGNOSIS:  Metastatic cholangiocarcinoma    HISTORY   63 yo with metastatic cholangiocarcinoma, dx'd early 2020.  On systemic therapy, katt

## 2021-10-06 ENCOUNTER — TELEPHONE (OUTPATIENT)
Dept: HEMATOLOGY/ONCOLOGY | Facility: HOSPITAL | Age: 61
End: 2021-10-06

## 2021-10-06 NOTE — TELEPHONE ENCOUNTER
Gillian Alaniz called to let Dr Robert May and his nurse know that her daughter and grandson tested positive for Covid 19 this morning. She had a Covid booster on 10/2/2021. She is asymptomatic as this time.      I told Gillian Alaniz she needs to contact her PCP regarding how long

## 2021-10-07 ENCOUNTER — HOSPITAL ENCOUNTER (OUTPATIENT)
Age: 61
Discharge: HOME | End: 2021-10-07
Attending: EMERGENCY MEDICINE
Payer: MEDICAID

## 2021-10-07 VITALS
DIASTOLIC BLOOD PRESSURE: 68 MMHG | TEMPERATURE: 98 F | SYSTOLIC BLOOD PRESSURE: 146 MMHG | RESPIRATION RATE: 18 BRPM | HEART RATE: 95 BPM | OXYGEN SATURATION: 95 %

## 2021-10-07 DIAGNOSIS — Z20.822 EXPOSURE TO COVID-19 VIRUS: Primary | ICD-10-CM

## 2021-10-07 PROCEDURE — 99212 OFFICE O/P EST SF 10 MIN: CPT

## 2021-10-07 NOTE — ED INITIAL ASSESSMENT (HPI)
PATIENT ARRIVED AMBULATORY TO ROOM FOR COVID TESTING. PATIENT IS FULLY VACCINATED. HER DAUGHTER WHOM SHE LIVES WITH RECENTLY TESTED COVID POSITIVE. NO SYMPTOMS.

## 2021-10-07 NOTE — ED PROVIDER NOTES
Patient Seen in: [facility]      History   Patient presents with:  Testing    Stated Complaint: headache,fever    Subjective:   HPI    61 yo female with covid exposure. She c/o mild headache earlier and temp of 99, otherwise asymptomatic. Pulse 95   Temp 98.4 °F (36.9 °C)   Resp 18   LMP 01/15/2013   SpO2 95%         Physical Exam  Vitals and nursing note reviewed. Constitutional:       Appearance: Normal appearance. She is well-developed. HENT:      Head: Normocephalic and atraumatic.

## 2021-10-13 ENCOUNTER — APPOINTMENT (OUTPATIENT)
Dept: HEMATOLOGY/ONCOLOGY | Facility: HOSPITAL | Age: 61
End: 2021-10-13
Attending: INTERNAL MEDICINE
Payer: MEDICAID

## 2021-10-13 ENCOUNTER — APPOINTMENT (OUTPATIENT)
Dept: GENERAL RADIOLOGY | Age: 61
End: 2021-10-13
Attending: EMERGENCY MEDICINE
Payer: MEDICAID

## 2021-10-13 ENCOUNTER — HOSPITAL ENCOUNTER (OUTPATIENT)
Age: 61
Discharge: HOME | End: 2021-10-13
Attending: EMERGENCY MEDICINE
Payer: MEDICAID

## 2021-10-13 VITALS
RESPIRATION RATE: 18 BRPM | TEMPERATURE: 97 F | DIASTOLIC BLOOD PRESSURE: 76 MMHG | BODY MASS INDEX: 31.1 KG/M2 | OXYGEN SATURATION: 94 % | HEIGHT: 62 IN | HEART RATE: 99 BPM | WEIGHT: 169 LBS | SYSTOLIC BLOOD PRESSURE: 148 MMHG

## 2021-10-13 DIAGNOSIS — J22 ACUTE RESPIRATORY INFECTION: Primary | ICD-10-CM

## 2021-10-13 PROCEDURE — 71046 X-RAY EXAM CHEST 2 VIEWS: CPT | Performed by: EMERGENCY MEDICINE

## 2021-10-13 PROCEDURE — 99213 OFFICE O/P EST LOW 20 MIN: CPT

## 2021-10-13 RX ORDER — LEVOFLOXACIN 750 MG/1
750 TABLET ORAL DAILY
Qty: 5 TABLET | Refills: 0 | Status: SHIPPED | OUTPATIENT
Start: 2021-10-13 | End: 2021-10-18

## 2021-10-13 NOTE — ED PROVIDER NOTES
Patient Seen in: [facility]      History   Patient presents with:  Testing    Stated Complaint: COUGH     Subjective:   HPI    50-year-old female presents for evaluation for cough. Patient is on chemotherapy.   Her  is positive for Cov Cardiovascular: Negative. Gastrointestinal: Negative. Genitourinary: Negative. Musculoskeletal: Negative. Skin: Negative. Neurological: Negative. All other systems reviewed and are negative.       Positive for stated complaint: COUGH   O Behavior: Behavior normal.               ED Course     Labs Reviewed   RAPID SARS-COV-2 BY PCR - Normal                   MDM    Chest x-ray is unremarkable. Covid testing is negative.   Patient states he typically needs an antibiotic prevent pneumonia, sh Disposition and Plan     Clinical Impression:  Acute respiratory infection  (primary encounter diagnosis)     Disposition:  Discharge  10/13/2021 11:39 am    Follow-up:  Deepti Osman MD  [address] [place] [address]  [address]

## 2021-10-13 NOTE — ED INITIAL ASSESSMENT (HPI)
PATIENT ARRIVED AMBULATORY TO ROOM C/O SYMPTOMS THAT STARTED 2 DAYS AGO. +COUGH. PRODUCTIVE THIS AM. NO NASAL CONGESTION. TEMP OF 99  AT HOME. CANCER HISTORY. PATIENT STATES SHE NORMALLY IS HYPOXIC ESPECIALLY WITH EXERTION. MONITORS LEVELS AT HOME.  SATURAT

## 2021-10-25 ENCOUNTER — TELEPHONE (OUTPATIENT)
Dept: HEMATOLOGY/ONCOLOGY | Facility: HOSPITAL | Age: 61
End: 2021-10-25

## 2021-10-25 ENCOUNTER — APPOINTMENT (OUTPATIENT)
Dept: CT IMAGING | Facility: HOSPITAL | Age: 61
DRG: 193 | End: 2021-10-25
Attending: EMERGENCY MEDICINE
Payer: MEDICAID

## 2021-10-25 ENCOUNTER — APPOINTMENT (OUTPATIENT)
Dept: GENERAL RADIOLOGY | Facility: HOSPITAL | Age: 61
DRG: 193 | End: 2021-10-25
Attending: EMERGENCY MEDICINE
Payer: MEDICAID

## 2021-10-25 ENCOUNTER — HOSPITAL ENCOUNTER (INPATIENT)
Facility: HOSPITAL | Age: 61
LOS: 2 days | Discharge: HOME | DRG: 193 | End: 2021-10-27
Attending: EMERGENCY MEDICINE | Admitting: STUDENT IN AN ORGANIZED HEALTH CARE EDUCATION/TRAINING PROGRAM
Payer: MEDICAID

## 2021-10-25 DIAGNOSIS — R09.02 HYPOXIA: Primary | ICD-10-CM

## 2021-10-25 DIAGNOSIS — J18.9 COMMUNITY ACQUIRED PNEUMONIA OF LEFT UPPER LOBE OF LUNG: ICD-10-CM

## 2021-10-25 PROCEDURE — 71275 CT ANGIOGRAPHY CHEST: CPT | Performed by: EMERGENCY MEDICINE

## 2021-10-25 PROCEDURE — 99223 1ST HOSP IP/OBS HIGH 75: CPT | Performed by: INTERNAL MEDICINE

## 2021-10-25 PROCEDURE — 71045 X-RAY EXAM CHEST 1 VIEW: CPT | Performed by: EMERGENCY MEDICINE

## 2021-10-25 NOTE — ED INITIAL ASSESSMENT (HPI)
Fever , shortness of breath,chills since yesterday . known case of lung cancer on oral chemotherapy. Vaccinated for covid . Positive contact with covid patient.

## 2021-10-25 NOTE — H&P
[facility]PITAL[facility]  History and Physical     [facility] Patient Status:  Emergency    1960 MRN [id number]   Location [facility] Attending WillFreddie MD   Hosp Day # 0 PCP Bishop Ambrosio MD     Chief Complaint: reports that she quit smoking about 4 years ago. She started smoking about 44 years ago. She has a 40.00 pack-year smoking history. She has never used smokeless tobacco. She reports previous drug use. She reports that she does not drink alcohol.     Family total) by mouth every 8 (eight) hours as needed for Nausea., Disp: 60 tablet, Rfl: 11  Prochlorperazine Maleate (COMPAZINE) 10 mg tablet, Take 1 tablet (10 mg total) by mouth every 8 (eight) hours as needed for Nausea., Disp: 60 tablet, Rfl: 11  docusate s Results    COVID-19  Lab Results   Component Value Date    COVID19 Not Detected 10/25/2021    COVID19 Not Detected 10/13/2021    COVID19 Not Detected 10/07/2021       Pro-Calcitonin  No results for input(s): PCT in the last 168 hours.     Cardiac  Recent La

## 2021-10-25 NOTE — TELEPHONE ENCOUNTER
Evelyn Stokes dtr calling from ER, patient here for possible Covid, her pulse ox decreased, she has apt for tomorrow depending on out come of this visit.

## 2021-10-25 NOTE — ED PROVIDER NOTES
Patient Seen in: [facility] Emergency Department      History   Patient presents with:  Covid: vaccinated phizer, rt lung pain 89% ra  Difficulty Breathing    Stated Complaint:     Subjective:   HPI    The patient is a 79-year-old female with a hist Start date: 1976        Quit date:         Years since quittin.8      Smokeless tobacco: Never Used    Vaping Use      Vaping Use: Never used    Alcohol use: No    Drug use: Not Currently      Comment: CBD gummies in the evening masses or nodules or abnormalities.   Psych: Normal interaction, cooperative with exam         ED Course     Labs Reviewed   URINALYSIS WITH CULTURE REFLEX - Abnormal; Notable for the following components:       Result Value    Glucose Urine >=500 (*)     K Sinus Rhythm  Reading: Sinus tachycardia without any ectopy, normal axis, normal normals, no ST segment elevations or depressions or pathologic T wave inversions.   This EKG is similar to her previous EKG obtained on September 4, 2021              On arriva pneumothorax. Dependent atelectasis bilaterally. Stable 0.8     x 0.5 cm left lower lobe pulmonary nodule. Stable 5 mm right upper lobe     nodule on image number 97. Stable     right upper lobe nodule on image number 84 measuring 7 mm.  3 mm right     l that she is experiencing chest pain and a cough. FINDINGS:  Stable right-sided power port. Cardiac size and pulmonary     vasculature are within normal limits. No pleural effusions. No     pneumothorax.   Atheromatous calcifications of the aor

## 2021-10-26 ENCOUNTER — APPOINTMENT (OUTPATIENT)
Dept: ULTRASOUND IMAGING | Facility: HOSPITAL | Age: 61
DRG: 193 | End: 2021-10-26
Attending: INTERNAL MEDICINE
Payer: MEDICAID

## 2021-10-26 ENCOUNTER — APPOINTMENT (OUTPATIENT)
Dept: HEMATOLOGY/ONCOLOGY | Facility: HOSPITAL | Age: 61
End: 2021-10-26
Attending: INTERNAL MEDICINE
Payer: MEDICAID

## 2021-10-26 PROCEDURE — 76705 ECHO EXAM OF ABDOMEN: CPT | Performed by: INTERNAL MEDICINE

## 2021-10-26 PROCEDURE — 99232 SBSQ HOSP IP/OBS MODERATE 35: CPT | Performed by: HOSPITALIST

## 2021-10-26 PROCEDURE — 99255 IP/OBS CONSLTJ NEW/EST HI 80: CPT | Performed by: INTERNAL MEDICINE

## 2021-10-26 RX ORDER — SODIUM CHLORIDE, SODIUM LACTATE, POTASSIUM CHLORIDE, CALCIUM CHLORIDE 600; 310; 30; 20 MG/100ML; MG/100ML; MG/100ML; MG/100ML
INJECTION, SOLUTION INTRAVENOUS CONTINUOUS
Status: DISCONTINUED | OUTPATIENT
Start: 2021-10-26 | End: 2021-10-27

## 2021-10-26 RX ORDER — PANTOPRAZOLE SODIUM 40 MG/1
40 TABLET, DELAYED RELEASE ORAL
Status: DISCONTINUED | OUTPATIENT
Start: 2021-10-26 | End: 2021-10-27

## 2021-10-26 RX ORDER — ACETAMINOPHEN 325 MG/1
650 TABLET ORAL EVERY 6 HOURS PRN
Status: DISCONTINUED | OUTPATIENT
Start: 2021-10-26 | End: 2021-10-27

## 2021-10-26 RX ORDER — AZITHROMYCIN 250 MG/1
250 TABLET, FILM COATED ORAL
Status: DISCONTINUED | OUTPATIENT
Start: 2021-10-26 | End: 2021-10-26

## 2021-10-26 RX ORDER — ENOXAPARIN SODIUM 100 MG/ML
40 INJECTION SUBCUTANEOUS DAILY
Status: DISCONTINUED | OUTPATIENT
Start: 2021-10-26 | End: 2021-10-27

## 2021-10-26 RX ORDER — ONDANSETRON 2 MG/ML
4 INJECTION INTRAMUSCULAR; INTRAVENOUS EVERY 6 HOURS PRN
Status: DISCONTINUED | OUTPATIENT
Start: 2021-10-26 | End: 2021-10-27

## 2021-10-26 RX ORDER — POLYETHYLENE GLYCOL 3350 17 G/17G
17 POWDER, FOR SOLUTION ORAL DAILY PRN
Status: DISCONTINUED | OUTPATIENT
Start: 2021-10-26 | End: 2021-10-27

## 2021-10-26 RX ORDER — BISACODYL 10 MG
10 SUPPOSITORY, RECTAL RECTAL
Status: DISCONTINUED | OUTPATIENT
Start: 2021-10-26 | End: 2021-10-27

## 2021-10-26 RX ORDER — SODIUM PHOSPHATE, DIBASIC AND SODIUM PHOSPHATE, MONOBASIC 7; 19 G/133ML; G/133ML
1 ENEMA RECTAL ONCE AS NEEDED
Status: DISCONTINUED | OUTPATIENT
Start: 2021-10-26 | End: 2021-10-27

## 2021-10-26 RX ORDER — ASPIRIN 81 MG/1
81 TABLET, CHEWABLE ORAL DAILY
Status: DISCONTINUED | OUTPATIENT
Start: 2021-10-26 | End: 2021-10-27

## 2021-10-26 RX ORDER — DEXTROSE MONOHYDRATE 25 G/50ML
50 INJECTION, SOLUTION INTRAVENOUS
Status: DISCONTINUED | OUTPATIENT
Start: 2021-10-25 | End: 2021-10-27

## 2021-10-26 RX ORDER — ROSUVASTATIN CALCIUM 5 MG/1
5 TABLET, COATED ORAL DAILY
Status: DISCONTINUED | OUTPATIENT
Start: 2021-10-26 | End: 2021-10-27

## 2021-10-26 RX ORDER — TRIAMCINOLONE ACETONIDE 5 MG/G
15 CREAM TOPICAL 3 TIMES DAILY
Status: DISCONTINUED | OUTPATIENT
Start: 2021-10-26 | End: 2021-10-27

## 2021-10-26 RX ORDER — SODIUM CHLORIDE, SODIUM LACTATE, POTASSIUM CHLORIDE, CALCIUM CHLORIDE 600; 310; 30; 20 MG/100ML; MG/100ML; MG/100ML; MG/100ML
INJECTION, SOLUTION INTRAVENOUS CONTINUOUS
Status: ACTIVE | OUTPATIENT
Start: 2021-10-26 | End: 2021-10-26

## 2021-10-26 RX ORDER — PROCHLORPERAZINE EDISYLATE 5 MG/ML
5 INJECTION INTRAMUSCULAR; INTRAVENOUS EVERY 8 HOURS PRN
Status: DISCONTINUED | OUTPATIENT
Start: 2021-10-26 | End: 2021-10-27

## 2021-10-26 RX ORDER — MELATONIN
3 NIGHTLY PRN
Status: DISCONTINUED | OUTPATIENT
Start: 2021-10-26 | End: 2021-10-27

## 2021-10-26 RX ORDER — AZITHROMYCIN 250 MG/1
500 TABLET, FILM COATED ORAL
Status: DISCONTINUED | OUTPATIENT
Start: 2021-10-26 | End: 2021-10-27

## 2021-10-26 NOTE — PROGRESS NOTES
NURSING ADMISSION NOTE      Patient admitted via Ambulatory  Oriented to room. 3602  Safety precautions initiated. Bed in low position. Call light in reach.

## 2021-10-26 NOTE — PLAN OF CARE
Patient alert and oriented x4, VS stable, NC, , NSR/ST on tele, afebrile  No complaint of pain or discomfort  PCR covid and flu panel neg  Pulmonology and hematology follow  Oral chemotherapy on hold for now (per hematology)  IV Zosyn, Azithromycin for

## 2021-10-26 NOTE — CONSULTS
Pulmonary / Critical Care H&P/Consult       NAME: Robin Kohli - ROOM: 9974/4672-Z - MRN: [id number] - Age: 62year old - :  1960    Date of Admission: 10/25/2021  3:24 PM  Admission Diagnosis: Hypoxia [R09.02]  Community acquired pneumonia of le file      Number of children: Not on file      Years of education: Not on file      Highest education level: Not on file    Occupational History      Not on file    Tobacco Use      Smoking status: Former Smoker        Packs/day: 1.00        Years: 40.00 on file      Attends Club or Organization Meetings: Not on file      Marital Status: Not on file  Intimate Partner Violence:       Fear of Current or Ex-Partner: Not on file      Emotionally Abused: Not on file      Physically Abused: Not on file      Sexu needed for Nausea., Disp: 60 tablet, Rfl: 11  Prochlorperazine Maleate (COMPAZINE) 10 mg tablet, Take 1 tablet (10 mg total) by mouth every 8 (eight) hours as needed for Nausea., Disp: 60 tablet, Rfl: 11  docusate sodium 100 MG Oral Cap, Take 2 tablets by kg)   LMP 01/15/2013   SpO2 97%   BMI 30.95 kg/m²     General Appearance:    Alert, cooperative, no distress, appears stated age   Head:    Normocephalic, without obvious abnormality, atraumatic   Eyes:    PERRL, conjunctiva/corneas clear   Neck:   Supple, adjust abx as able; urinary strep / legionella ag negative. - o2 needs back to baseline 2L at rest; ambulatory assessment tomorrow. 2. emphysema / fibrosis: has not taken anoro in months due to cost  - restart anoro here and continue on discharge  3.  Hiwot Villareal

## 2021-10-26 NOTE — PAYOR COMM NOTE
--------------  ADMISSION REVIEW     Payor: Elvin Rivera #:  [id number]  Authorization Number: [id number]    Admit date: 10/25/21  Admit time:  9:04 PM       History   Patient presents with:  Covid: vaccinated phize palpation throughout all 4 quadrants, epigastrium and suprapubic regions. No CVA tenderness. Extremities: No deformity, nontender throughout, and normal active range of motion of all 4 extremities. Distal pulses normal and symmetric.   No calf swelling, a HLD, tobacco abuse, liver cancer undergoing chemo who presents with multiple complaints including: fevers to 101 at home, BL flank pain, rigors, nausea, several episodes of NBNB emesis, and dysuria which have been ongoing for the last 2 days.  She has some Lab 10/25/21  1539   TROP <0.045     ASSESSMENT / PLAN:   #: LLL CAP   - procalcitonin elevated   - empiric ceftriaxone/azithromycin   - check urine legionella/strep antigen   - follow cultures   - IVF, supportive care   - hold chemo  #: hypoxia 2/2 ab (CRESTOR) tab 5 mg     Date Action Dose Route     10/26/2021 1001 Given 5 mg Oral

## 2021-10-26 NOTE — CONSULTS
THE MEDICAL [facility] Hematology and Oncology Consult Note    Reason for Consult: Cholangiocarcinoma   Medical Record Number: [id number]   CSN: [id number]   Referring Physician: No ref.  provider found  PCP: Ashley Mcghee MD    History of Present Illness: 60F with a PMH o glucose-vitamin C (DEX-4) chewable tab 4 tablet, 4 tablet, Oral, Q15 Min PRN **OR** dextrose 50 % injection 50 mL, 50 mL, Intravenous, Q15 Min PRN **OR** glucose (DEX4) oral liquid 30 g, 30 g, Oral, Q15 Min PRN **OR** glucose-vitamin C (DEX-4) chewable tab Diabetes ([facility]    • Diverticulitis large intestine w/o perforation or abscess w/o bleeding 11/14/2017   • Essential hypertension    • Exposure to medical diagnostic radiation    • GERD (gastroesophageal reflux disease)    • High blood pressure    • High ch Component Value Date    WBC 3.9 (L) 10/25/2021    HGB 12.2 10/25/2021    HCT 35.5 10/25/2021    MCV 96.7 10/25/2021    PLT 72.0 (L) 10/25/2021    EOSABS 0.00 06/23/2020     Lab Results   Component Value Date     (L) 10/25/2021    K 4.1 10/25/2021 /Trametinib 2 mg daily during acute infection  -Received Cisplatin 25 mg/m2 + Gemcitabine D1/8 q21 days x 8 cycles per the ABC-02 trial. Ki Barron al. [place] 2010. Completed 8 cycles of Cis/[place] with reduction in tumor markers.  Follow up PET/CT from 8/10/20 wi Group

## 2021-10-27 VITALS
HEIGHT: 62 IN | WEIGHT: 168 LBS | OXYGEN SATURATION: 98 % | BODY MASS INDEX: 30.91 KG/M2 | TEMPERATURE: 98 F | HEART RATE: 93 BPM | RESPIRATION RATE: 26 BRPM | DIASTOLIC BLOOD PRESSURE: 67 MMHG | SYSTOLIC BLOOD PRESSURE: 111 MMHG

## 2021-10-27 PROCEDURE — 99239 HOSP IP/OBS DSCHRG MGMT >30: CPT | Performed by: STUDENT IN AN ORGANIZED HEALTH CARE EDUCATION/TRAINING PROGRAM

## 2021-10-27 PROCEDURE — 99232 SBSQ HOSP IP/OBS MODERATE 35: CPT | Performed by: INTERNAL MEDICINE

## 2021-10-27 RX ORDER — AMOXICILLIN AND CLAVULANATE POTASSIUM 875; 125 MG/1; MG/1
1 TABLET, FILM COATED ORAL 2 TIMES DAILY
Qty: 10 TABLET | Refills: 0 | Status: SHIPPED | OUTPATIENT
Start: 2021-10-27 | End: 2021-11-01

## 2021-10-27 RX ORDER — LEVOFLOXACIN 750 MG/1
750 TABLET ORAL DAILY
Qty: 5 TABLET | Refills: 0 | Status: SHIPPED | OUTPATIENT
Start: 2021-10-27 | End: 2021-10-27

## 2021-10-27 NOTE — PLAN OF CARE
Assumed care of patient at 0700. A/o x3   Denies Chest pain, sob, Lightheadedness, dizziness. Up and walking around. O2 walk completed:  (see charting)   96% RA, 85% RA activity and 94% on 1 liter. This is patients baseline.    Dr. Pool Sommers and Dr. Natacha Aleman

## 2021-10-27 NOTE — PROGRESS NOTES
Pulmonary Progress Note     Assessment / Plan:  1. Acute on chronic respiratory failure  - weaned down to her usual supplemental O2 requirement  2.  Pneumonia - chest imaging c/w PNA and noted to have elevated PCT  - zosyn; DC on augmentin to complete cours

## 2021-10-27 NOTE — PLAN OF CARE
Problem: hypoxia, pneumonia  Data: Patient alert and oriented overnight, on 2L O2 per NC to maintain saturation >90%. Patient denies pain or nausea, up in room as tolerated, voiding freely. Vital signs stable, tele NSR/ST.   Action: Due medications given, I

## 2021-10-27 NOTE — PROGRESS NOTES
THE [facility] Hematology and Oncology Progress Note   Length of Stay: 2    Subjective: NAEO. She states that she feels much better. WBC 2.4, HB 11.2, Plt 78, ANC 1130.  224 from 246. Afebrile. On 2-3L.      ROS: 12 Point ROS completed and pertinent positiv glycol (PEG 3350), magnesium hydroxide, bisacodyl, Fleet Enema, ondansetron, Prochlorperazine Edisylate    Physical Exam   10/27/21  0709   BP: 128/80   Pulse: 77   Resp: 18   Temp: 97.8 °F (36.6 °C)     General: NAD, AOX3  HEENT: clear op, mmm, no jvd, no measures minimally larger since 11/6/2020 which may relate to differences in technique.  The previously seen avid peripheral enhancement is mildly diminished.  Continued    imaging follow-up is recommended.  No new metastasis is identified within the lumba cholangiocarcinoma, however, there only appears to be 1 dominant lesion with smaller treated lesions. She received Palliative RT to dominant L hepatic lobe mass (3000 cGy) as above and oral chemo was on hold during treatment (8/5 to 8/18/21).  She restarted

## 2021-10-27 NOTE — PROGRESS NOTES
[facility]     Hospitalist Progress Note     [place] Tomas Patient Status:  Inpatient    1960 MRN [id number]   [facility] 5NW-A Attending Awa Barnes MD   Hosp Day # 1 PCP Severa Boop, MD     Chief Complaint: Fevers    Sub 10/25/21  1539 10/26/21  1126   CRP 4.52*  --    DDIMER  --  2.09*       Imaging: Imaging data reviewed in Epic.     Medications:   • enoxaparin  40 mg Subcutaneous Daily   • Insulin Aspart Pen  1-10 Units Subcutaneous TID AC and HS   • azithromycin  500 mg discontinue isolation: Yes    Will the patient be referred to TCC on discharge?:  No  Estimated date of discharge: TBD  Discharge is dependent on: Clinical improvement  At this point Ms. Shiloh Sherwood is expected to be discharge to: Home

## 2021-10-27 NOTE — DISCHARGE SUMMARY
[facility] HOSPITALIST  DISCHARGE SUMMARY     Colletta Masters Patient Status:  Inpatient    1960 MRN [id number]   [facility] 5NW-A Attending Johnny Hamilton MD   Hosp Day # 2 PCP Zi Lemon MD     Date of Admission: 10/25/2021  Date of levoFLOXacin 750 MG Tabs  Commonly known as: LEVAQUIN      Take 1 tablet (750 mg total) by mouth daily for 5 days.    Stop taking on: November 1, 2021  Quantity: 5 tablet  Refills: 0     umeclidinium-vilanterol 62.5-25 MCG/INH Aepb  Commonly known as: ANO Quantity: 60 tablet  Refills: 11     rosuvastatin 5 MG Tabs  Commonly known as: CRESTOR      Take 5 mg by mouth daily. Refills: 0     Tafinlar 75 MG Caps  Generic drug: Dabrafenib Mesylate      Take 150 mg by mouth 2 (two) times a day.    Refills: 0     t minutes

## 2021-10-28 ENCOUNTER — PATIENT OUTREACH (OUTPATIENT)
Dept: CASE MANAGEMENT | Age: 61
End: 2021-10-28

## 2021-10-28 DIAGNOSIS — Z02.9 ENCOUNTERS FOR ADMINISTRATIVE PURPOSE: ICD-10-CM

## 2021-10-28 NOTE — PROGRESS NOTES
Initial Post Discharge Follow Up   Discharge Date: 10/27/21  Contact Date: 10/28/2021    Consent Verification:  Assessment Completed With: Patient  HIPAA Verified?   Yes    Discharge Dx:   Hypoxia      Was TCC ordered: yes      General:   • How have you Lispro (HUMALOG KWIKPEN) 200 UNIT/ML Subcutaneous Solution Pen-injector Inject 20 Units into the skin 3 (three) times daily before meals. Inject up to 20 units per dose, per sliding scale.      • insulin glargine (LANTUS SOLOSTAR) 100 UNIT/ML Subcutaneous S with your PCP?  (DME, meds, disease concerns, Etc): No     Follow up appointments:      Your appointments     Date & Time Appointment Department [facility])    Oct 29, 2021 12:30 PM CDT Hospital Follow Up with Lina Edouard ( outpatient medications with patient,  and orders reviewed and discussed. Any changes or updates to medications and or orders sent to PCP.      For patients with TCC appointments:     NCM offered sooner TCC appointment if schedule allowed:  yes    [x]  Advis

## 2021-11-04 ENCOUNTER — APPOINTMENT (OUTPATIENT)
Dept: HEMATOLOGY/ONCOLOGY | Facility: HOSPITAL | Age: 61
End: 2021-11-04
Attending: INTERNAL MEDICINE
Payer: MEDICAID

## 2021-11-08 ENCOUNTER — APPOINTMENT (OUTPATIENT)
Dept: CT IMAGING | Age: 61
End: 2021-11-08
Attending: EMERGENCY MEDICINE
Payer: MEDICAID

## 2021-11-08 ENCOUNTER — APPOINTMENT (OUTPATIENT)
Dept: GENERAL RADIOLOGY | Age: 61
End: 2021-11-08
Attending: EMERGENCY MEDICINE
Payer: MEDICAID

## 2021-11-08 ENCOUNTER — HOSPITAL ENCOUNTER (EMERGENCY)
Age: 61
Discharge: HOME | End: 2021-11-08
Attending: EMERGENCY MEDICINE
Payer: MEDICAID

## 2021-11-08 VITALS
HEART RATE: 91 BPM | HEIGHT: 62 IN | DIASTOLIC BLOOD PRESSURE: 98 MMHG | OXYGEN SATURATION: 92 % | WEIGHT: 168 LBS | SYSTOLIC BLOOD PRESSURE: 148 MMHG | TEMPERATURE: 98 F | RESPIRATION RATE: 18 BRPM | BODY MASS INDEX: 30.91 KG/M2

## 2021-11-08 DIAGNOSIS — M54.9 BACK PAIN WITHOUT RADIATION: ICD-10-CM

## 2021-11-08 DIAGNOSIS — R05.9 COUGH: Primary | ICD-10-CM

## 2021-11-08 PROCEDURE — 71046 X-RAY EXAM CHEST 2 VIEWS: CPT | Performed by: EMERGENCY MEDICINE

## 2021-11-08 PROCEDURE — 85025 COMPLETE CBC W/AUTO DIFF WBC: CPT | Performed by: EMERGENCY MEDICINE

## 2021-11-08 PROCEDURE — 81003 URINALYSIS AUTO W/O SCOPE: CPT

## 2021-11-08 PROCEDURE — 99284 EMERGENCY DEPT VISIT MOD MDM: CPT

## 2021-11-08 PROCEDURE — 36415 COLL VENOUS BLD VENIPUNCTURE: CPT

## 2021-11-08 PROCEDURE — 80053 COMPREHEN METABOLIC PANEL: CPT | Performed by: EMERGENCY MEDICINE

## 2021-11-08 PROCEDURE — 81003 URINALYSIS AUTO W/O SCOPE: CPT | Performed by: EMERGENCY MEDICINE

## 2021-11-08 PROCEDURE — 83690 ASSAY OF LIPASE: CPT | Performed by: EMERGENCY MEDICINE

## 2021-11-08 PROCEDURE — 74177 CT ABD & PELVIS W/CONTRAST: CPT | Performed by: EMERGENCY MEDICINE

## 2021-11-08 NOTE — ED INITIAL ASSESSMENT (HPI)
Out of hospital two weeks ago with PNA, lung MD wants a repeat xray. Bilateral flank pain for one week. Pain rated 5/10.

## 2021-11-08 NOTE — ED PROVIDER NOTES
Patient Seen in: THE [facility] Emergency Department In [place]      History   Patient presents with:  Abdomen/Flank Pain  Difficulty Breathing    Stated Complaint: sent for pnuemonia follow up and urinary issues, on chemo    Subjective:   HPI    52-year-old 4.8      Smokeless tobacco: Never Used    Vaping Use      Vaping Use: Never used    Alcohol use: No    Drug use: Yes      Types: Cannabis      Comment: CBD gummies in the evening             Review of Systems    Positive for stated complaint: sent for pnue -----------         ------                     CBC W/ DIFFERENTIAL[[id number]]          Abnormal            Final result                 Please view results for these tests on the individual orders.    RAINBOW DRAW LAVENDER   RAINBOW DRAW LIGHT GRE cholangiocarcinoma with numerous foci of hepatic disease again identified, suboptimally visualized on the current CT scan relative to the prior MRI. However as seen, the degree of disease is felt to be relatively stable.   If indicated, further evaluation

## 2021-11-09 ENCOUNTER — TELEPHONE (OUTPATIENT)
Dept: HEMATOLOGY/ONCOLOGY | Facility: HOSPITAL | Age: 61
End: 2021-11-09

## 2021-11-09 DIAGNOSIS — C22.1 CHOLANGIOCARCINOMA (HCC): Primary | ICD-10-CM

## 2021-11-09 RX ORDER — DABRAFENIB 75 MG/1
150 CAPSULE ORAL 2 TIMES DAILY
Qty: 60 CAPSULE | Refills: 3 | Status: SHIPPED | OUTPATIENT
Start: 2021-11-09 | End: 2021-11-11

## 2021-11-09 RX ORDER — TRAMETINIB 2 MG/1
1 TABLET, FILM COATED ORAL DAILY
Qty: 30 TABLET | Refills: 3 | Status: SHIPPED | OUTPATIENT
Start: 2021-11-09 | End: 2022-01-06

## 2021-11-09 NOTE — TELEPHONE ENCOUNTER
I spoke with patient and advised that her prescriptions have been sent to [facility] [place].

## 2021-11-09 NOTE — TELEPHONE ENCOUNTER
Patient called about her Tafinlar and Tysonravinder Khan is stating that they do not have an order for  these prescription's. She is running low on her Mekinist.  The Pharmacy gave her a number for us to call to refill. Contact [phone].   Please let m

## 2021-11-11 DIAGNOSIS — C22.1 CHOLANGIOCARCINOMA (HCC): ICD-10-CM

## 2021-11-11 RX ORDER — TRAMETINIB 2 MG/1
1 TABLET, FILM COATED ORAL DAILY
Qty: 30 TABLET | Refills: 3 | OUTPATIENT
Start: 2021-11-11

## 2021-11-11 RX ORDER — DABRAFENIB 75 MG/1
150 CAPSULE ORAL 2 TIMES DAILY
Qty: 120 CAPSULE | Refills: 3 | OUTPATIENT
Start: 2021-11-11

## 2021-11-11 RX ORDER — DABRAFENIB 75 MG/1
150 CAPSULE ORAL 2 TIMES DAILY
Qty: 120 CAPSULE | Refills: 3 | Status: SHIPPED | OUTPATIENT
Start: 2021-11-11 | End: 2022-01-06

## 2021-11-11 NOTE — TELEPHONE ENCOUNTER
Rodger parkre says new pharmacy needs refills on MEKINIST 2 MG Oral Tab and Dabrafenib Mesylate (TAFINLAR) 75 MG Oral Cap.  Thank you hao

## 2021-11-15 ENCOUNTER — TELEPHONE (OUTPATIENT)
Dept: ENDOCRINOLOGY CLINIC | Facility: CLINIC | Age: 61
End: 2021-11-15

## 2021-11-15 NOTE — TELEPHONE ENCOUNTER
Received fax from [employer] [place] for dexcom G6 sensor/transmitter. Spoke to [facility] that will fax over the forms for us to fill out and fax back.

## 2021-11-18 ENCOUNTER — TELEPHONE (OUTPATIENT)
Dept: HEMATOLOGY/ONCOLOGY | Facility: HOSPITAL | Age: 61
End: 2021-11-18

## 2021-11-18 NOTE — TELEPHONE ENCOUNTER
galina calling with [facility] looking for prior auth on Dabrafenib Mesylate (TAFINLAR) 75 MG Oral Cap call [phone].  Thank you hao

## 2021-11-29 NOTE — PROGRESS NOTES
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 Cisplatin 25 mg/m2 D1/8 + Gemcitabine 1000 mg/m2 D1/8 q21 days.    C1: 3/3/20 and 3/10/20: : 513,505 CEA 43.6 chills. Weight is stable. She does feel more \"sweaty\" but no drenching night sweats. Aside from her Low back, no other focal areas of bone pain. She is a previous smoker, she smoked for about 40 years and quit 2 years ago. She works as a . after C8 of Cis/[place]:   · Bone: L3 (SUC 4.7) and T7 (sclerosis)  · Liver: Left lobe (stable SUV 7.3), infiltrative hepatic lesions. · 8/12/20: Discussed at TB: proceed with maintenance Gemcitabine   · 11/4/20: CTA Chest: No PE.   · 11/6/20: MR L Spine:  Fo cholangiocarcinoma, BRAF V600E+. She completed 8 cycles of Cis/[place] on 8/4/20 and was put on gem maintenance. Unfortunately around 11/2020 she started to show evidence of progression and worsening back pain.  She received palliative RT to T7-T9 and L3 in 12/ day., Disp: 120 capsule, Rfl: 3  MEKINIST 2 MG Oral Tab, Take 1 tablet by mouth daily. , Disp: 30 tablet, Rfl: 3  Glycopyrrolate-Formoterol (BEVESPI AEROSPHERE) 9-4.8 MCG/ACT Inhalation Aerosol, Inhale 2 puffs into the lungs 2 (two) times a day., Disp: 3 ea Rfl:     [COMPLETED] iohexol (OMNIPAQUE) 350 MG/ML injection 100 mL, 100 mL, Intravenous, ONCE PRN, Anisha Neff MD, 100 mL at 11/08/21 4766      Past Medical History:   Diagnosis Date   • Atherosclerosis of coronary artery    • Cancer [facility])    • COPD 1036)     General: NAD, AOX3  HEENT: clear op, mmm, no jvd, no scleral icterus  CV: RRR s1s2 no murmurs  Extremities: No edema   Lungs: no increased wob, ctab  Abd: soft, mild distension   Neuro: CN: II-XII grossly intact  Port C/D/I  Skin: scattered  on l bone  · Discussed at TB, clinical presentation and pathology c/w cholangiocarcinoma  · Tempus: BRAF V600E mutation, ARID1A.  PD-L1 <1%, MSI-S, TMB 2.8  · Received Cisplatin 25 mg/m2 + Gemcitabine D1/8 q21 days x 8 cycles per the ABC-02 trial. Ivis De La Torre Abdominal distension: appears better     Pyrexia (fevers): Known complication of Dabrafenib. Take tylenol or motrin 30 min prior. · Improved with prednisone 10 mg daily. Given hyperglycemia, she tapered off. No longer with fevers.      Rash/Facial Marquez

## 2021-11-30 ENCOUNTER — TELEPHONE (OUTPATIENT)
Dept: HEMATOLOGY/ONCOLOGY | Facility: HOSPITAL | Age: 61
End: 2021-11-30

## 2021-11-30 ENCOUNTER — OFFICE VISIT (OUTPATIENT)
Dept: HEMATOLOGY/ONCOLOGY | Facility: HOSPITAL | Age: 61
End: 2021-11-30
Attending: INTERNAL MEDICINE
Payer: MEDICAID

## 2021-11-30 ENCOUNTER — HOSPITAL ENCOUNTER (OUTPATIENT)
Dept: ULTRASOUND IMAGING | Facility: HOSPITAL | Age: 61
Discharge: HOME | End: 2021-11-30
Attending: INTERNAL MEDICINE
Payer: MEDICAID

## 2021-11-30 VITALS
HEART RATE: 99 BPM | WEIGHT: 176 LBS | BODY MASS INDEX: 32 KG/M2 | SYSTOLIC BLOOD PRESSURE: 141 MMHG | RESPIRATION RATE: 18 BRPM | DIASTOLIC BLOOD PRESSURE: 85 MMHG | OXYGEN SATURATION: 90 % | TEMPERATURE: 98 F

## 2021-11-30 DIAGNOSIS — M79.89 LEG SWELLING: Primary | ICD-10-CM

## 2021-11-30 DIAGNOSIS — M79.89 LEG SWELLING: ICD-10-CM

## 2021-11-30 DIAGNOSIS — C22.1 CHOLANGIOCARCINOMA (HCC): ICD-10-CM

## 2021-11-30 DIAGNOSIS — C22.1 CHOLANGIOCARCINOMA (HCC): Primary | ICD-10-CM

## 2021-11-30 PROCEDURE — 99215 OFFICE O/P EST HI 40 MIN: CPT | Performed by: INTERNAL MEDICINE

## 2021-11-30 PROCEDURE — 85379 FIBRIN DEGRADATION QUANT: CPT

## 2021-11-30 PROCEDURE — 93970 EXTREMITY STUDY: CPT | Performed by: INTERNAL MEDICINE

## 2021-11-30 PROCEDURE — 36591 DRAW BLOOD OFF VENOUS DEVICE: CPT

## 2021-11-30 NOTE — TELEPHONE ENCOUNTER
Spoke with patient. STAT ultrasound scheduled. Patient verbalized understanding of plan and all future appointments. Margarita Oliveira MD  [facility]  Results reviewed. 1. D-dimer is up. Stat bilateral LE dopplers ordered     2.  Ca19-9 slig

## 2021-11-30 NOTE — PROGRESS NOTES
Outpatient Oncology Care Plan   Problem list:   knowledge deficit   Problems related to:   self care   Interventions:   provided general teaching   Expected outcomes:   understands plan of care   Progress towards outcome: making progress     Education Edwin

## 2021-12-01 ENCOUNTER — TELEMEDICINE (OUTPATIENT)
Dept: ENDOCRINOLOGY CLINIC | Facility: CLINIC | Age: 61
End: 2021-12-01

## 2021-12-01 DIAGNOSIS — E11.65 TYPE 2 DIABETES MELLITUS WITH HYPERGLYCEMIA, WITH LONG-TERM CURRENT USE OF INSULIN (HCC): Primary | ICD-10-CM

## 2021-12-01 DIAGNOSIS — I10 ESSENTIAL HYPERTENSION: ICD-10-CM

## 2021-12-01 DIAGNOSIS — Z79.4 TYPE 2 DIABETES MELLITUS WITH HYPERGLYCEMIA, WITH LONG-TERM CURRENT USE OF INSULIN (HCC): Primary | ICD-10-CM

## 2021-12-01 DIAGNOSIS — E78.5 DYSLIPIDEMIA: ICD-10-CM

## 2021-12-01 PROBLEM — D64.9 ANEMIA: Status: RESOLVED | Noted: 2021-09-04 | Resolved: 2021-12-01

## 2021-12-01 PROBLEM — J18.9 COMMUNITY ACQUIRED PNEUMONIA OF LEFT UPPER LOBE OF LUNG: Status: RESOLVED | Noted: 2021-01-01 | Resolved: 2021-01-01

## 2021-12-01 PROBLEM — J18.9 COMMUNITY ACQUIRED PNEUMONIA OF LEFT UPPER LOBE OF LUNG: Status: RESOLVED | Noted: 2021-10-25 | Resolved: 2021-12-01

## 2021-12-01 PROBLEM — D64.9 ANEMIA: Status: RESOLVED | Noted: 2021-01-01 | Resolved: 2021-01-01

## 2021-12-01 PROBLEM — R09.02 HYPOXEMIA: Status: RESOLVED | Noted: 2021-01-01 | Resolved: 2021-01-01

## 2021-12-01 PROBLEM — E87.1 HYPONATREMIA: Status: RESOLVED | Noted: 2021-01-01 | Resolved: 2021-01-01

## 2021-12-01 PROBLEM — R00.0 TACHYCARDIA, UNSPECIFIED: Status: RESOLVED | Noted: 2021-01-01 | Resolved: 2021-01-01

## 2021-12-01 PROBLEM — R00.0 TACHYCARDIA, UNSPECIFIED: Status: RESOLVED | Noted: 2021-09-04 | Resolved: 2021-12-01

## 2021-12-01 PROBLEM — E87.1 HYPONATREMIA: Status: RESOLVED | Noted: 2021-09-04 | Resolved: 2021-12-01

## 2021-12-01 PROBLEM — R09.02 HYPOXEMIA: Status: RESOLVED | Noted: 2021-07-16 | Resolved: 2021-12-01

## 2021-12-01 PROCEDURE — 99214 OFFICE O/P EST MOD 30 MIN: CPT | Performed by: NURSE PRACTITIONER

## 2021-12-01 NOTE — PROGRESS NOTES
Lacie Summers is a 62year old female who presents for Type 2 diabetes management. Primary care physician: David Rodriguez MD       Due to COVID-19 ACTION PLAN, the patient's office visit was converted to a video visit.  Please note that the following v 0.96 11/30/2021    GFRNAA 65 11/30/2021    GFRAA 75 11/30/2021    AST 46 (H) 11/30/2021    ALT 54 11/30/2021             DM Complications:  Microvascular:   Neuropathy: no  Retinopathy: no  Nephropathy: no    Macrovascular:  PVD: no  CAD: no  Stroke/CVA: n Dabrafenib Mesylate (TAFINLAR) 75 MG Oral Cap Take 150 mg by mouth 2 (two) times a day. 120 capsule 3   • MEKINIST 2 MG Oral Tab Take 1 tablet by mouth daily.  30 tablet 3   • Glycopyrrolate-Formoterol (BEVESPI AEROSPHERE) 9-4.8 MCG/ACT Inhalation Aerosol I Hypoglycemia: no    Review of Systems     LUNGS: + cough/shortness of breath   CARDIOVASCULAR: denies chest pain  GI: denies abdominal pain, nausea.  + transient nausea    : denies dysuria  MUSCULOSKELETAL: denies pain      Physical exam:  LMP 01/15/2013 center)     Reviewed w patient pathophysiology of diabetes, clinical significance of A1c, adverse effects of suboptimal glucose control, and goals of therapy   Reviewed the A1C test, what the value reflects and the goal for the patient.    Reminded pt on A1 decision making. Appropriate medical decision-making and tests are ordered as detailed in the plan of care above. Vidal Howard understands phone or video evaluation is not a substitute for face-to-face examination or emergency care.  Patient advise

## 2021-12-01 NOTE — PATIENT INSTRUCTIONS
We are here to help you manage your diabetes.  Please continue with your primary care physician/provider for your routine health care maintenance     Your A1C: update at lab from yesterday specimen -   Your trends are running too high     The A1C test provi Blood sugar targets:  Before breakfast:     2 hours After meals: less than 225  Please call me if you are having blood sugars less than 75 or more than  200 more than 2 days in a week         Watch for low blood sugars: (less than 70 )  Symptoms will be need to be seen more frequently. · Yearly blood work may also required for many medications to insure safe prescribing. If you are due for labs, you will have 30 days to complete the  requested labs before future refills are authorized.    · In th

## 2021-12-02 ENCOUNTER — TELEPHONE (OUTPATIENT)
Dept: HEMATOLOGY/ONCOLOGY | Facility: HOSPITAL | Age: 61
End: 2021-12-02

## 2021-12-04 ENCOUNTER — HOSPITAL ENCOUNTER (OUTPATIENT)
Dept: GENERAL RADIOLOGY | Age: 61
Discharge: HOME | End: 2021-12-04
Attending: INTERNAL MEDICINE
Payer: MEDICAID

## 2021-12-04 DIAGNOSIS — C22.1 CHOLANGIOCARCINOMA (HCC): ICD-10-CM

## 2021-12-04 PROCEDURE — 73502 X-RAY EXAM HIP UNI 2-3 VIEWS: CPT | Performed by: INTERNAL MEDICINE

## 2021-12-06 ENCOUNTER — HOSPITAL ENCOUNTER (OUTPATIENT)
Dept: NUCLEAR MEDICINE | Facility: HOSPITAL | Age: 61
Discharge: HOME | End: 2021-12-06
Attending: INTERNAL MEDICINE
Payer: MEDICAID

## 2021-12-06 DIAGNOSIS — C22.1 CHOLANGIOCARCINOMA (HCC): ICD-10-CM

## 2021-12-06 PROCEDURE — 82962 GLUCOSE BLOOD TEST: CPT

## 2021-12-06 PROCEDURE — 78815 PET IMAGE W/CT SKULL-THIGH: CPT | Performed by: INTERNAL MEDICINE

## 2021-12-09 ENCOUNTER — HOSPITAL ENCOUNTER (OUTPATIENT)
Dept: MRI IMAGING | Facility: HOSPITAL | Age: 61
Discharge: HOME | End: 2021-12-09
Attending: INTERNAL MEDICINE
Payer: MEDICAID

## 2021-12-09 DIAGNOSIS — C22.1 CHOLANGIOCARCINOMA (HCC): ICD-10-CM

## 2021-12-09 PROCEDURE — A9581 GADOXETATE DISODIUM INJ: HCPCS | Performed by: INTERNAL MEDICINE

## 2021-12-09 PROCEDURE — 74183 MRI ABD W/O CNTR FLWD CNTR: CPT | Performed by: INTERNAL MEDICINE

## 2021-12-10 ENCOUNTER — TELEPHONE (OUTPATIENT)
Dept: HEMATOLOGY/ONCOLOGY | Facility: HOSPITAL | Age: 61
End: 2021-12-10

## 2021-12-10 DIAGNOSIS — C22.1 CHOLANGIOCARCINOMA (HCC): Primary | ICD-10-CM

## 2021-12-10 NOTE — TELEPHONE ENCOUNTER
Spoke with patient. She verbalized understanding. Appointments scheduled with patient. Tanya Griffin MD  P [facility]  Results reviewed. I ordered repeat labs: CBC, CMP, Ca19-9.  Can she have those done next week on Tuesday/Wednesday and see m

## 2021-12-14 ENCOUNTER — NURSE ONLY (OUTPATIENT)
Dept: HEMATOLOGY/ONCOLOGY | Facility: HOSPITAL | Age: 61
End: 2021-12-14
Attending: INTERNAL MEDICINE
Payer: MEDICAID

## 2021-12-14 DIAGNOSIS — C22.1 CHOLANGIOCARCINOMA (HCC): ICD-10-CM

## 2021-12-14 PROCEDURE — 85025 COMPLETE CBC W/AUTO DIFF WBC: CPT

## 2021-12-14 PROCEDURE — 36591 DRAW BLOOD OFF VENOUS DEVICE: CPT

## 2021-12-14 PROCEDURE — 86301 IMMUNOASSAY TUMOR CA 19-9: CPT

## 2021-12-14 PROCEDURE — 80053 COMPREHEN METABOLIC PANEL: CPT

## 2021-12-14 PROCEDURE — 83036 HEMOGLOBIN GLYCOSYLATED A1C: CPT | Performed by: NURSE PRACTITIONER

## 2021-12-16 ENCOUNTER — OFFICE VISIT (OUTPATIENT)
Dept: HEMATOLOGY/ONCOLOGY | Facility: HOSPITAL | Age: 61
End: 2021-12-16
Attending: INTERNAL MEDICINE
Payer: MEDICAID

## 2021-12-16 VITALS
DIASTOLIC BLOOD PRESSURE: 80 MMHG | HEIGHT: 62.01 IN | TEMPERATURE: 98 F | RESPIRATION RATE: 20 BRPM | SYSTOLIC BLOOD PRESSURE: 121 MMHG | OXYGEN SATURATION: 92 % | BODY MASS INDEX: 32.16 KG/M2 | HEART RATE: 97 BPM | WEIGHT: 177 LBS

## 2021-12-16 DIAGNOSIS — C22.1 CHOLANGIOCARCINOMA (HCC): Primary | ICD-10-CM

## 2021-12-16 PROCEDURE — 99214 OFFICE O/P EST MOD 30 MIN: CPT | Performed by: INTERNAL MEDICINE

## 2021-12-16 RX ORDER — DULOXETIN HYDROCHLORIDE 30 MG/1
30 CAPSULE, DELAYED RELEASE ORAL DAILY
Qty: 30 CAPSULE | Refills: 0 | Status: SHIPPED | OUTPATIENT
Start: 2021-12-16 | End: 2022-01-18

## 2021-12-16 RX ORDER — PREDNISONE 1 MG/1
5 TABLET ORAL DAILY
Qty: 30 TABLET | Refills: 0 | Status: SHIPPED | OUTPATIENT
Start: 2021-12-16

## 2021-12-16 RX ORDER — PANTOPRAZOLE SODIUM 40 MG/1
40 TABLET, DELAYED RELEASE ORAL DAILY
Qty: 30 TABLET | Refills: 0 | Status: SHIPPED | OUTPATIENT
Start: 2021-12-16 | End: 2021-12-23

## 2021-12-16 RX ORDER — AMLODIPINE BESYLATE 10 MG/1
10 TABLET ORAL DAILY
COMMUNITY
Start: 2021-11-13 | End: 2022-01-06

## 2021-12-16 RX ORDER — BLOOD-GLUCOSE TRANSMITTER
1 EACH MISCELLANEOUS AS DIRECTED
COMMUNITY
Start: 2021-11-18

## 2021-12-16 NOTE — PROGRESS NOTES
Patient presents with: Follow - Up: MD visit change in treatment    Pt is here for follow up and discuss care plan. Reports intermittent pain in the RUQ/abdomen that is sharp; does not take medication to resolve.  Eating and drinking without issue; denies

## 2021-12-16 NOTE — PROGRESS NOTES
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 Cisplatin 25 mg/m2 D1/8 + Gemcitabine 1000 mg/m2 D1/8 q21 days.    C1: 3/3/20 and 3/10/20: : 646,670 CEA 43.6 chills. Weight is stable. She does feel more \"sweaty\" but no drenching night sweats. Aside from her Low back, no other focal areas of bone pain. She is a previous smoker, she smoked for about 40 years and quit 2 years ago. She works as a . after C8 of Cis/[place]:   · Bone: L3 (SUC 4.7) and T7 (sclerosis)  · Liver: Left lobe (stable SUV 7.3), infiltrative hepatic lesions. · 8/12/20: Discussed at TB: proceed with maintenance Gemcitabine   · 11/4/20: CTA Chest: No PE.   · 11/6/20: MR L Spine:  Fo lobe mass: SUV 3.9 from 4.9.   · 12/9/21: MRI Liver: Largest lesion in liver appears larger (discussed with rad onc, this is likely treatment effect) and other smaller lesions with similar enhancement are unchanged.    · 12/14/21: : 608    Interval Hi UNIT/ML Subcutaneous Solution Pen-injector, Inject 20 Units into the skin 3 (three) times daily before meals.  Inject up to 20 units per dose, per sliding scale., Disp: , Rfl:   insulin glargine (LANTUS SOLOSTAR) 100 UNIT/ML Subcutaneous Solution Pen-inject rotator cuff   • COLECTOMY     • COLONOSCOPY  12/20/2018    polyps   • OTHER  02/26/2018    resection Splenic flexure   • OTHER SURGICAL HISTORY       Social History    Socioeconomic History      Marital status:     Tobacco Use      Smoking status: given above. 2. Other smaller lesions with similar enhancement characteristics are unchanged since previous study. 3. Low signal lesion in the L3 vertebral body has been previously described and is unchanged.    4. There are otherwise no new lesions det cholangiocarcinoma  · Tempus: BRAF V600E mutation, ARID1A. PD-L1 <1%, MSI-S, TMB 2.8  · Received Cisplatin 25 mg/m2 + Gemcitabine D1/8 q21 days x 8 cycles per the ABC-02 trial. Li Mcclell[place] 2010.  Completed 8 cycles of Cis/[place] with reduction in tumor m swelling on exam. Check d-dimer. If positive, will get a doppler. Abdominal distension: appears better     Pyrexia (fevers): Known complication of Dabrafenib. Take tylenol or motrin 30 min prior. · Improved with prednisone 10 mg daily.  Given hypergly

## 2021-12-17 ENCOUNTER — TELEPHONE (OUTPATIENT)
Dept: HEMATOLOGY/ONCOLOGY | Facility: HOSPITAL | Age: 61
End: 2021-12-17

## 2021-12-17 NOTE — TELEPHONE ENCOUNTER
Called and talked to patient about treatment plan and start dates. Pt will start on 12/23/2021 in [place]; she will get a mini-education to review the treatment and then have treatment to follow.  She was instructed to stop her mekinist and taflinar no

## 2021-12-21 ENCOUNTER — HOSPITAL ENCOUNTER (EMERGENCY)
Facility: HOSPITAL | Age: 61
Discharge: HOME | End: 2021-12-21
Attending: EMERGENCY MEDICINE
Payer: MEDICAID

## 2021-12-21 ENCOUNTER — APPOINTMENT (OUTPATIENT)
Dept: CT IMAGING | Facility: HOSPITAL | Age: 61
End: 2021-12-21
Attending: EMERGENCY MEDICINE
Payer: MEDICAID

## 2021-12-21 VITALS
TEMPERATURE: 97 F | SYSTOLIC BLOOD PRESSURE: 132 MMHG | WEIGHT: 170 LBS | HEART RATE: 78 BPM | DIASTOLIC BLOOD PRESSURE: 98 MMHG | OXYGEN SATURATION: 96 % | BODY MASS INDEX: 31.28 KG/M2 | RESPIRATION RATE: 17 BRPM | HEIGHT: 62 IN

## 2021-12-21 DIAGNOSIS — C22.1 CHOLANGIOCARCINOMA (HCC): ICD-10-CM

## 2021-12-21 DIAGNOSIS — R10.11 ABDOMINAL PAIN, RIGHT UPPER QUADRANT: Primary | ICD-10-CM

## 2021-12-21 PROCEDURE — 80053 COMPREHEN METABOLIC PANEL: CPT | Performed by: EMERGENCY MEDICINE

## 2021-12-21 PROCEDURE — 84484 ASSAY OF TROPONIN QUANT: CPT | Performed by: EMERGENCY MEDICINE

## 2021-12-21 PROCEDURE — 85379 FIBRIN DEGRADATION QUANT: CPT | Performed by: EMERGENCY MEDICINE

## 2021-12-21 PROCEDURE — 99285 EMERGENCY DEPT VISIT HI MDM: CPT

## 2021-12-21 PROCEDURE — 74177 CT ABD & PELVIS W/CONTRAST: CPT | Performed by: EMERGENCY MEDICINE

## 2021-12-21 PROCEDURE — 96374 THER/PROPH/DIAG INJ IV PUSH: CPT

## 2021-12-21 PROCEDURE — 71275 CT ANGIOGRAPHY CHEST: CPT | Performed by: EMERGENCY MEDICINE

## 2021-12-21 PROCEDURE — 85025 COMPLETE CBC W/AUTO DIFF WBC: CPT | Performed by: EMERGENCY MEDICINE

## 2021-12-21 PROCEDURE — 93010 ELECTROCARDIOGRAM REPORT: CPT

## 2021-12-21 PROCEDURE — 93005 ELECTROCARDIOGRAM TRACING: CPT

## 2021-12-21 RX ORDER — MORPHINE SULFATE 4 MG/ML
4 INJECTION, SOLUTION INTRAMUSCULAR; INTRAVENOUS ONCE
Status: COMPLETED | OUTPATIENT
Start: 2021-12-21 | End: 2021-12-21

## 2021-12-21 RX ORDER — ACETAMINOPHEN AND CODEINE PHOSPHATE 300; 30 MG/1; MG/1
1-2 TABLET ORAL EVERY 6 HOURS PRN
Qty: 10 TABLET | Refills: 0 | Status: SHIPPED | OUTPATIENT
Start: 2021-12-21 | End: 2021-12-23

## 2021-12-21 NOTE — ED INITIAL ASSESSMENT (HPI)
Patient with c/o RUQ pain that began two days ago. Patient has liver cancer with mets to the spine. Patient states unable to sleep due to pain, feels like her lung is cramping. Currently undergoing chemo.

## 2021-12-21 NOTE — ED QUICK NOTES
Pt reevaluated by er physician, informed of all her test reports and plan of care.  Pt verbalizing karen

## 2021-12-21 NOTE — ED PROVIDER NOTES
Patient Seen in: [facility] Emergency Department      History   Patient presents with:  Abdomen/Flank Pain  Back Pain    Stated Complaint: right sided abdominal and back pain, hx of liver CA     Subjective:   HPI  58-year-old female history of liver cuff   • COLECTOMY     • COLONOSCOPY  12/20/2018    polyps   • OTHER  02/26/2018    resection Splenic flexure   • OTHER SURGICAL HISTORY                  Social History    Tobacco Use      Smoking status: Former Smoker        Packs/day: 1.00        Years: components within normal limits   CBC W/ DIFFERENTIAL - Abnormal; Notable for the following components:    .0 (*)     All other components within normal limits   TROPONIN I HIGH SENSITIVITY - Normal   D-DIMER - Normal   RAPID SARS-COV-2 BY PCR - Nor processes. This may represent red marrow reconversion with superimposed diffuse osseous metastatic disease not excluded. Consider further assessment with bone scan/SPECT is clinically appropriate. No epidural metastatic disease is identified.   There is DOPPLER LEG BILAT - DIAG IMG (CPT=93970), 9/08/2020, 11:54 AM.  INDICATIONS:  M79.89 Leg swelling C22.1 Cholangiocarcinoma (HCC)  TECHNIQUE:  Real time, grey scale, and duplex ultrasound was used to evaluate the lower extremity venous system.  B-mode two-di previously 4.9. This corresponds to a low-attenuation area in the liver, as most recently observed on CT with contrast from 11/8/2021. No other foci of liver activity seen.   Slightly nodular surface contour to the liver could reflect underlying cirrhosis about 2 cm diameter, as seen on recent comparison CT of the abdomen and pelvis (could be a sclerotic metastatic lesion or large bone island). SOFT TISSUES: No visible soft tissue swelling.   There is mild bilateral calcified enthesopathy at the iliac crests emphysema throughout the lungs. There is mild interlobular septal thickening seen diffusely specially in the upper lobe apices and in the lower lobes posteriorly which may represent interstitial fibrotic change.   There is some patchy ground-glass opacity diverticulosis of the left colon without ends of diverticulitis. ABDOMINAL WALL:  No mass or hernia. URINARY BLADDER:  No visible focal wall thickening, lesion, or calculus. PELVIC NODES:  No adenopathy. PELVIC ORGANS:  No visible mass.   Pelvic organs a hypoenhancing lesion lateral segment left hepatic lobe seen on the delayed postcontrast 20 minutes images series 12, image 49 measures 5 x 3.4 cm (previously 3.3 x 3.1 cm on a similar image).   There are innumerable smaller hypoenhancing lesions scattered t has been previously described and is unchanged. 4. There are otherwise no new lesions detected.    Dictated by (CST): Katherine Khoury MD on 12/09/2021 at 11:50 AM     Finalized by (CST): Katherine Khoury MD on 12/09/2021 at 12:03 PM              [facility]      61-year

## 2021-12-22 ENCOUNTER — TELEPHONE (OUTPATIENT)
Dept: HEMATOLOGY/ONCOLOGY | Facility: HOSPITAL | Age: 61
End: 2021-12-22

## 2021-12-22 ENCOUNTER — HOSPITAL ENCOUNTER (OUTPATIENT)
Dept: MRI IMAGING | Facility: HOSPITAL | Age: 61
Discharge: HOME | End: 2021-12-22
Attending: INTERNAL MEDICINE
Payer: MEDICAID

## 2021-12-22 DIAGNOSIS — C22.1 CHOLANGIOCARCINOMA (HCC): ICD-10-CM

## 2021-12-22 PROCEDURE — 72157 MRI CHEST SPINE W/O & W/DYE: CPT | Performed by: INTERNAL MEDICINE

## 2021-12-22 PROCEDURE — A9575 INJ GADOTERATE MEGLUMI 0.1ML: HCPCS | Performed by: INTERNAL MEDICINE

## 2021-12-22 NOTE — TELEPHONE ENCOUNTER
Romulo Hess at [phone] is calling for her Mother Rodger who is in severe pain in the entire right side and her spine hurts, pain is 10+, she is crying and she was in the ER on 12/21/21. Dr. Narciso Archer pt.

## 2021-12-22 NOTE — TELEPHONE ENCOUNTER
Called Burak Blanco in ER yesterday for pain and testing, she was sent home with Tylenol #3 - 10 tabs for pain control, she took only one last night, still having pain and crying and not sleeping. Pain to right side and spine rated 10/10.   Instructed to

## 2021-12-22 NOTE — TELEPHONE ENCOUNTER
Franki Robles 26, MRI scheduled for today at [facility] for 145 with arrival at 115, no metals. Medicate with pain medication prior to testing. Scripts of Norco and Decadron sent per Dr Kellie Ocasio. She verbalizes understanding.

## 2021-12-23 ENCOUNTER — OFFICE VISIT (OUTPATIENT)
Dept: HEMATOLOGY/ONCOLOGY | Facility: HOSPITAL | Age: 61
End: 2021-12-23
Attending: INTERNAL MEDICINE
Payer: MEDICAID

## 2021-12-23 ENCOUNTER — SOCIAL WORK SERVICES (OUTPATIENT)
Dept: HEMATOLOGY/ONCOLOGY | Facility: HOSPITAL | Age: 61
End: 2021-12-23

## 2021-12-23 VITALS
TEMPERATURE: 96 F | RESPIRATION RATE: 18 BRPM | SYSTOLIC BLOOD PRESSURE: 132 MMHG | OXYGEN SATURATION: 88 % | HEIGHT: 62.6 IN | HEART RATE: 92 BPM | DIASTOLIC BLOOD PRESSURE: 79 MMHG | BODY MASS INDEX: 31.04 KG/M2 | WEIGHT: 173 LBS

## 2021-12-23 DIAGNOSIS — R11.2 CINV (CHEMOTHERAPY-INDUCED NAUSEA AND VOMITING): ICD-10-CM

## 2021-12-23 DIAGNOSIS — C78.89 METASTATIC CHOLANGIOCARCINOMA TO BILE DUCT (HCC): Primary | ICD-10-CM

## 2021-12-23 DIAGNOSIS — R79.89 ELEVATED LFTS: ICD-10-CM

## 2021-12-23 DIAGNOSIS — C22.1 CHOLANGIOCARCINOMA (HCC): ICD-10-CM

## 2021-12-23 DIAGNOSIS — C22.1 METASTATIC CHOLANGIOCARCINOMA TO BILE DUCT (HCC): Primary | ICD-10-CM

## 2021-12-23 DIAGNOSIS — Z51.11 ENCOUNTER FOR CHEMOTHERAPY MANAGEMENT: ICD-10-CM

## 2021-12-23 DIAGNOSIS — T45.1X5A CINV (CHEMOTHERAPY-INDUCED NAUSEA AND VOMITING): ICD-10-CM

## 2021-12-23 DIAGNOSIS — G89.3 NEOPLASM RELATED PAIN: ICD-10-CM

## 2021-12-23 DIAGNOSIS — Z71.9 ENCOUNTER FOR EDUCATION: ICD-10-CM

## 2021-12-23 DIAGNOSIS — C22.1 CHOLANGIOCARCINOMA (HCC): Primary | ICD-10-CM

## 2021-12-23 PROCEDURE — S0028 INJECTION, FAMOTIDINE, 20 MG: HCPCS | Performed by: NURSE PRACTITIONER

## 2021-12-23 PROCEDURE — 80053 COMPREHEN METABOLIC PANEL: CPT

## 2021-12-23 PROCEDURE — 96367 TX/PROPH/DG ADDL SEQ IV INF: CPT

## 2021-12-23 PROCEDURE — 86301 IMMUNOASSAY TUMOR CA 19-9: CPT

## 2021-12-23 PROCEDURE — 96413 CHEMO IV INFUSION 1 HR: CPT

## 2021-12-23 PROCEDURE — 99215 OFFICE O/P EST HI 40 MIN: CPT | Performed by: NURSE PRACTITIONER

## 2021-12-23 PROCEDURE — 96415 CHEMO IV INFUSION ADDL HR: CPT

## 2021-12-23 PROCEDURE — 96375 TX/PRO/DX INJ NEW DRUG ADDON: CPT

## 2021-12-23 PROCEDURE — 96411 CHEMO IV PUSH ADDL DRUG: CPT

## 2021-12-23 PROCEDURE — 85025 COMPLETE CBC W/AUTO DIFF WBC: CPT

## 2021-12-23 PROCEDURE — 96368 THER/DIAG CONCURRENT INF: CPT

## 2021-12-23 RX ORDER — FLUOROURACIL 50 MG/ML
400 INJECTION, SOLUTION INTRAVENOUS ONCE
Status: CANCELLED | OUTPATIENT
Start: 2021-12-23

## 2021-12-23 RX ORDER — OLANZAPINE 5 MG/1
5 TABLET ORAL NIGHTLY
Refills: 0 | COMMUNITY
Start: 2021-12-23

## 2021-12-23 RX ORDER — ACETAMINOPHEN AND CODEINE PHOSPHATE 300; 30 MG/1; MG/1
1-2 TABLET ORAL EVERY 4 HOURS PRN
Qty: 120 TABLET | Refills: 0 | Status: SHIPPED | OUTPATIENT
Start: 2021-12-23 | End: 2022-01-06

## 2021-12-23 RX ORDER — FAMOTIDINE 10 MG/ML
20 INJECTION, SOLUTION INTRAVENOUS 2 TIMES DAILY
Status: DISCONTINUED | OUTPATIENT
Start: 2021-12-23 | End: 2021-12-23

## 2021-12-23 RX ORDER — PROCHLORPERAZINE MALEATE 10 MG
10 TABLET ORAL ONCE
Status: COMPLETED | OUTPATIENT
Start: 2021-12-23 | End: 2021-12-23

## 2021-12-23 RX ORDER — FLUOROURACIL 50 MG/ML
2400 INJECTION, SOLUTION INTRAVENOUS CONTINUOUS
Status: DISCONTINUED | OUTPATIENT
Start: 2021-12-23 | End: 2021-12-23

## 2021-12-23 RX ORDER — FLUOROURACIL 50 MG/ML
400 INJECTION, SOLUTION INTRAVENOUS ONCE
Status: COMPLETED | OUTPATIENT
Start: 2021-12-23 | End: 2021-12-23

## 2021-12-23 RX ORDER — FAMOTIDINE 20 MG/1
20 TABLET ORAL 2 TIMES DAILY
Qty: 60 TABLET | Refills: 2 | Status: SHIPPED | OUTPATIENT
Start: 2021-12-23 | End: 2022-01-13

## 2021-12-23 RX ORDER — FLUOROURACIL 50 MG/ML
2400 INJECTION, SOLUTION INTRAVENOUS CONTINUOUS
Status: CANCELLED | OUTPATIENT
Start: 2021-12-23

## 2021-12-23 RX ORDER — SODIUM CHLORIDE 9 MG/ML
250 INJECTION, SOLUTION INTRAVENOUS ONCE
Status: COMPLETED | OUTPATIENT
Start: 2021-12-23 | End: 2021-12-23

## 2021-12-23 RX ADMIN — SODIUM CHLORIDE 250 ML: 9 INJECTION, SOLUTION INTRAVENOUS at 14:30:00

## 2021-12-23 RX ADMIN — PROCHLORPERAZINE MALEATE 10 MG: 10 MG TABLET ORAL at 15:29:00

## 2021-12-23 RX ADMIN — FLUOROURACIL 750 MG: 50 INJECTION, SOLUTION INTRAVENOUS at 15:36:00

## 2021-12-23 RX ADMIN — FAMOTIDINE 20 MG: 10 INJECTION, SOLUTION INTRAVENOUS at 14:25:00

## 2021-12-23 RX ADMIN — FLUOROURACIL 4350 MG: 50 INJECTION, SOLUTION INTRAVENOUS at 15:42:00

## 2021-12-23 NOTE — PROGRESS NOTES
Received notification from [address] [place] [address] stating that due to patient’s Medicaid, she is unable to be seen. Reji Garcia stated she will reach out to Patient to connect her with any provider.

## 2021-12-23 NOTE — PROGRESS NOTES
ANP Visit Note    Patient Name: Ajit Baptiste   YOB: 1960   Medical Record Number: [id number]   CSN: [id number]   Date of visit: 12/23/2021       Chief Complaint/Reason for Visit:  Patient presents with:   Follow - Up: C1 APN eval, mini-chemo of which were reviewed with the patient at length today. Currently, she reports that she is feeling much better. Pain is well controlled on 1 tab of tylenol with codeine every 3.5 hrs.  She states that she is now very functional. Would like a refill of Inhale 2 puffs into the lungs 2 (two) times a day., Disp: 3 each, Rfl: 1  •  umeclidinium-vilanterol 62.5-25 MCG/INH Inhalation Aerosol Powder, Breath Activated, Inhale 1 puff into the lungs daily. , Disp: 1 each, Rfl: 0  •  lisinopril 40 MG Oral Tab, Take Resp 18   Ht 1.59 m (5' 2.6\")   Wt 78.5 kg (173 lb)   LMP 01/15/2013   SpO2 (!) 88%   BMI 31.04 kg/m²   General: Well developed, casually dressed, appropriately groomed, alert and oriented x 3, not in acute distress.   HEENT: Anicteric, conjunctivae and 3:04 PM    Specimen: Nares;  Other   Result Value Ref Range    Rapid SARS-CoV-2 by PCR Not Detected Not Detected   RAINBOW DRAW LAVENDER    Collection Time: 12/21/21  3:04 PM   Result Value Ref Range    Hold Lavender Auto Resulted    RAINBOW DRAW LIGHT GRE -American 64 >=60     (H) 15 - 37 U/L    ALT 84 (H) 13 - 56 U/L    Alkaline Phosphatase 122 (H) 46 - 118 U/L    Bilirubin, Total 0.6 0.1 - 2.0 mg/dL    Total Protein 8.3 (H) 6.4 - 8.2 g/dL    Albumin 3.6 3.4 - 5.0 g/dL    Globulin  4.7 (H) 2. better characterized on the prior dedicated lumbar spine MRI.     Dictated by (CST): Tiffany Aschoff, MD on 12/22/2021 at 2:54 PM     Finalized by (CST): Tiffany Aschoff, MD on 12/22/2021 at 3:11 PM       CTA CHEST + CT ABD (W) + CT PEL (W) (CPT=71275/7 oxaliplatin and leucovorin infusions, patient became flushed then clammy and nauseated. BP elevated to 180/110s. She developed dry heaves and eventually vomited bile 2x.  She reports this happened when she received cisplatin in the past. IV famotidine and N

## 2021-12-23 NOTE — PROGRESS NOTES
Pt here for C 1 D 1 FOLFOX.   Arrives Ambulating independently, accompanied by Self and Spouse           Pregnancy screening: Not applicable    Modifications in dose or schedule: No     Frequency of blood return and site check throughout administration: Francine

## 2021-12-23 NOTE — PROGRESS NOTES
met with patient to check in. Patient reports that she was doing well until she had a spike in her numbers and “the worst pain” she has ever felt. She is starting a new treatment, and appears to be doing ok.  Her  is present, who appear

## 2021-12-25 ENCOUNTER — NURSE ONLY (OUTPATIENT)
Dept: HEMATOLOGY/ONCOLOGY | Facility: HOSPITAL | Age: 61
End: 2021-12-25
Attending: INTERNAL MEDICINE
Payer: MEDICAID

## 2021-12-25 PROCEDURE — 96523 IRRIG DRUG DELIVERY DEVICE: CPT

## 2021-12-25 NOTE — PROGRESS NOTES
Education Record    Learner:  Patient and Spouse    Disease / Meka Faes disconnect    Barriers / Limitations:  None   Comments:    Method:  Discussion   Comments:    General Topics:  Plan of care reviewed   Comments:    Outcome:  Shows understanding

## 2021-12-27 ENCOUNTER — APPOINTMENT (OUTPATIENT)
Dept: CARDIOLOGY | Age: 61
End: 2021-12-27

## 2021-12-27 ENCOUNTER — TELEPHONE (OUTPATIENT)
Dept: HEMATOLOGY/ONCOLOGY | Facility: HOSPITAL | Age: 61
End: 2021-12-27

## 2021-12-27 NOTE — ED QUICK NOTES
Accessed port a cath at [facility].
Pt calmer and now resting comfortably;tolerating ice chips. ERMD at bs with updated poc. Adult family remains at bs.
Clothing

## 2021-12-27 NOTE — TELEPHONE ENCOUNTER
Toxicities: C1 D1 Fluorouracil/Leucovorin/Oxaliplatin on 12/23/2021    Evelyn Stokes reports she had been constipated and having left lower abdominal pain since last Thursday. She takes 3 colace a day and finally had a \"good sized\" bowel movement yesterday.  She h

## 2022-01-01 ENCOUNTER — APPOINTMENT (OUTPATIENT)
Dept: GENERAL RADIOLOGY | Facility: HOSPITAL | Age: 62
End: 2022-01-01
Attending: HOSPITALIST
Payer: MEDICAID

## 2022-01-01 ENCOUNTER — APPOINTMENT (OUTPATIENT)
Dept: HEMATOLOGY/ONCOLOGY | Facility: HOSPITAL | Age: 62
End: 2022-01-01
Attending: INTERNAL MEDICINE
Payer: MEDICAID

## 2022-01-01 ENCOUNTER — APPOINTMENT (OUTPATIENT)
Dept: MRI IMAGING | Facility: HOSPITAL | Age: 62
End: 2022-01-01
Attending: INTERNAL MEDICINE
Payer: MEDICAID

## 2022-01-01 ENCOUNTER — TELEPHONE (OUTPATIENT)
Dept: HEMATOLOGY/ONCOLOGY | Facility: HOSPITAL | Age: 62
End: 2022-01-01

## 2022-01-01 ENCOUNTER — HOSPITAL ENCOUNTER (INPATIENT)
Facility: HOSPITAL | Age: 62
LOS: 1 days | End: 2022-01-01
Attending: EMERGENCY MEDICINE | Admitting: HOSPITALIST
Payer: MEDICAID

## 2022-01-01 ENCOUNTER — HOSPITAL ENCOUNTER (INPATIENT)
Facility: HOSPITAL | Age: 62
LOS: 8 days | Discharge: HOME HEALTH | End: 2022-01-01
Attending: EMERGENCY MEDICINE | Admitting: HOSPITALIST
Payer: MEDICAID

## 2022-01-01 ENCOUNTER — APPOINTMENT (OUTPATIENT)
Dept: INTERVENTIONAL RADIOLOGY/VASCULAR | Facility: HOSPITAL | Age: 62
End: 2022-01-01
Attending: HOSPITALIST
Payer: MEDICAID

## 2022-01-01 ENCOUNTER — APPOINTMENT (OUTPATIENT)
Dept: INTERVENTIONAL RADIOLOGY/VASCULAR | Facility: HOSPITAL | Age: 62
End: 2022-01-01
Attending: INTERNAL MEDICINE
Payer: MEDICAID

## 2022-01-01 ENCOUNTER — TELEPHONE (OUTPATIENT)
Dept: INTERNAL MEDICINE CLINIC | Facility: CLINIC | Age: 62
End: 2022-01-01

## 2022-01-01 ENCOUNTER — APPOINTMENT (OUTPATIENT)
Dept: CT IMAGING | Facility: HOSPITAL | Age: 62
End: 2022-01-01
Attending: EMERGENCY MEDICINE
Payer: MEDICAID

## 2022-01-01 VITALS
TEMPERATURE: 96 F | RESPIRATION RATE: 24 BRPM | HEART RATE: 90 BPM | SYSTOLIC BLOOD PRESSURE: 106 MMHG | DIASTOLIC BLOOD PRESSURE: 76 MMHG

## 2022-01-01 VITALS
TEMPERATURE: 98 F | WEIGHT: 143.31 LBS | SYSTOLIC BLOOD PRESSURE: 104 MMHG | RESPIRATION RATE: 14 BRPM | DIASTOLIC BLOOD PRESSURE: 57 MMHG | BODY MASS INDEX: 29 KG/M2 | HEART RATE: 96 BPM | OXYGEN SATURATION: 97 %

## 2022-01-01 VITALS
SYSTOLIC BLOOD PRESSURE: 157 MMHG | TEMPERATURE: 96 F | BODY MASS INDEX: 25 KG/M2 | WEIGHT: 135.13 LBS | DIASTOLIC BLOOD PRESSURE: 123 MMHG | OXYGEN SATURATION: 84 %

## 2022-01-01 DIAGNOSIS — N17.9 ACUTE KIDNEY INJURY (HCC): ICD-10-CM

## 2022-01-01 DIAGNOSIS — C78.89 METASTATIC CHOLANGIOCARCINOMA TO BILE DUCT (HCC): ICD-10-CM

## 2022-01-01 DIAGNOSIS — C22.1 METASTATIC CHOLANGIOCARCINOMA TO BILE DUCT (HCC): ICD-10-CM

## 2022-01-01 DIAGNOSIS — K21.9 GASTROESOPHAGEAL REFLUX DISEASE, UNSPECIFIED WHETHER ESOPHAGITIS PRESENT: Chronic | ICD-10-CM

## 2022-01-01 DIAGNOSIS — E87.1 HYPONATREMIA: ICD-10-CM

## 2022-01-01 DIAGNOSIS — C22.1 CHOLANGIOCARCINOMA (HCC): ICD-10-CM

## 2022-01-01 DIAGNOSIS — R10.9 ABDOMINAL PAIN, ACUTE: Primary | ICD-10-CM

## 2022-01-01 DIAGNOSIS — E87.5 HYPERKALEMIA: ICD-10-CM

## 2022-01-01 DIAGNOSIS — A41.9 SEPSIS DUE TO UNDETERMINED ORGANISM (HCC): Primary | ICD-10-CM

## 2022-01-01 LAB
ALBUMIN SERPL-MCNC: 1.2 G/DL (ref 3.4–5)
ALBUMIN SERPL-MCNC: 1.2 G/DL (ref 3.4–5)
ALBUMIN SERPL-MCNC: 1.3 G/DL (ref 3.4–5)
ALBUMIN SERPL-MCNC: 1.4 G/DL (ref 3.4–5)
ALBUMIN SERPL-MCNC: 1.5 G/DL (ref 3.4–5)
ALBUMIN SERPL-MCNC: 1.5 G/DL (ref 3.4–5)
ALBUMIN SERPL-MCNC: 1.6 G/DL (ref 3.4–5)
ALBUMIN SERPL-MCNC: 1.7 G/DL (ref 3.4–5)
ALBUMIN/GLOB SERPL: 0.3 {RATIO} (ref 1–2)
ALP LIVER SERPL-CCNC: 1202 U/L
ALP LIVER SERPL-CCNC: 603 U/L
ALP LIVER SERPL-CCNC: 609 U/L
ALP LIVER SERPL-CCNC: 618 U/L
ALP LIVER SERPL-CCNC: 645 U/L
ALP LIVER SERPL-CCNC: 648 U/L
ALP LIVER SERPL-CCNC: 649 U/L
ALP LIVER SERPL-CCNC: 702 U/L
ALT SERPL-CCNC: 113 U/L
ALT SERPL-CCNC: 40 U/L
ALT SERPL-CCNC: 42 U/L
ALT SERPL-CCNC: 47 U/L
ALT SERPL-CCNC: 49 U/L
ALT SERPL-CCNC: 52 U/L
ALT SERPL-CCNC: 62 U/L
ALT SERPL-CCNC: 67 U/L
ANION GAP SERPL CALC-SCNC: 21 MMOL/L (ref 0–18)
ANION GAP SERPL CALC-SCNC: 24 MMOL/L (ref 0–18)
ANION GAP SERPL CALC-SCNC: 5 MMOL/L (ref 0–18)
ANION GAP SERPL CALC-SCNC: 6 MMOL/L (ref 0–18)
ANION GAP SERPL CALC-SCNC: 7 MMOL/L (ref 0–18)
ANION GAP SERPL CALC-SCNC: 8 MMOL/L (ref 0–18)
ANION GAP SERPL CALC-SCNC: 8 MMOL/L (ref 0–18)
ARTERIAL PATENCY WRIST A: POSITIVE
AST SERPL-CCNC: 129 U/L (ref 15–37)
AST SERPL-CCNC: 130 U/L (ref 15–37)
AST SERPL-CCNC: 143 U/L (ref 15–37)
AST SERPL-CCNC: 159 U/L (ref 15–37)
AST SERPL-CCNC: 165 U/L (ref 15–37)
AST SERPL-CCNC: 165 U/L (ref 15–37)
AST SERPL-CCNC: 169 U/L (ref 15–37)
AST SERPL-CCNC: 183 U/L (ref 15–37)
ATRIAL RATE: 114 BPM
ATRIAL RATE: 124 BPM
BASE EXCESS BLDA CALC-SCNC: -19.9 MMOL/L (ref ?–2)
BASOPHILS # BLD AUTO: 0.02 X10(3) UL (ref 0–0.2)
BASOPHILS # BLD AUTO: 0.03 X10(3) UL (ref 0–0.2)
BASOPHILS # BLD AUTO: 0.03 X10(3) UL (ref 0–0.2)
BASOPHILS # BLD AUTO: 0.04 X10(3) UL (ref 0–0.2)
BASOPHILS # BLD AUTO: 0.04 X10(3) UL (ref 0–0.2)
BASOPHILS # BLD AUTO: 0.05 X10(3) UL (ref 0–0.2)
BASOPHILS NFR BLD AUTO: 0.2 %
BASOPHILS NFR BLD AUTO: 0.2 %
BASOPHILS NFR BLD AUTO: 0.3 %
BASOPHILS NFR BLD AUTO: 0.4 %
BASOPHILS NFR BLD AUTO: 0.5 %
BASOPHILS NFR BLD AUTO: 0.5 %
BASOPHILS NFR PRT: 0 %
BILIRUB DIRECT SERPL-MCNC: 1.3 MG/DL (ref 0–0.2)
BILIRUB SERPL-MCNC: 1.9 MG/DL (ref 0.1–2)
BILIRUB SERPL-MCNC: 2.1 MG/DL (ref 0.1–2)
BILIRUB SERPL-MCNC: 2.3 MG/DL (ref 0.1–2)
BILIRUB SERPL-MCNC: 2.8 MG/DL (ref 0.1–2)
BILIRUB SERPL-MCNC: 7.5 MG/DL (ref 0.1–2)
BILIRUB UR QL CFM: POSITIVE
BODY TEMPERATURE: 98.6 F
BUN BLD-MCNC: 18 MG/DL (ref 7–18)
BUN BLD-MCNC: 20 MG/DL (ref 7–18)
BUN BLD-MCNC: 21 MG/DL (ref 7–18)
BUN BLD-MCNC: 21 MG/DL (ref 7–18)
BUN BLD-MCNC: 22 MG/DL (ref 7–18)
BUN BLD-MCNC: 22 MG/DL (ref 7–18)
BUN BLD-MCNC: 25 MG/DL (ref 7–18)
BUN BLD-MCNC: 66 MG/DL (ref 7–18)
BUN BLD-MCNC: 68 MG/DL (ref 7–18)
CA-I BLD-SCNC: 1.2 MMOL/L (ref 0.95–1.32)
CALCIUM BLD-MCNC: 10.8 MG/DL (ref 8.5–10.1)
CALCIUM BLD-MCNC: 7.8 MG/DL (ref 8.5–10.1)
CALCIUM BLD-MCNC: 7.9 MG/DL (ref 8.5–10.1)
CALCIUM BLD-MCNC: 8 MG/DL (ref 8.5–10.1)
CALCIUM BLD-MCNC: 8.1 MG/DL (ref 8.5–10.1)
CALCIUM BLD-MCNC: 8.1 MG/DL (ref 8.5–10.1)
CALCIUM BLD-MCNC: 8.6 MG/DL (ref 8.5–10.1)
CALCIUM BLD-MCNC: 8.7 MG/DL (ref 8.5–10.1)
CALCIUM BLD-MCNC: 9.8 MG/DL (ref 8.5–10.1)
CHLORIDE SERPL-SCNC: 102 MMOL/L (ref 98–112)
CHLORIDE SERPL-SCNC: 103 MMOL/L (ref 98–112)
CHLORIDE SERPL-SCNC: 90 MMOL/L (ref 98–112)
CHLORIDE SERPL-SCNC: 94 MMOL/L (ref 98–112)
CHLORIDE SERPL-SCNC: 95 MMOL/L (ref 98–112)
CHLORIDE SERPL-SCNC: 95 MMOL/L (ref 98–112)
CHLORIDE SERPL-SCNC: 99 MMOL/L (ref 98–112)
CHOLEST SERPL-MCNC: 162 MG/DL (ref ?–200)
CO2 SERPL-SCNC: 21 MMOL/L (ref 21–32)
CO2 SERPL-SCNC: 23 MMOL/L (ref 21–32)
CO2 SERPL-SCNC: 26 MMOL/L (ref 21–32)
CO2 SERPL-SCNC: 28 MMOL/L (ref 21–32)
CO2 SERPL-SCNC: 9 MMOL/L (ref 21–32)
CO2 SERPL-SCNC: 9 MMOL/L (ref 21–32)
COHGB MFR BLD: 1.7 % SAT (ref 0–3)
COLOR FLD: YELLOW
COLOR UR AUTO: YELLOW
CREAT BLD-MCNC: 0.51 MG/DL
CREAT BLD-MCNC: 0.56 MG/DL
CREAT BLD-MCNC: 0.63 MG/DL
CREAT BLD-MCNC: 0.76 MG/DL
CREAT BLD-MCNC: 1.01 MG/DL
CREAT BLD-MCNC: 1.29 MG/DL
CREAT BLD-MCNC: 1.76 MG/DL
CREAT BLD-MCNC: 2.77 MG/DL
CREAT BLD-MCNC: 2.88 MG/DL
CREAT UR-SCNC: 198 MG/DL
EOSINOPHIL # BLD AUTO: 0.01 X10(3) UL (ref 0–0.7)
EOSINOPHIL # BLD AUTO: 0.06 X10(3) UL (ref 0–0.7)
EOSINOPHIL # BLD AUTO: 0.09 X10(3) UL (ref 0–0.7)
EOSINOPHIL # BLD AUTO: 0.11 X10(3) UL (ref 0–0.7)
EOSINOPHIL # BLD AUTO: 0.12 X10(3) UL (ref 0–0.7)
EOSINOPHIL # BLD AUTO: 0.15 X10(3) UL (ref 0–0.7)
EOSINOPHIL NFR BLD AUTO: 0 %
EOSINOPHIL NFR BLD AUTO: 0.9 %
EOSINOPHIL NFR BLD AUTO: 1 %
EOSINOPHIL NFR BLD AUTO: 1.3 %
EOSINOPHIL NFR BLD AUTO: 1.4 %
EOSINOPHIL NFR BLD AUTO: 1.9 %
EOSINOPHIL NFR PRT: 0 %
ERYTHROCYTE [DISTWIDTH] IN BLOOD BY AUTOMATED COUNT: 15.9 %
ERYTHROCYTE [DISTWIDTH] IN BLOOD BY AUTOMATED COUNT: 16.2 %
ERYTHROCYTE [DISTWIDTH] IN BLOOD BY AUTOMATED COUNT: 16.2 %
ERYTHROCYTE [DISTWIDTH] IN BLOOD BY AUTOMATED COUNT: 16.5 %
ERYTHROCYTE [DISTWIDTH] IN BLOOD BY AUTOMATED COUNT: 17.1 %
ERYTHROCYTE [DISTWIDTH] IN BLOOD BY AUTOMATED COUNT: 17.2 %
ERYTHROCYTE [DISTWIDTH] IN BLOOD BY AUTOMATED COUNT: 18.2 %
EST. AVERAGE GLUCOSE BLD GHB EST-MCNC: 100 MG/DL (ref 68–126)
GLOBULIN PLAS-MCNC: 4.2 G/DL (ref 2.8–4.4)
GLOBULIN PLAS-MCNC: 4.3 G/DL (ref 2.8–4.4)
GLOBULIN PLAS-MCNC: 4.5 G/DL (ref 2.8–4.4)
GLOBULIN PLAS-MCNC: 4.6 G/DL (ref 2.8–4.4)
GLOBULIN PLAS-MCNC: 4.6 G/DL (ref 2.8–4.4)
GLOBULIN PLAS-MCNC: 4.7 G/DL (ref 2.8–4.4)
GLOBULIN PLAS-MCNC: 5 G/DL (ref 2.8–4.4)
GLUCOSE BLD-MCNC: 101 MG/DL (ref 70–99)
GLUCOSE BLD-MCNC: 102 MG/DL (ref 70–99)
GLUCOSE BLD-MCNC: 117 MG/DL (ref 70–99)
GLUCOSE BLD-MCNC: 121 MG/DL (ref 70–99)
GLUCOSE BLD-MCNC: 131 MG/DL (ref 70–99)
GLUCOSE BLD-MCNC: 132 MG/DL (ref 70–99)
GLUCOSE BLD-MCNC: 134 MG/DL (ref 70–99)
GLUCOSE BLD-MCNC: 136 MG/DL (ref 70–99)
GLUCOSE BLD-MCNC: 144 MG/DL (ref 70–99)
GLUCOSE BLD-MCNC: 151 MG/DL (ref 70–99)
GLUCOSE BLD-MCNC: 154 MG/DL (ref 70–99)
GLUCOSE BLD-MCNC: 163 MG/DL (ref 70–99)
GLUCOSE BLD-MCNC: 163 MG/DL (ref 70–99)
GLUCOSE BLD-MCNC: 164 MG/DL (ref 70–99)
GLUCOSE BLD-MCNC: 176 MG/DL (ref 70–99)
GLUCOSE BLD-MCNC: 177 MG/DL (ref 70–99)
GLUCOSE BLD-MCNC: 179 MG/DL (ref 70–99)
GLUCOSE BLD-MCNC: 189 MG/DL (ref 70–99)
GLUCOSE BLD-MCNC: 198 MG/DL (ref 70–99)
GLUCOSE BLD-MCNC: 199 MG/DL (ref 70–99)
GLUCOSE BLD-MCNC: 205 MG/DL (ref 70–99)
GLUCOSE BLD-MCNC: 208 MG/DL (ref 70–99)
GLUCOSE BLD-MCNC: 208 MG/DL (ref 70–99)
GLUCOSE BLD-MCNC: 209 MG/DL (ref 70–99)
GLUCOSE BLD-MCNC: 211 MG/DL (ref 70–99)
GLUCOSE BLD-MCNC: 217 MG/DL (ref 70–99)
GLUCOSE BLD-MCNC: 217 MG/DL (ref 70–99)
GLUCOSE BLD-MCNC: 219 MG/DL (ref 70–99)
GLUCOSE BLD-MCNC: 220 MG/DL (ref 70–99)
GLUCOSE BLD-MCNC: 229 MG/DL (ref 70–99)
GLUCOSE BLD-MCNC: 237 MG/DL (ref 70–99)
GLUCOSE BLD-MCNC: 244 MG/DL (ref 70–99)
GLUCOSE BLD-MCNC: 249 MG/DL (ref 70–99)
GLUCOSE BLD-MCNC: 285 MG/DL (ref 70–99)
GLUCOSE BLD-MCNC: 298 MG/DL (ref 70–99)
GLUCOSE BLD-MCNC: 72 MG/DL (ref 70–99)
GLUCOSE BLD-MCNC: 72 MG/DL (ref 70–99)
GLUCOSE BLD-MCNC: 89 MG/DL (ref 70–99)
GLUCOSE BLD-MCNC: 89 MG/DL (ref 70–99)
GLUCOSE BLD-MCNC: 93 MG/DL (ref 70–99)
GLUCOSE UR STRIP.AUTO-MCNC: NEGATIVE MG/DL
HBA1C MFR BLD: 5.1 % (ref ?–5.7)
HCO3 BLDA-SCNC: 9.4 MEQ/L (ref 21–27)
HCT VFR BLD AUTO: 34.6 %
HCT VFR BLD AUTO: 34.8 %
HCT VFR BLD AUTO: 35.9 %
HCT VFR BLD AUTO: 36.4 %
HCT VFR BLD AUTO: 37.6 %
HCT VFR BLD AUTO: 38.4 %
HCT VFR BLD AUTO: 45.9 %
HDLC SERPL-MCNC: 8 MG/DL (ref 40–59)
HEPARIN AB PPP QL PL AGG: NEGATIVE
HGB BLD-MCNC: 10.7 G/DL
HGB BLD-MCNC: 10.9 G/DL
HGB BLD-MCNC: 11.5 G/DL
HGB BLD-MCNC: 11.7 G/DL
HGB BLD-MCNC: 11.9 G/DL
HGB BLD-MCNC: 12.2 G/DL
HGB BLD-MCNC: 13 G/DL
HGB BLD-MCNC: 14.4 G/DL
IMM GRANULOCYTES # BLD AUTO: 0.04 X10(3) UL (ref 0–1)
IMM GRANULOCYTES # BLD AUTO: 0.05 X10(3) UL (ref 0–1)
IMM GRANULOCYTES # BLD AUTO: 0.37 X10(3) UL (ref 0–1)
IMM GRANULOCYTES NFR BLD: 0.5 %
IMM GRANULOCYTES NFR BLD: 0.6 %
IMM GRANULOCYTES NFR BLD: 0.7 %
IMM GRANULOCYTES NFR BLD: 1.5 %
INR BLD: 1.21 (ref 0.8–1.2)
INR BLD: 1.83 (ref 0.8–1.2)
KETONES UR STRIP.AUTO-MCNC: NEGATIVE MG/DL
L/M: 12 L/MIN
LACTATE BLD-SCNC: 13.9 MMOL/L (ref 0.5–2)
LACTATE SERPL-SCNC: 12.6 MMOL/L (ref 0.4–2)
LACTATE SERPL-SCNC: 14.4 MMOL/L (ref 0.4–2)
LDLC SERPL CALC-MCNC: 97 MG/DL (ref ?–100)
LEUKOCYTE ESTERASE UR QL STRIP.AUTO: NEGATIVE
LYMPHOCYTES # BLD AUTO: 0.54 X10(3) UL (ref 1–4)
LYMPHOCYTES # BLD AUTO: 0.6 X10(3) UL (ref 1–4)
LYMPHOCYTES # BLD AUTO: 0.63 X10(3) UL (ref 1–4)
LYMPHOCYTES # BLD AUTO: 0.69 X10(3) UL (ref 1–4)
LYMPHOCYTES # BLD AUTO: 0.88 X10(3) UL (ref 1–4)
LYMPHOCYTES # BLD AUTO: 1.07 X10(3) UL (ref 1–4)
LYMPHOCYTES NFR BLD AUTO: 10.1 %
LYMPHOCYTES NFR BLD AUTO: 4.4 %
LYMPHOCYTES NFR BLD AUTO: 6.4 %
LYMPHOCYTES NFR BLD AUTO: 7.9 %
LYMPHOCYTES NFR BLD AUTO: 8.1 %
LYMPHOCYTES NFR BLD AUTO: 8.7 %
LYMPHOCYTES NFR PRT: 75 %
MAGNESIUM SERPL-MCNC: 1.8 MG/DL (ref 1.6–2.6)
MAGNESIUM SERPL-MCNC: 2 MG/DL (ref 1.6–2.6)
MAGNESIUM SERPL-MCNC: 2.1 MG/DL (ref 1.6–2.6)
MAGNESIUM SERPL-MCNC: 2.2 MG/DL (ref 1.6–2.6)
MCH RBC QN AUTO: 29.4 PG (ref 26–34)
MCH RBC QN AUTO: 29.5 PG (ref 26–34)
MCH RBC QN AUTO: 29.5 PG (ref 26–34)
MCH RBC QN AUTO: 29.6 PG (ref 26–34)
MCH RBC QN AUTO: 29.8 PG (ref 26–34)
MCH RBC QN AUTO: 29.9 PG (ref 26–34)
MCH RBC QN AUTO: 30 PG (ref 26–34)
MCHC RBC AUTO-ENTMCNC: 30.9 G/DL (ref 31–37)
MCHC RBC AUTO-ENTMCNC: 31.3 G/DL (ref 31–37)
MCHC RBC AUTO-ENTMCNC: 31.4 G/DL (ref 31–37)
MCHC RBC AUTO-ENTMCNC: 31.6 G/DL (ref 31–37)
MCHC RBC AUTO-ENTMCNC: 31.8 G/DL (ref 31–37)
MCHC RBC AUTO-ENTMCNC: 32 G/DL (ref 31–37)
MCHC RBC AUTO-ENTMCNC: 32.1 G/DL (ref 31–37)
MCV RBC AUTO: 91.7 FL
MCV RBC AUTO: 92.8 FL
MCV RBC AUTO: 93.7 FL
MCV RBC AUTO: 94.2 FL
MCV RBC AUTO: 94.6 FL
MCV RBC AUTO: 95.1 FL
MCV RBC AUTO: 95.4 FL
METHGB MFR BLD: 0.2 % SAT (ref 0.4–1.5)
MONOCYTES # BLD AUTO: 0.78 X10(3) UL (ref 0.1–1)
MONOCYTES # BLD AUTO: 0.83 X10(3) UL (ref 0.1–1)
MONOCYTES # BLD AUTO: 0.93 X10(3) UL (ref 0.1–1)
MONOCYTES # BLD AUTO: 0.94 X10(3) UL (ref 0.1–1)
MONOCYTES # BLD AUTO: 1.05 X10(3) UL (ref 0.1–1)
MONOCYTES # BLD AUTO: 1.08 X10(3) UL (ref 0.1–1)
MONOCYTES NFR BLD AUTO: 11 %
MONOCYTES NFR BLD AUTO: 11.7 %
MONOCYTES NFR BLD AUTO: 12.8 %
MONOCYTES NFR BLD AUTO: 15.2 %
MONOCYTES NFR BLD AUTO: 3.2 %
MONOCYTES NFR BLD AUTO: 9.5 %
MONOS+MACROS NFR PRT: 7 %
NEUTROPHILS # BLD AUTO: 22.22 X10 (3) UL (ref 1.5–7.7)
NEUTROPHILS # BLD AUTO: 22.22 X10(3) UL (ref 1.5–7.7)
NEUTROPHILS # BLD AUTO: 5.11 X10 (3) UL (ref 1.5–7.7)
NEUTROPHILS # BLD AUTO: 5.11 X10(3) UL (ref 1.5–7.7)
NEUTROPHILS # BLD AUTO: 6.16 X10 (3) UL (ref 1.5–7.7)
NEUTROPHILS # BLD AUTO: 6.16 X10(3) UL (ref 1.5–7.7)
NEUTROPHILS # BLD AUTO: 6.61 X10 (3) UL (ref 1.5–7.7)
NEUTROPHILS # BLD AUTO: 6.61 X10(3) UL (ref 1.5–7.7)
NEUTROPHILS # BLD AUTO: 6.7 X10 (3) UL (ref 1.5–7.7)
NEUTROPHILS # BLD AUTO: 6.7 X10(3) UL (ref 1.5–7.7)
NEUTROPHILS # BLD AUTO: 6.82 X10 (3) UL (ref 1.5–7.7)
NEUTROPHILS # BLD AUTO: 6.82 X10(3) UL (ref 1.5–7.7)
NEUTROPHILS NFR BLD AUTO: 74.1 %
NEUTROPHILS NFR BLD AUTO: 77.4 %
NEUTROPHILS NFR BLD AUTO: 78.5 %
NEUTROPHILS NFR BLD AUTO: 78.5 %
NEUTROPHILS NFR BLD AUTO: 78.7 %
NEUTROPHILS NFR BLD AUTO: 90.7 %
NEUTROPHILS PERITONEAL FLUID: 18 %
NITRITE UR QL STRIP.AUTO: NEGATIVE
NONHDLC SERPL-MCNC: 154 MG/DL (ref ?–130)
NT-PROBNP SERPL-MCNC: 3732 PG/ML (ref ?–125)
OSMOLALITY SERPL CALC.SUM OF ELEC: 263 MOSM/KG (ref 275–295)
OSMOLALITY SERPL CALC.SUM OF ELEC: 269 MOSM/KG (ref 275–295)
OSMOLALITY SERPL CALC.SUM OF ELEC: 271 MOSM/KG (ref 275–295)
OSMOLALITY SERPL CALC.SUM OF ELEC: 271 MOSM/KG (ref 275–295)
OSMOLALITY SERPL CALC.SUM OF ELEC: 277 MOSM/KG (ref 275–295)
OSMOLALITY SERPL CALC.SUM OF ELEC: 284 MOSM/KG (ref 275–295)
OSMOLALITY SERPL CALC.SUM OF ELEC: 284 MOSM/KG (ref 275–295)
OSMOLALITY SERPL CALC.SUM OF ELEC: 287 MOSM/KG (ref 275–295)
OSMOLALITY SERPL CALC.SUM OF ELEC: 291 MOSM/KG (ref 275–295)
OXYHGB MFR BLDA: 98.1 % (ref 92–100)
P AXIS: 61 DEGREES
P AXIS: 62 DEGREES
P-R INTERVAL: 128 MS
P-R INTERVAL: 136 MS
PCO2 BLDA: 17 MM HG (ref 35–45)
PH BLDA: 7.18 [PH] (ref 7.35–7.45)
PH UR STRIP.AUTO: 5 [PH] (ref 5–8)
PHOSPHATE SERPL-MCNC: 2.2 MG/DL (ref 2.5–4.9)
PHOSPHATE SERPL-MCNC: 2.2 MG/DL (ref 2.5–4.9)
PHOSPHATE SERPL-MCNC: 2.5 MG/DL (ref 2.5–4.9)
PHOSPHATE SERPL-MCNC: 2.7 MG/DL (ref 2.5–4.9)
PHOSPHATE SERPL-MCNC: 2.8 MG/DL (ref 2.5–4.9)
PHOSPHATE SERPL-MCNC: 3.1 MG/DL (ref 2.5–4.9)
PLASMA CELLS NFR PRT MANUAL: 0 %
PLATELET # BLD AUTO: 118 10(3)UL (ref 150–450)
PLATELET # BLD AUTO: 120 10(3)UL (ref 150–450)
PLATELET # BLD AUTO: 131 10(3)UL (ref 150–450)
PLATELET # BLD AUTO: 68 10(3)UL (ref 150–450)
PLATELET # BLD AUTO: 80 10(3)UL (ref 150–450)
PLATELET # BLD AUTO: 95 10(3)UL (ref 150–450)
PLATELET # BLD AUTO: 98 10(3)UL (ref 150–450)
PO2 BLDA: 231 MM HG (ref 80–100)
POTASSIUM BLD-SCNC: 6.6 MMOL/L (ref 3.6–5.1)
POTASSIUM SERPL-SCNC: 4.1 MMOL/L (ref 3.5–5.1)
POTASSIUM SERPL-SCNC: 4.2 MMOL/L (ref 3.5–5.1)
POTASSIUM SERPL-SCNC: 4.2 MMOL/L (ref 3.5–5.1)
POTASSIUM SERPL-SCNC: 4.3 MMOL/L (ref 3.5–5.1)
POTASSIUM SERPL-SCNC: 4.3 MMOL/L (ref 3.5–5.1)
POTASSIUM SERPL-SCNC: 4.4 MMOL/L (ref 3.5–5.1)
POTASSIUM SERPL-SCNC: 4.5 MMOL/L (ref 3.5–5.1)
POTASSIUM SERPL-SCNC: 6.6 MMOL/L (ref 3.5–5.1)
POTASSIUM SERPL-SCNC: 6.7 MMOL/L (ref 3.5–5.1)
PROT SERPL-MCNC: 5.6 G/DL (ref 6.4–8.2)
PROT SERPL-MCNC: 5.7 G/DL (ref 6.4–8.2)
PROT SERPL-MCNC: 5.8 G/DL (ref 6.4–8.2)
PROT SERPL-MCNC: 6.1 G/DL (ref 6.4–8.2)
PROT SERPL-MCNC: 6.3 G/DL (ref 6.4–8.2)
PROT SERPL-MCNC: 6.7 G/DL (ref 6.4–8.2)
PROT UR STRIP.AUTO-MCNC: NEGATIVE MG/DL
PROTHROMBIN TIME: 15.3 SECONDS (ref 11.6–14.8)
PROTHROMBIN TIME: 21.3 SECONDS (ref 11.6–14.8)
Q-T INTERVAL: 310 MS
Q-T INTERVAL: 318 MS
QRS DURATION: 70 MS
QRS DURATION: 92 MS
QTC CALCULATION (BEZET): 438 MS
QTC CALCULATION (BEZET): 445 MS
R AXIS: -32 DEGREES
R AXIS: 32 DEGREES
RBC # BLD AUTO: 3.64 X10(6)UL
RBC # BLD AUTO: 3.68 X10(6)UL
RBC # BLD AUTO: 3.83 X10(6)UL
RBC # BLD AUTO: 3.97 X10(6)UL
RBC # BLD AUTO: 3.99 X10(6)UL
RBC # BLD AUTO: 4.14 X10(6)UL
RBC # BLD AUTO: 4.81 X10(6)UL
RBC PERITONEAL FLUID: <3000 /MM3
RBC UR QL AUTO: NEGATIVE
SARS-COV-2 RNA RESP QL NAA+PROBE: NOT DETECTED
SARS-COV-2 RNA RESP QL NAA+PROBE: NOT DETECTED
SODIUM BLD-SCNC: 119 MMOL/L (ref 135–145)
SODIUM SERPL-SCNC: 120 MMOL/L (ref 136–145)
SODIUM SERPL-SCNC: 125 MMOL/L (ref 136–145)
SODIUM SERPL-SCNC: 126 MMOL/L (ref 136–145)
SODIUM SERPL-SCNC: 128 MMOL/L (ref 136–145)
SODIUM SERPL-SCNC: 128 MMOL/L (ref 136–145)
SODIUM SERPL-SCNC: 129 MMOL/L (ref 136–145)
SODIUM SERPL-SCNC: 132 MMOL/L (ref 136–145)
SODIUM SERPL-SCNC: 135 MMOL/L (ref 136–145)
SODIUM SERPL-SCNC: 135 MMOL/L (ref 136–145)
SODIUM SERPL-SCNC: <5 MMOL/L
SP GR UR STRIP.AUTO: 1.02 (ref 1–1.03)
T AXIS: 40 DEGREES
T AXIS: 61 DEGREES
TOTAL CELLS COUNTED FLD: 100
TRIGL SERPL-MCNC: 185 MG/DL (ref 30–149)
TRIGL SERPL-MCNC: 337 MG/DL (ref 30–149)
TROPONIN I HIGH SENSITIVITY: 298 NG/L
TURBIDITY CSF QL: CLEAR
URATE SERPL-MCNC: 8.2 MG/DL
UROBILINOGEN UR STRIP.AUTO-MCNC: 4 MG/DL
VENTRICULAR RATE: 114 BPM
VENTRICULAR RATE: 124 BPM
VLDLC SERPL CALC-MCNC: 57 MG/DL (ref 0–30)
WBC # BLD AUTO: 11.8 X10(3) UL (ref 4–11)
WBC # BLD AUTO: 24.5 X10(3) UL (ref 4–11)
WBC # BLD AUTO: 6.9 X10(3) UL (ref 4–11)
WBC # BLD AUTO: 8 X10(3) UL (ref 4–11)
WBC # BLD AUTO: 8.4 X10(3) UL (ref 4–11)
WBC # BLD AUTO: 8.5 X10(3) UL (ref 4–11)
WBC # BLD AUTO: 8.7 X10(3) UL (ref 4–11)
WBC # PRT: 220 /MM3

## 2022-01-01 PROCEDURE — 74019 RADEX ABDOMEN 2 VIEWS: CPT | Performed by: HOSPITALIST

## 2022-01-01 PROCEDURE — 99233 SBSQ HOSP IP/OBS HIGH 50: CPT | Performed by: HOSPITALIST

## 2022-01-01 PROCEDURE — 3E04317 INTRODUCTION OF OTHER THROMBOLYTIC INTO CENTRAL VEIN, PERCUTANEOUS APPROACH: ICD-10-PCS | Performed by: HOSPITALIST

## 2022-01-01 PROCEDURE — 99232 SBSQ HOSP IP/OBS MODERATE 35: CPT | Performed by: INTERNAL MEDICINE

## 2022-01-01 PROCEDURE — 3E0336Z INTRODUCTION OF NUTRITIONAL SUBSTANCE INTO PERIPHERAL VEIN, PERCUTANEOUS APPROACH: ICD-10-PCS | Performed by: HOSPITALIST

## 2022-01-01 PROCEDURE — 99223 1ST HOSP IP/OBS HIGH 75: CPT | Performed by: HOSPITALIST

## 2022-01-01 PROCEDURE — 99232 SBSQ HOSP IP/OBS MODERATE 35: CPT | Performed by: HOSPITALIST

## 2022-01-01 PROCEDURE — 76376 3D RENDER W/INTRP POSTPROCES: CPT | Performed by: INTERNAL MEDICINE

## 2022-01-01 PROCEDURE — 0JJT3ZZ INSPECTION OF TRUNK SUBCUTANEOUS TISSUE AND FASCIA, PERCUTANEOUS APPROACH: ICD-10-PCS | Performed by: RADIOLOGY

## 2022-01-01 PROCEDURE — 74176 CT ABD & PELVIS W/O CONTRAST: CPT | Performed by: EMERGENCY MEDICINE

## 2022-01-01 PROCEDURE — 99291 CRITICAL CARE FIRST HOUR: CPT | Performed by: INTERNAL MEDICINE

## 2022-01-01 PROCEDURE — 74181 MRI ABDOMEN W/O CONTRAST: CPT | Performed by: INTERNAL MEDICINE

## 2022-01-01 PROCEDURE — 99231 SBSQ HOSP IP/OBS SF/LOW 25: CPT | Performed by: NURSE PRACTITIONER

## 2022-01-01 PROCEDURE — 71045 X-RAY EXAM CHEST 1 VIEW: CPT | Performed by: HOSPITALIST

## 2022-01-01 PROCEDURE — 71250 CT THORAX DX C-: CPT | Performed by: EMERGENCY MEDICINE

## 2022-01-01 PROCEDURE — 0W9G30Z DRAINAGE OF PERITONEAL CAVITY WITH DRAINAGE DEVICE, PERCUTANEOUS APPROACH: ICD-10-PCS | Performed by: RADIOLOGY

## 2022-01-01 PROCEDURE — 36591 DRAW BLOOD OFF VENOUS DEVICE: CPT

## 2022-01-01 PROCEDURE — 99252 IP/OBS CONSLTJ NEW/EST SF 35: CPT | Performed by: INTERNAL MEDICINE

## 2022-01-01 RX ORDER — MORPHINE SULFATE 4 MG/ML
4 INJECTION, SOLUTION INTRAMUSCULAR; INTRAVENOUS EVERY 2 HOUR PRN
Status: DISCONTINUED | OUTPATIENT
Start: 2022-01-01 | End: 2022-01-01

## 2022-01-01 RX ORDER — CALCIUM CARBONATE 200(500)MG
500 TABLET,CHEWABLE ORAL 3 TIMES DAILY PRN
Status: DISCONTINUED | OUTPATIENT
Start: 2022-01-01 | End: 2022-01-01

## 2022-01-01 RX ORDER — SCOLOPAMINE TRANSDERMAL SYSTEM 1 MG/1
1 PATCH, EXTENDED RELEASE TRANSDERMAL
Status: DISCONTINUED | OUTPATIENT
Start: 2022-01-01 | End: 2022-01-01

## 2022-01-01 RX ORDER — METOCLOPRAMIDE HYDROCHLORIDE 5 MG/ML
10 INJECTION INTRAMUSCULAR; INTRAVENOUS EVERY 6 HOURS
Status: DISCONTINUED | OUTPATIENT
Start: 2022-01-01 | End: 2022-01-01

## 2022-01-01 RX ORDER — MORPHINE SULFATE 15 MG/1
7.5 TABLET ORAL EVERY 4 HOURS PRN
Status: DISCONTINUED | OUTPATIENT
Start: 2022-01-01 | End: 2022-01-01

## 2022-01-01 RX ORDER — ONDANSETRON 2 MG/ML
4 INJECTION INTRAMUSCULAR; INTRAVENOUS EVERY 4 HOURS PRN
Status: ACTIVE | OUTPATIENT
Start: 2022-01-01 | End: 2022-01-01

## 2022-01-01 RX ORDER — ASPIRIN 81 MG/1
81 TABLET ORAL DAILY
Status: DISCONTINUED | OUTPATIENT
Start: 2022-01-01 | End: 2022-01-01

## 2022-01-01 RX ORDER — ACETAMINOPHEN AND CODEINE PHOSPHATE 300; 30 MG/1; MG/1
1 TABLET ORAL EVERY 4 HOURS PRN
Status: DISCONTINUED | OUTPATIENT
Start: 2022-01-01 | End: 2022-01-01

## 2022-01-01 RX ORDER — MORPHINE SULFATE 2 MG/ML
2 INJECTION, SOLUTION INTRAMUSCULAR; INTRAVENOUS EVERY 2 HOUR PRN
Status: DISCONTINUED | OUTPATIENT
Start: 2022-01-01 | End: 2022-01-01

## 2022-01-01 RX ORDER — PANTOPRAZOLE SODIUM 20 MG/1
20 TABLET, DELAYED RELEASE ORAL ONCE
Status: COMPLETED | OUTPATIENT
Start: 2022-01-01 | End: 2022-01-01

## 2022-01-01 RX ORDER — ONDANSETRON 2 MG/ML
4 INJECTION INTRAMUSCULAR; INTRAVENOUS ONCE
Status: COMPLETED | OUTPATIENT
Start: 2022-01-01 | End: 2022-01-01

## 2022-01-01 RX ORDER — MELATONIN
3 NIGHTLY PRN
Status: DISCONTINUED | OUTPATIENT
Start: 2022-01-01 | End: 2022-01-01

## 2022-01-01 RX ORDER — POLYETHYLENE GLYCOL 3350 17 G/17G
17 POWDER, FOR SOLUTION ORAL DAILY
Status: DISCONTINUED | OUTPATIENT
Start: 2022-01-01 | End: 2022-01-01

## 2022-01-01 RX ORDER — LORAZEPAM 2 MG/ML
0.5 INJECTION INTRAMUSCULAR EVERY 4 HOURS PRN
Status: DISCONTINUED | OUTPATIENT
Start: 2022-01-01 | End: 2022-01-01

## 2022-01-01 RX ORDER — DEXAMETHASONE 4 MG/1
TABLET ORAL
Qty: 28 TABLET | Refills: 0 | OUTPATIENT
Start: 2022-01-01

## 2022-01-01 RX ORDER — DIPHENHYDRAMINE HCL 25 MG
25 CAPSULE ORAL ONCE
Status: COMPLETED | OUTPATIENT
Start: 2022-01-01 | End: 2022-01-01

## 2022-01-01 RX ORDER — HYDROMORPHONE HYDROCHLORIDE 1 MG/ML
0.5 INJECTION, SOLUTION INTRAMUSCULAR; INTRAVENOUS; SUBCUTANEOUS EVERY 30 MIN PRN
Status: DISCONTINUED | OUTPATIENT
Start: 2022-01-01 | End: 2022-01-01

## 2022-01-01 RX ORDER — LACTULOSE 10 G/15ML
20 SOLUTION ORAL 2 TIMES DAILY
Status: DISCONTINUED | OUTPATIENT
Start: 2022-01-01 | End: 2022-01-01

## 2022-01-01 RX ORDER — CEFAZOLIN SODIUM 1 G/3ML
INJECTION, POWDER, FOR SOLUTION INTRAMUSCULAR; INTRAVENOUS
Status: COMPLETED
Start: 2022-01-01 | End: 2022-01-01

## 2022-01-01 RX ORDER — PANTOPRAZOLE SODIUM 40 MG/1
40 TABLET, DELAYED RELEASE ORAL
Qty: 30 TABLET | Refills: 2 | Status: SHIPPED | OUTPATIENT
Start: 2022-01-01

## 2022-01-01 RX ORDER — PREDNISONE 5 MG/1
TABLET ORAL
Qty: 30 TABLET | Refills: 0 | OUTPATIENT
Start: 2022-01-01

## 2022-01-01 RX ORDER — ROSUVASTATIN CALCIUM 5 MG/1
5 TABLET, COATED ORAL DAILY
Status: DISCONTINUED | OUTPATIENT
Start: 2022-01-01 | End: 2022-01-01

## 2022-01-01 RX ORDER — DEXTROSE MONOHYDRATE 25 G/50ML
50 INJECTION, SOLUTION INTRAVENOUS
Status: DISCONTINUED | OUTPATIENT
Start: 2022-01-01 | End: 2022-01-01

## 2022-01-01 RX ORDER — AMLODIPINE BESYLATE 5 MG/1
10 TABLET ORAL DAILY
Status: DISCONTINUED | OUTPATIENT
Start: 2022-01-01 | End: 2022-01-01

## 2022-01-01 RX ORDER — DULOXETIN HYDROCHLORIDE 30 MG/1
CAPSULE, DELAYED RELEASE ORAL
Qty: 30 CAPSULE | Refills: 0 | Status: SHIPPED | OUTPATIENT
Start: 2022-01-01 | End: 2022-01-01

## 2022-01-01 RX ORDER — LORAZEPAM 2 MG/ML
2 INJECTION INTRAMUSCULAR EVERY 4 HOURS PRN
Status: DISCONTINUED | OUTPATIENT
Start: 2022-01-01 | End: 2022-01-01

## 2022-01-01 RX ORDER — SODIUM BICARBONATE 150 MEQ/1,000 ML IN DEXTROSE 5 % INTRAVENOUS
125 SOLUTION CONTINUOUS
Status: DISCONTINUED | OUTPATIENT
Start: 2022-01-01 | End: 2022-01-01

## 2022-01-01 RX ORDER — PREDNISONE 20 MG/1
60 TABLET ORAL DAILY
Qty: 15 TABLET | Refills: 0 | Status: SHIPPED | OUTPATIENT
Start: 2022-01-01 | End: 2022-01-01

## 2022-01-01 RX ORDER — DOCUSATE SODIUM 100 MG/1
100 CAPSULE, LIQUID FILLED ORAL 2 TIMES DAILY
Status: DISCONTINUED | OUTPATIENT
Start: 2022-01-01 | End: 2022-01-01

## 2022-01-01 RX ORDER — ACETAMINOPHEN AND CODEINE PHOSPHATE 300; 30 MG/1; MG/1
2 TABLET ORAL EVERY 4 HOURS PRN
Status: DISCONTINUED | OUTPATIENT
Start: 2022-01-01 | End: 2022-01-01

## 2022-01-01 RX ORDER — NICOTINE POLACRILEX 4 MG
30 LOZENGE BUCCAL
Status: DISCONTINUED | OUTPATIENT
Start: 2022-01-01 | End: 2022-01-01

## 2022-01-01 RX ORDER — BISACODYL 10 MG
10 SUPPOSITORY, RECTAL RECTAL
Status: DISCONTINUED | OUTPATIENT
Start: 2022-01-01 | End: 2022-01-01

## 2022-01-01 RX ORDER — OLANZAPINE 2.5 MG/1
5 TABLET ORAL NIGHTLY
Status: DISCONTINUED | OUTPATIENT
Start: 2022-01-01 | End: 2022-01-01

## 2022-01-01 RX ORDER — MORPHINE SULFATE 2 MG/ML
1 INJECTION, SOLUTION INTRAMUSCULAR; INTRAVENOUS EVERY 2 HOUR PRN
Status: DISCONTINUED | OUTPATIENT
Start: 2022-01-01 | End: 2022-01-01

## 2022-01-01 RX ORDER — SODIUM CHLORIDE 9 MG/ML
INJECTION, SOLUTION INTRAVENOUS CONTINUOUS
Status: DISCONTINUED | OUTPATIENT
Start: 2022-01-01 | End: 2022-01-01

## 2022-01-01 RX ORDER — ALBUMIN (HUMAN) 12.5 G/50ML
25 SOLUTION INTRAVENOUS ONCE
Status: COMPLETED | OUTPATIENT
Start: 2022-01-01 | End: 2022-01-01

## 2022-01-01 RX ORDER — DULOXETIN HYDROCHLORIDE 30 MG/1
30 CAPSULE, DELAYED RELEASE ORAL DAILY
Status: DISCONTINUED | OUTPATIENT
Start: 2022-01-01 | End: 2022-01-01

## 2022-01-01 RX ORDER — HYDROMORPHONE HYDROCHLORIDE 1 MG/ML
0.5 INJECTION, SOLUTION INTRAMUSCULAR; INTRAVENOUS; SUBCUTANEOUS ONCE
Status: COMPLETED | OUTPATIENT
Start: 2022-01-01 | End: 2022-01-01

## 2022-01-01 RX ORDER — PANTOPRAZOLE SODIUM 20 MG/1
20 TABLET, DELAYED RELEASE ORAL
Status: DISCONTINUED | OUTPATIENT
Start: 2022-01-01 | End: 2022-01-01

## 2022-01-01 RX ORDER — DULOXETIN HYDROCHLORIDE 30 MG/1
CAPSULE, DELAYED RELEASE ORAL
Qty: 30 CAPSULE | Refills: 0 | OUTPATIENT
Start: 2022-01-01

## 2022-01-01 RX ORDER — SODIUM CHLORIDE 9 MG/ML
INJECTION, SOLUTION INTRAVENOUS CONTINUOUS
Status: ACTIVE | OUTPATIENT
Start: 2022-01-01 | End: 2022-01-01

## 2022-01-01 RX ORDER — MORPHINE SULFATE 4 MG/ML
4 INJECTION, SOLUTION INTRAMUSCULAR; INTRAVENOUS ONCE
Status: COMPLETED | OUTPATIENT
Start: 2022-01-01 | End: 2022-01-01

## 2022-01-01 RX ORDER — MIDAZOLAM HYDROCHLORIDE 1 MG/ML
INJECTION INTRAMUSCULAR; INTRAVENOUS
Status: COMPLETED
Start: 2022-01-01 | End: 2022-01-01

## 2022-01-01 RX ORDER — HEPARIN SODIUM 5000 [USP'U]/ML
5000 INJECTION, SOLUTION INTRAVENOUS; SUBCUTANEOUS EVERY 8 HOURS SCHEDULED
Status: DISCONTINUED | OUTPATIENT
Start: 2022-01-01 | End: 2022-01-01

## 2022-01-01 RX ORDER — VANCOMYCIN HYDROCHLORIDE
25 ONCE
Status: DISCONTINUED | OUTPATIENT
Start: 2022-01-01 | End: 2022-01-01

## 2022-01-01 RX ORDER — INSULIN GLARGINE 100 [IU]/ML
22 INJECTION, SOLUTION SUBCUTANEOUS 2 TIMES DAILY
Status: SHIPPED | COMMUNITY
Start: 2022-01-01

## 2022-01-01 RX ORDER — PREDNISONE 10 MG/1
TABLET ORAL
Qty: 22 TABLET | Refills: 0 | Status: SHIPPED
Start: 2022-01-01 | End: 2022-01-01 | Stop reason: ALTCHOICE

## 2022-01-01 RX ORDER — CALCIUM GLUCONATE 10 MG/ML
1 INJECTION, SOLUTION INTRAVENOUS ONCE
Status: COMPLETED | OUTPATIENT
Start: 2022-01-01 | End: 2022-01-01

## 2022-01-01 RX ORDER — DEXTROSE AND SODIUM CHLORIDE 5; .45 G/100ML; G/100ML
INJECTION, SOLUTION INTRAVENOUS CONTINUOUS
Status: DISCONTINUED | OUTPATIENT
Start: 2022-01-01 | End: 2022-01-01

## 2022-01-01 RX ORDER — NICOTINE POLACRILEX 4 MG
15 LOZENGE BUCCAL
Status: DISCONTINUED | OUTPATIENT
Start: 2022-01-01 | End: 2022-01-01

## 2022-01-01 RX ORDER — FUROSEMIDE 10 MG/ML
40 INJECTION INTRAMUSCULAR; INTRAVENOUS ONCE
Status: COMPLETED | OUTPATIENT
Start: 2022-01-01 | End: 2022-01-01

## 2022-01-01 RX ORDER — ONDANSETRON 2 MG/ML
INJECTION INTRAMUSCULAR; INTRAVENOUS
Status: COMPLETED
Start: 2022-01-01 | End: 2022-01-01

## 2022-01-01 RX ORDER — SODIUM CHLORIDE 9 MG/ML
INJECTION, SOLUTION INTRAVENOUS CONTINUOUS PRN
Status: DISCONTINUED | OUTPATIENT
Start: 2022-01-01 | End: 2022-01-01

## 2022-01-01 RX ORDER — LIDOCAINE HYDROCHLORIDE AND EPINEPHRINE 10; 10 MG/ML; UG/ML
INJECTION, SOLUTION INFILTRATION; PERINEURAL
Status: COMPLETED
Start: 2022-01-01 | End: 2022-01-01

## 2022-01-01 RX ORDER — PREDNISONE 10 MG/1
TABLET ORAL
Qty: 22 TABLET | Refills: 0 | OUTPATIENT
Start: 2022-01-01

## 2022-01-01 RX ORDER — MORPHINE SULFATE 2 MG/ML
0.5 INJECTION, SOLUTION INTRAMUSCULAR; INTRAVENOUS EVERY 2 HOUR PRN
Status: DISCONTINUED | OUTPATIENT
Start: 2022-01-01 | End: 2022-01-01

## 2022-01-01 RX ORDER — LORAZEPAM 2 MG/ML
1 INJECTION INTRAMUSCULAR EVERY 4 HOURS PRN
Status: DISCONTINUED | OUTPATIENT
Start: 2022-01-01 | End: 2022-01-01

## 2022-01-01 RX ORDER — ACETAMINOPHEN AND CODEINE PHOSPHATE 300; 30 MG/1; MG/1
1-2 TABLET ORAL EVERY 4 HOURS PRN
Status: DISCONTINUED | OUTPATIENT
Start: 2022-01-01 | End: 2022-01-01 | Stop reason: SDUPTHER

## 2022-01-01 RX ORDER — PANTOPRAZOLE SODIUM 40 MG/1
40 TABLET, DELAYED RELEASE ORAL
Status: DISCONTINUED | OUTPATIENT
Start: 2022-01-01 | End: 2022-01-01

## 2022-01-01 RX ORDER — LIDOCAINE HYDROCHLORIDE 10 MG/ML
INJECTION, SOLUTION INFILTRATION; PERINEURAL
Status: COMPLETED
Start: 2022-01-01 | End: 2022-01-01

## 2022-01-06 ENCOUNTER — OFFICE VISIT (OUTPATIENT)
Dept: HEMATOLOGY/ONCOLOGY | Facility: HOSPITAL | Age: 62
End: 2022-01-06
Attending: INTERNAL MEDICINE
Payer: MEDICAID

## 2022-01-06 VITALS
HEART RATE: 83 BPM | TEMPERATURE: 97 F | DIASTOLIC BLOOD PRESSURE: 59 MMHG | OXYGEN SATURATION: 94 % | SYSTOLIC BLOOD PRESSURE: 117 MMHG | BODY MASS INDEX: 30.5 KG/M2 | HEIGHT: 62.6 IN | RESPIRATION RATE: 16 BRPM | WEIGHT: 170 LBS

## 2022-01-06 DIAGNOSIS — G89.3 NEOPLASM RELATED PAIN: Primary | ICD-10-CM

## 2022-01-06 DIAGNOSIS — C22.1 CHOLANGIOCARCINOMA (HCC): Primary | ICD-10-CM

## 2022-01-06 DIAGNOSIS — C78.89 METASTATIC CHOLANGIOCARCINOMA TO BILE DUCT (HCC): Primary | ICD-10-CM

## 2022-01-06 DIAGNOSIS — T45.1X5A CHEMOTHERAPY INDUCED NEUTROPENIA (HCC): ICD-10-CM

## 2022-01-06 DIAGNOSIS — D70.1 CHEMOTHERAPY INDUCED NEUTROPENIA (HCC): ICD-10-CM

## 2022-01-06 DIAGNOSIS — C22.1 CHOLANGIOCARCINOMA (HCC): ICD-10-CM

## 2022-01-06 DIAGNOSIS — C22.1 METASTATIC CHOLANGIOCARCINOMA TO BILE DUCT (HCC): Primary | ICD-10-CM

## 2022-01-06 DIAGNOSIS — G47.9 SLEEPING DIFFICULTY: ICD-10-CM

## 2022-01-06 DIAGNOSIS — D69.6 THROMBOCYTOPENIA (HCC): ICD-10-CM

## 2022-01-06 LAB
ALBUMIN SERPL-MCNC: 2.8 G/DL (ref 3.4–5)
ALBUMIN/GLOB SERPL: 0.8 {RATIO} (ref 1–2)
ALP LIVER SERPL-CCNC: 127 U/L
ALT SERPL-CCNC: 81 U/L
ANION GAP SERPL CALC-SCNC: 8 MMOL/L (ref 0–18)
AST SERPL-CCNC: 45 U/L (ref 15–37)
BASOPHILS # BLD AUTO: 0.02 X10(3) UL (ref 0–0.2)
BASOPHILS NFR BLD AUTO: 1.1 %
BILIRUB SERPL-MCNC: 0.4 MG/DL (ref 0.1–2)
BUN BLD-MCNC: 7 MG/DL (ref 7–18)
CALCIUM BLD-MCNC: 8.8 MG/DL (ref 8.5–10.1)
CANCER AG19-9 SERPL-ACNC: 3097.6 U/ML (ref ?–37)
CHLORIDE SERPL-SCNC: 105 MMOL/L (ref 98–112)
CO2 SERPL-SCNC: 26 MMOL/L (ref 21–32)
CREAT BLD-MCNC: 0.92 MG/DL
EOSINOPHIL # BLD AUTO: 0.13 X10(3) UL (ref 0–0.7)
EOSINOPHIL NFR BLD AUTO: 6.9 %
ERYTHROCYTE [DISTWIDTH] IN BLOOD BY AUTOMATED COUNT: 14.3 %
FASTING STATUS PATIENT QL REPORTED: NO
GLOBULIN PLAS-MCNC: 3.7 G/DL (ref 2.8–4.4)
GLUCOSE BLD-MCNC: 336 MG/DL (ref 70–99)
HCT VFR BLD AUTO: 31.4 %
HGB BLD-MCNC: 11.2 G/DL
IMM GRANULOCYTES # BLD AUTO: 0 X10(3) UL (ref 0–1)
IMM GRANULOCYTES NFR BLD: 0 %
LYMPHOCYTES # BLD AUTO: 0.58 X10(3) UL (ref 1–4)
LYMPHOCYTES NFR BLD AUTO: 30.7 %
MCH RBC QN AUTO: 32.6 PG (ref 26–34)
MCHC RBC AUTO-ENTMCNC: 35.7 G/DL (ref 31–37)
MCV RBC AUTO: 91.3 FL
MONOCYTES # BLD AUTO: 0.4 X10(3) UL (ref 0.1–1)
MONOCYTES NFR BLD AUTO: 21.2 %
NEUTROPHILS # BLD AUTO: 0.76 X10 (3) UL (ref 1.5–7.7)
NEUTROPHILS # BLD AUTO: 0.76 X10(3) UL (ref 1.5–7.7)
NEUTROPHILS NFR BLD AUTO: 40.1 %
OSMOLALITY SERPL CALC.SUM OF ELEC: 299 MOSM/KG (ref 275–295)
PLATELET # BLD AUTO: 63 10(3)UL (ref 150–450)
POTASSIUM SERPL-SCNC: 3.4 MMOL/L (ref 3.5–5.1)
PROT SERPL-MCNC: 6.5 G/DL (ref 6.4–8.2)
RBC # BLD AUTO: 3.44 X10(6)UL
SODIUM SERPL-SCNC: 139 MMOL/L (ref 136–145)
WBC # BLD AUTO: 1.9 X10(3) UL (ref 4–11)

## 2022-01-06 PROCEDURE — 96365 THER/PROPH/DIAG IV INF INIT: CPT

## 2022-01-06 PROCEDURE — 99215 OFFICE O/P EST HI 40 MIN: CPT | Performed by: CLINICAL NURSE SPECIALIST

## 2022-01-06 PROCEDURE — 86301 IMMUNOASSAY TUMOR CA 19-9: CPT

## 2022-01-06 PROCEDURE — 80053 COMPREHEN METABOLIC PANEL: CPT

## 2022-01-06 PROCEDURE — 85025 COMPLETE CBC W/AUTO DIFF WBC: CPT

## 2022-01-06 PROCEDURE — 99214 OFFICE O/P EST MOD 30 MIN: CPT | Performed by: NURSE PRACTITIONER

## 2022-01-06 RX ORDER — FLUCONAZOLE 150 MG/1
150 TABLET ORAL ONCE
Qty: 1 TABLET | Refills: 0 | Status: SHIPPED | OUTPATIENT
Start: 2022-01-06 | End: 2022-01-06

## 2022-01-06 RX ORDER — MORPHINE SULFATE 15 MG/1
7.5 TABLET ORAL EVERY 4 HOURS PRN
Qty: 30 TABLET | Refills: 0 | Status: SHIPPED | OUTPATIENT
Start: 2022-01-06

## 2022-01-06 RX ORDER — OMEPRAZOLE 40 MG/1
40 CAPSULE, DELAYED RELEASE ORAL DAILY
Qty: 30 CAPSULE | Refills: 5 | Status: SHIPPED | OUTPATIENT
Start: 2022-01-06

## 2022-01-06 NOTE — PROGRESS NOTES
[facility] Progress Note    Patient Name: Heidi Javier   YOB: 1960   Medical Record Number: [id number]   CSN: [id number]   Date of visit: 1/6/2022  Provider: FREYA Deal  Referring Physician: Maddi Torres    Problem List: mm.  · 2/17/20: PET/CT:  ? Bones: L3 sclerotic focus w/o FDG uptake. T7 lytic lesion with SUV 5.3.   ? Lungs: micro-nodules w/o specific uptake. Limited by motion. Concerning for disease. ? Liver: Left hepatic lobe lesion with SUV 8.9 concerning.  Numerou 511  ? 04/20/21: : 189  ? --PET/CT on 4/30/21 Left hepatic lobe lesion SUV 5.2 from 5.6, T7/L3 lesion are stable.    ? 05/18/21:  113  ? 06/18/21: : 120  ? 7/16/21: : 126  ? 7/27/21:  186  ? --Admitted for a COPD exacerbation f cm (5' 2.6\") (01/06 0915)  Weight: 77.1 kg (170 lb) (01/06 0915)  BSA (Calculated - sq m): 1.8 sq meters (01/06 0915)  Pulse: 83 (01/06 0915)  BP: 117/59 (01/06 0915)  Temp: 97 °F (36.1 °C) (01/06 0915)  Do Not Use - Resp Rate: --  SpO2: 94 % (01/06 0915) ondansetron 8 MG Oral Tablet Dispersible, Take 1 tablet (8 mg total) by mouth every 8 (eight) hours as needed for Nausea., Disp: 60 tablet, Rfl: 11  •  Prochlorperazine Maleate (COMPAZINE) 10 mg tablet, Take 1 tablet (10 mg total) by mouth every 8 (eight) A/G Ratio 0.8 (L) 1.0 - 2.0    Patient Fasting for CMP?  No    CBC W/ DIFFERENTIAL    Collection Time: 01/06/22  9:16 AM   Result Value Ref Range    WBC 1.9 (L) 4.0 - 11.0 x10(3) uL    RBC 3.44 (L) 3.80 - 5.30 x10(6)uL    HGB 11.2 (L) 12.0 - 16.0 g/dL    HC

## 2022-01-06 NOTE — PROGRESS NOTES
Palliative Care Follow Up Note     Patient Name: Wendy Sandoval   YOB: 1960   Medical Record Number: [id number]   CSN: [id number]   Date of visit: 1/6/2022     Chief Complaint/Reason for Visit:  Follow up for pain and sleep     History of Pre discomfort     Dyspnea, unspecified type     COPD exacerbation ([facility] [address]     Hypoxia     Anemia complicating neoplastic disease     Thrombocytopenia (HCC)     Fever     Pyelonephritis     Fever, unspecified fever cause     Cystitis     Metastatic cholangiocarc Drug use: Yes        Types: Cannabis        Comment: CBD gummies in the evening      Sexual activity: Never    Scientology/Cultural Information:   Current living situation: Patient lives with spouse, Edmond.  She was part-time  at [employer] but re (two) times daily. 15 mL 0   • Rosuvastatin Calcium 5 MG Oral Tab Take 5 mg by mouth daily. • triamcinolone acetonide 0.5 % External Cream Apply 15 g topically 3 (three) times daily.  1 Tube 0   • Clobetasol Propionate 0.025 % External Cream Apply 1 A since she is hoping for better pain control  Reinforced using antiemetics and anti diarrheals. She can continue CBD since this has really helped with her sleep    Impression/Plan:   1.  Neoplasm related pain   cymbalta 30mg daily  Morphine 7.5-15mg Q 4 p

## 2022-01-06 NOTE — PROGRESS NOTES
Pt here for C2 D1 Folfox. ANC  0.76. Per KRYSTLE Lemus, delay treatment by 1 week and no growth factor today. K+ 3.4. Per Marcela, 1L fluid bolus with potassium ordered and given. Neutropenic precautions reviewed with patient. Patient d/c'd in stable condition.

## 2022-01-06 NOTE — PROGRESS NOTES
Patient presents with: Follow - Up: APN assessment prior to treatment  Chemotherapy    Pt is here for treatment; C2 D1 folfox is expected.  Pt reports that she had a difficult 1st cycle; persistent diarrhea not well controlled with imodium with gas and aci

## 2022-01-12 RX ORDER — FAMOTIDINE 10 MG/ML
20 INJECTION, SOLUTION INTRAVENOUS ONCE
Status: CANCELLED
Start: 2022-01-13 | End: 2022-01-13

## 2022-01-12 RX ORDER — FLUOROURACIL 50 MG/ML
400 INJECTION, SOLUTION INTRAVENOUS ONCE
Status: CANCELLED | OUTPATIENT
Start: 2022-01-13

## 2022-01-12 RX ORDER — FLUOROURACIL 50 MG/ML
2400 INJECTION, SOLUTION INTRAVENOUS CONTINUOUS
Status: CANCELLED | OUTPATIENT
Start: 2022-01-13

## 2022-01-12 NOTE — PROGRESS NOTES
THE [facility] Hematology and Oncology Clinic Note    Diagnosis: Metastatic Cholangiocarcinoma: liver, bone: BRAF V600E +    Treatment History:   1. Cisplatin 25 mg/m2 D1/8 + Gemcitabine 1000 mg/m2 D1/8 q21 days.    C1: 3/3/20 and 3/10/20: : 025,057 CEA 43.6 of an appetite. No fevers or chills. Weight is stable. She does feel more \"sweaty\" but no drenching night sweats. Aside from her Low back, no other focal areas of bone pain. She is a previous smoker, she smoked for about 40 years and quit 2 years ago.  Sh CEA 33.2. · 8/10/20: PET/CT after C8 of Cis/[place]:   · Bone: L3 (SUC 4.7) and T7 (sclerosis)  · Liver: Left lobe (stable SUV 7.3), infiltrative hepatic lesions.    · 8/12/20: Discussed at TB: proceed with maintenance Gemcitabine   · 11/4/20: CTA Chest: No P activity in the left hepatic lobe mass: SUV 3.9 from 4.9.   · 12/9/21: MRI Liver: Largest lesion in liver appears larger (discussed with rad onc, this is likely treatment effect) and other smaller lesions with similar enhancement are unchanged.    · 12/14/2 times daily. , Disp: 60 tablet, Rfl: 2  OLANZapine 5 MG Oral Tab, Take 1 tablet (5 mg total) by mouth nightly., Disp: , Rfl: 0  Continuous Blood Gluc Transmit (DEXCOM G6 TRANSMITTER) Does not apply Misc, Take 1 Bottle by mouth As Directed., Disp: , Rfl:   p dose., Disp: 1 tablet, Rfl: 0  [] dexamethasone (DECADRON) 4 MG tablet, Take 2 tablets (8 mg total) by mouth 2 (two) times daily for 7 days. , Disp: 28 tablet, Rfl: 0    [COMPLETED] 0.9 % NaCl 1000ml with KCl 20 mEq infusion, 500 mL/hr, Intravenous, UNIT/ML lock flush 500 Units, 5 mL, Intravenous, Once, Cheryl Ruano DO, 500 Units at 12/21/21 5640      Past Medical History:   Diagnosis Date   • Atherosclerosis of coronary artery    • Cancer [facility])    • COPD (chronic obstructive pulmonary disease) --  SpO2: 94 % (01/13 0849)     General: NAD, AOX3  HEENT: clear op, mmm, no jvd, no scleral icterus  CV: RRR s1s2 no murmurs  Extremities: No edema   Lungs: no increased wob, ctab  Abd: soft, mild distension   Neuro: CN: II-XII grossly intact  Port C/D/I ductal dilatation.  New line simple cyst along the inferior pole left lobe of liver measuring 2.1 cm.   3. Borderline hepatomegaly and splenomegaly. 7/22/21: PET/CT  1.  Mildly hypermetabolic lesion in left hepatic lobe was previously described and is n pending her neuropathy. · C2 of FOLFOX + G-CSF  · CBC, CMP,  today  · If needed,  we will request HRD testing from [facility] given her ARID1A mutation. She also has an NETTA variant of unknown significance.  Given these findings, she may respond to a pa

## 2022-01-13 ENCOUNTER — OFFICE VISIT (OUTPATIENT)
Dept: HEMATOLOGY/ONCOLOGY | Facility: HOSPITAL | Age: 62
End: 2022-01-13
Attending: INTERNAL MEDICINE
Payer: MEDICAID

## 2022-01-13 VITALS
WEIGHT: 168.81 LBS | HEIGHT: 62.6 IN | RESPIRATION RATE: 18 BRPM | OXYGEN SATURATION: 94 % | TEMPERATURE: 99 F | SYSTOLIC BLOOD PRESSURE: 105 MMHG | HEART RATE: 81 BPM | BODY MASS INDEX: 30.29 KG/M2 | DIASTOLIC BLOOD PRESSURE: 70 MMHG

## 2022-01-13 DIAGNOSIS — C22.1 CHOLANGIOCARCINOMA (HCC): Primary | ICD-10-CM

## 2022-01-13 DIAGNOSIS — C22.1 METASTATIC CHOLANGIOCARCINOMA TO BILE DUCT (HCC): ICD-10-CM

## 2022-01-13 DIAGNOSIS — C78.89 METASTATIC CHOLANGIOCARCINOMA TO BILE DUCT (HCC): ICD-10-CM

## 2022-01-13 LAB
ALBUMIN SERPL-MCNC: 3.2 G/DL (ref 3.4–5)
ALBUMIN/GLOB SERPL: 0.8 {RATIO} (ref 1–2)
ALP LIVER SERPL-CCNC: 144 U/L
ALT SERPL-CCNC: 67 U/L
ANION GAP SERPL CALC-SCNC: 7 MMOL/L (ref 0–18)
AST SERPL-CCNC: 50 U/L (ref 15–37)
BASOPHILS # BLD AUTO: 0.05 X10(3) UL (ref 0–0.2)
BASOPHILS NFR BLD AUTO: 0.9 %
BILIRUB SERPL-MCNC: 0.4 MG/DL (ref 0.1–2)
BUN BLD-MCNC: 12 MG/DL (ref 7–18)
CALCIUM BLD-MCNC: 9.4 MG/DL (ref 8.5–10.1)
CANCER AG19-9 SERPL-ACNC: 4138.5 U/ML (ref ?–37)
CHLORIDE SERPL-SCNC: 104 MMOL/L (ref 98–112)
CO2 SERPL-SCNC: 25 MMOL/L (ref 21–32)
CREAT BLD-MCNC: 0.92 MG/DL
EOSINOPHIL # BLD AUTO: 0.25 X10(3) UL (ref 0–0.7)
EOSINOPHIL NFR BLD AUTO: 4.6 %
ERYTHROCYTE [DISTWIDTH] IN BLOOD BY AUTOMATED COUNT: 14.6 %
FASTING STATUS PATIENT QL REPORTED: NO
GLOBULIN PLAS-MCNC: 4.1 G/DL (ref 2.8–4.4)
GLUCOSE BLD-MCNC: 203 MG/DL (ref 70–99)
HCT VFR BLD AUTO: 36.4 %
HGB BLD-MCNC: 12.6 G/DL
IMM GRANULOCYTES # BLD AUTO: 0.03 X10(3) UL (ref 0–1)
IMM GRANULOCYTES NFR BLD: 0.6 %
LYMPHOCYTES # BLD AUTO: 1.26 X10(3) UL (ref 1–4)
LYMPHOCYTES NFR BLD AUTO: 23.4 %
MCH RBC QN AUTO: 32.6 PG (ref 26–34)
MCHC RBC AUTO-ENTMCNC: 34.6 G/DL (ref 31–37)
MCV RBC AUTO: 94.1 FL
MONOCYTES # BLD AUTO: 1 X10(3) UL (ref 0.1–1)
MONOCYTES NFR BLD AUTO: 18.6 %
NEUTROPHILS # BLD AUTO: 2.8 X10 (3) UL (ref 1.5–7.7)
NEUTROPHILS # BLD AUTO: 2.8 X10(3) UL (ref 1.5–7.7)
NEUTROPHILS NFR BLD AUTO: 51.9 %
OSMOLALITY SERPL CALC.SUM OF ELEC: 288 MOSM/KG (ref 275–295)
PLATELET # BLD AUTO: 120 10(3)UL (ref 150–450)
POTASSIUM SERPL-SCNC: 3.5 MMOL/L (ref 3.5–5.1)
PROT SERPL-MCNC: 7.3 G/DL (ref 6.4–8.2)
RBC # BLD AUTO: 3.87 X10(6)UL
SODIUM SERPL-SCNC: 136 MMOL/L (ref 136–145)
WBC # BLD AUTO: 5.4 X10(3) UL (ref 4–11)

## 2022-01-13 PROCEDURE — 99214 OFFICE O/P EST MOD 30 MIN: CPT | Performed by: INTERNAL MEDICINE

## 2022-01-13 PROCEDURE — 96415 CHEMO IV INFUSION ADDL HR: CPT

## 2022-01-13 PROCEDURE — 96375 TX/PRO/DX INJ NEW DRUG ADDON: CPT

## 2022-01-13 PROCEDURE — 96411 CHEMO IV PUSH ADDL DRUG: CPT

## 2022-01-13 PROCEDURE — 96413 CHEMO IV INFUSION 1 HR: CPT

## 2022-01-13 PROCEDURE — 85025 COMPLETE CBC W/AUTO DIFF WBC: CPT

## 2022-01-13 PROCEDURE — 80053 COMPREHEN METABOLIC PANEL: CPT

## 2022-01-13 PROCEDURE — 96368 THER/DIAG CONCURRENT INF: CPT

## 2022-01-13 PROCEDURE — 86301 IMMUNOASSAY TUMOR CA 19-9: CPT

## 2022-01-13 PROCEDURE — 96367 TX/PROPH/DG ADDL SEQ IV INF: CPT

## 2022-01-13 PROCEDURE — S0028 INJECTION, FAMOTIDINE, 20 MG: HCPCS | Performed by: INTERNAL MEDICINE

## 2022-01-13 RX ORDER — FLUOROURACIL 50 MG/ML
400 INJECTION, SOLUTION INTRAVENOUS ONCE
Status: COMPLETED | OUTPATIENT
Start: 2022-01-13 | End: 2022-01-13

## 2022-01-13 RX ORDER — FLUOROURACIL 50 MG/ML
2400 INJECTION, SOLUTION INTRAVENOUS CONTINUOUS
Status: DISCONTINUED | OUTPATIENT
Start: 2022-01-13 | End: 2022-01-13

## 2022-01-13 RX ORDER — DIPHENOXYLATE HYDROCHLORIDE AND ATROPINE SULFATE 2.5; .025 MG/1; MG/1
1-2 TABLET ORAL 4 TIMES DAILY PRN
Qty: 30 TABLET | Refills: 0 | Status: SHIPPED | OUTPATIENT
Start: 2022-01-13

## 2022-01-13 RX ORDER — FAMOTIDINE 20 MG/1
20 TABLET ORAL 2 TIMES DAILY
Qty: 60 TABLET | Refills: 2 | Status: SHIPPED | OUTPATIENT
Start: 2022-01-13

## 2022-01-13 RX ORDER — PREDNISONE 10 MG/1
TABLET ORAL
Qty: 22 TABLET | Refills: 0 | Status: SHIPPED | OUTPATIENT
Start: 2022-01-13 | End: 2022-01-24

## 2022-01-13 RX ORDER — FAMOTIDINE 10 MG/ML
20 INJECTION, SOLUTION INTRAVENOUS ONCE
Status: COMPLETED | OUTPATIENT
Start: 2022-01-13 | End: 2022-01-13

## 2022-01-13 RX ADMIN — FLUOROURACIL 750 MG: 50 INJECTION, SOLUTION INTRAVENOUS at 13:04:00

## 2022-01-13 RX ADMIN — FAMOTIDINE 20 MG: 10 INJECTION, SOLUTION INTRAVENOUS at 09:37:00

## 2022-01-13 RX ADMIN — FLUOROURACIL 4350 MG: 50 INJECTION, SOLUTION INTRAVENOUS at 13:10:00

## 2022-01-13 NOTE — PROGRESS NOTES
Pt here for C2 D1 Folfox.   Arrives Ambulating independently, accompanied by Self           Pregnancy screening: Denies possibility of pregnancy    Modifications in dose or schedule: No     Frequency of blood return and site check throughout administration:

## 2022-01-14 NOTE — PATIENT INSTRUCTIONS
- Start diclofenac (anti-inflammatory). Take 1 tablet every 8 hours as needed. This takes the place of ibuprofen (do not take both). Take with food. - Get x-ray of left hand and wrist done today.    - Follow up with Orthopedic Hand specialist.  Try Simonet Skyrizi Counseling: I discussed with the patient the risks of risankizumab-rzaa including but not limited to immunosuppression, and serious infections.  The patient understands that monitoring is required including a PPD at baseline and must alert us or the primary physician if symptoms of infection or other concerning signs are noted.

## 2022-01-14 NOTE — PROGRESS NOTES
Nutrition Consultation    Patient Name: Nathan Alfredo  YOB: 1960  Medical Record Number: [id number]   Account Number: [de-identified]  Dietitian: Sheela Aguilar RD, LDN      Date of visit: 1/13/2022    Diet Rx: high protein/calorie, CHO contr needed for Pain., Disp: 30 tablet, Rfl: 0  •  OLANZapine 5 MG Oral Tab, Take 1 tablet (5 mg total) by mouth nightly., Disp: , Rfl: 0  •  Continuous Blood Gluc Transmit (DEXCOM G6 TRANSMITTER) Does not apply Misc, Take 1 Bottle by mouth As Directed., Disp: IBW: 115 +/- 10%    WT HX:   01/13/22: 168 lbs 12.8 oz  12/23/21: 173 lbs  12/16/21: 177 lbs  11/08/21: 168 lbs    Estimated Nutrition Needs: 23-25 kcals/kg = 8987-7696 KCALS/d; 1.2 gms protein/kg = 92 gms/d    Services Provided: Verbal and written ix pr

## 2022-01-15 ENCOUNTER — OFFICE VISIT (OUTPATIENT)
Dept: HEMATOLOGY/ONCOLOGY | Facility: HOSPITAL | Age: 62
End: 2022-01-15
Attending: INTERNAL MEDICINE
Payer: MEDICAID

## 2022-01-15 DIAGNOSIS — C22.1 CHOLANGIOCARCINOMA (HCC): ICD-10-CM

## 2022-01-15 DIAGNOSIS — C78.89 METASTATIC CHOLANGIOCARCINOMA TO BILE DUCT (HCC): Primary | ICD-10-CM

## 2022-01-15 DIAGNOSIS — C22.1 METASTATIC CHOLANGIOCARCINOMA TO BILE DUCT (HCC): Primary | ICD-10-CM

## 2022-01-15 PROCEDURE — 96372 THER/PROPH/DIAG INJ SC/IM: CPT

## 2022-01-15 NOTE — PROGRESS NOTES
Education Record    Learner:  Patient     Disease / Diagnosis: Ziextenzo    Barriers / Limitations:  None   Comments:    Method:  Discussion   Comments:    General Topics:  Plan of care reviewed   Comments:    Outcome:  Shows understanding   Comments:

## 2022-01-17 ENCOUNTER — TELEPHONE (OUTPATIENT)
Dept: HEMATOLOGY/ONCOLOGY | Facility: HOSPITAL | Age: 62
End: 2022-01-17

## 2022-01-17 RX ORDER — INSULIN GLARGINE 100 [IU]/ML
INJECTION, SOLUTION SUBCUTANEOUS
Qty: 15 ML | Refills: 0 | Status: SHIPPED | OUTPATIENT
Start: 2022-01-17

## 2022-01-17 NOTE — TELEPHONE ENCOUNTER
Requested Prescriptions     Pending Prescriptions Disp Refills   • LANTUS SOLOSTAR 100 UNIT/ML Subcutaneous Solution Pen-injector [Pharmacy Med Name: LANTUS SOLOSTAR PEN INJ 3ML] 15 mL 0     Sig: INJECT UP TO 40 UNITS UNDER THE SKIN DAILY AS DIRECTED     F

## 2022-01-17 NOTE — TELEPHONE ENCOUNTER
Edmond parker says too has been having right sided pain for the last two days . Denies any other symptoms.  Zo Rear

## 2022-01-17 NOTE — TELEPHONE ENCOUNTER
Toxicities: C2 D1 Fluorouracil/Leucovorin/Oxlaliplatin on 1/13/2022  C1 D3 Pegfilgrastim-bmez on 1/15/2022    Right Upper Back Pain: Oskar Ruano reports for the last 2 days she has had constant, sharp, stabbing pain in her right upper back pain that radiates to the right side of her ribs and under her right breast. She is rating the pain \"4-5/10\" if she does not move or take deep breaths. When she moves and when she takes deep breaths the pain goes to \"8-9/10. \" She tried taking 1 tylenol with codeine at 7am. It did not help much. She just took another tablet at 2pm. She had morphine sulfate 15mg (take 1/2 a tab by mouth every 4 hours as needed for pain) that was prescribed by FREYA North. She has not taken it. Prince Sam denies dyspnea at rest. She does have dyspnea with exertion. No chills, shakes or fever. No other symptoms.)    I updated Netta Minaya. She said should be evaluated in the [address] [place] [address] ER. I updated Prince Sam. She said she wants to try taking the morphine now. If her pain does not improve in the next hour or so she will have her  bring her to the [address] [place] [address] ER for evaluation.

## 2022-01-26 NOTE — PROGRESS NOTES
[facility] Hematology and Oncology Clinic Note    Diagnosis: Metastatic Cholangiocarcinoma: liver, bone: BRAF V600E +    Treatment History:   1. Cisplatin 25 mg/m2 D1/8 + Gemcitabine 1000 mg/m2 D1/8 q21 days.    C1: 3/3/20 and 3/10/20: : 429,809 CEA 43.6 She states that she does feel more fatigued and has a less of an appetite. No fevers or chills. Weight is stable. She does feel more \"sweaty\" but no drenching night sweats. Aside from her Low back, no other focal areas of bone pain.  She is a previous smo cycles of Cisplatin + Gemcitabine:  down to 50,768 and CEA 33.2. · 8/10/20: PET/CT after C8 of Cis/[place]:   · Bone: L3 (SUC 4.7) and T7 (sclerosis)  · Liver: Left lobe (stable SUV 7.3), infiltrative hepatic lesions.    · 8/12/20: Discussed at TB: proc  224  · 11/30/21:  563  · 12/6/21: PET/CT: Decreased activity in the left hepatic lobe mass: SUV 3.9 from 4.9.   · 12/9/21: MRI Liver: Largest lesion in liver appears larger (discussed with rad onc, this is likely treatment effect) and other sm HPI    PREDNISONE 10 MG Oral Tab, TAKE 4 TABLETS BY MOUTH DAILY X 1 DAY, 3 TABLETS DAILY X 3 DAYS, 2 TABLETS DAILY X 3 DAYS, 1 TABLET DAILY WITH BREAKFAST FOR 3 DAYS, Disp: 22 tablet, Rfl: 0  DULOXETINE 30 MG Oral Cap DR Particles, TAKE 1 CAPSULE(30 MG) BY Apply 1 Application topically daily. , Disp: 1 Tube, Rfl: 11  ondansetron 8 MG Oral Tablet Dispersible, Take 1 tablet (8 mg total) by mouth every 8 (eight) hours as needed for Nausea., Disp: 60 tablet, Rfl: 11  Prochlorperazine Maleate (COMPAZINE) 10 mg tab Medical History:   Diagnosis Date   • Atherosclerosis of coronary artery    • Cancer [facility])    • COPD (chronic obstructive pulmonary disease) ([facility] [address]     2 1/2 L O2 @ night   • Coronary atherosclerosis    • Diabetes ([facility] [address]    • Diverticulitis large intestine w murmurs  Extremities: No edema   Lungs: no increased wob, ctab  Abd: soft, mild distension   Neuro: CN: II-XII grossly intact  Port C/D/I  Skin: scattered  on legs    Results:  Lab Results   Component Value Date    WBC 6.6 01/27/2022    HGB 12.5 01/27/2022 hepatomegaly and splenomegaly. 7/22/21: PET/CT  1. Mildly hypermetabolic lesion in left hepatic lobe was previously described and is not significantly changed with slight decrease in SUV.    2. There is decrease in the SUV of the lesion in the T7 spinou can consider 5-FU + Liposomal irinotecan. We will also repeat her imaging to reassess the liver and consider a repeat biopsy to assess for any new targetable mutations. We will also call [facility] to see if we can add on HRD testing.  We may need a repeat biop

## 2022-01-27 ENCOUNTER — OFFICE VISIT (OUTPATIENT)
Dept: HEMATOLOGY/ONCOLOGY | Facility: HOSPITAL | Age: 62
End: 2022-01-27
Attending: INTERNAL MEDICINE
Payer: MEDICAID

## 2022-01-27 VITALS
SYSTOLIC BLOOD PRESSURE: 163 MMHG | HEART RATE: 97 BPM | DIASTOLIC BLOOD PRESSURE: 82 MMHG | OXYGEN SATURATION: 89 % | BODY MASS INDEX: 29.6 KG/M2 | HEIGHT: 62.6 IN | WEIGHT: 165 LBS | RESPIRATION RATE: 18 BRPM | TEMPERATURE: 97 F

## 2022-01-27 DIAGNOSIS — C78.89 METASTATIC CHOLANGIOCARCINOMA TO BILE DUCT (HCC): Primary | ICD-10-CM

## 2022-01-27 DIAGNOSIS — C22.1 CHOLANGIOCARCINOMA (HCC): Primary | ICD-10-CM

## 2022-01-27 DIAGNOSIS — C22.1 METASTATIC CHOLANGIOCARCINOMA TO BILE DUCT (HCC): Primary | ICD-10-CM

## 2022-01-27 DIAGNOSIS — C22.1 CHOLANGIOCARCINOMA (HCC): ICD-10-CM

## 2022-01-27 LAB
ALBUMIN SERPL-MCNC: 3.4 G/DL (ref 3.4–5)
ALBUMIN/GLOB SERPL: 0.9 {RATIO} (ref 1–2)
ALP LIVER SERPL-CCNC: 197 U/L
ALT SERPL-CCNC: 97 U/L
ANION GAP SERPL CALC-SCNC: 7 MMOL/L (ref 0–18)
AST SERPL-CCNC: 75 U/L (ref 15–37)
BASOPHILS # BLD AUTO: 0.04 X10(3) UL (ref 0–0.2)
BASOPHILS NFR BLD AUTO: 0.6 %
BILIRUB SERPL-MCNC: 0.7 MG/DL (ref 0.1–2)
BUN BLD-MCNC: 7 MG/DL (ref 7–18)
CALCIUM BLD-MCNC: 9.2 MG/DL (ref 8.5–10.1)
CANCER AG19-9 SERPL-ACNC: 8578.2 U/ML (ref ?–37)
CHLORIDE SERPL-SCNC: 104 MMOL/L (ref 98–112)
CO2 SERPL-SCNC: 26 MMOL/L (ref 21–32)
CREAT BLD-MCNC: 0.92 MG/DL
EOSINOPHIL # BLD AUTO: 0.07 X10(3) UL (ref 0–0.7)
EOSINOPHIL NFR BLD AUTO: 1.1 %
ERYTHROCYTE [DISTWIDTH] IN BLOOD BY AUTOMATED COUNT: 15.8 %
GLOBULIN PLAS-MCNC: 3.8 G/DL (ref 2.8–4.4)
GLUCOSE BLD-MCNC: 223 MG/DL (ref 70–99)
HCT VFR BLD AUTO: 36.7 %
HGB BLD-MCNC: 12.5 G/DL
IMM GRANULOCYTES # BLD AUTO: 0.07 X10(3) UL (ref 0–1)
IMM GRANULOCYTES NFR BLD: 1.1 %
LYMPHOCYTES # BLD AUTO: 0.7 X10(3) UL (ref 1–4)
LYMPHOCYTES NFR BLD AUTO: 10.7 %
MCH RBC QN AUTO: 32.1 PG (ref 26–34)
MCHC RBC AUTO-ENTMCNC: 34.1 G/DL (ref 31–37)
MCV RBC AUTO: 94.1 FL
MONOCYTES # BLD AUTO: 0.42 X10(3) UL (ref 0.1–1)
MONOCYTES NFR BLD AUTO: 6.4 %
NEUTROPHILS # BLD AUTO: 5.26 X10 (3) UL (ref 1.5–7.7)
NEUTROPHILS # BLD AUTO: 5.26 X10(3) UL (ref 1.5–7.7)
NEUTROPHILS NFR BLD AUTO: 80.1 %
OSMOLALITY SERPL CALC.SUM OF ELEC: 289 MOSM/KG (ref 275–295)
PLATELET # BLD AUTO: 65 10(3)UL (ref 150–450)
POTASSIUM SERPL-SCNC: 3.7 MMOL/L (ref 3.5–5.1)
PROT SERPL-MCNC: 7.2 G/DL (ref 6.4–8.2)
RBC # BLD AUTO: 3.9 X10(6)UL
SODIUM SERPL-SCNC: 137 MMOL/L (ref 136–145)
WBC # BLD AUTO: 6.6 X10(3) UL (ref 4–11)

## 2022-01-27 PROCEDURE — 85025 COMPLETE CBC W/AUTO DIFF WBC: CPT

## 2022-01-27 PROCEDURE — 99215 OFFICE O/P EST HI 40 MIN: CPT | Performed by: INTERNAL MEDICINE

## 2022-01-27 PROCEDURE — 80053 COMPREHEN METABOLIC PANEL: CPT

## 2022-01-27 PROCEDURE — 36591 DRAW BLOOD OFF VENOUS DEVICE: CPT

## 2022-01-27 PROCEDURE — 86301 IMMUNOASSAY TUMOR CA 19-9: CPT

## 2022-01-27 RX ORDER — DEXAMETHASONE 4 MG/1
4 TABLET ORAL
Qty: 30 TABLET | Refills: 1 | Status: SHIPPED | OUTPATIENT
Start: 2022-01-27 | End: 2022-02-26

## 2022-01-27 NOTE — PROGRESS NOTES
Education Record    Learner:  Patient    Disease / Diagnosis:    Barriers / Limitations:  None   Comments:    Method:  Discussion   Comments:    General Topics:  Plan of care reviewed   Comments:    Outcome:  Shows understanding   Comments:chemo held for t

## 2022-02-01 ENCOUNTER — HOSPITAL ENCOUNTER (OUTPATIENT)
Dept: RADIATION ONCOLOGY | Facility: HOSPITAL | Age: 62
Discharge: HOME | End: 2022-02-01
Attending: RADIOLOGY
Payer: MEDICAID

## 2022-02-01 ENCOUNTER — TELEPHONE (OUTPATIENT)
Dept: HEMATOLOGY/ONCOLOGY | Facility: HOSPITAL | Age: 62
End: 2022-02-01

## 2022-02-01 VITALS
OXYGEN SATURATION: 95 % | HEIGHT: 62 IN | BODY MASS INDEX: 30.29 KG/M2 | TEMPERATURE: 98 F | RESPIRATION RATE: 20 BRPM | SYSTOLIC BLOOD PRESSURE: 154 MMHG | WEIGHT: 164.63 LBS | HEART RATE: 76 BPM | DIASTOLIC BLOOD PRESSURE: 76 MMHG

## 2022-02-01 DIAGNOSIS — C79.51 BONE METASTASES (HCC): Primary | ICD-10-CM

## 2022-02-01 PROCEDURE — 77399 UNLISTED PX MED RADJ PHYSICS: CPT | Performed by: RADIOLOGY

## 2022-02-01 PROCEDURE — 77470 SPECIAL RADIATION TREATMENT: CPT | Performed by: RADIOLOGY

## 2022-02-01 PROCEDURE — 77334 RADIATION TREATMENT AID(S): CPT | Performed by: RADIOLOGY

## 2022-02-01 NOTE — PROGRESS NOTES
[facility] RADIATION ONCOLOGY  FOLLOW UP     PATIENT:   Jacob Nunez      12/7/1960    DIAGNOSIS:   Metastatic cholangiocarcinoma      CANCER HISTORY   63 yo metastatic cholangiocarcinoma. Dx early 2020. Previous RT:    30 Gy, 10 fx, T7-T9, Dec 2020.  30 Gy, 10 fx, L3, Dec 2020.  30 Gy, 10 fx, L liver lesion, Aug 2021. INTERIM HISTORY   Here with pain at T4 level. Radiates to the right. Triggered by lying on back or left side. Not worse with bending/twisting. No numbness/weakness. Needs morphine to control pain. Centered at [facility] strap\" level. MRI back on 12/22/2021 shows a 6 mm enhancing mass in the mid posterior elements at T4. Currently on every 2 week FOLFIRI. Tentative plan to get chemo next week, pending blood counts. PHYSICAL EXAM   Percussion at T4 triggers pain  No skin reaction from prior RT to T spine and L3. IMPRESSION   Pain could be from T4 lesion. May have grown a bit since MRI 1 month ago? Will limit radiation to the lesion itself to avoid esophagitis. Will use IMRT and IGRT to do that. Prior RT to T7 level - will avoid. 20 Gy in 4 fractions during off-chemo week. Consent obtained. Simulation today in prone position. Will fuse Dec MRI. In these circumstances, prob better to cont chemo without interruption or break. Meryle Collie, MD  Radiation Oncology    CC: PAUL Sofia MD

## 2022-02-01 NOTE — TELEPHONE ENCOUNTER
Spoke with Dr Sourav Rose at [address] [place] [address]. Discussed the request for liposomal irinotecan based on phase 2 NIFTY trial data. Reviewed overall survival and progression free survival data from the study. Based on these results, he was able to approve the liposomal irinotecan.  Loli Duet #[id number]

## 2022-02-03 ENCOUNTER — HOSPITAL ENCOUNTER (OUTPATIENT)
Dept: RADIATION ONCOLOGY | Facility: HOSPITAL | Age: 62
Discharge: HOME | End: 2022-02-03
Attending: RADIOLOGY
Payer: MEDICAID

## 2022-02-03 ENCOUNTER — OFFICE VISIT (OUTPATIENT)
Dept: HEMATOLOGY/ONCOLOGY | Facility: HOSPITAL | Age: 62
End: 2022-02-03
Attending: INTERNAL MEDICINE
Payer: MEDICAID

## 2022-02-03 ENCOUNTER — TELEPHONE (OUTPATIENT)
Dept: RADIATION ONCOLOGY | Facility: HOSPITAL | Age: 62
End: 2022-02-03

## 2022-02-03 VITALS
DIASTOLIC BLOOD PRESSURE: 69 MMHG | OXYGEN SATURATION: 95 % | BODY MASS INDEX: 30.16 KG/M2 | TEMPERATURE: 98 F | RESPIRATION RATE: 18 BRPM | HEART RATE: 85 BPM | WEIGHT: 166 LBS | HEIGHT: 62.21 IN | SYSTOLIC BLOOD PRESSURE: 115 MMHG

## 2022-02-03 DIAGNOSIS — C78.89 METASTATIC CHOLANGIOCARCINOMA TO BILE DUCT (HCC): Primary | ICD-10-CM

## 2022-02-03 DIAGNOSIS — C22.1 METASTATIC CHOLANGIOCARCINOMA TO BILE DUCT (HCC): Primary | ICD-10-CM

## 2022-02-03 DIAGNOSIS — C22.1 CHOLANGIOCARCINOMA (HCC): ICD-10-CM

## 2022-02-03 LAB
ALBUMIN SERPL-MCNC: 3.2 G/DL (ref 3.4–5)
ALBUMIN/GLOB SERPL: 0.8 {RATIO} (ref 1–2)
ALP LIVER SERPL-CCNC: 166 U/L
ALT SERPL-CCNC: 56 U/L
ANION GAP SERPL CALC-SCNC: 6 MMOL/L (ref 0–18)
AST SERPL-CCNC: 48 U/L (ref 15–37)
BASOPHILS # BLD AUTO: 0.07 X10(3) UL (ref 0–0.2)
BASOPHILS NFR BLD AUTO: 1 %
BILIRUB SERPL-MCNC: 0.5 MG/DL (ref 0.1–2)
BUN BLD-MCNC: 8 MG/DL (ref 7–18)
CALCIUM BLD-MCNC: 9.1 MG/DL (ref 8.5–10.1)
CANCER AG19-9 SERPL-ACNC: 9767.6 U/ML (ref ?–37)
CHLORIDE SERPL-SCNC: 104 MMOL/L (ref 98–112)
CO2 SERPL-SCNC: 27 MMOL/L (ref 21–32)
CREAT BLD-MCNC: 0.88 MG/DL
EOSINOPHIL # BLD AUTO: 0.12 X10(3) UL (ref 0–0.7)
EOSINOPHIL NFR BLD AUTO: 1.8 %
ERYTHROCYTE [DISTWIDTH] IN BLOOD BY AUTOMATED COUNT: 15.9 %
FASTING STATUS PATIENT QL REPORTED: NO
GLOBULIN PLAS-MCNC: 3.9 G/DL (ref 2.8–4.4)
GLUCOSE BLD-MCNC: 137 MG/DL (ref 70–99)
HCT VFR BLD AUTO: 35.5 %
HGB BLD-MCNC: 11.8 G/DL
IMM GRANULOCYTES # BLD AUTO: 0.03 X10(3) UL (ref 0–1)
IMM GRANULOCYTES NFR BLD: 0.4 %
LYMPHOCYTES # BLD AUTO: 1.03 X10(3) UL (ref 1–4)
LYMPHOCYTES NFR BLD AUTO: 15.3 %
MCH RBC QN AUTO: 31.8 PG (ref 26–34)
MCHC RBC AUTO-ENTMCNC: 33.2 G/DL (ref 31–37)
MCV RBC AUTO: 95.7 FL
MONOCYTES # BLD AUTO: 0.95 X10(3) UL (ref 0.1–1)
MONOCYTES NFR BLD AUTO: 14.1 %
NEUTROPHILS # BLD AUTO: 4.52 X10 (3) UL (ref 1.5–7.7)
NEUTROPHILS # BLD AUTO: 4.52 X10(3) UL (ref 1.5–7.7)
NEUTROPHILS NFR BLD AUTO: 67.4 %
OSMOLALITY SERPL CALC.SUM OF ELEC: 284 MOSM/KG (ref 275–295)
PLATELET # BLD AUTO: 125 10(3)UL (ref 150–450)
POTASSIUM SERPL-SCNC: 3.4 MMOL/L (ref 3.5–5.1)
PROT SERPL-MCNC: 7.1 G/DL (ref 6.4–8.2)
RBC # BLD AUTO: 3.71 X10(6)UL
SODIUM SERPL-SCNC: 137 MMOL/L (ref 136–145)

## 2022-02-03 PROCEDURE — 99213 OFFICE O/P EST LOW 20 MIN: CPT | Performed by: INTERNAL MEDICINE

## 2022-02-03 PROCEDURE — 86301 IMMUNOASSAY TUMOR CA 19-9: CPT

## 2022-02-03 PROCEDURE — 80053 COMPREHEN METABOLIC PANEL: CPT

## 2022-02-03 PROCEDURE — 36591 DRAW BLOOD OFF VENOUS DEVICE: CPT

## 2022-02-03 PROCEDURE — 85025 COMPLETE CBC W/AUTO DIFF WBC: CPT

## 2022-02-03 RX ORDER — FLUOROURACIL 50 MG/ML
2400 INJECTION, SOLUTION INTRAVENOUS CONTINUOUS
Status: CANCELLED | OUTPATIENT
Start: 2022-02-03

## 2022-02-03 RX ORDER — ATROPINE SULFATE 0.4 MG/ML
0.2 AMPUL (ML) INJECTION ONCE
Status: CANCELLED | OUTPATIENT
Start: 2022-02-03

## 2022-02-03 NOTE — PROGRESS NOTES
Education Record    Learner:  Patient    Disease / Diagnosis: Cholangiocarcinoma    Barriers / Limitations:  None   Comments:    Method:  Brief focused and Discussion   Comments:    General Topics:  Medication, Side effects and symptom management and Plan of care reviewed   Comments:    Outcome:  Shows understanding   Comments: No treatment today - will return Monday for treatment.

## 2022-02-03 NOTE — TELEPHONE ENCOUNTER
TC to patient coming in today to get re-marked for spinal RT    She has a small lesion on MRI without much corresponding bony change on CT at T4 root of the spinous process    She does feel her back is not entirely easily to localize  Some of the pain may be coming from the lesion at T7, with was treated to 30 Gy in 10 fractions in Dec 2020    This area can be re-irradiated using IMRT and should not add too much to the side effects

## 2022-02-03 NOTE — PROGRESS NOTES
Outpatient Oncology Care Plan   Problem list:    knowledge deficit   Problems related to:   chemotherapy   Interventions:   provided general teaching   Expected outcomes:   understands plan of care   Progress towards outcome: making progress     Education Record   Learner: Patient   Barriers / Limitations: None   Method: Discussion   Outcome: Shows understanding   Comments:  Patient here for follow-up and planned C1D1 treatment. Met with radiation oncology. Energy levels are still poor. Still experiencing pain, more notable upon inspiration.

## 2022-02-04 ENCOUNTER — TELEPHONE (OUTPATIENT)
Dept: HEMATOLOGY/ONCOLOGY | Facility: HOSPITAL | Age: 62
End: 2022-02-04

## 2022-02-04 ENCOUNTER — HOSPITAL ENCOUNTER (OUTPATIENT)
Dept: MRI IMAGING | Facility: HOSPITAL | Age: 62
Discharge: HOME | End: 2022-02-04
Attending: INTERNAL MEDICINE
Payer: MEDICAID

## 2022-02-04 DIAGNOSIS — C22.1 CHOLANGIOCARCINOMA (HCC): ICD-10-CM

## 2022-02-04 PROCEDURE — A9581 GADOXETATE DISODIUM INJ: HCPCS | Performed by: INTERNAL MEDICINE

## 2022-02-04 PROCEDURE — 74183 MRI ABD W/O CNTR FLWD CNTR: CPT | Performed by: INTERNAL MEDICINE

## 2022-02-04 NOTE — TELEPHONE ENCOUNTER
Spoke with patient. She verbalized understanding. Patient transferred to central scheduling to assist with scheduling biopsy. Lebron Khan MD  P [facility] [place] [facility]  Results reviewed. MRI confirms worsening disease.  I ordered a CT Guided liver biopsy which we can use for tempus

## 2022-02-07 ENCOUNTER — OFFICE VISIT (OUTPATIENT)
Dept: HEMATOLOGY/ONCOLOGY | Facility: HOSPITAL | Age: 62
End: 2022-02-07
Attending: INTERNAL MEDICINE
Payer: MEDICAID

## 2022-02-07 ENCOUNTER — NURSE NAVIGATOR ENCOUNTER (OUTPATIENT)
Dept: HEMATOLOGY/ONCOLOGY | Facility: HOSPITAL | Age: 62
End: 2022-02-07

## 2022-02-07 VITALS
DIASTOLIC BLOOD PRESSURE: 76 MMHG | OXYGEN SATURATION: 94 % | HEART RATE: 120 BPM | WEIGHT: 161.19 LBS | RESPIRATION RATE: 20 BRPM | BODY MASS INDEX: 29.29 KG/M2 | TEMPERATURE: 97 F | SYSTOLIC BLOOD PRESSURE: 144 MMHG | HEIGHT: 62.21 IN

## 2022-02-07 DIAGNOSIS — T80.90XA INFUSION REACTION, INITIAL ENCOUNTER: Primary | ICD-10-CM

## 2022-02-07 DIAGNOSIS — C22.1 CHOLANGIOCARCINOMA (HCC): ICD-10-CM

## 2022-02-07 DIAGNOSIS — C22.1 METASTATIC CHOLANGIOCARCINOMA TO BILE DUCT (HCC): Primary | ICD-10-CM

## 2022-02-07 DIAGNOSIS — C78.89 METASTATIC CHOLANGIOCARCINOMA TO BILE DUCT (HCC): Primary | ICD-10-CM

## 2022-02-07 DIAGNOSIS — G89.3 NEOPLASM RELATED PAIN: Primary | ICD-10-CM

## 2022-02-07 PROCEDURE — 77301 RADIOTHERAPY DOSE PLAN IMRT: CPT | Performed by: RADIOLOGY

## 2022-02-07 PROCEDURE — 96367 TX/PROPH/DG ADDL SEQ IV INF: CPT

## 2022-02-07 PROCEDURE — 99214 OFFICE O/P EST MOD 30 MIN: CPT | Performed by: NURSE PRACTITIONER

## 2022-02-07 PROCEDURE — 99215 OFFICE O/P EST HI 40 MIN: CPT | Performed by: CLINICAL NURSE SPECIALIST

## 2022-02-07 PROCEDURE — 96375 TX/PRO/DX INJ NEW DRUG ADDON: CPT

## 2022-02-07 PROCEDURE — 77300 RADIATION THERAPY DOSE PLAN: CPT | Performed by: RADIOLOGY

## 2022-02-07 PROCEDURE — 96372 THER/PROPH/DIAG INJ SC/IM: CPT

## 2022-02-07 PROCEDURE — S0028 INJECTION, FAMOTIDINE, 20 MG: HCPCS | Performed by: CLINICAL NURSE SPECIALIST

## 2022-02-07 PROCEDURE — 77338 DESIGN MLC DEVICE FOR IMRT: CPT | Performed by: RADIOLOGY

## 2022-02-07 PROCEDURE — 96413 CHEMO IV INFUSION 1 HR: CPT

## 2022-02-07 RX ORDER — ATROPINE SULFATE 0.4 MG/ML
0.2 AMPUL (ML) INJECTION ONCE
Status: COMPLETED | OUTPATIENT
Start: 2022-02-07 | End: 2022-02-07

## 2022-02-07 RX ORDER — DIPHENHYDRAMINE HYDROCHLORIDE 50 MG/ML
50 INJECTION INTRAMUSCULAR; INTRAVENOUS ONCE
Status: COMPLETED | OUTPATIENT
Start: 2022-02-07 | End: 2022-02-07

## 2022-02-07 RX ORDER — FAMOTIDINE 10 MG/ML
20 INJECTION, SOLUTION INTRAVENOUS ONCE
Status: COMPLETED | OUTPATIENT
Start: 2022-02-07 | End: 2022-02-07

## 2022-02-07 RX ORDER — METHYLPREDNISOLONE SODIUM SUCCINATE 125 MG/2ML
125 INJECTION, POWDER, LYOPHILIZED, FOR SOLUTION INTRAMUSCULAR; INTRAVENOUS ONCE
Status: COMPLETED | OUTPATIENT
Start: 2022-02-07 | End: 2022-02-07

## 2022-02-07 RX ORDER — ALBUTEROL SULFATE 90 UG/1
2 AEROSOL, METERED RESPIRATORY (INHALATION) ONCE
Status: COMPLETED | OUTPATIENT
Start: 2022-02-07 | End: 2022-02-07

## 2022-02-07 RX ORDER — FLUOROURACIL 50 MG/ML
2400 INJECTION, SOLUTION INTRAVENOUS CONTINUOUS
Status: DISCONTINUED | OUTPATIENT
Start: 2022-02-07 | End: 2022-02-07

## 2022-02-07 RX ADMIN — ATROPINE SULFATE 0.2 MG: 0.4 MG/ML AMPUL (ML) INJECTION at 09:52:00

## 2022-02-07 RX ADMIN — FAMOTIDINE 20 MG: 10 INJECTION, SOLUTION INTRAVENOUS at 10:19:00

## 2022-02-07 RX ADMIN — ALBUTEROL SULFATE 2 PUFF: 90 AEROSOL, METERED RESPIRATORY (INHALATION) at 10:16:00

## 2022-02-07 RX ADMIN — DIPHENHYDRAMINE HYDROCHLORIDE 50 MG: 50 INJECTION INTRAMUSCULAR; INTRAVENOUS at 10:17:00

## 2022-02-07 RX ADMIN — METHYLPREDNISOLONE SODIUM SUCCINATE 125 MG: 125 INJECTION, POWDER, LYOPHILIZED, FOR SOLUTION INTRAMUSCULAR; INTRAVENOUS at 10:16:00

## 2022-02-07 NOTE — PROGRESS NOTES
Pt here for C1D1 liposomal irinotecan/leucovorin/5fu. Arrives Ambulating independently, accompanied by Self           Pregnancy screening: Denies possibility of pregnancy    Modifications in dose or schedule: No       Medication involved - Liposomal Irinotecan ([place])    Grade of reaction, patient symptoms at time of reaction - shortness of breath, hives, itching, wheezing, chest pain    Vital Signs taken - 144/76, , O2 98% on 2L    MD/APN/PA called to room, orders received - 2 puffs Albuterol Inhaler, 50mg IV benadryl, 125mg IV solumedrol, 20mg  IV Pepcid    Outcome of reaction - patient not re-challenged, discharged in stable condition     Patient was observed for 40 minutes after reaction occurred. Vital signs were stable upon discharge. Symptoms improved upon discharge. Patient was able to be weaned off oxygen with stable O2 saturation. Patient was advised to go to the ER if symptoms worsen or she has difficulty breathing.

## 2022-02-07 NOTE — PROGRESS NOTES
Next generation genomic sequencing order sent in online to [facility]; paired blood sample sent via [employer]. Specimen to be sent after planned biopsy on 02/11/2022 at [facility] to be tested; additional testing requested: BGD.

## 2022-02-08 ENCOUNTER — TELEPHONE (OUTPATIENT)
Dept: HEMATOLOGY/ONCOLOGY | Facility: HOSPITAL | Age: 62
End: 2022-02-08

## 2022-02-08 ENCOUNTER — LAB ENCOUNTER (OUTPATIENT)
Dept: LAB | Facility: HOSPITAL | Age: 62
End: 2022-02-08
Attending: INTERNAL MEDICINE
Payer: MEDICAID

## 2022-02-08 DIAGNOSIS — C22.1 CHOLANGIOCARCINOMA (HCC): ICD-10-CM

## 2022-02-08 LAB — SARS-COV-2 RNA RESP QL NAA+PROBE: NOT DETECTED

## 2022-02-08 NOTE — TELEPHONE ENCOUNTER
Toxicities: C1 D1 Irinotecan Liposome on 2/7/2022    Infusion Reaction with Irinotecan Liposome: Broncho spasm, chest pain, dyspnea, itching & hives on 2/7/2022    Mercedes Oakes said she feels good. She still has a little rash, but no itching. No other symptoms. I encouraged her to please call the office if she is not feeling well or she has any questions or concerns. She thanked me for checking on her.

## 2022-02-09 ENCOUNTER — APPOINTMENT (OUTPATIENT)
Dept: HEMATOLOGY/ONCOLOGY | Facility: HOSPITAL | Age: 62
End: 2022-02-09
Attending: INTERNAL MEDICINE
Payer: MEDICAID

## 2022-02-10 ENCOUNTER — TELEPHONE (OUTPATIENT)
Dept: HEMATOLOGY/ONCOLOGY | Facility: HOSPITAL | Age: 62
End: 2022-02-10

## 2022-02-10 RX ORDER — FAMOTIDINE 20 MG/1
20 TABLET, FILM COATED ORAL DAILY
Qty: 30 TABLET | Refills: 0 | Status: SHIPPED | OUTPATIENT
Start: 2022-02-10 | End: 2022-03-01

## 2022-02-10 RX ORDER — DEXAMETHASONE 4 MG/1
8 TABLET ORAL 2 TIMES DAILY
Qty: 60 TABLET | Refills: 1 | Status: SHIPPED | OUTPATIENT
Start: 2022-02-10 | End: 2022-03-12

## 2022-02-10 NOTE — TELEPHONE ENCOUNTER
Spoke with patient. She verbalized understanding. Scripts sent per MD.         MD Josias Bradford, RN  I switched her treatment plan to regular FOLFIRI. We will need to reconsent her on her visit. Also-can you start her on premedications:   1. Dex 8 mg BID starting 2 days before treatment. She will receive Dex 20 with chemotherapy   2.  Pepcid 20 mg daily starting 2 days before treatment     Thanks

## 2022-02-11 ENCOUNTER — HOSPITAL ENCOUNTER (OUTPATIENT)
Dept: CT IMAGING | Facility: HOSPITAL | Age: 62
Discharge: HOME | End: 2022-02-11
Attending: INTERNAL MEDICINE
Payer: MEDICAID

## 2022-02-11 ENCOUNTER — HOSPITAL ENCOUNTER (OUTPATIENT)
Dept: GENERAL RADIOLOGY | Facility: HOSPITAL | Age: 62
Discharge: HOME | End: 2022-02-11
Attending: INTERNAL MEDICINE
Payer: MEDICAID

## 2022-02-11 ENCOUNTER — APPOINTMENT (OUTPATIENT)
Dept: CT IMAGING | Facility: HOSPITAL | Age: 62
End: 2022-02-11
Attending: EMERGENCY MEDICINE
Payer: MEDICAID

## 2022-02-11 ENCOUNTER — HOSPITAL ENCOUNTER (EMERGENCY)
Facility: HOSPITAL | Age: 62
Discharge: HOME | End: 2022-02-12
Attending: EMERGENCY MEDICINE
Payer: MEDICAID

## 2022-02-11 VITALS
SYSTOLIC BLOOD PRESSURE: 123 MMHG | RESPIRATION RATE: 22 BRPM | HEART RATE: 100 BPM | WEIGHT: 165 LBS | BODY MASS INDEX: 30.36 KG/M2 | TEMPERATURE: 97 F | HEIGHT: 62 IN | OXYGEN SATURATION: 92 % | DIASTOLIC BLOOD PRESSURE: 81 MMHG

## 2022-02-11 DIAGNOSIS — R10.9 RIGHT FLANK PAIN: Primary | ICD-10-CM

## 2022-02-11 DIAGNOSIS — C22.1 CHOLANGIOCARCINOMA (HCC): ICD-10-CM

## 2022-02-11 DIAGNOSIS — E87.6 HYPOKALEMIA: ICD-10-CM

## 2022-02-11 DIAGNOSIS — C22.9 MALIGNANT NEOPLASM OF LIVER, UNSPECIFIED LIVER MALIGNANCY TYPE (HCC): ICD-10-CM

## 2022-02-11 DIAGNOSIS — C22.1 CHOLANGIOCARCINOMA (HCC): Primary | ICD-10-CM

## 2022-02-11 DIAGNOSIS — R74.01 TRANSAMINITIS: ICD-10-CM

## 2022-02-11 LAB
ALBUMIN SERPL-MCNC: 3.3 G/DL (ref 3.4–5)
ALBUMIN/GLOB SERPL: 0.8 {RATIO} (ref 1–2)
ALP LIVER SERPL-CCNC: 176 U/L
ALT SERPL-CCNC: 108 U/L
ANION GAP SERPL CALC-SCNC: 6 MMOL/L (ref 0–18)
AST SERPL-CCNC: 104 U/L (ref 15–37)
BASOPHILS # BLD AUTO: 0.04 X10(3) UL (ref 0–0.2)
BASOPHILS NFR BLD AUTO: 0.4 %
BILIRUB SERPL-MCNC: 0.9 MG/DL (ref 0.1–2)
BUN BLD-MCNC: 23 MG/DL (ref 7–18)
CALCIUM BLD-MCNC: 9.1 MG/DL (ref 8.5–10.1)
CHLORIDE SERPL-SCNC: 103 MMOL/L (ref 98–112)
CO2 SERPL-SCNC: 28 MMOL/L (ref 21–32)
CREAT BLD-MCNC: 0.98 MG/DL
EOSINOPHIL NFR BLD AUTO: 0.2 %
ERYTHROCYTE [DISTWIDTH] IN BLOOD BY AUTOMATED COUNT: 14.9 %
GLOBULIN PLAS-MCNC: 3.9 G/DL (ref 2.8–4.4)
GLUCOSE BLD-MCNC: 120 MG/DL (ref 70–99)
GLUCOSE BLD-MCNC: 139 MG/DL (ref 70–99)
GLUCOSE BLD-MCNC: 145 MG/DL (ref 70–99)
HCT VFR BLD AUTO: 41.7 %
HGB BLD-MCNC: 13.6 G/DL
IMM GRANULOCYTES # BLD AUTO: 0.04 X10(3) UL (ref 0–1)
IMM GRANULOCYTES NFR BLD: 0.4 %
INR BLD: 1.04 (ref 0.8–1.2)
LYMPHOCYTES # BLD AUTO: 0.63 X10(3) UL (ref 1–4)
MCH RBC QN AUTO: 31.2 PG (ref 26–34)
MCHC RBC AUTO-ENTMCNC: 32.6 G/DL (ref 31–37)
MCV RBC AUTO: 95.6 FL
MONOCYTES # BLD AUTO: 1.15 X10(3) UL (ref 0.1–1)
MONOCYTES NFR BLD AUTO: 12.1 %
NEUTROPHILS # BLD AUTO: 7.65 X10 (3) UL (ref 1.5–7.7)
NEUTROPHILS # BLD AUTO: 7.65 X10(3) UL (ref 1.5–7.7)
NEUTROPHILS NFR BLD AUTO: 80.3 %
OSMOLALITY SERPL CALC.SUM OF ELEC: 290 MOSM/KG (ref 275–295)
POTASSIUM SERPL-SCNC: 3.4 MMOL/L (ref 3.5–5.1)
PROT SERPL-MCNC: 7.2 G/DL (ref 6.4–8.2)
PROTHROMBIN TIME: 13.7 SECONDS (ref 11.6–14.8)
RBC # BLD AUTO: 4.36 X10(6)UL
SODIUM SERPL-SCNC: 137 MMOL/L (ref 136–145)
WBC # BLD AUTO: 9.5 X10(3) UL (ref 4–11)

## 2022-02-11 PROCEDURE — 85610 PROTHROMBIN TIME: CPT | Performed by: RADIOLOGY

## 2022-02-11 PROCEDURE — 96374 THER/PROPH/DIAG INJ IV PUSH: CPT

## 2022-02-11 PROCEDURE — 82962 GLUCOSE BLOOD TEST: CPT

## 2022-02-11 PROCEDURE — 85025 COMPLETE CBC W/AUTO DIFF WBC: CPT | Performed by: EMERGENCY MEDICINE

## 2022-02-11 PROCEDURE — 80053 COMPREHEN METABOLIC PANEL: CPT | Performed by: EMERGENCY MEDICINE

## 2022-02-11 PROCEDURE — 88341 IMHCHEM/IMCYTCHM EA ADD ANTB: CPT | Performed by: INTERNAL MEDICINE

## 2022-02-11 PROCEDURE — 47000 NEEDLE BIOPSY OF LIVER PERQ: CPT | Performed by: INTERNAL MEDICINE

## 2022-02-11 PROCEDURE — 74177 CT ABD & PELVIS W/CONTRAST: CPT | Performed by: EMERGENCY MEDICINE

## 2022-02-11 PROCEDURE — 88307 TISSUE EXAM BY PATHOLOGIST: CPT | Performed by: INTERNAL MEDICINE

## 2022-02-11 PROCEDURE — 0FB23ZX EXCISION OF LEFT LOBE LIVER, PERCUTANEOUS APPROACH, DIAGNOSTIC: ICD-10-PCS | Performed by: RADIOLOGY

## 2022-02-11 PROCEDURE — 99284 EMERGENCY DEPT VISIT MOD MDM: CPT

## 2022-02-11 PROCEDURE — 99152 MOD SED SAME PHYS/QHP 5/>YRS: CPT | Performed by: INTERNAL MEDICINE

## 2022-02-11 PROCEDURE — 88342 IMHCHEM/IMCYTCHM 1ST ANTB: CPT | Performed by: INTERNAL MEDICINE

## 2022-02-11 PROCEDURE — 99285 EMERGENCY DEPT VISIT HI MDM: CPT

## 2022-02-11 PROCEDURE — 77012 CT SCAN FOR NEEDLE BIOPSY: CPT | Performed by: INTERNAL MEDICINE

## 2022-02-11 PROCEDURE — 96361 HYDRATE IV INFUSION ADD-ON: CPT

## 2022-02-11 PROCEDURE — 36591 DRAW BLOOD OFF VENOUS DEVICE: CPT

## 2022-02-11 RX ORDER — IOHEXOL 350 MG/ML
80 INJECTION, SOLUTION INTRAVENOUS
Status: COMPLETED | OUTPATIENT
Start: 2022-02-11 | End: 2022-02-11

## 2022-02-11 RX ORDER — ACETAMINOPHEN 325 MG/1
650 TABLET ORAL EVERY 4 HOURS PRN
Status: DISCONTINUED | OUTPATIENT
Start: 2022-02-11 | End: 2022-02-13

## 2022-02-11 RX ORDER — HYDROCODONE BITARTRATE AND ACETAMINOPHEN 5; 325 MG/1; MG/1
1 TABLET ORAL EVERY 4 HOURS PRN
Status: DISCONTINUED | OUTPATIENT
Start: 2022-02-11 | End: 2022-02-13

## 2022-02-11 RX ORDER — MORPHINE SULFATE 4 MG/ML
4 INJECTION, SOLUTION INTRAMUSCULAR; INTRAVENOUS ONCE
Status: DISCONTINUED | OUTPATIENT
Start: 2022-02-11 | End: 2022-02-12

## 2022-02-11 RX ORDER — NALOXONE HYDROCHLORIDE 0.4 MG/ML
80 INJECTION, SOLUTION INTRAMUSCULAR; INTRAVENOUS; SUBCUTANEOUS AS NEEDED
Status: DISCONTINUED | OUTPATIENT
Start: 2022-02-11 | End: 2022-02-13

## 2022-02-11 RX ORDER — HYDROCODONE BITARTRATE AND ACETAMINOPHEN 5; 325 MG/1; MG/1
2 TABLET ORAL EVERY 4 HOURS PRN
Status: DISCONTINUED | OUTPATIENT
Start: 2022-02-11 | End: 2022-02-13

## 2022-02-11 RX ORDER — SODIUM CHLORIDE 9 MG/ML
INJECTION, SOLUTION INTRAVENOUS CONTINUOUS
Status: DISCONTINUED | OUTPATIENT
Start: 2022-02-11 | End: 2022-02-13

## 2022-02-11 RX ORDER — MIDAZOLAM HYDROCHLORIDE 1 MG/ML
1 INJECTION INTRAMUSCULAR; INTRAVENOUS EVERY 5 MIN PRN
Status: DISCONTINUED | OUTPATIENT
Start: 2022-02-11 | End: 2022-02-11

## 2022-02-11 RX ORDER — FLUMAZENIL 0.1 MG/ML
0.2 INJECTION, SOLUTION INTRAVENOUS AS NEEDED
Status: DISCONTINUED | OUTPATIENT
Start: 2022-02-11 | End: 2022-02-13

## 2022-02-11 RX ORDER — MIDAZOLAM HYDROCHLORIDE 1 MG/ML
INJECTION INTRAMUSCULAR; INTRAVENOUS
Status: COMPLETED
Start: 2022-02-11 | End: 2022-02-11

## 2022-02-11 RX ORDER — ONDANSETRON 2 MG/ML
4 INJECTION INTRAMUSCULAR; INTRAVENOUS ONCE
Status: COMPLETED | OUTPATIENT
Start: 2022-02-11 | End: 2022-02-11

## 2022-02-11 RX ADMIN — MIDAZOLAM HYDROCHLORIDE 1 MG: 1 INJECTION INTRAMUSCULAR; INTRAVENOUS at 09:30:00

## 2022-02-11 RX ADMIN — MIDAZOLAM HYDROCHLORIDE 1 MG: 1 INJECTION INTRAMUSCULAR; INTRAVENOUS at 09:40:00

## 2022-02-11 RX ADMIN — SODIUM CHLORIDE: 9 INJECTION, SOLUTION INTRAVENOUS at 09:20:00

## 2022-02-11 NOTE — PROCEDURES
[facility]  Procedure Note    Heidi Javier Patient Status:  Outpatient    1960 MRN [id number]   [facility] CT Attending Lazaro Lim MD   Hosp Day # 0 PCP Louis House MD     Procedure: CT guided liver biopsy    Pre-Procedure Diagnosis:  cholangiocarcinoma    Post-Procedure Diagnosis: same    Anesthesia:  Local and Sedation    Findings:  Solid cores    Specimens: 4 18 g cores    Blood Loss:  <10 ml    Tourniquet Time: n/a  Complications:  None  Drains:  none    Secondary Diagnosis:  none    Mg Coulter MD  2022

## 2022-02-11 NOTE — IMAGING NOTE
Ayanmarivel Neema, name, date of birth and allergies verified with pt. Pt here for liver bx. States NPO since 1700. Pre procedure vitals checked. IV access established to right Port access  0.9 NS IV initiated to maintain patency. Informed consent obtained by Dr Hussein Jung. Positioned pt on scanner. Universal protocol performed. Site prepped, draped and local anesthetic given. Obtained biopsy. Specimen sent to Pathology. Dressing to band aid, left/Mid  Upper abd, clean, dry and intact. Presently denies pain. Tolerated procedure well. Vital signs stable on room air. Report given to Nat Vásquez RN at [facility]. Transported pt to Rm 2258 accompanied by [employer].

## 2022-02-11 NOTE — INTERVAL H&P NOTE
The above referenced H&P was reviewed by Tamra Leavitt MD on 2/11/2022, the patient was examined and no significant changes have occurred in the patient's condition since the H&P was performed. Risks, benefits, alternative treatments and consequences of no treatment were discussed. We will proceed with procedure as planned.       Tamra Leavitt MD  2/11/2022  9:21 AM

## 2022-02-12 VITALS
HEART RATE: 101 BPM | RESPIRATION RATE: 20 BRPM | DIASTOLIC BLOOD PRESSURE: 76 MMHG | WEIGHT: 160 LBS | TEMPERATURE: 98 F | SYSTOLIC BLOOD PRESSURE: 129 MMHG | OXYGEN SATURATION: 96 % | BODY MASS INDEX: 29 KG/M2

## 2022-02-12 LAB
BILIRUB UR QL STRIP.AUTO: NEGATIVE
CLARITY UR REFRACT.AUTO: CLEAR
COLOR UR AUTO: YELLOW
GLUCOSE UR STRIP.AUTO-MCNC: NEGATIVE MG/DL
HYALINE CASTS #/AREA URNS AUTO: PRESENT /LPF
KETONES UR STRIP.AUTO-MCNC: NEGATIVE MG/DL
NITRITE UR QL STRIP.AUTO: NEGATIVE
PH UR STRIP.AUTO: 6 [PH] (ref 5–8)
PROT UR STRIP.AUTO-MCNC: NEGATIVE MG/DL
RBC UR QL AUTO: NEGATIVE
SP GR UR STRIP.AUTO: >1.03 (ref 1–1.03)
UROBILINOGEN UR STRIP.AUTO-MCNC: 2 MG/DL

## 2022-02-12 PROCEDURE — 81001 URINALYSIS AUTO W/SCOPE: CPT | Performed by: EMERGENCY MEDICINE

## 2022-02-12 PROCEDURE — 87086 URINE CULTURE/COLONY COUNT: CPT | Performed by: EMERGENCY MEDICINE

## 2022-02-12 RX ORDER — LIDOCAINE 50 MG/G
1 PATCH TOPICAL EVERY 24 HOURS
Qty: 30 PATCH | Refills: 0 | Status: SHIPPED | OUTPATIENT
Start: 2022-02-11

## 2022-02-12 RX ORDER — POTASSIUM CHLORIDE 20 MEQ/1
20 TABLET, EXTENDED RELEASE ORAL ONCE
Status: COMPLETED | OUTPATIENT
Start: 2022-02-12 | End: 2022-02-12

## 2022-02-12 NOTE — ED QUICK NOTES
Pt taken to CT via cart. States, \"pain is OK. \" Pt declined Morphine, \"it makes me sick to my stomach and I have not eaten. I will wait. \" Pt in no acute distress

## 2022-02-12 NOTE — ED INITIAL ASSESSMENT (HPI)
Pt to ed with complaints of right flank pain, fever, and vomiting since 3pm, had of liver biopsy this am

## 2022-02-14 ENCOUNTER — APPOINTMENT (OUTPATIENT)
Dept: HEMATOLOGY/ONCOLOGY | Facility: HOSPITAL | Age: 62
End: 2022-02-14
Attending: INTERNAL MEDICINE
Payer: MEDICAID

## 2022-02-15 ENCOUNTER — APPOINTMENT (OUTPATIENT)
Dept: HEMATOLOGY/ONCOLOGY | Facility: HOSPITAL | Age: 62
End: 2022-02-15
Attending: INTERNAL MEDICINE
Payer: MEDICAID

## 2022-02-15 VITALS
BODY MASS INDEX: 29.19 KG/M2 | RESPIRATION RATE: 18 BRPM | DIASTOLIC BLOOD PRESSURE: 91 MMHG | HEIGHT: 62.21 IN | WEIGHT: 160.63 LBS | HEART RATE: 85 BPM | SYSTOLIC BLOOD PRESSURE: 176 MMHG | TEMPERATURE: 98 F | OXYGEN SATURATION: 95 %

## 2022-02-15 DIAGNOSIS — C22.1 METASTATIC CHOLANGIOCARCINOMA TO BILE DUCT (HCC): Primary | ICD-10-CM

## 2022-02-15 DIAGNOSIS — C78.89 METASTATIC CHOLANGIOCARCINOMA TO BILE DUCT (HCC): Primary | ICD-10-CM

## 2022-02-15 DIAGNOSIS — C22.1 CHOLANGIOCARCINOMA (HCC): Primary | ICD-10-CM

## 2022-02-15 DIAGNOSIS — C22.1 CHOLANGIOCARCINOMA (HCC): ICD-10-CM

## 2022-02-15 LAB
ALBUMIN SERPL-MCNC: 3.3 G/DL (ref 3.4–5)
ALBUMIN/GLOB SERPL: 0.8 {RATIO} (ref 1–2)
ALP LIVER SERPL-CCNC: 181 U/L
ALT SERPL-CCNC: 83 U/L
ANION GAP SERPL CALC-SCNC: 8 MMOL/L (ref 0–18)
AST SERPL-CCNC: 42 U/L (ref 15–37)
BASOPHILS # BLD AUTO: 0.03 X10(3) UL (ref 0–0.2)
BASOPHILS NFR BLD AUTO: 0.2 %
BILIRUB SERPL-MCNC: 0.5 MG/DL (ref 0.1–2)
BUN BLD-MCNC: 19 MG/DL (ref 7–18)
CANCER AG19-9 SERPL-ACNC: ABNORMAL U/ML (ref ?–37)
CHLORIDE SERPL-SCNC: 102 MMOL/L (ref 98–112)
CO2 SERPL-SCNC: 24 MMOL/L (ref 21–32)
CREAT BLD-MCNC: 1.04 MG/DL
EOSINOPHIL # BLD AUTO: 0 X10(3) UL (ref 0–0.7)
EOSINOPHIL NFR BLD AUTO: 0 %
ERYTHROCYTE [DISTWIDTH] IN BLOOD BY AUTOMATED COUNT: 14.7 %
GLOBULIN PLAS-MCNC: 4.3 G/DL (ref 2.8–4.4)
GLUCOSE BLD-MCNC: 245 MG/DL (ref 70–99)
HCT VFR BLD AUTO: 37.6 %
HGB BLD-MCNC: 12.9 G/DL
IMM GRANULOCYTES # BLD AUTO: 0.27 X10(3) UL (ref 0–1)
IMM GRANULOCYTES NFR BLD: 1.5 %
LYMPHOCYTES # BLD AUTO: 0.71 X10(3) UL (ref 1–4)
LYMPHOCYTES NFR BLD AUTO: 3.9 %
MCH RBC QN AUTO: 31.9 PG (ref 26–34)
MCHC RBC AUTO-ENTMCNC: 34.3 G/DL (ref 31–37)
MCV RBC AUTO: 93.1 FL
MONOCYTES # BLD AUTO: 0.44 X10(3) UL (ref 0.1–1)
MONOCYTES NFR BLD AUTO: 2.4 %
NEUTROPHILS # BLD AUTO: 16.69 X10 (3) UL (ref 1.5–7.7)
NEUTROPHILS # BLD AUTO: 16.69 X10(3) UL (ref 1.5–7.7)
NEUTROPHILS NFR BLD AUTO: 92 %
OSMOLALITY SERPL CALC.SUM OF ELEC: 288 MOSM/KG (ref 275–295)
PLATELET # BLD AUTO: 159 10(3)UL (ref 150–450)
POTASSIUM SERPL-SCNC: 4.1 MMOL/L (ref 3.5–5.1)
RBC # BLD AUTO: 4.04 X10(6)UL
SODIUM SERPL-SCNC: 134 MMOL/L (ref 136–145)

## 2022-02-15 PROCEDURE — 96374 THER/PROPH/DIAG INJ IV PUSH: CPT

## 2022-02-15 PROCEDURE — 99213 OFFICE O/P EST LOW 20 MIN: CPT | Performed by: INTERNAL MEDICINE

## 2022-02-15 PROCEDURE — 96368 THER/DIAG CONCURRENT INF: CPT

## 2022-02-15 PROCEDURE — 96413 CHEMO IV INFUSION 1 HR: CPT

## 2022-02-15 PROCEDURE — 80053 COMPREHEN METABOLIC PANEL: CPT

## 2022-02-15 PROCEDURE — 85025 COMPLETE CBC W/AUTO DIFF WBC: CPT

## 2022-02-15 PROCEDURE — 96375 TX/PRO/DX INJ NEW DRUG ADDON: CPT

## 2022-02-15 PROCEDURE — 96411 CHEMO IV PUSH ADDL DRUG: CPT

## 2022-02-15 RX ORDER — DIPHENHYDRAMINE HYDROCHLORIDE 50 MG/ML
50 INJECTION INTRAMUSCULAR; INTRAVENOUS ONCE
Status: CANCELLED
Start: 2022-02-15 | End: 2022-02-15

## 2022-02-15 RX ORDER — FLUOROURACIL 50 MG/ML
2400 INJECTION, SOLUTION INTRAVENOUS CONTINUOUS
Status: DISCONTINUED | OUTPATIENT
Start: 2022-02-15 | End: 2022-02-15

## 2022-02-15 RX ORDER — ATROPINE SULFATE 0.4 MG/ML
0.2 AMPUL (ML) INJECTION ONCE
Status: CANCELLED | OUTPATIENT
Start: 2022-02-15

## 2022-02-15 RX ORDER — FLUOROURACIL 50 MG/ML
400 INJECTION, SOLUTION INTRAVENOUS ONCE
Status: CANCELLED | OUTPATIENT
Start: 2022-02-15

## 2022-02-15 RX ORDER — FLUOROURACIL 50 MG/ML
400 INJECTION, SOLUTION INTRAVENOUS ONCE
Status: COMPLETED | OUTPATIENT
Start: 2022-02-15 | End: 2022-02-15

## 2022-02-15 RX ORDER — DIPHENHYDRAMINE HYDROCHLORIDE 50 MG/ML
50 INJECTION INTRAMUSCULAR; INTRAVENOUS ONCE
Status: COMPLETED | OUTPATIENT
Start: 2022-02-15 | End: 2022-02-15

## 2022-02-15 RX ORDER — ATROPINE SULFATE 0.4 MG/ML
0.2 AMPUL (ML) INJECTION ONCE
Status: COMPLETED | OUTPATIENT
Start: 2022-02-15 | End: 2022-02-15

## 2022-02-15 RX ORDER — FLUOROURACIL 50 MG/ML
2400 INJECTION, SOLUTION INTRAVENOUS CONTINUOUS
Status: CANCELLED | OUTPATIENT
Start: 2022-02-15

## 2022-02-15 RX ADMIN — ATROPINE SULFATE 0.2 MG: 0.4 MG/ML AMPUL (ML) INJECTION at 10:23:00

## 2022-02-15 RX ADMIN — FLUOROURACIL 4300 MG: 50 INJECTION, SOLUTION INTRAVENOUS at 12:21:00

## 2022-02-15 RX ADMIN — FLUOROURACIL 700 MG: 50 INJECTION, SOLUTION INTRAVENOUS at 12:11:00

## 2022-02-15 RX ADMIN — DIPHENHYDRAMINE HYDROCHLORIDE 50 MG: 50 INJECTION INTRAMUSCULAR; INTRAVENOUS at 09:44:00

## 2022-02-15 NOTE — PROGRESS NOTES
Outpatient Oncology Care Plan   Problem list:   knowledge deficit   Problems related to:   chemotherapy   Interventions:   provided general teaching   Expected outcomes:   understands plan of care   Progress towards outcome: making progress     Education Record   Learner: Patient   Barriers / Limitations: None   Method: Discussion   Outcome: Shows understanding   Comments:  Patient here for C1D1 Folfiri. Recently had biopsy performed. Having some nausea, but well managed at this time. Bowels managed with lomotil. Took all pre-meds as instructed. Denies any additional symptoms at this time.

## 2022-02-15 NOTE — PROGRESS NOTES
Pt here for C1D1 FOLFIRI. Patient handled chemo well today without any sign of reaction.  Patient did receive following premeds:  -50 IV benadryl  -100mg solucortef  -Dex 20mg     Arrives Ambulating independently, accompanied by Self and Spouse           Pregnancy screening: Not applicable    Modifications in dose or schedule: No     Frequency of blood return and site check throughout administration: Prior to administration, Prior to each drug, Every 2-3 ml IVP and At completion of therapy   Discharged to Home, Ambulating independently, accompanied by:Self and Spouse    Outpatient Oncology Care Plan  Problem list:  fatigue  knowledge deficit  Problems related to:    chemotherapy  side effect of treatment  Interventions:  patient/family supported  monitor lab values  promoted rest  provided general teaching  Expected outcomes:  patient supported/coping well  safe in environment  symptoms relieved/minimized  understands plan of care  Progress towards outcome:  making progress    Education Record    Learner:  Patient  Barriers / Limitations:  None  Method:  Brief focused and Discussion  Outcome:  Shows understanding  Comments:

## 2022-02-17 ENCOUNTER — HOSPITAL ENCOUNTER (OUTPATIENT)
Dept: RADIATION ONCOLOGY | Facility: HOSPITAL | Age: 62
Discharge: HOME | End: 2022-02-17
Attending: RADIOLOGY
Payer: MEDICAID

## 2022-02-17 ENCOUNTER — NURSE ONLY (OUTPATIENT)
Dept: HEMATOLOGY/ONCOLOGY | Facility: HOSPITAL | Age: 62
End: 2022-02-17
Attending: INTERNAL MEDICINE
Payer: MEDICAID

## 2022-02-17 VITALS
RESPIRATION RATE: 18 BRPM | OXYGEN SATURATION: 95 % | DIASTOLIC BLOOD PRESSURE: 83 MMHG | SYSTOLIC BLOOD PRESSURE: 132 MMHG | HEART RATE: 85 BPM | TEMPERATURE: 99 F

## 2022-02-17 DIAGNOSIS — C79.51 BONE METASTASES (HCC): Primary | ICD-10-CM

## 2022-02-17 PROCEDURE — 96523 IRRIG DRUG DELIVERY DEVICE: CPT

## 2022-02-17 PROCEDURE — 77386 HC IMRT COMPLEX: CPT | Performed by: RADIOLOGY

## 2022-02-17 PROCEDURE — 77417 THER RADIOLOGY PORT IMAGE(S): CPT | Performed by: RADIOLOGY

## 2022-02-17 NOTE — PROGRESS NOTES
[facility] Radiation Treatment Management Note 1-5    Patient:  Noris Arana  Age:  62year old  Visit Diagnosis:    1. Bone metastases ([address]      Primary Rad/Onc:  Dr. Raji Chanel    Site Delivered Dose (cGy) Prescribed Dose (cGy) Fraction #   T4 500 2000 1/4   T7  400 2000 1/5     First treatment date:   2.17.2022  Concurrent chemotherapy:  Folfiri every other week     Oncology Vitals 2/12/2022 2/15/2022 2/17/2022   Height - 5' 2.205\" -   Height - 158 cm -   Weight - 160 lb 9.6 oz -   Weight - 72.848 kg -   BSA (m2) - 1.75 m2 -   /76 176/91 132/83   Pulse 101 85 85   Resp 20 18 18   Temp - 97.6 99.2   SpO2 96 95 95   Pain Score - 0 0   Some recent data might be hidden      CBC:    Lab Results   Component Value Date    WBC 18.1 (H) 02/15/2022    WBC 9.5 02/11/2022    WBC 6.7 02/03/2022     Lab Results   Component Value Date    HEMOGLOBIN 12.5 03/25/2021    HEMOGLOBIN 14.6 10/08/2019    HEMOGLOBIN 15.0 09/01/2019    HGB 12.9 02/15/2022    HGB 13.6 02/11/2022    HGB 11.8 (L) 02/03/2022      Lab Results   Component Value Date    .0 02/15/2022    .0 02/11/2022    .0 (L) 02/03/2022       Toxicities:  Fatigue Grade 1= Fatigue relieved by rest  Bone pain Grade 0= None  Gait disturbance Grade 0= None  Muscle weakness Grade 0= None    Nursing Note:  VSS  Biopsy of liver on 2.11.2022. Fever afterward. Chemo this week. Denies pain. On decadron. On prednisone for breathing. Isaiah Putnam, RN    Physician Note:  Subjective:  Pain better w/ steroids  Had liver bx      Objective:  No changes      Treatment setup imaging have been reviewed:   Yes    Assessment/Plan:  Finish RT to T4 and T7 (retreat)  F/u prn  Call if pain or trouble with swallowing in next 1-2 w    Dr. Raji Chanel

## 2022-02-17 NOTE — PROGRESS NOTES
Education Record    Learner:  Patient and Spouse    Disease / Diagnosis: Cholangiocarcinoma    Barriers / Limitations:  None   Comments:    Method:  Brief focused, Demonstration, Discussion and Reinforcement   Comments:    General Topics:  Medication, Procedure, Side effects and symptom management and Plan of care reviewed   Comments:    Outcome:  Observed demonstration and Shows understanding   Comments: Patient's spouse assisted in pump disconnect and port needle decannulation.

## 2022-02-18 PROCEDURE — 77386 HC IMRT COMPLEX: CPT | Performed by: RADIOLOGY

## 2022-02-21 PROCEDURE — 77386 HC IMRT COMPLEX: CPT | Performed by: RADIOLOGY

## 2022-02-22 ENCOUNTER — OFFICE VISIT (OUTPATIENT)
Dept: HEMATOLOGY/ONCOLOGY | Facility: HOSPITAL | Age: 62
End: 2022-02-22
Attending: INTERNAL MEDICINE
Payer: MEDICAID

## 2022-02-22 VITALS
TEMPERATURE: 97 F | DIASTOLIC BLOOD PRESSURE: 66 MMHG | HEART RATE: 134 BPM | SYSTOLIC BLOOD PRESSURE: 100 MMHG | RESPIRATION RATE: 16 BRPM | WEIGHT: 155 LBS | BODY MASS INDEX: 28.16 KG/M2 | OXYGEN SATURATION: 97 % | HEIGHT: 62.21 IN

## 2022-02-22 DIAGNOSIS — R79.89 ELEVATED LFTS: ICD-10-CM

## 2022-02-22 DIAGNOSIS — C22.1 CHOLANGIOCARCINOMA (HCC): Primary | ICD-10-CM

## 2022-02-22 DIAGNOSIS — E86.0 DEHYDRATION: ICD-10-CM

## 2022-02-22 DIAGNOSIS — B37.3 YEAST INFECTION INVOLVING THE VAGINA AND SURROUNDING AREA: ICD-10-CM

## 2022-02-22 DIAGNOSIS — E87.1 HYPONATREMIA: ICD-10-CM

## 2022-02-22 DIAGNOSIS — K12.30 MUCOSITIS: ICD-10-CM

## 2022-02-22 DIAGNOSIS — D69.6 THROMBOCYTOPENIA (HCC): ICD-10-CM

## 2022-02-22 LAB
ALBUMIN SERPL-MCNC: 3 G/DL (ref 3.4–5)
ALBUMIN/GLOB SERPL: 0.8 {RATIO} (ref 1–2)
ALP LIVER SERPL-CCNC: 175 U/L
ALT SERPL-CCNC: 110 U/L
ANION GAP SERPL CALC-SCNC: 8 MMOL/L (ref 0–18)
AST SERPL-CCNC: 55 U/L (ref 15–37)
BASOPHILS # BLD AUTO: 0.01 X10(3) UL (ref 0–0.2)
BASOPHILS NFR BLD AUTO: 0.1 %
BILIRUB SERPL-MCNC: 1.3 MG/DL (ref 0.1–2)
BUN BLD-MCNC: 16 MG/DL (ref 7–18)
CALCIUM BLD-MCNC: 8.8 MG/DL (ref 8.5–10.1)
CHLORIDE SERPL-SCNC: 98 MMOL/L (ref 98–112)
CO2 SERPL-SCNC: 26 MMOL/L (ref 21–32)
CREAT BLD-MCNC: 1 MG/DL
EOSINOPHIL # BLD AUTO: 0.09 X10(3) UL (ref 0–0.7)
EOSINOPHIL NFR BLD AUTO: 1 %
ERYTHROCYTE [DISTWIDTH] IN BLOOD BY AUTOMATED COUNT: 14.5 %
FASTING STATUS PATIENT QL REPORTED: NO
GLOBULIN PLAS-MCNC: 3.8 G/DL (ref 2.8–4.4)
GLUCOSE BLD-MCNC: 334 MG/DL (ref 70–99)
HCT VFR BLD AUTO: 38.1 %
HGB BLD-MCNC: 13.5 G/DL
IMM GRANULOCYTES # BLD AUTO: 0.17 X10(3) UL (ref 0–1)
IMM GRANULOCYTES NFR BLD: 2 %
LYMPHOCYTES # BLD AUTO: 0.62 X10(3) UL (ref 1–4)
LYMPHOCYTES NFR BLD AUTO: 7.2 %
MCH RBC QN AUTO: 31.8 PG (ref 26–34)
MCHC RBC AUTO-ENTMCNC: 35.4 G/DL (ref 31–37)
MCV RBC AUTO: 89.9 FL
MONOCYTES # BLD AUTO: 0.21 X10(3) UL (ref 0.1–1)
MONOCYTES NFR BLD AUTO: 2.4 %
NEUTROPHILS # BLD AUTO: 7.53 X10 (3) UL (ref 1.5–7.7)
NEUTROPHILS # BLD AUTO: 7.53 X10(3) UL (ref 1.5–7.7)
NEUTROPHILS NFR BLD AUTO: 87.3 %
OSMOLALITY SERPL CALC.SUM OF ELEC: 288 MOSM/KG (ref 275–295)
PLATELET # BLD AUTO: 76 10(3)UL (ref 150–450)
POTASSIUM SERPL-SCNC: 3.5 MMOL/L (ref 3.5–5.1)
PROT SERPL-MCNC: 6.8 G/DL (ref 6.4–8.2)
RBC # BLD AUTO: 4.24 X10(6)UL
SODIUM SERPL-SCNC: 132 MMOL/L (ref 136–145)
WBC # BLD AUTO: 8.6 X10(3) UL (ref 4–11)

## 2022-02-22 PROCEDURE — 77386 HC IMRT COMPLEX: CPT | Performed by: RADIOLOGY

## 2022-02-22 PROCEDURE — 96360 HYDRATION IV INFUSION INIT: CPT

## 2022-02-22 PROCEDURE — 83036 HEMOGLOBIN GLYCOSYLATED A1C: CPT | Performed by: NURSE PRACTITIONER

## 2022-02-22 PROCEDURE — 99215 OFFICE O/P EST HI 40 MIN: CPT | Performed by: NURSE PRACTITIONER

## 2022-02-22 PROCEDURE — 85025 COMPLETE CBC W/AUTO DIFF WBC: CPT

## 2022-02-22 PROCEDURE — 80053 COMPREHEN METABOLIC PANEL: CPT

## 2022-02-22 RX ORDER — DIPHENHYDRAMINE HYDROCHLORIDE AND LIDOCAINE HYDROCHLORIDE AND ALUMINUM HYDROXIDE AND MAGNESIUM HYDRO
5 KIT 4 TIMES DAILY PRN
Qty: 237 ML | Refills: 3 | Status: SHIPPED | OUTPATIENT
Start: 2022-02-22 | End: 2022-02-22 | Stop reason: ALTCHOICE

## 2022-02-22 RX ORDER — FLUCONAZOLE 100 MG/1
TABLET ORAL
Qty: 8 TABLET | Refills: 0 | Status: SHIPPED | OUTPATIENT
Start: 2022-02-22

## 2022-02-22 NOTE — PROGRESS NOTES
Patient presents with: Follow - Up: apn assessment      Patient here for D8 apn assessment of Folfiri. Patient started RT last week c/o feeling fatigue all the time with dry mouth very hard to swallow at times. She is eating and drinking small amounts denies any N/V/D/C at this time.       Learner:  Patient, family member    Disease / Diagnosis: cholangiocarcinoma    Barriers / Limitations:  None   Comments:    Method:  Brief focused   Comments:    General Topics:     Comments:    Outcome:  Shows understanding   Comments:

## 2022-02-23 ENCOUNTER — OFFICE VISIT (OUTPATIENT)
Dept: HEMATOLOGY/ONCOLOGY | Facility: HOSPITAL | Age: 62
End: 2022-02-23
Attending: INTERNAL MEDICINE
Payer: MEDICAID

## 2022-02-23 VITALS
SYSTOLIC BLOOD PRESSURE: 137 MMHG | DIASTOLIC BLOOD PRESSURE: 72 MMHG | RESPIRATION RATE: 16 BRPM | HEART RATE: 114 BPM | TEMPERATURE: 99 F | OXYGEN SATURATION: 98 %

## 2022-02-23 DIAGNOSIS — C22.1 CHOLANGIOCARCINOMA (HCC): Primary | ICD-10-CM

## 2022-02-23 PROCEDURE — 77336 RADIATION PHYSICS CONSULT: CPT | Performed by: RADIOLOGY

## 2022-02-23 PROCEDURE — 96360 HYDRATION IV INFUSION INIT: CPT

## 2022-02-23 PROCEDURE — 77386 HC IMRT COMPLEX: CPT | Performed by: RADIOLOGY

## 2022-02-23 NOTE — PROGRESS NOTES
Education Record    Learner:  Patient and Family Member     Disease / Diagnosis: Cholangiocarcinoma. Here for IVF. Barriers / Limitations:  None   Comments:    Method:  Discussion   Comments:    General Topics:  Plan of care reviewed   Comments:    Outcome:  Shows understanding   Comments:    Patient tolerated IVF well.  Patient discharged in stable condition

## 2022-02-24 LAB
EST. AVERAGE GLUCOSE BLD GHB EST-MCNC: 217 MG/DL (ref 68–126)
HBA1C MFR BLD: 9.2 % (ref ?–5.7)

## 2022-02-24 RX ORDER — INSULIN GLARGINE 100 [IU]/ML
INJECTION, SOLUTION SUBCUTANEOUS
Qty: 15 ML | Refills: 0 | Status: SHIPPED | OUTPATIENT
Start: 2022-02-24

## 2022-02-24 NOTE — TELEPHONE ENCOUNTER
Added A1C lab to current CBC lab specimen - await results   Ok to schedule f/u for telehealth   Last A1c value was 8.3% done 12/14/2021.

## 2022-02-25 RX ORDER — AMLODIPINE BESYLATE 10 MG/1
TABLET ORAL
Qty: 90 TABLET | Refills: 0 | Status: SHIPPED | OUTPATIENT
Start: 2022-02-25

## 2022-03-01 ENCOUNTER — OFFICE VISIT (OUTPATIENT)
Dept: HEMATOLOGY/ONCOLOGY | Facility: HOSPITAL | Age: 62
End: 2022-03-01
Attending: INTERNAL MEDICINE
Payer: MEDICAID

## 2022-03-01 ENCOUNTER — TELEPHONE (OUTPATIENT)
Dept: HEMATOLOGY/ONCOLOGY | Facility: HOSPITAL | Age: 62
End: 2022-03-01

## 2022-03-01 VITALS
HEIGHT: 62.21 IN | WEIGHT: 150 LBS | OXYGEN SATURATION: 93 % | BODY MASS INDEX: 27.26 KG/M2 | RESPIRATION RATE: 16 BRPM | HEART RATE: 101 BPM | DIASTOLIC BLOOD PRESSURE: 67 MMHG | TEMPERATURE: 97 F | SYSTOLIC BLOOD PRESSURE: 104 MMHG

## 2022-03-01 DIAGNOSIS — C22.1 METASTATIC CHOLANGIOCARCINOMA TO BILE DUCT (HCC): Primary | ICD-10-CM

## 2022-03-01 DIAGNOSIS — C78.89 METASTATIC CHOLANGIOCARCINOMA TO BILE DUCT (HCC): Primary | ICD-10-CM

## 2022-03-01 DIAGNOSIS — C22.1 CHOLANGIOCARCINOMA (HCC): ICD-10-CM

## 2022-03-01 LAB
ALBUMIN SERPL-MCNC: 3 G/DL (ref 3.4–5)
ALBUMIN/GLOB SERPL: 0.9 {RATIO} (ref 1–2)
ALP LIVER SERPL-CCNC: 169 U/L
ALT SERPL-CCNC: 125 U/L
ANION GAP SERPL CALC-SCNC: 10 MMOL/L (ref 0–18)
AST SERPL-CCNC: 52 U/L (ref 15–37)
BASOPHILS # BLD AUTO: 0.01 X10(3) UL (ref 0–0.2)
BASOPHILS NFR BLD AUTO: 0.2 %
BILIRUB SERPL-MCNC: 1.1 MG/DL (ref 0.1–2)
BILIRUB UR QL STRIP.AUTO: NEGATIVE
BUN BLD-MCNC: 26 MG/DL (ref 7–18)
CALCIUM BLD-MCNC: 9.2 MG/DL (ref 8.5–10.1)
CHLORIDE SERPL-SCNC: 96 MMOL/L (ref 98–112)
CO2 SERPL-SCNC: 25 MMOL/L (ref 21–32)
CREAT BLD-MCNC: 1.27 MG/DL
EOSINOPHIL # BLD AUTO: 0.03 X10(3) UL (ref 0–0.7)
EOSINOPHIL NFR BLD AUTO: 0.7 %
ERYTHROCYTE [DISTWIDTH] IN BLOOD BY AUTOMATED COUNT: 15.1 %
FASTING STATUS PATIENT QL REPORTED: NO
GLOBULIN PLAS-MCNC: 3.5 G/DL (ref 2.8–4.4)
GLUCOSE BLD-MCNC: 381 MG/DL (ref 70–99)
GLUCOSE UR STRIP.AUTO-MCNC: >=500 MG/DL
HCT VFR BLD AUTO: 37.8 %
HGB BLD-MCNC: 13.2 G/DL
HYALINE CASTS #/AREA URNS AUTO: PRESENT /LPF
IMM GRANULOCYTES # BLD AUTO: 0.07 X10(3) UL (ref 0–1)
IMM GRANULOCYTES NFR BLD: 1.7 %
KETONES UR STRIP.AUTO-MCNC: NEGATIVE MG/DL
LEUKOCYTE ESTERASE UR QL STRIP.AUTO: NEGATIVE
LYMPHOCYTES # BLD AUTO: 0.45 X10(3) UL (ref 1–4)
LYMPHOCYTES NFR BLD AUTO: 10.8 %
MCH RBC QN AUTO: 32 PG (ref 26–34)
MCHC RBC AUTO-ENTMCNC: 34.9 G/DL (ref 31–37)
MCV RBC AUTO: 91.7 FL
MONOCYTES # BLD AUTO: 0.63 X10(3) UL (ref 0.1–1)
MONOCYTES NFR BLD AUTO: 15.2 %
NEUTROPHILS # BLD AUTO: 2.96 X10 (3) UL (ref 1.5–7.7)
NEUTROPHILS # BLD AUTO: 2.96 X10(3) UL (ref 1.5–7.7)
NEUTROPHILS NFR BLD AUTO: 71.4 %
NITRITE UR QL STRIP.AUTO: POSITIVE
PH UR STRIP.AUTO: 6 [PH] (ref 5–8)
PLATELET # BLD AUTO: 95 10(3)UL (ref 150–450)
POTASSIUM SERPL-SCNC: 4.1 MMOL/L (ref 3.5–5.1)
PROT SERPL-MCNC: 6.5 G/DL (ref 6.4–8.2)
PROT UR STRIP.AUTO-MCNC: NEGATIVE MG/DL
RBC # BLD AUTO: 4.12 X10(6)UL
RBC UR QL AUTO: NEGATIVE
SODIUM SERPL-SCNC: 131 MMOL/L (ref 136–145)
SP GR UR STRIP.AUTO: 1.02 (ref 1–1.03)
UROBILINOGEN UR STRIP.AUTO-MCNC: 2 MG/DL
WBC # BLD AUTO: 4.2 X10(3) UL (ref 4–11)

## 2022-03-01 PROCEDURE — 85025 COMPLETE CBC W/AUTO DIFF WBC: CPT

## 2022-03-01 PROCEDURE — 81001 URINALYSIS AUTO W/SCOPE: CPT

## 2022-03-01 PROCEDURE — 87077 CULTURE AEROBIC IDENTIFY: CPT

## 2022-03-01 PROCEDURE — 87086 URINE CULTURE/COLONY COUNT: CPT

## 2022-03-01 PROCEDURE — 96360 HYDRATION IV INFUSION INIT: CPT

## 2022-03-01 PROCEDURE — 80053 COMPREHEN METABOLIC PANEL: CPT

## 2022-03-01 PROCEDURE — 99214 OFFICE O/P EST MOD 30 MIN: CPT | Performed by: INTERNAL MEDICINE

## 2022-03-01 PROCEDURE — 87186 SC STD MICRODIL/AGAR DIL: CPT

## 2022-03-01 RX ORDER — LEVOFLOXACIN 750 MG/1
750 TABLET ORAL DAILY
Qty: 7 TABLET | Refills: 0 | Status: SHIPPED | OUTPATIENT
Start: 2022-03-01 | End: 2022-03-08

## 2022-03-01 NOTE — TELEPHONE ENCOUNTER
Spoke with patient. She verbalized understanding. She will  her script and begin medication. Rosmery Hernandez MD  [facility]  Results reviewed. I sent an Rx for Levaquin for 7 days.  Thanks

## 2022-03-01 NOTE — PROGRESS NOTES
Education Record    Learner:  Patient and Spouse    Disease / Diagnosis: cholangiocarcinoma    Barriers / Limitations:  None   Comments:    Method:  Brief focused and Discussion   Comments:    General Topics:  Precautions, Side effects and symptom management, Plan of care reviewed and Fall risk and prevention   Comments:    Outcome:  Shows understanding   Comments:    No treatment today, per Dr Karlos Suarez. UA and culture obtained. Ca 19-9 to drawn next week. Patient understands to call if symptoms and oral intake do not improve.

## 2022-03-01 NOTE — PROGRESS NOTES
Outpatient Oncology Care Plan   Problem list:   knowledge deficit   Problems related to:   chemotherapy   Interventions:   provided general teaching   Expected outcomes:   understands plan of care   Progress towards outcome: making progress     Education Record   Learner: Patient   Barriers / Limitations: None   Method: Discussion   Outcome: Shows understanding   Comments:  Patient here for follow-up and planned C2D1 Folfiri. States energy levels are poor. Trying to eat and drink. Encouraged patient to contact PCP in regards to unmanaged blood sugar levels.

## 2022-03-08 ENCOUNTER — OFFICE VISIT (OUTPATIENT)
Dept: HEMATOLOGY/ONCOLOGY | Facility: HOSPITAL | Age: 62
End: 2022-03-08
Attending: INTERNAL MEDICINE
Payer: MEDICAID

## 2022-03-08 VITALS
OXYGEN SATURATION: 95 % | TEMPERATURE: 97 F | BODY MASS INDEX: 27.62 KG/M2 | HEART RATE: 112 BPM | HEIGHT: 62.21 IN | SYSTOLIC BLOOD PRESSURE: 155 MMHG | WEIGHT: 152 LBS | DIASTOLIC BLOOD PRESSURE: 84 MMHG | RESPIRATION RATE: 16 BRPM

## 2022-03-08 DIAGNOSIS — C22.1 CHOLANGIOCARCINOMA (HCC): Primary | ICD-10-CM

## 2022-03-08 DIAGNOSIS — C22.1 METASTATIC CHOLANGIOCARCINOMA TO BILE DUCT (HCC): ICD-10-CM

## 2022-03-08 DIAGNOSIS — C78.89 METASTATIC CHOLANGIOCARCINOMA TO BILE DUCT (HCC): ICD-10-CM

## 2022-03-08 LAB
ALBUMIN SERPL-MCNC: 3 G/DL (ref 3.4–5)
ALBUMIN/GLOB SERPL: 0.8 {RATIO} (ref 1–2)
ALP LIVER SERPL-CCNC: 179 U/L
ALT SERPL-CCNC: 191 U/L
ANION GAP SERPL CALC-SCNC: 9 MMOL/L (ref 0–18)
AST SERPL-CCNC: 72 U/L (ref 15–37)
BASOPHILS # BLD AUTO: 0.02 X10(3) UL (ref 0–0.2)
BASOPHILS NFR BLD AUTO: 0.2 %
BILIRUB SERPL-MCNC: 1.1 MG/DL (ref 0.1–2)
BUN BLD-MCNC: 25 MG/DL (ref 7–18)
CALCIUM BLD-MCNC: 9.4 MG/DL (ref 8.5–10.1)
CANCER AG19-9 SERPL-ACNC: ABNORMAL U/ML (ref ?–37)
CHLORIDE SERPL-SCNC: 97 MMOL/L (ref 98–112)
CO2 SERPL-SCNC: 25 MMOL/L (ref 21–32)
CREAT BLD-MCNC: 1.23 MG/DL
EOSINOPHIL # BLD AUTO: 0 X10(3) UL (ref 0–0.7)
EOSINOPHIL NFR BLD AUTO: 0 %
ERYTHROCYTE [DISTWIDTH] IN BLOOD BY AUTOMATED COUNT: 15.9 %
FASTING STATUS PATIENT QL REPORTED: NO
GLOBULIN PLAS-MCNC: 3.8 G/DL (ref 2.8–4.4)
GLUCOSE BLD-MCNC: 296 MG/DL (ref 70–99)
HCT VFR BLD AUTO: 40.4 %
HGB BLD-MCNC: 14 G/DL
IMM GRANULOCYTES # BLD AUTO: 0.2 X10(3) UL (ref 0–1)
IMM GRANULOCYTES NFR BLD: 1.6 %
LYMPHOCYTES # BLD AUTO: 0.49 X10(3) UL (ref 1–4)
LYMPHOCYTES NFR BLD AUTO: 3.9 %
MCH RBC QN AUTO: 32.4 PG (ref 26–34)
MCHC RBC AUTO-ENTMCNC: 34.7 G/DL (ref 31–37)
MCV RBC AUTO: 93.5 FL
MONOCYTES # BLD AUTO: 1.1 X10(3) UL (ref 0.1–1)
MONOCYTES NFR BLD AUTO: 8.7 %
NEUTROPHILS # BLD AUTO: 10.87 X10 (3) UL (ref 1.5–7.7)
NEUTROPHILS # BLD AUTO: 10.87 X10(3) UL (ref 1.5–7.7)
NEUTROPHILS NFR BLD AUTO: 85.6 %
OSMOLALITY SERPL CALC.SUM OF ELEC: 287 MOSM/KG (ref 275–295)
PLATELET # BLD AUTO: 94 10(3)UL (ref 150–450)
POTASSIUM SERPL-SCNC: 4.4 MMOL/L (ref 3.5–5.1)
PROT SERPL-MCNC: 6.8 G/DL (ref 6.4–8.2)
RBC # BLD AUTO: 4.32 X10(6)UL
SODIUM SERPL-SCNC: 131 MMOL/L (ref 136–145)
WBC # BLD AUTO: 12.7 X10(3) UL (ref 4–11)

## 2022-03-08 PROCEDURE — 96361 HYDRATE IV INFUSION ADD-ON: CPT

## 2022-03-08 PROCEDURE — 96368 THER/DIAG CONCURRENT INF: CPT

## 2022-03-08 PROCEDURE — 96413 CHEMO IV INFUSION 1 HR: CPT

## 2022-03-08 PROCEDURE — 96375 TX/PRO/DX INJ NEW DRUG ADDON: CPT

## 2022-03-08 PROCEDURE — 86301 IMMUNOASSAY TUMOR CA 19-9: CPT | Performed by: INTERNAL MEDICINE

## 2022-03-08 PROCEDURE — 96411 CHEMO IV PUSH ADDL DRUG: CPT

## 2022-03-08 RX ORDER — ATROPINE SULFATE 0.4 MG/ML
0.2 AMPUL (ML) INJECTION ONCE
Status: COMPLETED | OUTPATIENT
Start: 2022-03-08 | End: 2022-03-08

## 2022-03-08 RX ORDER — FLUOROURACIL 50 MG/ML
2400 INJECTION, SOLUTION INTRAVENOUS CONTINUOUS
Status: DISCONTINUED | OUTPATIENT
Start: 2022-03-08 | End: 2022-03-08

## 2022-03-08 RX ORDER — DIPHENHYDRAMINE HYDROCHLORIDE 50 MG/ML
50 INJECTION INTRAMUSCULAR; INTRAVENOUS ONCE
Status: COMPLETED | OUTPATIENT
Start: 2022-03-08 | End: 2022-03-08

## 2022-03-08 RX ORDER — FLUOROURACIL 50 MG/ML
400 INJECTION, SOLUTION INTRAVENOUS ONCE
Status: COMPLETED | OUTPATIENT
Start: 2022-03-08 | End: 2022-03-08

## 2022-03-08 RX ADMIN — ATROPINE SULFATE 0.2 MG: 0.4 MG/ML AMPUL (ML) INJECTION at 11:32:00

## 2022-03-08 RX ADMIN — FLUOROURACIL 4300 MG: 50 INJECTION, SOLUTION INTRAVENOUS at 13:34:00

## 2022-03-08 RX ADMIN — DIPHENHYDRAMINE HYDROCHLORIDE 50 MG: 50 INJECTION INTRAMUSCULAR; INTRAVENOUS at 10:53:00

## 2022-03-08 RX ADMIN — FLUOROURACIL 700 MG: 50 INJECTION, SOLUTION INTRAVENOUS at 13:25:00

## 2022-03-08 NOTE — PROGRESS NOTES
Pt here for C2D1. Arrives Ambulating independently, accompanied by Family member           Pregnancy screening: Denies possibility of pregnancy    Modifications in dose or schedule: No     Frequency of blood return and site check throughout administration: Prior to administration, Prior to each drug, Every 2-3 ml IVP and At completion of therapy   Discharged to Home, Ambulating independently, accompanied by:Family member    Outpatient Oncology Care Plan  Problem list:  fatigue  knowledge deficit  Problems related to:    chemotherapy  side effect of treatment  therapeutic effects  Interventions:  chemotherapy teaching  encourage activity as tolerated  monitor effect of therapy  monitor lab values  promoted rest  provided general teaching  Expected outcomes:  adequate sleep/rest  optimal lab values  understands plan of care  Progress towards outcome:  making progress    Education Record    Learner:  Patient  Barriers / Limitations:  None  Method:  Discussion  Outcome:  Shows understanding  Comments: Pt tolerated inf well. 46 hr pump connected and pt sent home. Will continue to monitor.

## 2022-03-08 NOTE — PROGRESS NOTES
03/08/22 1035   Clinical Encounter Type   Visited With Patient   Routine Visit Introduction   Continue Visiting No   Taxonomy   Intended Effects Jackelyn affirmation   Methods Demonstrate acceptance;Encourage self care   Interventions Acknowledge current situation; Share words of hope and inspiration;Adams

## 2022-03-10 ENCOUNTER — APPOINTMENT (OUTPATIENT)
Dept: CT IMAGING | Facility: HOSPITAL | Age: 62
End: 2022-03-10
Attending: EMERGENCY MEDICINE
Payer: MEDICAID

## 2022-03-10 ENCOUNTER — HOSPITAL ENCOUNTER (EMERGENCY)
Facility: HOSPITAL | Age: 62
Discharge: HOME | End: 2022-03-10
Attending: EMERGENCY MEDICINE
Payer: MEDICAID

## 2022-03-10 ENCOUNTER — OFFICE VISIT (OUTPATIENT)
Dept: HEMATOLOGY/ONCOLOGY | Facility: HOSPITAL | Age: 62
End: 2022-03-10
Attending: INTERNAL MEDICINE
Payer: MEDICAID

## 2022-03-10 VITALS
TEMPERATURE: 98 F | OXYGEN SATURATION: 96 % | RESPIRATION RATE: 14 BRPM | SYSTOLIC BLOOD PRESSURE: 147 MMHG | DIASTOLIC BLOOD PRESSURE: 92 MMHG | BODY MASS INDEX: 27.6 KG/M2 | WEIGHT: 150 LBS | HEIGHT: 62 IN | HEART RATE: 95 BPM

## 2022-03-10 VITALS — SYSTOLIC BLOOD PRESSURE: 142 MMHG | HEART RATE: 107 BPM | DIASTOLIC BLOOD PRESSURE: 86 MMHG

## 2022-03-10 DIAGNOSIS — R55 SYNCOPE, NEAR: ICD-10-CM

## 2022-03-10 DIAGNOSIS — C78.89 METASTATIC CHOLANGIOCARCINOMA TO BILE DUCT (HCC): ICD-10-CM

## 2022-03-10 DIAGNOSIS — C22.1 METASTATIC CHOLANGIOCARCINOMA TO BILE DUCT (HCC): ICD-10-CM

## 2022-03-10 DIAGNOSIS — C22.1 CHOLANGIOCARCINOMA (HCC): Primary | ICD-10-CM

## 2022-03-10 DIAGNOSIS — R73.9 HYPERGLYCEMIA: ICD-10-CM

## 2022-03-10 DIAGNOSIS — E86.0 DEHYDRATION: Primary | ICD-10-CM

## 2022-03-10 DIAGNOSIS — R29.6 FREQUENT FALLS: Primary | ICD-10-CM

## 2022-03-10 DIAGNOSIS — C22.1 CHOLANGIOCARCINOMA (HCC): ICD-10-CM

## 2022-03-10 LAB
ALBUMIN SERPL-MCNC: 2.5 G/DL (ref 3.4–5)
ALBUMIN/GLOB SERPL: 0.8 {RATIO} (ref 1–2)
ALP LIVER SERPL-CCNC: 171 U/L
ALT SERPL-CCNC: 188 U/L
ANION GAP SERPL CALC-SCNC: 3 MMOL/L (ref 0–18)
AST SERPL-CCNC: 87 U/L (ref 15–37)
ATRIAL RATE: 90 BPM
BASOPHILS # BLD AUTO: 0.01 X10(3) UL (ref 0–0.2)
BASOPHILS NFR BLD AUTO: 0.1 %
BILIRUB SERPL-MCNC: 1.1 MG/DL (ref 0.1–2)
BUN BLD-MCNC: 24 MG/DL (ref 7–18)
CALCIUM BLD-MCNC: 8.1 MG/DL (ref 8.5–10.1)
CHLORIDE SERPL-SCNC: 98 MMOL/L (ref 98–112)
CO2 SERPL-SCNC: 28 MMOL/L (ref 21–32)
CREAT BLD-MCNC: 0.96 MG/DL
EOSINOPHIL # BLD AUTO: 0 X10(3) UL (ref 0–0.7)
EOSINOPHIL NFR BLD AUTO: 0 %
ERYTHROCYTE [DISTWIDTH] IN BLOOD BY AUTOMATED COUNT: 15.5 %
GLUCOSE BLD-MCNC: 338 MG/DL (ref 70–99)
GLUCOSE BLD-MCNC: 338 MG/DL (ref 70–99)
GLUCOSE BLD-MCNC: 345 MG/DL (ref 70–99)
HCT VFR BLD AUTO: 36.9 %
HGB BLD-MCNC: 12.2 G/DL
IMM GRANULOCYTES # BLD AUTO: 0.19 X10(3) UL (ref 0–1)
IMM GRANULOCYTES NFR BLD: 1.6 %
LYMPHOCYTES # BLD AUTO: 0.19 X10(3) UL (ref 1–4)
LYMPHOCYTES NFR BLD AUTO: 1.6 %
MCH RBC QN AUTO: 31.5 PG (ref 26–34)
MCHC RBC AUTO-ENTMCNC: 33.1 G/DL (ref 31–37)
MCV RBC AUTO: 95.3 FL
MONOCYTES # BLD AUTO: 0.2 X10(3) UL (ref 0.1–1)
MONOCYTES NFR BLD AUTO: 1.7 %
NEUTROPHILS # BLD AUTO: 11.21 X10 (3) UL (ref 1.5–7.7)
NEUTROPHILS # BLD AUTO: 11.21 X10(3) UL (ref 1.5–7.7)
NEUTROPHILS NFR BLD AUTO: 95 %
OSMOLALITY SERPL CALC.SUM OF ELEC: 285 MOSM/KG (ref 275–295)
P AXIS: -27 DEGREES
P-R INTERVAL: 120 MS
PLATELET # BLD AUTO: 77 10(3)UL (ref 150–450)
POTASSIUM SERPL-SCNC: 4.4 MMOL/L (ref 3.5–5.1)
PROT SERPL-MCNC: 5.8 G/DL (ref 6.4–8.2)
Q-T INTERVAL: 346 MS
QRS DURATION: 70 MS
QTC CALCULATION (BEZET): 423 MS
R AXIS: 15 DEGREES
RBC # BLD AUTO: 3.87 X10(6)UL
SODIUM SERPL-SCNC: 129 MMOL/L (ref 136–145)
T AXIS: -23 DEGREES
VENTRICULAR RATE: 90 BPM
WBC # BLD AUTO: 11.8 X10(3) UL (ref 4–11)

## 2022-03-10 PROCEDURE — 99285 EMERGENCY DEPT VISIT HI MDM: CPT

## 2022-03-10 PROCEDURE — 80053 COMPREHEN METABOLIC PANEL: CPT | Performed by: EMERGENCY MEDICINE

## 2022-03-10 PROCEDURE — 93010 ELECTROCARDIOGRAM REPORT: CPT

## 2022-03-10 PROCEDURE — 85025 COMPLETE CBC W/AUTO DIFF WBC: CPT | Performed by: EMERGENCY MEDICINE

## 2022-03-10 PROCEDURE — 96360 HYDRATION IV INFUSION INIT: CPT

## 2022-03-10 PROCEDURE — 82962 GLUCOSE BLOOD TEST: CPT

## 2022-03-10 PROCEDURE — 93005 ELECTROCARDIOGRAM TRACING: CPT

## 2022-03-10 PROCEDURE — 70470 CT HEAD/BRAIN W/O & W/DYE: CPT | Performed by: EMERGENCY MEDICINE

## 2022-03-10 PROCEDURE — 99215 OFFICE O/P EST HI 40 MIN: CPT | Performed by: NURSE PRACTITIONER

## 2022-03-10 PROCEDURE — 96361 HYDRATE IV INFUSION ADD-ON: CPT

## 2022-03-10 RX ORDER — NICOTINE POLACRILEX 4 MG
30 LOZENGE BUCCAL
Status: DISCONTINUED | OUTPATIENT
Start: 2022-03-10 | End: 2022-03-10

## 2022-03-10 RX ORDER — INSULIN ASPART 100 [IU]/ML
0.15 INJECTION, SOLUTION INTRAVENOUS; SUBCUTANEOUS ONCE
Status: COMPLETED | OUTPATIENT
Start: 2022-03-10 | End: 2022-03-10

## 2022-03-10 RX ORDER — NICOTINE POLACRILEX 4 MG
15 LOZENGE BUCCAL
Status: DISCONTINUED | OUTPATIENT
Start: 2022-03-10 | End: 2022-03-10

## 2022-03-10 RX ORDER — IOHEXOL 350 MG/ML
75 INJECTION, SOLUTION INTRAVENOUS
Status: COMPLETED | OUTPATIENT
Start: 2022-03-10 | End: 2022-03-10

## 2022-03-10 RX ORDER — DEXTROSE MONOHYDRATE 25 G/50ML
50 INJECTION, SOLUTION INTRAVENOUS
Status: DISCONTINUED | OUTPATIENT
Start: 2022-03-10 | End: 2022-03-10

## 2022-03-10 NOTE — PROGRESS NOTES
Education Record    Learner:  Patient and Family Member    Disease / Diagnosis: cholangiocarcinoma    Barriers / Limitations:  None   Comments:    Method:  Brief focused and Discussion   Comments:    General Topics:  Medication, Side effects and symptom management, Plan of care reviewed and Fall risk and prevention   Comments:    Outcome:  Shows understanding   Comments:    Patient here for pump dc. Requested 1L IVF - ok per Moira Tolentino RN w/ Dr Cammie Rodriguez. Patient stated she was feeling better after fluids. While walking out to waiting room, patient bent down to  something she'd dropped and fell. Rolled onto her back, did not hit her head. This RN witnessed the fall. Patient states her legs gave out. Patient states she doesn't think she lost conciousness, but also does not remember falling. orthostatics checked. Denisse Paulino NP, here to assess patient. Patient escorted to the ER.

## 2022-03-10 NOTE — ED INITIAL ASSESSMENT (HPI)
Pt to the ED s/p weakness and fall/syncope after her chemo today. Pt is being treated for liver cancer. Pt has been falling at home as well. Pt c/o neuropathy an poor vision. Pt states she passed out after her chemo today and that it's been happening more frequently.

## 2022-03-11 ENCOUNTER — TELEPHONE (OUTPATIENT)
Dept: HEMATOLOGY/ONCOLOGY | Facility: HOSPITAL | Age: 62
End: 2022-03-11

## 2022-03-14 RX ORDER — DULOXETIN HYDROCHLORIDE 30 MG/1
30 CAPSULE, DELAYED RELEASE ORAL DAILY
Qty: 30 CAPSULE | Refills: 3 | Status: SHIPPED | OUTPATIENT
Start: 2022-03-14

## 2022-03-15 ENCOUNTER — TELEPHONE (OUTPATIENT)
Dept: HEMATOLOGY/ONCOLOGY | Facility: HOSPITAL | Age: 62
End: 2022-03-15

## 2022-03-15 NOTE — TELEPHONE ENCOUNTER
Patient's  called and is extremely upset with [facility]. Colleen Banks was at the ER a few days ago for hours. She in now in another hospital with many things wrong with her. He is upset because the ER missed all of it.

## 2022-03-15 NOTE — TELEPHONE ENCOUNTER
Hua Barnes, patient's sister called. Said the patient is very lathargic; her oxygen 70, she's very weak. Please call asap. Thank you.

## 2022-03-15 NOTE — TELEPHONE ENCOUNTER
Toxicities: C1 D1 Fluorouracil/Leucovorin/Irinotecan on 3/3/2022    Lethargic/Dyspnea (Chacho Garza reports that Nicolette Batista is very lethargic this morning. Her pulse ox is 86% on 3L oxygen. When they got her up to use the bathroom her pulse ox dropped to 70%.)    I explained that they need to call 911 now so Nicolette Batista can be evaluated in the ER. Cathie's daughter asked if they could drive her to [address] [place] [address] ER? I explained that for Terrie's safety they call 911. If she were develop more difficulty breathing or stopped breathing they would not be able to help her in the car. They both agreed and will call 911 now.

## 2022-03-16 NOTE — TELEPHONE ENCOUNTER
Spoke with patient's spouse about patient's condition. Patient currently inpatient at [facility] for pneumonia and sepsis. Spouse states he will update us with any changes and will bring any imaging discs/reports upon discharge. Provided emotional support to spouse. Reassured him that we are here if they need anything.

## 2022-03-16 NOTE — TELEPHONE ENCOUNTER
LVM for patient's spouse requesting call back for an update on patient. Contact information provided.

## 2022-03-17 ENCOUNTER — TELEPHONE (OUTPATIENT)
Dept: INTERNAL MEDICINE CLINIC | Facility: CLINIC | Age: 62
End: 2022-03-17

## 2022-03-17 NOTE — TELEPHONE ENCOUNTER
I can sign home health orders but she will need to schedule hospital follow up visit with me as I have not seen her in 2 years

## 2022-03-17 NOTE — TELEPHONE ENCOUNTER
Spoke with Izabela Renteria, advised RP will sign HH orders but pt needs to schedule OV for HFU with RP. [facility] indicates they will notify pt.

## 2022-03-22 ENCOUNTER — APPOINTMENT (OUTPATIENT)
Dept: HEMATOLOGY/ONCOLOGY | Facility: HOSPITAL | Age: 62
End: 2022-03-22
Attending: INTERNAL MEDICINE
Payer: MEDICAID

## 2022-03-22 NOTE — TELEPHONE ENCOUNTER
Spoke with patient's spouse. Patient currently in ICU for neutropenic fevers, sepsis, and need for transfusions. He will call upon patient's discharge to arrange follow-up.

## 2022-03-31 ENCOUNTER — TELEPHONE (OUTPATIENT)
Dept: INTERNAL MEDICINE CLINIC | Facility: CLINIC | Age: 62
End: 2022-03-31

## 2022-03-31 NOTE — TELEPHONE ENCOUNTER
Incoming fax from [address]     Patients OV notes from 3/30/2022     Placed in  in basket for review

## 2022-04-07 ENCOUNTER — TELEPHONE (OUTPATIENT)
Dept: HEMATOLOGY/ONCOLOGY | Facility: HOSPITAL | Age: 62
End: 2022-04-07

## 2022-04-07 NOTE — TELEPHONE ENCOUNTER
Received a phone call overnight with report of patient's platelet count in 17F by patient's . Patient is currently at rehab in [place]. Instructed to repeat CBC in a couple days and monitor for signs of bleeding. Fax number provided.

## 2022-04-12 ENCOUNTER — OFFICE VISIT (OUTPATIENT)
Dept: HEMATOLOGY/ONCOLOGY | Facility: HOSPITAL | Age: 62
End: 2022-04-12
Attending: INTERNAL MEDICINE
Payer: MEDICAID

## 2022-04-12 ENCOUNTER — SOCIAL WORK SERVICES (OUTPATIENT)
Dept: HEMATOLOGY/ONCOLOGY | Facility: HOSPITAL | Age: 62
End: 2022-04-12

## 2022-04-12 VITALS
SYSTOLIC BLOOD PRESSURE: 104 MMHG | DIASTOLIC BLOOD PRESSURE: 62 MMHG | OXYGEN SATURATION: 77 % | TEMPERATURE: 97 F | HEART RATE: 93 BPM

## 2022-04-12 VITALS — OXYGEN SATURATION: 95 %

## 2022-04-12 DIAGNOSIS — C78.89 METASTATIC CHOLANGIOCARCINOMA TO BILE DUCT (HCC): Primary | ICD-10-CM

## 2022-04-12 DIAGNOSIS — C22.1 CHOLANGIOCARCINOMA (HCC): ICD-10-CM

## 2022-04-12 DIAGNOSIS — C22.1 CHOLANGIOCARCINOMA (HCC): Primary | ICD-10-CM

## 2022-04-12 DIAGNOSIS — C22.1 METASTATIC CHOLANGIOCARCINOMA TO BILE DUCT (HCC): Primary | ICD-10-CM

## 2022-04-12 LAB
ALBUMIN SERPL-MCNC: 2.2 G/DL (ref 3.4–5)
ALBUMIN/GLOB SERPL: 0.5 {RATIO} (ref 1–2)
ALP LIVER SERPL-CCNC: 354 U/L
ALT SERPL-CCNC: 76 U/L
ANION GAP SERPL CALC-SCNC: 2 MMOL/L (ref 0–18)
AST SERPL-CCNC: 39 U/L (ref 15–37)
BASOPHILS # BLD AUTO: 0.03 X10(3) UL (ref 0–0.2)
BASOPHILS NFR BLD AUTO: 0.3 %
BILIRUB SERPL-MCNC: 1.2 MG/DL (ref 0.1–2)
BUN BLD-MCNC: 10 MG/DL (ref 7–18)
CALCIUM BLD-MCNC: 8.8 MG/DL (ref 8.5–10.1)
CANCER AG19-9 SERPL-ACNC: ABNORMAL U/ML (ref ?–37)
CHLORIDE SERPL-SCNC: 103 MMOL/L (ref 98–112)
CO2 SERPL-SCNC: 31 MMOL/L (ref 21–32)
CREAT BLD-MCNC: 0.7 MG/DL
DEPRECATED HBV CORE AB SER IA-ACNC: 562.1 NG/ML
EOSINOPHIL # BLD AUTO: 0 X10(3) UL (ref 0–0.7)
EOSINOPHIL NFR BLD AUTO: 0 %
ERYTHROCYTE [DISTWIDTH] IN BLOOD BY AUTOMATED COUNT: 23.9 %
FASTING STATUS PATIENT QL REPORTED: NO
FOLATE SERPL-MCNC: 17 NG/ML (ref 8.7–?)
GLOBULIN PLAS-MCNC: 4.1 G/DL (ref 2.8–4.4)
GLUCOSE BLD-MCNC: 184 MG/DL (ref 70–99)
HCT VFR BLD AUTO: 26.7 %
HGB BLD-MCNC: 8.2 G/DL
IMM GRANULOCYTES # BLD AUTO: 0.1 X10(3) UL (ref 0–1)
IMM GRANULOCYTES NFR BLD: 0.8 %
IRON SATN MFR SERPL: 37 %
IRON SERPL-MCNC: 114 UG/DL
LYMPHOCYTES # BLD AUTO: 1.67 X10(3) UL (ref 1–4)
LYMPHOCYTES NFR BLD AUTO: 14.1 %
MCH RBC QN AUTO: 33.2 PG (ref 26–34)
MCHC RBC AUTO-ENTMCNC: 30.7 G/DL (ref 31–37)
MCV RBC AUTO: 108.1 FL
MONOCYTES # BLD AUTO: 0.8 X10(3) UL (ref 0.1–1)
MONOCYTES NFR BLD AUTO: 6.7 %
NEUTROPHILS # BLD AUTO: 9.28 X10 (3) UL (ref 1.5–7.7)
NEUTROPHILS # BLD AUTO: 9.28 X10(3) UL (ref 1.5–7.7)
NEUTROPHILS NFR BLD AUTO: 78.1 %
OSMOLALITY SERPL CALC.SUM OF ELEC: 286 MOSM/KG (ref 275–295)
PLATELET # BLD AUTO: 65 10(3)UL (ref 150–450)
PLATELET MORPHOLOGY: NORMAL
POTASSIUM SERPL-SCNC: 3.5 MMOL/L (ref 3.5–5.1)
PROT SERPL-MCNC: 6.3 G/DL (ref 6.4–8.2)
RBC # BLD AUTO: 2.47 X10(6)UL
SODIUM SERPL-SCNC: 136 MMOL/L (ref 136–145)
TIBC SERPL-MCNC: 307 UG/DL (ref 240–450)
TRANSFERRIN SERPL-MCNC: 206 MG/DL (ref 200–360)
VIT B12 SERPL-MCNC: >2000 PG/ML (ref 193–986)
WBC # BLD AUTO: 11.9 X10(3) UL (ref 4–11)

## 2022-04-12 PROCEDURE — 83540 ASSAY OF IRON: CPT

## 2022-04-12 PROCEDURE — 80053 COMPREHEN METABOLIC PANEL: CPT

## 2022-04-12 PROCEDURE — 85025 COMPLETE CBC W/AUTO DIFF WBC: CPT

## 2022-04-12 PROCEDURE — 82746 ASSAY OF FOLIC ACID SERUM: CPT

## 2022-04-12 PROCEDURE — 82728 ASSAY OF FERRITIN: CPT

## 2022-04-12 PROCEDURE — 83550 IRON BINDING TEST: CPT

## 2022-04-12 PROCEDURE — 86301 IMMUNOASSAY TUMOR CA 19-9: CPT

## 2022-04-12 PROCEDURE — 82607 VITAMIN B-12: CPT

## 2022-04-12 PROCEDURE — 96360 HYDRATION IV INFUSION INIT: CPT

## 2022-04-12 PROCEDURE — 99215 OFFICE O/P EST HI 40 MIN: CPT | Performed by: INTERNAL MEDICINE

## 2022-04-12 NOTE — PROGRESS NOTES
Outpatient Oncology Care Plan   Problem list:   knowledge deficit   Problems related to:   chemotherapy   Interventions:   provided general teaching   Expected outcomes:   understands plan of care   Progress towards outcome: making progress     Education Record   Learner: Patient and Spouse   Barriers / Limitations: None   Method: Discussion   Outcome: Shows understanding   Comments:  Patient here for follow-up and possible chemo. Recently discharged for infection. Energy levels are still poor. Now on 3L O2 continuous. Still has a residual cough, denies any bleeding. Here to discuss next steps in plan of care.

## 2022-04-12 NOTE — PROGRESS NOTES
Education Record    Learner:  Patient    Disease / Diagnosis: cholangiocarcinoma: IVF     Barriers / Limitations:  Fatigue   Comments:    Method:  Brief focused and Discussion   Comments:    General Topics:  Precautions, Side effects and symptom management and Plan of care reviewed   Comments:    Outcome:  Shows understanding   Comments:  Patient here for C3 D1 folfiri. On arrival, patient had difficulty breathing. Oxygen saturation on room air was 77%. Patient placed on 3 Liters. Oxygen saturation went up to 95%. Patient reported that it is no longer difficult to breathe. Per patient, she has been on 3 liters at home but she couldn't get her mobile oxygen tank to work so she left to the cancer clinic without it. Per patient, she will be calling oxygen company to bring her a new tank. Per Dr. Teresa Lombardi, hold FOLFIRI, give 90 minute NS bolus instead. Patient will be starting [place] in 3 weeks. Appointments scheduled. AVS given. Patient verbalized understanding to plan. Pt discharged in stable condition accompanied by .

## 2022-04-12 NOTE — PROGRESS NOTES
Spoke with patient and  this visit. Voiced concern about potentially missed diagnosis on 3/10/22 [facility] ER visit. Pt sent home and admitted a few days later with sepsis. Spoke with Magan Shabazz at [facility] patient experience line on behalf of patient and . Per request concern submitted. They are aware to reach out to  for follow up. Patient experience number given to  with above update.

## 2022-04-12 NOTE — PROGRESS NOTES
met with patient to provide support after long hospitalization elsewhere, and to see if there are any needs required in the home. Patient shared that she did not remember her hospitalization, but knows it was tough on her . She was also at Rehab and came home quickly as she did not prefer the care there. Support provided. Discussed available resources and patient stated Home Health would be helpful;  sent to , [facility], and [facility] with denials from all 3 due to Insurance or Staffing; will continue to search for company to accept. Patient also requested a Wheelchair for easier mobility; referral sent to [facility] (B#[phone] S#[phone]) and  is awaiting confirmation that they can deliver. Received confirmation from [employer] that insurance was verified and they will contact patient for Wheelchair delivery. Confirmed with Meredith Keys at  that they can see patient for services and will contact patient to set up meetings.

## 2022-04-15 ENCOUNTER — TELEPHONE (OUTPATIENT)
Dept: HEMATOLOGY/ONCOLOGY | Facility: HOSPITAL | Age: 62
End: 2022-04-15

## 2022-04-15 NOTE — TELEPHONE ENCOUNTER
Kim Baldwin calling with residential home health to let Dr know they started home health yesterday, Brittany Scruggs also needs to confirm medications predniSONE 5 MG Oral Tab and metropol. She also needs a copy of the patients most recent A1-C.  Thank you, Lina Leon

## 2022-04-20 ENCOUNTER — TELEPHONE (OUTPATIENT)
Dept: HEMATOLOGY/ONCOLOGY | Facility: HOSPITAL | Age: 62
End: 2022-04-20

## 2022-04-20 NOTE — TELEPHONE ENCOUNTER
Patient has tachycardia and is reporting out of perimeters, please call home health nurse Lexy Correa at [phone]

## 2022-04-26 ENCOUNTER — APPOINTMENT (OUTPATIENT)
Dept: HEMATOLOGY/ONCOLOGY | Facility: HOSPITAL | Age: 62
End: 2022-04-26
Attending: INTERNAL MEDICINE
Payer: MEDICAID

## 2022-04-27 ENCOUNTER — OFFICE VISIT (OUTPATIENT)
Dept: HEMATOLOGY/ONCOLOGY | Facility: HOSPITAL | Age: 62
End: 2022-04-27
Attending: INTERNAL MEDICINE
Payer: MEDICAID

## 2022-04-27 VITALS
TEMPERATURE: 97 F | BODY MASS INDEX: 27.26 KG/M2 | RESPIRATION RATE: 18 BRPM | WEIGHT: 150 LBS | HEART RATE: 106 BPM | HEIGHT: 62.21 IN | OXYGEN SATURATION: 99 % | SYSTOLIC BLOOD PRESSURE: 113 MMHG | DIASTOLIC BLOOD PRESSURE: 74 MMHG

## 2022-04-27 DIAGNOSIS — C78.89 METASTATIC CHOLANGIOCARCINOMA TO BILE DUCT (HCC): ICD-10-CM

## 2022-04-27 DIAGNOSIS — N39.0 URINARY TRACT INFECTION WITH HEMATURIA, SITE UNSPECIFIED: ICD-10-CM

## 2022-04-27 DIAGNOSIS — C22.1 METASTATIC CHOLANGIOCARCINOMA TO BILE DUCT (HCC): Primary | ICD-10-CM

## 2022-04-27 DIAGNOSIS — C22.1 CHOLANGIOCARCINOMA (HCC): Primary | ICD-10-CM

## 2022-04-27 DIAGNOSIS — C78.89 METASTATIC CHOLANGIOCARCINOMA TO BILE DUCT (HCC): Primary | ICD-10-CM

## 2022-04-27 DIAGNOSIS — C22.1 METASTATIC CHOLANGIOCARCINOMA TO BILE DUCT (HCC): ICD-10-CM

## 2022-04-27 DIAGNOSIS — E87.6 HYPOKALEMIA: ICD-10-CM

## 2022-04-27 DIAGNOSIS — R31.9 URINARY TRACT INFECTION WITH HEMATURIA, SITE UNSPECIFIED: ICD-10-CM

## 2022-04-27 LAB
ALBUMIN SERPL-MCNC: 2 G/DL (ref 3.4–5)
ALBUMIN/GLOB SERPL: 0.4 {RATIO} (ref 1–2)
ALP LIVER SERPL-CCNC: 341 U/L
ALT SERPL-CCNC: 51 U/L
ANION GAP SERPL CALC-SCNC: 8 MMOL/L (ref 0–18)
AST SERPL-CCNC: 55 U/L (ref 15–37)
BASOPHILS # BLD AUTO: 0.05 X10(3) UL (ref 0–0.2)
BASOPHILS NFR BLD AUTO: 0.5 %
BILIRUB SERPL-MCNC: 1.3 MG/DL (ref 0.1–2)
BILIRUB UR QL CFM: NEGATIVE
BUN BLD-MCNC: 7 MG/DL (ref 7–18)
CALCIUM BLD-MCNC: 8.8 MG/DL (ref 8.5–10.1)
CHLORIDE SERPL-SCNC: 101 MMOL/L (ref 98–112)
CLARITY UR REFRACT.AUTO: CLEAR
CO2 SERPL-SCNC: 29 MMOL/L (ref 21–32)
COLOR UR AUTO: YELLOW
CREAT BLD-MCNC: 0.63 MG/DL
EOSINOPHIL # BLD AUTO: 0.02 X10(3) UL (ref 0–0.7)
EOSINOPHIL NFR BLD AUTO: 0.2 %
ERYTHROCYTE [DISTWIDTH] IN BLOOD BY AUTOMATED COUNT: 17.4 %
GLOBULIN PLAS-MCNC: 4.7 G/DL (ref 2.8–4.4)
GLUCOSE BLD-MCNC: 171 MG/DL (ref 70–99)
GLUCOSE UR STRIP.AUTO-MCNC: NEGATIVE MG/DL
HCT VFR BLD AUTO: 31.4 %
HGB BLD-MCNC: 9.5 G/DL
IMM GRANULOCYTES # BLD AUTO: 0.05 X10(3) UL (ref 0–1)
IMM GRANULOCYTES NFR BLD: 0.5 %
LEUKOCYTE ESTERASE UR QL STRIP.AUTO: NEGATIVE
LYMPHOCYTES # BLD AUTO: 2.29 X10(3) UL (ref 1–4)
LYMPHOCYTES NFR BLD AUTO: 24.9 %
MCH RBC QN AUTO: 31.4 PG (ref 26–34)
MCHC RBC AUTO-ENTMCNC: 30.3 G/DL (ref 31–37)
MCV RBC AUTO: 103.6 FL
MONOCYTES # BLD AUTO: 0.84 X10(3) UL (ref 0.1–1)
MONOCYTES NFR BLD AUTO: 9.2 %
NEUTROPHILS # BLD AUTO: 5.93 X10 (3) UL (ref 1.5–7.7)
NEUTROPHILS # BLD AUTO: 5.93 X10(3) UL (ref 1.5–7.7)
NEUTROPHILS NFR BLD AUTO: 64.7 %
NITRITE UR QL STRIP.AUTO: NEGATIVE
OSMOLALITY SERPL CALC.SUM OF ELEC: 288 MOSM/KG (ref 275–295)
PH UR STRIP.AUTO: 6 [PH] (ref 5–8)
PLATELET # BLD AUTO: 92 10(3)UL (ref 150–450)
POTASSIUM SERPL-SCNC: 3.2 MMOL/L (ref 3.5–5.1)
PROT SERPL-MCNC: 6.7 G/DL (ref 6.4–8.2)
RBC # BLD AUTO: 3.03 X10(6)UL
RBC UR QL AUTO: NEGATIVE
SODIUM SERPL-SCNC: 138 MMOL/L (ref 136–145)
SP GR UR STRIP.AUTO: 1.02 (ref 1–1.03)
UROBILINOGEN UR STRIP.AUTO-MCNC: 2 MG/DL
WBC # BLD AUTO: 9.2 X10(3) UL (ref 4–11)

## 2022-04-27 PROCEDURE — 85025 COMPLETE CBC W/AUTO DIFF WBC: CPT

## 2022-04-27 PROCEDURE — 80053 COMPREHEN METABOLIC PANEL: CPT

## 2022-04-27 PROCEDURE — 96365 THER/PROPH/DIAG IV INF INIT: CPT

## 2022-04-27 PROCEDURE — 81001 URINALYSIS AUTO W/SCOPE: CPT

## 2022-04-27 PROCEDURE — 99214 OFFICE O/P EST MOD 30 MIN: CPT | Performed by: INTERNAL MEDICINE

## 2022-04-27 RX ORDER — LEVOFLOXACIN 750 MG/1
750 TABLET ORAL DAILY
Qty: 10 TABLET | Refills: 0 | Status: SHIPPED | OUTPATIENT
Start: 2022-04-27

## 2022-04-27 RX ORDER — POTASSIUM CHLORIDE 20 MEQ/1
20 TABLET, EXTENDED RELEASE ORAL ONCE
Status: COMPLETED | OUTPATIENT
Start: 2022-04-27 | End: 2022-04-27

## 2022-04-27 RX ORDER — POTASSIUM CHLORIDE 750 MG/1
20 TABLET, EXTENDED RELEASE ORAL ONCE
Status: CANCELLED
Start: 2022-04-27 | End: 2022-04-27

## 2022-04-27 RX ADMIN — POTASSIUM CHLORIDE 20 MEQ: 20 TABLET, EXTENDED RELEASE ORAL at 12:02:00

## 2022-04-27 NOTE — PROGRESS NOTES
Outpatient Oncology Care Plan   Problem list:   knowledge deficit   Problems related to:   side effect of treatment   Interventions:   provided general teaching   Expected outcomes:   understands plan of care   Progress towards outcome: making progress     Education Record   Learner: Patient   Barriers / Limitations: None   Method: Discussion   Outcome: Shows understanding   Comments:  Patient here for follow-up. Still having blood in her urine. Noticeable right flank and abdominal pain. Denies any fevers. Will have chemo ed this Friday. car

## 2022-04-27 NOTE — PROGRESS NOTES
Education Record    Learner:  Patient and Spouse    Disease / Diagnosis: risk for infection, pain from r/o UTI     Barriers / Limitations:  None   Comments:    Method:  Discussion and Printed material   Comments:    General Topics:  Medication, Plan of care reviewed and Fall risk and prevention   Comments:    Outcome:  Shows understanding   Comments:    Pt tolerated IV abx well. Reviewed lab results with patient and . Aware of urine culture pending. Pt given PO k. No RX for K at home or need to recheck labs on Friday per Nellie- Dr. Grace Mcpherson. Pt departed stable with .

## 2022-04-29 ENCOUNTER — TELEPHONE (OUTPATIENT)
Dept: HEMATOLOGY/ONCOLOGY | Facility: HOSPITAL | Age: 62
End: 2022-04-29

## 2022-04-29 ENCOUNTER — APPOINTMENT (OUTPATIENT)
Dept: HEMATOLOGY/ONCOLOGY | Facility: HOSPITAL | Age: 62
End: 2022-04-29
Attending: INTERNAL MEDICINE
Payer: MEDICAID

## 2022-04-29 ENCOUNTER — APPOINTMENT (OUTPATIENT)
Dept: HEMATOLOGY/ONCOLOGY | Facility: HOSPITAL | Age: 62
End: 2022-04-29
Attending: NURSE PRACTITIONER
Payer: MEDICAID

## 2022-04-29 DIAGNOSIS — C22.1 CHOLANGIOCARCINOMA (HCC): Primary | ICD-10-CM

## 2022-04-29 DIAGNOSIS — C78.89 METASTATIC CHOLANGIOCARCINOMA TO BILE DUCT (HCC): ICD-10-CM

## 2022-04-29 DIAGNOSIS — Z71.9 ENCOUNTER FOR EDUCATION: ICD-10-CM

## 2022-04-29 DIAGNOSIS — C22.1 METASTATIC CHOLANGIOCARCINOMA TO BILE DUCT (HCC): ICD-10-CM

## 2022-04-29 PROCEDURE — 99214 OFFICE O/P EST MOD 30 MIN: CPT | Performed by: NURSE PRACTITIONER

## 2022-04-29 NOTE — TELEPHONE ENCOUNTER
Spoke with patient during education appointment. She was reaching for something when sitting and it was just to far, so she tumbled out of her chair. No other injury. Denies headache, dizziness.

## 2022-04-29 NOTE — TELEPHONE ENCOUNTER
Christopher Marcos calling to cancel today's appointment's  she's not able to get down stairs.  Ariana Garcia

## 2022-04-29 NOTE — TELEPHONE ENCOUNTER
Kirill Fuentes from [facility] reported the patient fell and has a 1/2inch minor laceration on her nose. She feels fine and doesn't feel the need for an x-ray. I confirmed the information with a call to Kirill Fuentes as the ans. Serv message had the wrong patient's name listed on the message.

## 2022-04-29 NOTE — TELEPHONE ENCOUNTER
Patient states she is doing well and is rarely using the morphine. She feels her symptoms are well controlled and declines video visit for today. She remains in hopeful that this new chemotherapy will be well tolerated.

## 2022-05-03 ENCOUNTER — OFFICE VISIT (OUTPATIENT)
Dept: HEMATOLOGY/ONCOLOGY | Facility: HOSPITAL | Age: 62
End: 2022-05-03
Attending: INTERNAL MEDICINE
Payer: MEDICAID

## 2022-05-03 VITALS
SYSTOLIC BLOOD PRESSURE: 122 MMHG | HEART RATE: 128 BPM | RESPIRATION RATE: 18 BRPM | OXYGEN SATURATION: 95 % | WEIGHT: 148.81 LBS | TEMPERATURE: 97 F | DIASTOLIC BLOOD PRESSURE: 80 MMHG | BODY MASS INDEX: 27.04 KG/M2 | HEIGHT: 62.21 IN

## 2022-05-03 VITALS — HEART RATE: 110 BPM

## 2022-05-03 DIAGNOSIS — C22.1 METASTATIC CHOLANGIOCARCINOMA TO BILE DUCT (HCC): Primary | ICD-10-CM

## 2022-05-03 DIAGNOSIS — C78.89 METASTATIC CHOLANGIOCARCINOMA TO BILE DUCT (HCC): Primary | ICD-10-CM

## 2022-05-03 DIAGNOSIS — C22.1 CHOLANGIOCARCINOMA (HCC): ICD-10-CM

## 2022-05-03 LAB
ALBUMIN SERPL-MCNC: 2 G/DL (ref 3.4–5)
ALBUMIN/GLOB SERPL: 0.4 {RATIO} (ref 1–2)
ALP LIVER SERPL-CCNC: 291 U/L
ALT SERPL-CCNC: 34 U/L
ANION GAP SERPL CALC-SCNC: 9 MMOL/L (ref 0–18)
AST SERPL-CCNC: 54 U/L (ref 15–37)
BASOPHILS # BLD AUTO: 0.06 X10(3) UL (ref 0–0.2)
BASOPHILS NFR BLD AUTO: 0.5 %
BILIRUB SERPL-MCNC: 1.4 MG/DL (ref 0.1–2)
BUN BLD-MCNC: 6 MG/DL (ref 7–18)
CALCIUM BLD-MCNC: 8.4 MG/DL (ref 8.5–10.1)
CANCER AG19-9 SERPL-ACNC: ABNORMAL U/ML (ref ?–37)
CHLORIDE SERPL-SCNC: 101 MMOL/L (ref 98–112)
CO2 SERPL-SCNC: 25 MMOL/L (ref 21–32)
CREAT BLD-MCNC: 0.71 MG/DL
EOSINOPHIL # BLD AUTO: 0.02 X10(3) UL (ref 0–0.7)
EOSINOPHIL NFR BLD AUTO: 0.2 %
ERYTHROCYTE [DISTWIDTH] IN BLOOD BY AUTOMATED COUNT: 15.7 %
GLOBULIN PLAS-MCNC: 4.8 G/DL (ref 2.8–4.4)
GLUCOSE BLD-MCNC: 139 MG/DL (ref 70–99)
HCT VFR BLD AUTO: 33.4 %
HGB BLD-MCNC: 10.4 G/DL
IMM GRANULOCYTES # BLD AUTO: 0.06 X10(3) UL (ref 0–1)
IMM GRANULOCYTES NFR BLD: 0.5 %
LYMPHOCYTES # BLD AUTO: 1.96 X10(3) UL (ref 1–4)
LYMPHOCYTES NFR BLD AUTO: 18 %
MCH RBC QN AUTO: 31.6 PG (ref 26–34)
MCHC RBC AUTO-ENTMCNC: 31.1 G/DL (ref 31–37)
MCV RBC AUTO: 101.5 FL
MONOCYTES # BLD AUTO: 0.9 X10(3) UL (ref 0.1–1)
MONOCYTES NFR BLD AUTO: 8.2 %
NEUTROPHILS # BLD AUTO: 7.91 X10 (3) UL (ref 1.5–7.7)
NEUTROPHILS # BLD AUTO: 7.91 X10(3) UL (ref 1.5–7.7)
NEUTROPHILS NFR BLD AUTO: 72.6 %
OSMOLALITY SERPL CALC.SUM OF ELEC: 280 MOSM/KG (ref 275–295)
PLATELET # BLD AUTO: 128 10(3)UL (ref 150–450)
POTASSIUM SERPL-SCNC: 3.4 MMOL/L (ref 3.5–5.1)
PROT SERPL-MCNC: 6.8 G/DL (ref 6.4–8.2)
RBC # BLD AUTO: 3.29 X10(6)UL
SODIUM SERPL-SCNC: 135 MMOL/L (ref 136–145)
T4 FREE SERPL-MCNC: 2 NG/DL (ref 0.8–1.7)
TSI SER-ACNC: 3.56 MIU/ML (ref 0.36–3.74)
WBC # BLD AUTO: 10.9 X10(3) UL (ref 4–11)

## 2022-05-03 PROCEDURE — 85025 COMPLETE CBC W/AUTO DIFF WBC: CPT

## 2022-05-03 PROCEDURE — 84439 ASSAY OF FREE THYROXINE: CPT

## 2022-05-03 PROCEDURE — 99215 OFFICE O/P EST HI 40 MIN: CPT | Performed by: NURSE PRACTITIONER

## 2022-05-03 PROCEDURE — 86301 IMMUNOASSAY TUMOR CA 19-9: CPT

## 2022-05-03 PROCEDURE — 96413 CHEMO IV INFUSION 1 HR: CPT

## 2022-05-03 PROCEDURE — 80053 COMPREHEN METABOLIC PANEL: CPT

## 2022-05-03 PROCEDURE — 84443 ASSAY THYROID STIM HORMONE: CPT

## 2022-05-03 RX ORDER — DEXAMETHASONE 4 MG/1
TABLET ORAL
COMMUNITY
Start: 2022-03-12

## 2022-05-03 NOTE — PROGRESS NOTES
Pt here for C1D1. Arrives Via wheelchair, accompanied by Family member           Pregnancy screening: Not applicable    Modifications in dose or schedule: No     Frequency of blood return and site check throughout administration: Prior to administration and At completion of therapy   Discharged to Home, Via wheelchair, accompanied by:Family member    Outpatient Oncology Care Plan  Problem list:  diarrhea  loss of appetite  constipation  fatigue  knowledge deficit  Problems related to:    chemotherapy  Interventions:  provided general teaching  Expected outcomes:  understands plan of care  Progress towards outcome:  making progress    Education Record    Learner:  Patient and Family Member  Barriers / Limitations:  None  Method:  Discussion, Printed material and Reinforcement  Outcome:  Shows understanding  Comments: Pt tolerated infusion. Pt reported no complaints. Pt left in stable condition via wheelchair.

## 2022-05-04 ENCOUNTER — TELEPHONE (OUTPATIENT)
Dept: HEMATOLOGY/ONCOLOGY | Facility: HOSPITAL | Age: 62
End: 2022-05-04

## 2022-05-04 NOTE — TELEPHONE ENCOUNTER
Toxicities: C1 D1 Pembrolizumab on 5/3/2022    Melvin Andrade said she feels great. She is not having any side effects at this time. I encouraged her to please call the office if she is not feeling well or she has any questions or concerns. She agreed and thanked me for checking on her.

## 2022-05-06 ENCOUNTER — OFFICE VISIT (OUTPATIENT)
Dept: HEMATOLOGY/ONCOLOGY | Facility: HOSPITAL | Age: 62
End: 2022-05-06
Attending: INTERNAL MEDICINE
Payer: MEDICAID

## 2022-05-06 VITALS
TEMPERATURE: 98 F | OXYGEN SATURATION: 94 % | RESPIRATION RATE: 18 BRPM | SYSTOLIC BLOOD PRESSURE: 121 MMHG | HEART RATE: 128 BPM | DIASTOLIC BLOOD PRESSURE: 75 MMHG | HEIGHT: 59.37 IN | BODY MASS INDEX: 29.92 KG/M2 | WEIGHT: 150.38 LBS

## 2022-05-06 DIAGNOSIS — C22.1 METASTATIC CHOLANGIOCARCINOMA TO BILE DUCT (HCC): Primary | ICD-10-CM

## 2022-05-06 DIAGNOSIS — J96.11 CHRONIC RESPIRATORY FAILURE WITH HYPOXIA (HCC): ICD-10-CM

## 2022-05-06 DIAGNOSIS — C78.89 METASTATIC CHOLANGIOCARCINOMA TO BILE DUCT (HCC): Primary | ICD-10-CM

## 2022-05-06 DIAGNOSIS — R21 RASH: ICD-10-CM

## 2022-05-06 DIAGNOSIS — J43.9 MIXED RESTRICTIVE AND OBSTRUCTIVE LUNG DISEASE (HCC): ICD-10-CM

## 2022-05-06 DIAGNOSIS — J98.4 MIXED RESTRICTIVE AND OBSTRUCTIVE LUNG DISEASE (HCC): ICD-10-CM

## 2022-05-06 PROBLEM — C79.51 SPINE METASTASIS (HCC): Status: ACTIVE | Noted: 2022-03-15

## 2022-05-06 PROBLEM — C79.51 SPINE METASTASIS (HCC): Status: ACTIVE | Noted: 2022-01-01

## 2022-05-06 PROBLEM — C79.51 SPINE METASTASIS: Status: ACTIVE | Noted: 2022-01-01

## 2022-05-06 PROCEDURE — 99214 OFFICE O/P EST MOD 30 MIN: CPT | Performed by: NURSE PRACTITIONER

## 2022-05-06 NOTE — PROGRESS NOTES
Patient presents with:  Rash    Last Tx 5/3 Keytruda- Patient c/o rash all over body onset yesterday- denies any itching, pain or warm to touch but skin is dry- no change in soap or detergent per patient. Patient denies any fever, chills nausea or vomiting.         Learner:  Patient and Family Member    Disease / Diagnosis: cholangiocarcinoma    Barriers / Limitations:  None   Comments:    Method:  Brief focused   Comments:    General Topics:  Other:  rash   Comments:    Outcome:  Shows understanding   Comments:

## 2022-05-17 ENCOUNTER — PATIENT MESSAGE (OUTPATIENT)
Dept: HEMATOLOGY/ONCOLOGY | Facility: HOSPITAL | Age: 62
End: 2022-05-17

## 2022-05-18 NOTE — TELEPHONE ENCOUNTER
From: Kye Bethea  To:  Óscar Vega MD  Sent: 5/17/2022 4:28 PM CDT  Subject: Nausea medicine    Hi can you send over a prescription this evening for nausea she's very nauseous tonight to [place] I can pick it up this evening hopefully that will help thank you

## 2022-05-19 RX ORDER — ONDANSETRON 8 MG/1
8 TABLET, ORALLY DISINTEGRATING ORAL EVERY 8 HOURS PRN
Qty: 60 TABLET | Refills: 3 | Status: SHIPPED | OUTPATIENT
Start: 2022-05-19

## 2022-05-24 ENCOUNTER — HOSPITAL ENCOUNTER (OUTPATIENT)
Dept: CT IMAGING | Age: 62
Discharge: HOME | End: 2022-05-24
Attending: INTERNAL MEDICINE
Payer: MEDICAID

## 2022-05-24 ENCOUNTER — OFFICE VISIT (OUTPATIENT)
Dept: HEMATOLOGY/ONCOLOGY | Facility: HOSPITAL | Age: 62
End: 2022-05-24
Attending: INTERNAL MEDICINE
Payer: MEDICAID

## 2022-05-24 ENCOUNTER — HOSPITAL ENCOUNTER (EMERGENCY)
Facility: HOSPITAL | Age: 62
Discharge: LEFT BEFORE DISCHARGE | End: 2022-05-24
Payer: MEDICAID

## 2022-05-24 VITALS
OXYGEN SATURATION: 99 % | BODY MASS INDEX: 27.6 KG/M2 | HEART RATE: 117 BPM | TEMPERATURE: 98 F | WEIGHT: 150 LBS | SYSTOLIC BLOOD PRESSURE: 135 MMHG | RESPIRATION RATE: 24 BRPM | DIASTOLIC BLOOD PRESSURE: 73 MMHG | HEIGHT: 62 IN

## 2022-05-24 VITALS
HEART RATE: 113 BPM | SYSTOLIC BLOOD PRESSURE: 117 MMHG | TEMPERATURE: 98 F | OXYGEN SATURATION: 95 % | RESPIRATION RATE: 20 BRPM | DIASTOLIC BLOOD PRESSURE: 82 MMHG | BODY MASS INDEX: 30.71 KG/M2 | WEIGHT: 154.38 LBS | HEIGHT: 59.37 IN

## 2022-05-24 DIAGNOSIS — C22.1 CHOLANGIOCARCINOMA (HCC): ICD-10-CM

## 2022-05-24 DIAGNOSIS — R10.9 ABDOMINAL PAIN, UNSPECIFIED ABDOMINAL LOCATION: ICD-10-CM

## 2022-05-24 DIAGNOSIS — C78.89 METASTATIC CHOLANGIOCARCINOMA TO BILE DUCT (HCC): Primary | ICD-10-CM

## 2022-05-24 DIAGNOSIS — R30.0 DYSURIA: Primary | ICD-10-CM

## 2022-05-24 DIAGNOSIS — C22.1 METASTATIC CHOLANGIOCARCINOMA TO BILE DUCT (HCC): Primary | ICD-10-CM

## 2022-05-24 LAB
ALBUMIN SERPL-MCNC: 2 G/DL (ref 3.4–5)
ALBUMIN/GLOB SERPL: 0.4 {RATIO} (ref 1–2)
ALP LIVER SERPL-CCNC: 459 U/L
ALT SERPL-CCNC: 32 U/L
ANION GAP SERPL CALC-SCNC: 6 MMOL/L (ref 0–18)
AST SERPL-CCNC: 74 U/L (ref 15–37)
BASOPHILS # BLD AUTO: 0.05 X10(3) UL (ref 0–0.2)
BASOPHILS NFR BLD AUTO: 0.5 %
BILIRUB SERPL-MCNC: 1.3 MG/DL (ref 0.1–2)
BUN BLD-MCNC: 7 MG/DL (ref 7–18)
CALCIUM BLD-MCNC: 8.9 MG/DL (ref 8.5–10.1)
CANCER AG19-9 SERPL-ACNC: ABNORMAL U/ML (ref ?–37)
CHLORIDE SERPL-SCNC: 100 MMOL/L (ref 98–112)
CO2 SERPL-SCNC: 28 MMOL/L (ref 21–32)
CREAT BLD-MCNC: 0.68 MG/DL
EOSINOPHIL # BLD AUTO: 0.08 X10(3) UL (ref 0–0.7)
EOSINOPHIL NFR BLD AUTO: 0.8 %
ERYTHROCYTE [DISTWIDTH] IN BLOOD BY AUTOMATED COUNT: 14.7 %
GLOBULIN PLAS-MCNC: 5.3 G/DL (ref 2.8–4.4)
GLUCOSE BLD-MCNC: 122 MG/DL (ref 70–99)
HCT VFR BLD AUTO: 38.3 %
HGB BLD-MCNC: 11.7 G/DL
IMM GRANULOCYTES # BLD AUTO: 0.04 X10(3) UL (ref 0–1)
IMM GRANULOCYTES NFR BLD: 0.4 %
LYMPHOCYTES # BLD AUTO: 1.77 X10(3) UL (ref 1–4)
LYMPHOCYTES NFR BLD AUTO: 16.8 %
MCH RBC QN AUTO: 30.2 PG (ref 26–34)
MCHC RBC AUTO-ENTMCNC: 30.5 G/DL (ref 31–37)
MCV RBC AUTO: 98.7 FL
MONOCYTES # BLD AUTO: 1.13 X10(3) UL (ref 0.1–1)
MONOCYTES NFR BLD AUTO: 10.7 %
NEUTROPHILS # BLD AUTO: 7.47 X10 (3) UL (ref 1.5–7.7)
NEUTROPHILS # BLD AUTO: 7.47 X10(3) UL (ref 1.5–7.7)
NEUTROPHILS NFR BLD AUTO: 70.8 %
OSMOLALITY SERPL CALC.SUM OF ELEC: 277 MOSM/KG (ref 275–295)
PLATELET # BLD AUTO: 177 10(3)UL (ref 150–450)
POTASSIUM SERPL-SCNC: 3.6 MMOL/L (ref 3.5–5.1)
PROT SERPL-MCNC: 7.3 G/DL (ref 6.4–8.2)
RBC # BLD AUTO: 3.88 X10(6)UL
SODIUM SERPL-SCNC: 134 MMOL/L (ref 136–145)
WBC # BLD AUTO: 10.5 X10(3) UL (ref 4–11)

## 2022-05-24 PROCEDURE — 74177 CT ABD & PELVIS W/CONTRAST: CPT | Performed by: INTERNAL MEDICINE

## 2022-05-24 PROCEDURE — 85025 COMPLETE CBC W/AUTO DIFF WBC: CPT

## 2022-05-24 PROCEDURE — 80053 COMPREHEN METABOLIC PANEL: CPT

## 2022-05-24 PROCEDURE — 99215 OFFICE O/P EST HI 40 MIN: CPT | Performed by: INTERNAL MEDICINE

## 2022-05-24 PROCEDURE — 96360 HYDRATION IV INFUSION INIT: CPT

## 2022-05-24 PROCEDURE — 96413 CHEMO IV INFUSION 1 HR: CPT

## 2022-05-24 PROCEDURE — 96361 HYDRATE IV INFUSION ADD-ON: CPT

## 2022-05-24 PROCEDURE — 86301 IMMUNOASSAY TUMOR CA 19-9: CPT

## 2022-05-24 RX ORDER — PHENAZOPYRIDINE HYDROCHLORIDE 200 MG/1
200 TABLET, FILM COATED ORAL 3 TIMES DAILY PRN
Qty: 30 TABLET | Refills: 0 | Status: SHIPPED | OUTPATIENT
Start: 2022-05-24

## 2022-05-24 RX ORDER — IOHEXOL 350 MG/ML
80 INJECTION, SOLUTION INTRAVENOUS
Status: COMPLETED | OUTPATIENT
Start: 2022-05-24 | End: 2022-05-24

## 2022-05-24 RX ADMIN — IOHEXOL 80 ML: 350 INJECTION, SOLUTION INTRAVENOUS at 14:00:00

## 2022-05-24 NOTE — ED INITIAL ASSESSMENT (HPI)
Patient sent here for abnormal CT of abdomen- found fluid. Patient has been having abdominal pain for about a week. Severe constipation. Patient on 3LNC regularly. Increased SOB.

## 2022-05-24 NOTE — ED QUICK NOTES
Attempted to call pt with no answer in waiting room. Called pt's cell phone with no answer.  Pt considered LWBS

## 2022-05-24 NOTE — PROGRESS NOTES
Pt here for Md visit and chemo treatment. Pt c/o right body pain with no help with Tylenol. Pain is a 3 when sitting. Pt states she is having UTI sx, pressure, pain and burning when she urinate. Pt has not had a BM since a week ago. Pt c/o abdomen being hard and distended. Pt has tried over the counter medications with no help. Pt is fatigue, dehydrated and no appetite.

## 2022-05-24 NOTE — PROGRESS NOTES
Pt here for C2D1. Arrives Via wheelchair, accompanied by Family member           Pregnancy screening: Not applicable    Modifications in dose or schedule: No    Drugs/infusions dual verified for appearance and physical integrity: yes     Frequency of blood return and site check throughout administration: Prior to administration and At completion of therapy   Discharged to Home, Via wheelchair, accompanied by:Family member    Outpatient Oncology Care Plan  Problem list:  pain  constipation  fatigue  knowledge deficit  nausea and vomiting  Problems related to:  chemotherapy  Interventions:  provided general teaching  Expected outcomes:  understands plan of care  Progress towards outcome:  making progress    Education Record    Learner:  Patient  Barriers / Limitations:  None  Method:  Discussion and Reinforcement  Outcome:  Shows understanding  Comments:  Pt comes in reporting continuous pain all along the right side of body. Pt takes tylenol for this but finds minimal relief. Pt stated she is also now on 4L nc. Pt reports fatigue and constipation for the past few days. Pt has tried using stool softeners and Miralax with minimal relief. Pt stated she is passing small amounts of gas. Pt also reports a burning/ pressure sensation with urination. Pt reported no appetite and not eating or drinking much. MD notified and aware. Pt was given prescriptions for urinary symptoms. Pt also could not give urine sample here. Pt was given cup to use and bring to [facility] if she was able to get a sample. Pt encouraged to drink mag citrate to help with constipation. Per MD, CT scan was ordered in [facility] at 2 pm. Pt power port needle inserted before leaving. Pt tolerated Keytruda infusion. Pt left in stable condition via wheelchair.

## 2022-05-25 ENCOUNTER — APPOINTMENT (OUTPATIENT)
Dept: ULTRASOUND IMAGING | Facility: HOSPITAL | Age: 62
End: 2022-05-25
Attending: EMERGENCY MEDICINE
Payer: MEDICAID

## 2022-05-25 ENCOUNTER — TELEPHONE (OUTPATIENT)
Dept: HEMATOLOGY/ONCOLOGY | Facility: HOSPITAL | Age: 62
End: 2022-05-25

## 2022-05-25 ENCOUNTER — HOSPITAL ENCOUNTER (EMERGENCY)
Facility: HOSPITAL | Age: 62
Discharge: HOME | End: 2022-05-25
Attending: EMERGENCY MEDICINE
Payer: MEDICAID

## 2022-05-25 VITALS
HEART RATE: 94 BPM | TEMPERATURE: 98 F | OXYGEN SATURATION: 98 % | DIASTOLIC BLOOD PRESSURE: 80 MMHG | SYSTOLIC BLOOD PRESSURE: 138 MMHG | RESPIRATION RATE: 21 BRPM

## 2022-05-25 DIAGNOSIS — C78.7 CHOLANGIOCARCINOMA METASTATIC TO LIVER (HCC): Primary | ICD-10-CM

## 2022-05-25 DIAGNOSIS — R18.0 ASCITES, MALIGNANT: ICD-10-CM

## 2022-05-25 DIAGNOSIS — C22.1 CHOLANGIOCARCINOMA METASTATIC TO LIVER (HCC): Primary | ICD-10-CM

## 2022-05-25 LAB
AMYLASE PRT-CCNC: 12 U/L
APTT PPP: 53.7 SECONDS (ref 23.3–35.6)
GLUCOSE PRT-MCNC: 129 MG/DL
INR BLD: 1.2 (ref 0.8–1.2)
LDH FLD L TO P-CCNC: 61 U/L
PROT PRT-MCNC: 1.4 G/DL
PROTHROMBIN TIME: 15.2 SECONDS (ref 11.6–14.8)

## 2022-05-25 PROCEDURE — 87205 SMEAR GRAM STAIN: CPT | Performed by: EMERGENCY MEDICINE

## 2022-05-25 PROCEDURE — 83615 LACTATE (LD) (LDH) ENZYME: CPT | Performed by: EMERGENCY MEDICINE

## 2022-05-25 PROCEDURE — 85610 PROTHROMBIN TIME: CPT | Performed by: EMERGENCY MEDICINE

## 2022-05-25 PROCEDURE — 85730 THROMBOPLASTIN TIME PARTIAL: CPT | Performed by: EMERGENCY MEDICINE

## 2022-05-25 PROCEDURE — 82945 GLUCOSE OTHER FLUID: CPT | Performed by: EMERGENCY MEDICINE

## 2022-05-25 PROCEDURE — 84157 ASSAY OF PROTEIN OTHER: CPT | Performed by: EMERGENCY MEDICINE

## 2022-05-25 PROCEDURE — 82150 ASSAY OF AMYLASE: CPT | Performed by: EMERGENCY MEDICINE

## 2022-05-25 PROCEDURE — 96374 THER/PROPH/DIAG INJ IV PUSH: CPT

## 2022-05-25 PROCEDURE — 49083 ABD PARACENTESIS W/IMAGING: CPT | Performed by: EMERGENCY MEDICINE

## 2022-05-25 PROCEDURE — 99285 EMERGENCY DEPT VISIT HI MDM: CPT

## 2022-05-25 PROCEDURE — 99284 EMERGENCY DEPT VISIT MOD MDM: CPT

## 2022-05-25 PROCEDURE — 96375 TX/PRO/DX INJ NEW DRUG ADDON: CPT

## 2022-05-25 PROCEDURE — 87070 CULTURE OTHR SPECIMN AEROBIC: CPT | Performed by: EMERGENCY MEDICINE

## 2022-05-25 RX ORDER — HYDROMORPHONE HYDROCHLORIDE 1 MG/ML
0.5 INJECTION, SOLUTION INTRAMUSCULAR; INTRAVENOUS; SUBCUTANEOUS EVERY 30 MIN PRN
Status: DISCONTINUED | OUTPATIENT
Start: 2022-05-25 | End: 2022-05-25

## 2022-05-25 RX ORDER — ONDANSETRON 2 MG/ML
4 INJECTION INTRAMUSCULAR; INTRAVENOUS ONCE
Status: COMPLETED | OUTPATIENT
Start: 2022-05-25 | End: 2022-05-25

## 2022-05-25 NOTE — PROCEDURES
[facility]  Procedure Note    Stanley Fuentes Patient Status:  Emergency    1960 MRN [id number]   Location [address] [facility] Attending Thu Chavez MD   Hosp Day # 0 PCP Blaise Fowler MD     LOCATION: Right lower quadrant  FLUID REMOVED: 4 L  MEDICATION: 10 cc of 1% Lidocaine for local anesthetic. COMPLICATIONS: None. LABORATORY: 1 L    CONCLUSION: Ultrasound-guided paracentesis was performed without complication.       Junie Ervin MD  2022

## 2022-05-25 NOTE — TELEPHONE ENCOUNTER
Contacted pt in regard to her mychart message. LVM to let her know Dr Leny Byrd recommends she go back to the ER for immediate attention to her pain.

## 2022-05-25 NOTE — ED INITIAL ASSESSMENT (HPI)
Pt to er with abd pain and known fluid in abd that needs to be drained per pt , nasuea no vomiting and hx of constipation

## 2022-06-02 DIAGNOSIS — C78.89 METASTATIC CHOLANGIOCARCINOMA TO BILE DUCT (HCC): ICD-10-CM

## 2022-06-02 DIAGNOSIS — C22.1 CHOLANGIOCARCINOMA (HCC): Primary | ICD-10-CM

## 2022-06-02 DIAGNOSIS — C22.1 METASTATIC CHOLANGIOCARCINOMA TO BILE DUCT (HCC): ICD-10-CM

## 2022-06-03 DIAGNOSIS — C22.1 METASTATIC CHOLANGIOCARCINOMA TO BILE DUCT (HCC): Primary | ICD-10-CM

## 2022-06-03 DIAGNOSIS — C78.89 METASTATIC CHOLANGIOCARCINOMA TO BILE DUCT (HCC): Primary | ICD-10-CM

## 2022-06-03 RX ORDER — PROCHLORPERAZINE MALEATE 10 MG
10 TABLET ORAL EVERY 8 HOURS PRN
Qty: 60 TABLET | Refills: 11 | Status: SHIPPED | OUTPATIENT
Start: 2022-06-03

## 2022-06-07 ENCOUNTER — LAB ENCOUNTER (OUTPATIENT)
Dept: LAB | Facility: HOSPITAL | Age: 62
End: 2022-06-07
Attending: INTERNAL MEDICINE
Payer: MEDICAID

## 2022-06-07 ENCOUNTER — HOSPITAL ENCOUNTER (OUTPATIENT)
Dept: ULTRASOUND IMAGING | Facility: HOSPITAL | Age: 62
Discharge: HOME | End: 2022-06-07
Attending: INTERNAL MEDICINE
Payer: MEDICAID

## 2022-06-07 VITALS
DIASTOLIC BLOOD PRESSURE: 80 MMHG | BODY MASS INDEX: 27.6 KG/M2 | SYSTOLIC BLOOD PRESSURE: 138 MMHG | HEIGHT: 62 IN | HEART RATE: 116 BPM | OXYGEN SATURATION: 100 % | RESPIRATION RATE: 16 BRPM | TEMPERATURE: 98 F | WEIGHT: 150 LBS

## 2022-06-07 DIAGNOSIS — C22.1 CHOLANGIOCARCINOMA (HCC): Primary | ICD-10-CM

## 2022-06-07 DIAGNOSIS — C78.89 METASTATIC CHOLANGIOCARCINOMA TO BILE DUCT (HCC): ICD-10-CM

## 2022-06-07 DIAGNOSIS — C22.1 METASTATIC CHOLANGIOCARCINOMA TO BILE DUCT (HCC): ICD-10-CM

## 2022-06-07 DIAGNOSIS — C22.1 CHOLANGIOCARCINOMA (HCC): ICD-10-CM

## 2022-06-07 LAB
GLUCOSE BLD-MCNC: 147 MG/DL (ref 70–99)
INR BLD: 1.16 (ref 0.8–1.2)
PROTHROMBIN TIME: 14.8 SECONDS (ref 11.6–14.8)

## 2022-06-07 PROCEDURE — 85610 PROTHROMBIN TIME: CPT

## 2022-06-07 PROCEDURE — 82962 GLUCOSE BLOOD TEST: CPT

## 2022-06-07 PROCEDURE — 49083 ABD PARACENTESIS W/IMAGING: CPT | Performed by: INTERNAL MEDICINE

## 2022-06-07 PROCEDURE — 36415 COLL VENOUS BLD VENIPUNCTURE: CPT

## 2022-06-07 NOTE — IMAGING NOTE
Attempted to access port per protocol. Unable to get blood return. Pt reports that have not seen blood return last week either. Flushed with 0.9 NS x 10. Pt reports able to taste saline, denies any pain at port site, no swelling noted and she states she can feel the needle go in to port. Pt reports she has had a bad week unable to eat or drink and is dehydrated. Did not leave port accessed. Lab able to draw blood.

## 2022-06-07 NOTE — IMAGING NOTE
Report given to [facility] RN 5.7 litre removed. RMQ dressing dry and intact. Vitals documented.  Pt transported by cart to [id number]

## 2022-06-14 ENCOUNTER — HOSPITAL ENCOUNTER (INPATIENT)
Facility: HOSPITAL | Age: 62
LOS: 8 days | Discharge: HOME | End: 2022-06-22
Attending: EMERGENCY MEDICINE | Admitting: HOSPITALIST
Payer: MEDICAID

## 2022-06-14 ENCOUNTER — OFFICE VISIT (OUTPATIENT)
Dept: HEMATOLOGY/ONCOLOGY | Facility: HOSPITAL | Age: 62
End: 2022-06-14
Attending: INTERNAL MEDICINE
Payer: MEDICAID

## 2022-06-14 VITALS
WEIGHT: 138 LBS | OXYGEN SATURATION: 97 % | DIASTOLIC BLOOD PRESSURE: 63 MMHG | RESPIRATION RATE: 20 BRPM | TEMPERATURE: 97 F | SYSTOLIC BLOOD PRESSURE: 114 MMHG | HEART RATE: 63 BPM | BODY MASS INDEX: 27.45 KG/M2 | HEIGHT: 59.37 IN

## 2022-06-14 DIAGNOSIS — C22.1 METASTATIC CHOLANGIOCARCINOMA TO BILE DUCT (HCC): Primary | ICD-10-CM

## 2022-06-14 DIAGNOSIS — C78.89 METASTATIC CHOLANGIOCARCINOMA TO BILE DUCT (HCC): Primary | ICD-10-CM

## 2022-06-14 DIAGNOSIS — C22.1 CHOLANGIOCARCINOMA (HCC): ICD-10-CM

## 2022-06-14 PROBLEM — R10.9 ABDOMINAL PAIN, ACUTE: Status: ACTIVE | Noted: 2022-01-01

## 2022-06-14 PROBLEM — D72.829 LEUKOCYTOSIS: Status: ACTIVE | Noted: 2022-01-01

## 2022-06-14 PROBLEM — N17.9 ACUTE KIDNEY INJURY (HCC): Status: ACTIVE | Noted: 2022-01-01

## 2022-06-14 PROBLEM — R10.9 ABDOMINAL PAIN, ACUTE: Status: ACTIVE | Noted: 2022-06-14

## 2022-06-14 PROBLEM — E87.1 HYPONATREMIA: Status: ACTIVE | Noted: 2022-06-14

## 2022-06-14 PROBLEM — D72.829 LEUKOCYTOSIS: Status: ACTIVE | Noted: 2022-06-14

## 2022-06-14 PROBLEM — N17.9 ACUTE RENAL FAILURE (ARF) (HCC): Status: ACTIVE | Noted: 2022-01-01

## 2022-06-14 PROBLEM — E87.1 HYPONATREMIA: Status: ACTIVE | Noted: 2022-01-01

## 2022-06-14 PROBLEM — N17.9 ACUTE RENAL FAILURE (ARF) (HCC): Status: ACTIVE | Noted: 2022-06-14

## 2022-06-14 PROBLEM — N17.9 ACUTE KIDNEY INJURY (HCC): Status: ACTIVE | Noted: 2022-06-14

## 2022-06-14 LAB
ALBUMIN SERPL-MCNC: 1.8 G/DL (ref 3.4–5)
ALBUMIN/GLOB SERPL: 0.3 {RATIO} (ref 1–2)
ALP LIVER SERPL-CCNC: 700 U/L
ALT SERPL-CCNC: 52 U/L
ANION GAP SERPL CALC-SCNC: 11 MMOL/L (ref 0–18)
AST SERPL-CCNC: 159 U/L (ref 15–37)
BASOPHILS # BLD AUTO: 0.05 X10(3) UL (ref 0–0.2)
BASOPHILS NFR BLD AUTO: 0.3 %
BILIRUB SERPL-MCNC: 2.6 MG/DL (ref 0.1–2)
BUN BLD-MCNC: 23 MG/DL (ref 7–18)
CALCIUM BLD-MCNC: 9.9 MG/DL (ref 8.5–10.1)
CANCER AG19-9 SERPL-ACNC: ABNORMAL U/ML (ref ?–37)
CHLORIDE SERPL-SCNC: 89 MMOL/L (ref 98–112)
CO2 SERPL-SCNC: 23 MMOL/L (ref 21–32)
CREAT BLD-MCNC: 2.01 MG/DL
EOSINOPHIL # BLD AUTO: 0.08 X10(3) UL (ref 0–0.7)
EOSINOPHIL NFR BLD AUTO: 0.5 %
ERYTHROCYTE [DISTWIDTH] IN BLOOD BY AUTOMATED COUNT: 15.8 %
FASTING STATUS PATIENT QL REPORTED: NO
GLOBULIN PLAS-MCNC: 5.6 G/DL (ref 2.8–4.4)
GLUCOSE BLD-MCNC: 121 MG/DL (ref 70–99)
HCT VFR BLD AUTO: 42.5 %
HGB BLD-MCNC: 13.6 G/DL
IMM GRANULOCYTES # BLD AUTO: 0.1 X10(3) UL (ref 0–1)
IMM GRANULOCYTES NFR BLD: 0.7 %
LYMPHOCYTES # BLD AUTO: 1.07 X10(3) UL (ref 1–4)
LYMPHOCYTES NFR BLD AUTO: 7.1 %
MCH RBC QN AUTO: 29.4 PG (ref 26–34)
MCHC RBC AUTO-ENTMCNC: 32 G/DL (ref 31–37)
MCV RBC AUTO: 91.8 FL
MONOCYTES # BLD AUTO: 1.33 X10(3) UL (ref 0.1–1)
MONOCYTES NFR BLD AUTO: 8.8 %
NEUTROPHILS # BLD AUTO: 12.48 X10 (3) UL (ref 1.5–7.7)
NEUTROPHILS # BLD AUTO: 12.48 X10(3) UL (ref 1.5–7.7)
NEUTROPHILS NFR BLD AUTO: 82.6 %
OSMOLALITY SERPL CALC.SUM OF ELEC: 261 MOSM/KG (ref 275–295)
PLATELET # BLD AUTO: 204 10(3)UL (ref 150–450)
POTASSIUM SERPL-SCNC: 4.7 MMOL/L (ref 3.5–5.1)
PROT SERPL-MCNC: 7.4 G/DL (ref 6.4–8.2)
RBC # BLD AUTO: 4.63 X10(6)UL
SODIUM SERPL-SCNC: 123 MMOL/L (ref 136–145)
T4 FREE SERPL-MCNC: 2 NG/DL (ref 0.8–1.7)
TSI SER-ACNC: 5.64 MIU/ML (ref 0.36–3.74)
WBC # BLD AUTO: 15.1 X10(3) UL (ref 4–11)

## 2022-06-14 PROCEDURE — 80053 COMPREHEN METABOLIC PANEL: CPT

## 2022-06-14 PROCEDURE — 86301 IMMUNOASSAY TUMOR CA 19-9: CPT

## 2022-06-14 PROCEDURE — 99215 OFFICE O/P EST HI 40 MIN: CPT | Performed by: INTERNAL MEDICINE

## 2022-06-14 PROCEDURE — 96413 CHEMO IV INFUSION 1 HR: CPT

## 2022-06-14 PROCEDURE — 84443 ASSAY THYROID STIM HORMONE: CPT

## 2022-06-14 PROCEDURE — 85025 COMPLETE CBC W/AUTO DIFF WBC: CPT

## 2022-06-14 PROCEDURE — 84439 ASSAY OF FREE THYROXINE: CPT

## 2022-06-14 NOTE — PROGRESS NOTES
Pt here for C3D1. Arrives Ambulating independently, accompanied by Family member           Pregnancy screening: Denies possibility of pregnancy    Modifications in dose or schedule: No     Frequency of blood return and site check throughout administration: Prior to administration, Prior to each drug, Every 2-3 ml IVP and At completion of therapy   Discharged to Home, Ambulating independently, accompanied by:Family member    Outpatient Oncology Care Plan  Problem list:  fatigue  knowledge deficit  Problems related to:    chemotherapy  side effect of treatment  therapeutic effects  Interventions:  chemotherapy teaching  encourage activity as tolerated  monitor effect of therapy  monitor lab values  promoted rest  provided general teaching  Expected outcomes:  adequate sleep/rest  optimal lab values  understands plan of care  Progress towards outcome:  making progress    Education Record    Learner:  Patient  Barriers / Limitations:  None  Method:  Discussion  Outcome:  Shows understanding  Comments: Pt tolerated inf well. Pt to go to the ER after treatment for further workup due to abnormal kidney/liver levels. Will continue to monitor.

## 2022-06-14 NOTE — ED INITIAL ASSESSMENT (HPI)
Patient states that she was seen at the cancer center for blood work and her Geraldean Salts are out of wack\". Call from doctor states that her creat was 2.01, , . Patient states that her symptoms are ABD pain, bloating, N/V. She also states that she is severely constipated.

## 2022-06-14 NOTE — PROGRESS NOTES
Charge nurse on receiving unit called x1 to obtain receiving IP nurse name/number so transport can be arranged to take pt from ER to her assigned IP bed. No answer.

## 2022-06-14 NOTE — ED QUICK NOTES
Orders for admission, patient is aware of plan and ready to go upstairs. Any questions, please call ED RN Sreedhar Young at extension [phone]. Patient Covid vaccination status: Fully vaccinated     COVID Test Ordered in ED: Rapid SARS-CoV-2 by PCR    COVID Suspicion at Admission: No risk with negative rapid    Running Infusions:  IV normal saline bolus 1 L infusing at present, see MAR     Mental Status/LOC at time of transport: a/ox4    Other pertinent information:   CIWA score: N/A   NIH score:  N/A     Generalized weakness  Up w/ walker or WC  3-4L o2.  Uses home o2

## 2022-06-14 NOTE — PROGRESS NOTES
Outpatient Oncology Care Plan   Problem list:   fatigue   Problems related to:   disease/disease progression  side effect of treatment   Interventions:   provided general teaching   Expected outcomes:   understands plan of care   Progress towards outcome: unchanged     Education Record   Learner: Patient and Spouse   Barriers / Limitations: None   Method: Discussion   Outcome: Shows understanding   Comments:  Patient here for follow-up and possible C3D1 Keytruda. States energy levels are poor. Having more abdominal distention which is causing increased shortness of breath. She has no appetite and has not eaten in 6 days. She states her nausea is worse. Having an abdominal rash which worsens with distention.

## 2022-06-15 ENCOUNTER — HOSPITAL ENCOUNTER (OUTPATIENT)
Dept: INTERVENTIONAL RADIOLOGY/VASCULAR | Facility: HOSPITAL | Age: 62
Discharge: HOME | End: 2022-06-15
Attending: INTERNAL MEDICINE
Payer: MEDICAID

## 2022-06-15 ENCOUNTER — APPOINTMENT (OUTPATIENT)
Dept: INTERVENTIONAL RADIOLOGY/VASCULAR | Facility: HOSPITAL | Age: 62
End: 2022-06-15
Attending: INTERNAL MEDICINE
Payer: MEDICAID

## 2022-06-15 DIAGNOSIS — C22.1 METASTATIC CHOLANGIOCARCINOMA TO BILE DUCT (HCC): Primary | ICD-10-CM

## 2022-06-15 DIAGNOSIS — C78.89 METASTATIC CHOLANGIOCARCINOMA TO BILE DUCT (HCC): Primary | ICD-10-CM

## 2022-06-15 NOTE — IMAGING NOTE
I spoke with Angie Cardenas RN at bedside. Both pluerex catheter and paracentesis ordered. IR to review. Pt last ate 0930 and is now NPO. PT/INR in process. Pt able to consent.
- - -

## 2022-06-15 NOTE — PROGRESS NOTES
Patient is alert and oriented x4. VSS. Afebrile. WBC count today improved at 8.7. Pt ordered to have PleurX cath placement in IR. Consent obtained from patient. Procedure completed at 1444, no complications. PleurX catheter dressing intact. Pt denies pain. Pt had labs drawn peripherally today due to no blood return on port. TPN ordered for tonight to infuse in port. Dr. Stephenie Drew approved for patient to have TPN infusion in port. Urine collected and sent. Pt on 3L O2. Pt resting comfortably in bed. Jeny Rodriguez (spouse) phone number [phone] wanted to pass on to next shift in case of any updates. Will continue to monitor.

## 2022-06-15 NOTE — PLAN OF CARE
NURSING ADMISSION NOTE      Patient admitted via cart. Oriented to room. Safety precautions initiated. Bed in low position. Call light in reach. Admitted patient from ED due to RUQ abdominal pain. Patient has Hx of Cholangiocarcinoma with Liver and Bones mets, receiving Immunotherapy from [facility]. Hx of COPD and patient having 3.5L home 02. Alert and oriented x4. Vital signs stable. Afebrile. O2 sats 93-95% on 3L. Denies chest pain. Tenderness on the abdomen reported when touched, patient declined pain medication. No nausea and vomiting. Denies diarrhea. Blood glucose monitored, no Insulin coverage given per MAR. On Clear liquid diet. Fall precautions in place. Received admitting orders form Dr. Jay Abbott.

## 2022-06-15 NOTE — CM/SW NOTE
06/15/22 1100   CM/SW Referral Data   Referral Source Social Work (self-referral)   Reason for Referral Discharge planning   Informant Patient;EMR;Clinical Staff Member   Patient [address]   Patient lives with Spouse/Significant other   Patient Status Prior to Admission   Independent with ADLs and Mobility Yes   Services in place prior to admission DME/Supplies at home   DME Provider/Supplier Lydia   Type of DME/Supplies Wheelchair;Rollator Walker;Home Oxygen   Discharge Needs   Anticipated D/C needs To be determined     SW spoke with patient and her  at bedside to discuss patient's home needs. Patient reported that she lives at home with her family and she has two wheelchairs, a walker, and home O2 provided by [employer] at [facility]. Patient reported that she had a [place] agency come out to complete an assessment but has not seen them since that initial visit. Upon chart review it appears that Vince Sherman worked with patient at last discharge. Patient reported that she also goes to the [facility] every 3 weeks for immunotherapy. Patient requested a bedside commode for home due to occasional weakness when walking to the restroom. SHANNON will work on ordering DME. Update:   SHANNON received cosigned order for DME and [place] services. SHANNON sent referrals via [employer] and is awaiting accepting agencies. SHANNON will continue to follow for choice list.       SHANNON will continue to remain available for any additional DC needs or concerns.      Alf RIVAS, LSW  Discharge Planner   [phone]

## 2022-06-16 ENCOUNTER — APPOINTMENT (OUTPATIENT)
Dept: MRI IMAGING | Facility: HOSPITAL | Age: 62
End: 2022-06-16
Attending: INTERNAL MEDICINE
Payer: MEDICAID

## 2022-06-16 ENCOUNTER — APPOINTMENT (OUTPATIENT)
Dept: GENERAL RADIOLOGY | Facility: HOSPITAL | Age: 62
End: 2022-06-16
Attending: HOSPITALIST
Payer: MEDICAID

## 2022-06-16 ENCOUNTER — APPOINTMENT (OUTPATIENT)
Dept: INTERVENTIONAL RADIOLOGY/VASCULAR | Facility: HOSPITAL | Age: 62
End: 2022-06-16
Attending: HOSPITALIST
Payer: MEDICAID

## 2022-06-16 NOTE — PLAN OF CARE
Patient alert and oriented x4. Vital signs stable. Afebrile. On 3LO2 with O2 sats above 95%. Denies SOB. No chest pain. Abdomen less distended s/p  06/15/22 paracentesis. Placed RLQ abdominal Pleurx Drain intact, dressings clean and dry. No complaints of pain. TPN given via central line as ordered. Denies nausea and vomiting. Blood glucose monitored. No Insulin coverage given per MAR. Fall precautions in place. Call light in reach.        Problem: Diabetes/Glucose Control  Goal: Glucose maintained within prescribed range  Description: INTERVENTIONS:  - Monitor Blood Glucose as ordered  - Assess for signs and symptoms of hyperglycemia and hypoglycemia  - Administer ordered medications to maintain glucose within target range  - Assess barriers to adequate nutritional intake and initiate nutrition consult as needed  - Instruct patient on self management of diabetes  Outcome: Progressing     Problem: GASTROINTESTINAL - ADULT  Goal: Minimal or absence of nausea and vomiting  Description: INTERVENTIONS:  - Maintain adequate hydration with IV or PO as ordered and tolerated  - Nasogastric tube to low intermittent suction as ordered  - Evaluate effectiveness of ordered antiemetic medications  - Provide nonpharmacologic comfort measures as appropriate  - Advance diet as tolerated, if ordered  - Obtain nutritional consult as needed  - Evaluate fluid balance  6/15/2022 2357 by Kevin Verduzco RN  Outcome: Progressing     Problem: GASTROINTESTINAL - ADULT  Goal: Maintains adequate nutritional intake (undernourished)  Description: INTERVENTIONS:  - Monitor percentage of each meal consumed  - Identify factors contributing to decreased intake, treat as appropriate  - Assist with meals as needed  - Monitor I&O, WT and lab values  - Obtain nutritional consult as needed  - Optimize oral hygiene and moisture  - Encourage food from home; allow for food preferences  - Enhance eating environment  6/15/2022 2357 by Chad Treviño RN  Outcome: Progressing     Problem: METABOLIC/FLUID AND ELECTROLYTES - ADULT  Goal: Glucose maintained within prescribed range  Description: INTERVENTIONS:  - Monitor Blood Glucose as ordered  - Assess for signs and symptoms of hyperglycemia and hypoglycemia  - Administer ordered medications to maintain glucose within target range  - Assess barriers to adequate nutritional intake and initiate nutrition consult as needed  - Instruct patient on self management of diabetes  Outcome: Progressing     Problem: METABOLIC/FLUID AND ELECTROLYTES - ADULT  Goal: Hemodynamic stability and optimal renal function maintained  Description: INTERVENTIONS:  - Monitor labs and assess for signs and symptoms of volume excess or deficit  - Monitor intake, output and patient weight  - Monitor urine specific gravity, serum osmolarity and serum sodium as indicated or ordered  - Monitor response to interventions for patient's volume status, including labs, urine output, blood pressure (other measures as available)  - Encourage oral intake as appropriate  - Instruct patient on fluid and nutrition restrictions as appropriate  Outcome: Progressing

## 2022-06-16 NOTE — PROCEDURES
[address] Patient Status:  Inpatient    1960 MRN [id number]   Location [facility] Attending Cindy Marks MD   Hosp Day # 2 PCP Maureen Barajas MD         Brief Procedure Report    Pre-Operative Diagnosis: Cannot draw blood from chest port    Post-Operative Diagnosis: Same as above. Procedure Performed: Port check    Anesthesia: None    EBL: 0    Complications: None    Summary of Case: Port check shows a fibrin sheath around distal end of catheter. Catheter is in good position. Recommend tpa infusion protocol if not already performed. Patient tolerated procedure well without immediate complication. Full report to follow in PACS.     Jordin Wiley

## 2022-06-16 NOTE — PLAN OF CARE
Patient is alert and oriented x4. VSS. Afebrile. WBC count today improved at 8.7. Pt ordered to have PleurX cath placement in IR. Consent obtained from patient. Procedure completed at 5060, no complications. PleurX catheter dressing intact. Pt denies pain. Pt had labs drawn peripherally today due to no blood return on port. TPN ordered for tonight to infuse in port. Dr. Tamera Lopez approved for patient to have TPN infusion in port. Urine collected and sent. Pt on 3L O2. Pt resting comfortably in bed. Geoffrey Li (spouse) phone number [phone] wanted to pass on to next shift in case of any updates. Will continue to monitor.     Problem: Diabetes/Glucose Control  Goal: Glucose maintained within prescribed range  Description: INTERVENTIONS:  - Monitor Blood Glucose as ordered  - Assess for signs and symptoms of hyperglycemia and hypoglycemia  - Administer ordered medications to maintain glucose within target range  - Assess barriers to adequate nutritional intake and initiate nutrition consult as needed  - Instruct patient on self management of diabetes  Outcome: Progressing     Problem: GASTROINTESTINAL - ADULT  Goal: Minimal or absence of nausea and vomiting  Description: INTERVENTIONS:  - Maintain adequate hydration with IV or PO as ordered and tolerated  - Nasogastric tube to low intermittent suction as ordered  - Evaluate effectiveness of ordered antiemetic medications  - Provide nonpharmacologic comfort measures as appropriate  - Advance diet as tolerated, if ordered  - Obtain nutritional consult as needed  - Evaluate fluid balance  Outcome: Progressing  Goal: Maintains or returns to baseline bowel function  Description: INTERVENTIONS:  - Assess bowel function  - Maintain adequate hydration with IV or PO as ordered and tolerated  - Evaluate effectiveness of GI medications  - Encourage mobilization and activity  - Obtain nutritional consult as needed  - Establish a toileting routine/schedule  - Consider collaborating with pharmacy to review patient's medication profile  Outcome: Progressing  Goal: Maintains adequate nutritional intake (undernourished)  Description: INTERVENTIONS:  - Monitor percentage of each meal consumed  - Identify factors contributing to decreased intake, treat as appropriate  - Assist with meals as needed  - Monitor I&O, WT and lab values  - Obtain nutritional consult as needed  - Optimize oral hygiene and moisture  - Encourage food from home; allow for food preferences  - Enhance eating environment  Outcome: Progressing     Problem: METABOLIC/FLUID AND ELECTROLYTES - ADULT  Goal: Glucose maintained within prescribed range  Description: INTERVENTIONS:  - Monitor Blood Glucose as ordered  - Assess for signs and symptoms of hyperglycemia and hypoglycemia  - Administer ordered medications to maintain glucose within target range  - Assess barriers to adequate nutritional intake and initiate nutrition consult as needed  - Instruct patient on self management of diabetes  Outcome: Progressing  Goal: Hemodynamic stability and optimal renal function maintained  Description: INTERVENTIONS:  - Monitor labs and assess for signs and symptoms of volume excess or deficit  - Monitor intake, output and patient weight  - Monitor urine specific gravity, serum osmolarity and serum sodium as indicated or ordered  - Monitor response to interventions for patient's volume status, including labs, urine output, blood pressure (other measures as available)  - Encourage oral intake as appropriate  - Instruct patient on fluid and nutrition restrictions as appropriate  Outcome: Progressing

## 2022-06-17 ENCOUNTER — APPOINTMENT (OUTPATIENT)
Dept: GENERAL RADIOLOGY | Facility: HOSPITAL | Age: 62
End: 2022-06-17
Attending: HOSPITALIST
Payer: MEDICAID

## 2022-06-17 NOTE — PLAN OF CARE
Patient received on return from MRI/MRCP. Patient is A&O x4.   Calm and cooperative. VSS. On tele-NSR. On 3L O2.  IV-0.9% NS 75 ml/hr. R chest port-good blood return. TPN started. Accuchecks. C/o pain, tylenol #3 prn given per MAR. Dressing to RLQ from pleurX placement-dry and intact. Bed alarm on. Bed at lowest position. Call light within reach. All needs met at this time.      Problem: Diabetes/Glucose Control  Goal: Glucose maintained within prescribed range  Description: INTERVENTIONS:  - Monitor Blood Glucose as ordered  - Assess for signs and symptoms of hyperglycemia and hypoglycemia  - Administer ordered medications to maintain glucose within target range  - Assess barriers to adequate nutritional intake and initiate nutrition consult as needed  - Instruct patient on self management of diabetes  Outcome: Progressing     Problem: GASTROINTESTINAL - ADULT  Goal: Minimal or absence of nausea and vomiting  Description: INTERVENTIONS:  - Maintain adequate hydration with IV or PO as ordered and tolerated  - Nasogastric tube to low intermittent suction as ordered  - Evaluate effectiveness of ordered antiemetic medications  - Provide nonpharmacologic comfort measures as appropriate  - Advance diet as tolerated, if ordered  - Obtain nutritional consult as needed  - Evaluate fluid balance  Outcome: Progressing  Goal: Maintains or returns to baseline bowel function  Description: INTERVENTIONS:  - Assess bowel function  - Maintain adequate hydration with IV or PO as ordered and tolerated  - Evaluate effectiveness of GI medications  - Encourage mobilization and activity  - Obtain nutritional consult as needed  - Establish a toileting routine/schedule  - Consider collaborating with pharmacy to review patient's medication profile  Outcome: Progressing  Goal: Maintains adequate nutritional intake (undernourished)  Description: INTERVENTIONS:  - Monitor percentage of each meal consumed  - Identify factors contributing to decreased intake, treat as appropriate  - Assist with meals as needed  - Monitor I&O, WT and lab values  - Obtain nutritional consult as needed  - Optimize oral hygiene and moisture  - Encourage food from home; allow for food preferences  - Enhance eating environment  Outcome: Progressing     Problem: METABOLIC/FLUID AND ELECTROLYTES - ADULT  Goal: Glucose maintained within prescribed range  Description: INTERVENTIONS:  - Monitor Blood Glucose as ordered  - Assess for signs and symptoms of hyperglycemia and hypoglycemia  - Administer ordered medications to maintain glucose within target range  - Assess barriers to adequate nutritional intake and initiate nutrition consult as needed  - Instruct patient on self management of diabetes  Outcome: Progressing  Goal: Hemodynamic stability and optimal renal function maintained  Description: INTERVENTIONS:  - Monitor labs and assess for signs and symptoms of volume excess or deficit  - Monitor intake, output and patient weight  - Monitor urine specific gravity, serum osmolarity and serum sodium as indicated or ordered  - Monitor response to interventions for patient's volume status, including labs, urine output, blood pressure (other measures as available)  - Encourage oral intake as appropriate  - Instruct patient on fluid and nutrition restrictions as appropriate  Outcome: Progressing     Problem: HEMATOLOGIC - ADULT  Goal: Free from bleeding injury  Description: (Example usage: patient with low platelets)  INTERVENTIONS:  - Avoid intramuscular injections, enemas and rectal medication administration  - Ensure safe mobilization of patient  - Hold pressure on venipuncture sites to achieve adequate hemostasis  - Assess for signs and symptoms of internal bleeding  - Monitor lab trends  - Patient is to report abnormal signs of bleeding to staff  - Avoid use of toothpicks and dental floss  - Use electric shaver for shaving  - Use soft bristle tooth brush  - Limit straining and forceful nose blowing  Outcome: Progressing

## 2022-06-18 NOTE — PLAN OF CARE
Pt a/o x4. VSS on 2L NC. C/o abdominal pain, per pt better than earlier in the day. Meds given per MAR. TPN infusing per order. Pluerx on RLQ, dressing c/d/i. Pt up to bathroom w/ walker & SBA. BM x3- pt report. Call light within reach.      Problem: Diabetes/Glucose Control  Goal: Glucose maintained within prescribed range  Description: INTERVENTIONS:  - Monitor Blood Glucose as ordered  - Assess for signs and symptoms of hyperglycemia and hypoglycemia  - Administer ordered medications to maintain glucose within target range  - Assess barriers to adequate nutritional intake and initiate nutrition consult as needed  - Instruct patient on self management of diabetes  Outcome: Progressing     Problem: GASTROINTESTINAL - ADULT  Goal: Minimal or absence of nausea and vomiting  Description: INTERVENTIONS:  - Maintain adequate hydration with IV or PO as ordered and tolerated  - Nasogastric tube to low intermittent suction as ordered  - Evaluate effectiveness of ordered antiemetic medications  - Provide nonpharmacologic comfort measures as appropriate  - Advance diet as tolerated, if ordered  - Obtain nutritional consult as needed  - Evaluate fluid balance  Outcome: Progressing  Goal: Maintains or returns to baseline bowel function  Description: INTERVENTIONS:  - Assess bowel function  - Maintain adequate hydration with IV or PO as ordered and tolerated  - Evaluate effectiveness of GI medications  - Encourage mobilization and activity  - Obtain nutritional consult as needed  - Establish a toileting routine/schedule  - Consider collaborating with pharmacy to review patient's medication profile  Outcome: Progressing     Problem: METABOLIC/FLUID AND ELECTROLYTES - ADULT  Goal: Glucose maintained within prescribed range  Description: INTERVENTIONS:  - Monitor Blood Glucose as ordered  - Assess for signs and symptoms of hyperglycemia and hypoglycemia  - Administer ordered medications to maintain glucose within target range  - Assess barriers to adequate nutritional intake and initiate nutrition consult as needed  - Instruct patient on self management of diabetes  Outcome: Progressing     Problem: HEMATOLOGIC - ADULT  Goal: Free from bleeding injury  Description: (Example usage: patient with low platelets)  INTERVENTIONS:  - Avoid intramuscular injections, enemas and rectal medication administration  - Ensure safe mobilization of patient  - Hold pressure on venipuncture sites to achieve adequate hemostasis  - Assess for signs and symptoms of internal bleeding  - Monitor lab trends  - Patient is to report abnormal signs of bleeding to staff  - Avoid use of toothpicks and dental floss  - Use electric shaver for shaving  - Use soft bristle tooth brush  - Limit straining and forceful nose blowing  Outcome: Progressing

## 2022-06-18 NOTE — PLAN OF CARE
Patient c/o moderate abdominal pain. Patient's abdomen distended, firm, tender, bowel sounds active. Pleurx catheter drained, obtained 1000 ml clear yellow fluid. Tylenol given as needed, with moderate relief.

## 2022-06-19 NOTE — PLAN OF CARE
Patient is AOx4, on 2L NC at baseline. TPN infusing at 75 ml/hr. Tolerating well. Tylenol 3 given for pain. Metoprolol held d/t low BP, Dr. Desir Counter notified. pT WALKS WITH THE WALKER TO THE BATHROOM. Pleurx drained 2L out per doctor's order. Patient tolerated well, no complications. Bed alarm is on, call light within reach, family at the bedside. Will continue to monitor.     Problem: Diabetes/Glucose Control  Goal: Glucose maintained within prescribed range  Description: INTERVENTIONS:  - Monitor Blood Glucose as ordered  - Assess for signs and symptoms of hyperglycemia and hypoglycemia  - Administer ordered medications to maintain glucose within target range  - Assess barriers to adequate nutritional intake and initiate nutrition consult as needed  - Instruct patient on self management of diabetes  Outcome: Progressing     Problem: GASTROINTESTINAL - ADULT  Goal: Minimal or absence of nausea and vomiting  Description: INTERVENTIONS:  - Maintain adequate hydration with IV or PO as ordered and tolerated  - Nasogastric tube to low intermittent suction as ordered  - Evaluate effectiveness of ordered antiemetic medications  - Provide nonpharmacologic comfort measures as appropriate  - Advance diet as tolerated, if ordered  - Obtain nutritional consult as needed  - Evaluate fluid balance  Outcome: Progressing  Goal: Maintains or returns to baseline bowel function  Description: INTERVENTIONS:  - Assess bowel function  - Maintain adequate hydration with IV or PO as ordered and tolerated  - Evaluate effectiveness of GI medications  - Encourage mobilization and activity  - Obtain nutritional consult as needed  - Establish a toileting routine/schedule  - Consider collaborating with pharmacy to review patient's medication profile  Outcome: Progressing  Goal: Maintains adequate nutritional intake (undernourished)  Description: INTERVENTIONS:  - Monitor percentage of each meal consumed  - Identify factors contributing to decreased intake, treat as appropriate  - Assist with meals as needed  - Monitor I&O, WT and lab values  - Obtain nutritional consult as needed  - Optimize oral hygiene and moisture  - Encourage food from home; allow for food preferences  - Enhance eating environment  Outcome: Progressing     Problem: METABOLIC/FLUID AND ELECTROLYTES - ADULT  Goal: Glucose maintained within prescribed range  Description: INTERVENTIONS:  - Monitor Blood Glucose as ordered  - Assess for signs and symptoms of hyperglycemia and hypoglycemia  - Administer ordered medications to maintain glucose within target range  - Assess barriers to adequate nutritional intake and initiate nutrition consult as needed  - Instruct patient on self management of diabetes  Outcome: Progressing

## 2022-06-19 NOTE — PLAN OF CARE
Pt a/o x4. VSS on 2L NC, tele- ST. C/o abdominal pain- prn tylenol#3 given. Meds given per MAR. TPN infusing per order. Pleurx on RLQ, dressing c/d/i. Call light within reach.      Problem: Diabetes/Glucose Control  Goal: Glucose maintained within prescribed range  Description: INTERVENTIONS:  - Monitor Blood Glucose as ordered  - Assess for signs and symptoms of hyperglycemia and hypoglycemia  - Administer ordered medications to maintain glucose within target range  - Assess barriers to adequate nutritional intake and initiate nutrition consult as needed  - Instruct patient on self management of diabetes  Outcome: Progressing     Problem: GASTROINTESTINAL - ADULT  Goal: Minimal or absence of nausea and vomiting  Description: INTERVENTIONS:  - Maintain adequate hydration with IV or PO as ordered and tolerated  - Nasogastric tube to low intermittent suction as ordered  - Evaluate effectiveness of ordered antiemetic medications  - Provide nonpharmacologic comfort measures as appropriate  - Advance diet as tolerated, if ordered  - Obtain nutritional consult as needed  - Evaluate fluid balance  Outcome: Progressing  Goal: Maintains or returns to baseline bowel function  Description: INTERVENTIONS:  - Assess bowel function  - Maintain adequate hydration with IV or PO as ordered and tolerated  - Evaluate effectiveness of GI medications  - Encourage mobilization and activity  - Obtain nutritional consult as needed  - Establish a toileting routine/schedule  - Consider collaborating with pharmacy to review patient's medication profile  Outcome: Progressing     Problem: METABOLIC/FLUID AND ELECTROLYTES - ADULT  Goal: Glucose maintained within prescribed range  Description: INTERVENTIONS:  - Monitor Blood Glucose as ordered  - Assess for signs and symptoms of hyperglycemia and hypoglycemia  - Administer ordered medications to maintain glucose within target range  - Assess barriers to adequate nutritional intake and initiate nutrition consult as needed  - Instruct patient on self management of diabetes  Outcome: Progressing     Problem: HEMATOLOGIC - ADULT  Goal: Free from bleeding injury  Description: (Example usage: patient with low platelets)  INTERVENTIONS:  - Avoid intramuscular injections, enemas and rectal medication administration  - Ensure safe mobilization of patient  - Hold pressure on venipuncture sites to achieve adequate hemostasis  - Assess for signs and symptoms of internal bleeding  - Monitor lab trends  - Patient is to report abnormal signs of bleeding to staff  - Avoid use of toothpicks and dental floss  - Use electric shaver for shaving  - Use soft bristle tooth brush  - Limit straining and forceful nose blowing  Outcome: Progressing     Problem: PAIN - ADULT  Goal: Verbalizes/displays adequate comfort level or patient's stated pain goal  Description: INTERVENTIONS:  - Encourage pt to monitor pain and request assistance  - Assess pain using appropriate pain scale  - Administer analgesics based on type and severity of pain and evaluate response  - Implement non-pharmacological measures as appropriate and evaluate response  - Consider cultural and social influences on pain and pain management  - Manage/alleviate anxiety  - Utilize distraction and/or relaxation techniques  - Monitor for opioid side effects  - Notify MD/LIP if interventions unsuccessful or patient reports new pain  - Anticipate increased pain with activity and pre-medicate as appropriate  Outcome: Progressing

## 2022-06-20 NOTE — PLAN OF CARE
Patient alert and oriented, still very weak. Walks to the bathroom with the waLKER. Pleurx drained 2L today, tolerated well. Pt still has poor appetite. Family at the bedside. TPN infusing at 75 ml/hr. Discharge planning in progress. No new complaints. Will continue to monitor.      Problem: Diabetes/Glucose Control  Goal: Glucose maintained within prescribed range  Description: INTERVENTIONS:  - Monitor Blood Glucose as ordered  - Assess for signs and symptoms of hyperglycemia and hypoglycemia  - Administer ordered medications to maintain glucose within target range  - Assess barriers to adequate nutritional intake and initiate nutrition consult as needed  - Instruct patient on self management of diabetes  Outcome: Progressing     Problem: GASTROINTESTINAL - ADULT  Goal: Minimal or absence of nausea and vomiting  Description: INTERVENTIONS:  - Maintain adequate hydration with IV or PO as ordered and tolerated  - Nasogastric tube to low intermittent suction as ordered  - Evaluate effectiveness of ordered antiemetic medications  - Provide nonpharmacologic comfort measures as appropriate  - Advance diet as tolerated, if ordered  - Obtain nutritional consult as needed  - Evaluate fluid balance  Outcome: Progressing  Goal: Maintains or returns to baseline bowel function  Description: INTERVENTIONS:  - Assess bowel function  - Maintain adequate hydration with IV or PO as ordered and tolerated  - Evaluate effectiveness of GI medications  - Encourage mobilization and activity  - Obtain nutritional consult as needed  - Establish a toileting routine/schedule  - Consider collaborating with pharmacy to review patient's medication profile  Outcome: Progressing  Goal: Maintains adequate nutritional intake (undernourished)  Description: INTERVENTIONS:  - Monitor percentage of each meal consumed  - Identify factors contributing to decreased intake, treat as appropriate  - Assist with meals as needed  - Monitor I&O, WT and lab values  - Obtain nutritional consult as needed  - Optimize oral hygiene and moisture  - Encourage food from home; allow for food preferences  - Enhance eating environment  Outcome: Progressing     Problem: METABOLIC/FLUID AND ELECTROLYTES - ADULT  Goal: Glucose maintained within prescribed range  Description: INTERVENTIONS:  - Monitor Blood Glucose as ordered  - Assess for signs and symptoms of hyperglycemia and hypoglycemia  - Administer ordered medications to maintain glucose within target range  - Assess barriers to adequate nutritional intake and initiate nutrition consult as needed  - Instruct patient on self management of diabetes  Outcome: Progressing

## 2022-06-20 NOTE — HOME CARE LIAISON
Received referral via [facility] for Home Health services. Spoke w/ patient and her  Edmond and provided with list of [facility] providers from [facility], choice is [facility]. Agency reserved in [facility] and contact information placed on AVS.Financial interest disclosure provided.  Notified Josh Collet

## 2022-06-20 NOTE — PLAN OF CARE
Pt a/o x4. VSS on 2L NC (baseline). C/o pain & bloating, PRNs given per MAR. TPN infusing per order. Pleurx RLQ- dressing c/d/i. Call light within reach.      Problem: Diabetes/Glucose Control  Goal: Glucose maintained within prescribed range  Description: INTERVENTIONS:  - Monitor Blood Glucose as ordered  - Assess for signs and symptoms of hyperglycemia and hypoglycemia  - Administer ordered medications to maintain glucose within target range  - Assess barriers to adequate nutritional intake and initiate nutrition consult as needed  - Instruct patient on self management of diabetes  Outcome: Progressing     Problem: GASTROINTESTINAL - ADULT  Goal: Minimal or absence of nausea and vomiting  Description: INTERVENTIONS:  - Maintain adequate hydration with IV or PO as ordered and tolerated  - Nasogastric tube to low intermittent suction as ordered  - Evaluate effectiveness of ordered antiemetic medications  - Provide nonpharmacologic comfort measures as appropriate  - Advance diet as tolerated, if ordered  - Obtain nutritional consult as needed  - Evaluate fluid balance  Outcome: Progressing     Problem: METABOLIC/FLUID AND ELECTROLYTES - ADULT  Goal: Glucose maintained within prescribed range  Description: INTERVENTIONS:  - Monitor Blood Glucose as ordered  - Assess for signs and symptoms of hyperglycemia and hypoglycemia  - Administer ordered medications to maintain glucose within target range  - Assess barriers to adequate nutritional intake and initiate nutrition consult as needed  - Instruct patient on self management of diabetes  Outcome: Progressing     Problem: HEMATOLOGIC - ADULT  Goal: Free from bleeding injury  Description: (Example usage: patient with low platelets)  INTERVENTIONS:  - Avoid intramuscular injections, enemas and rectal medication administration  - Ensure safe mobilization of patient  - Hold pressure on venipuncture sites to achieve adequate hemostasis  - Assess for signs and symptoms of internal bleeding  - Monitor lab trends  - Patient is to report abnormal signs of bleeding to staff  - Avoid use of toothpicks and dental floss  - Use electric shaver for shaving  - Use soft bristle tooth brush  - Limit straining and forceful nose blowing  Outcome: Progressing     Problem: PAIN - ADULT  Goal: Verbalizes/displays adequate comfort level or patient's stated pain goal  Description: INTERVENTIONS:  - Encourage pt to monitor pain and request assistance  - Assess pain using appropriate pain scale  - Administer analgesics based on type and severity of pain and evaluate response  - Implement non-pharmacological measures as appropriate and evaluate response  - Consider cultural and social influences on pain and pain management  - Manage/alleviate anxiety  - Utilize distraction and/or relaxation techniques  - Monitor for opioid side effects  - Notify MD/LIP if interventions unsuccessful or patient reports new pain  - Anticipate increased pain with activity and pre-medicate as appropriate  Outcome: Progressing    Problem: GASTROINTESTINAL - ADULT  Goal: Maintains adequate nutritional intake (undernourished)  Description: INTERVENTIONS:  - Monitor percentage of each meal consumed  - Identify factors contributing to decreased intake, treat as appropriate  - Assist with meals as needed  - Monitor I&O, WT and lab values  - Obtain nutritional consult as needed  - Optimize oral hygiene and moisture  - Encourage food from home; allow for food preferences  - Enhance eating environment  Outcome: Not Progressing

## 2022-06-20 NOTE — CM/SW NOTE
SW received finalized TPN orders and sent referral to [facility]. Patient was approved by [facility] and is covered 100% for home TPN. SHANNON has been unable to locate an accepting [facility] agency for patient to teach TPN, manage pleurx draining, check glucose levels and vitals. [facility] is assessing the patient to see if they are able to staff her needs. SHANNON was also informed by [facility] that patient will be living at her daughters home in [facility] post [place]. SHANNON notified [facility]. If patient were to be accepted by [facility], there is a form that will need to be completed by APN for pleurx management and supplies. SHANNON messaged APN to notify and placed it on the chart. Barriers to DC:   No accepting HH    SHANNON will continue to remain available for any additional DC needs or concerns.      Kg RIVAS, APRILW  Discharge Planner   [phone]

## 2022-06-21 NOTE — CM/SW NOTE
Order form for pleurx on paper chart awaiting MD/APN signature, Dr Baljinder Cummins paged with request yesterday. [facility] will accept patient for TPN and pleurx management, will need same day SOC. Call to [facility], clarified w/Dr De Leon that hem/onc will cont to follow and write for TPN outpatient. D/T insulin in TPN, Patient has glucometer at home/able to check BG. Awaiting am labs for final TPN order per HOLLY allison. [facility] will need to receive labs/orders by 12pm in order to process same day. 1140: labs and tpn orders sent via aidin to [facility], call to tequila at [facility] to inform - await confirmation. Sylwia Medina at [facility] updated, will coordinate rn for tonight w/tequila at [facility]. 1400: confirmed w/tequila at [facility] and Cathie at [facility] they have received all orders and can start care tonight. Message to Dr Scott Zuniga to inform. 1415: per Dr Scott Zuniga, dc 6/22. Call to Sylwia Medina at [facility] and [facility] to inform and reschedule services, await confirmations. 1420: spoke with tequila at [facility], cmp is ordered for tomorrow am, will need RD to confirm TPN order again in am. Call/left VM with May Wylie RD to inform of above and ensure TPN is ordered for tonight, call to RN as well. [facility] is scheduled to start care 6/22 6-7pm. [facility] notified.      Jeaneth Carcamo, RN,   [id number]

## 2022-06-21 NOTE — DIETARY NOTE
[facility]iol[facility] 14 FOLLOW UP    Noris Arana      TPN order for tonight to provide: 950 dextrose calories, 570 lipid calories, 30% lipids, 90 grams protein in 1800mL fluid to meet ~100% of nutrition prescription. Communicated with pharmacy and case mgmt due to pt going home with [facility] care. Intake/Output Summary (Last 24 hours) at 6/21/2022 1142  Last data filed at 6/21/2022 0806  Gross per 24 hour   Intake 320 ml   Output 650 ml   Net -330 ml     Labs:   Recent Labs   Lab 06/16/22  0707 06/17/22  0532 06/18/22  0610 06/19/22  0534 06/21/22  1010   *   < > 211* 219* 237*   *   < > 129* 132* 135*   K 4.3   < > 4.2 4.1 4.4   CL 95*   < > 102 102 103   CO2 23.0   < > 21.0 23.0 26.0   BUN 21*   < > 20* 22* 21*   CREATSERUM 1.29*   < > 0.76 0.63 0.51*   CA 8.6   < > 8.0* 7.8* 7.9*   PHOS 2.5   < > 2.2* 3.1 2.8   MG 2.1   < > 2.0 1.8 2.2   ALKPHO 645*   < > 618* 649*  --    *   < > 143* 129*  --    ALT 49   < > 42 47  --    BILT 2.3*   < > 1.9 1.9  --    TRIG 185*  --   --   --   --     < > = values in this interval not displayed. Electrolytes adjusted based on today's labs. RD will write TPN daily and follow per protocol.     Pt is at high nutrition risk    Albert Goss RD, LDN  Clinical Dietitian

## 2022-06-21 NOTE — TELEPHONE ENCOUNTER
Arnav Bergman RN from [facility] called and stated pt is currently admitted to [facility] and will be discharged tomorrow. Arnav Bergman wants to confirm Dr. Joana Justice will be signing future orders for pt and for [facility] nurse and PT. Best call back number for Arnav Bergman is [phone]. Requesting for nurse to return call.

## 2022-06-21 NOTE — PLAN OF CARE
No new complaints, sstill poor appetite. Pleurx drained today, 4 L out, tolerated well. Possible discharge home tomorrow with HH, on TPN. Family at the bedside. Will continue to monitor.     Problem: GASTROINTESTINAL - ADULT  Goal: Minimal or absence of nausea and vomiting  Description: INTERVENTIONS:  - Maintain adequate hydration with IV or PO as ordered and tolerated  - Nasogastric tube to low intermittent suction as ordered  - Evaluate effectiveness of ordered antiemetic medications  - Provide nonpharmacologic comfort measures as appropriate  - Advance diet as tolerated, if ordered  - Obtain nutritional consult as needed  - Evaluate fluid balance  Outcome: Progressing  Goal: Maintains or returns to baseline bowel function  Description: INTERVENTIONS:  - Assess bowel function  - Maintain adequate hydration with IV or PO as ordered and tolerated  - Evaluate effectiveness of GI medications  - Encourage mobilization and activity  - Obtain nutritional consult as needed  - Establish a toileting routine/schedule  - Consider collaborating with pharmacy to review patient's medication profile  Outcome: Progressing  Goal: Maintains adequate nutritional intake (undernourished)  Description: INTERVENTIONS:  - Monitor percentage of each meal consumed  - Identify factors contributing to decreased intake, treat as appropriate  - Assist with meals as needed  - Monitor I&O, WT and lab values  - Obtain nutritional consult as needed  - Optimize oral hygiene and moisture  - Encourage food from home; allow for food preferences  - Enhance eating environment  Outcome: Progressing

## 2022-06-21 NOTE — PLAN OF CARE
Patient alert and oriented x4. VSS. Afebrile. No complaints of pain at this time. No incidences of nausea/vomiting at this time. Medications administered per MAR. TPN infusing per MAR. Safety precautions continued. Call light within reach. Will continue to monitor.    Problem: GASTROINTESTINAL - ADULT  Goal: Minimal or absence of nausea and vomiting  Description: INTERVENTIONS:  - Maintain adequate hydration with IV or PO as ordered and tolerated  - Nasogastric tube to low intermittent suction as ordered  - Evaluate effectiveness of ordered antiemetic medications  - Provide nonpharmacologic comfort measures as appropriate  - Advance diet as tolerated, if ordered  - Obtain nutritional consult as needed  - Evaluate fluid balance  Outcome: Progressing     Problem: METABOLIC/FLUID AND ELECTROLYTES - ADULT  Goal: Hemodynamic stability and optimal renal function maintained  Description: INTERVENTIONS:  - Monitor labs and assess for signs and symptoms of volume excess or deficit  - Monitor intake, output and patient weight  - Monitor urine specific gravity, serum osmolarity and serum sodium as indicated or ordered  - Monitor response to interventions for patient's volume status, including labs, urine output, blood pressure (other measures as available)  - Encourage oral intake as appropriate  - Instruct patient on fluid and nutrition restrictions as appropriate  Outcome: Progressing     Problem: HEMATOLOGIC - ADULT  Goal: Free from bleeding injury  Description: (Example usage: patient with low platelets)  INTERVENTIONS:  - Avoid intramuscular injections, enemas and rectal medication administration  - Ensure safe mobilization of patient  - Hold pressure on venipuncture sites to achieve adequate hemostasis  - Assess for signs and symptoms of internal bleeding  - Monitor lab trends  - Patient is to report abnormal signs of bleeding to staff  - Avoid use of toothpicks and dental floss  - Use electric shaver for shaving  - Use soft bristle tooth brush  - Limit straining and forceful nose blowing  Outcome: Progressing     Problem: PAIN - ADULT  Goal: Verbalizes/displays adequate comfort level or patient's stated pain goal  Description: INTERVENTIONS:  - Encourage pt to monitor pain and request assistance  - Assess pain using appropriate pain scale  - Administer analgesics based on type and severity of pain and evaluate response  - Implement non-pharmacological measures as appropriate and evaluate response  - Consider cultural and social influences on pain and pain management  - Manage/alleviate anxiety  - Utilize distraction and/or relaxation techniques  - Monitor for opioid side effects  - Notify MD/LIP if interventions unsuccessful or patient reports new pain  - Anticipate increased pain with activity and pre-medicate as appropriate  Outcome: Progressing     Problem: Diabetes/Glucose Control  Goal: Glucose maintained within prescribed range  Description: INTERVENTIONS:  - Monitor Blood Glucose as ordered  - Assess for signs and symptoms of hyperglycemia and hypoglycemia  - Administer ordered medications to maintain glucose within target range  - Assess barriers to adequate nutritional intake and initiate nutrition consult as needed  - Instruct patient on self management of diabetes  Outcome: Not Progressing     Problem: GASTROINTESTINAL - ADULT  Goal: Maintains or returns to baseline bowel function  Description: INTERVENTIONS:  - Assess bowel function  - Maintain adequate hydration with IV or PO as ordered and tolerated  - Evaluate effectiveness of GI medications  - Encourage mobilization and activity  - Obtain nutritional consult as needed  - Establish a toileting routine/schedule  - Consider collaborating with pharmacy to review patient's medication profile  Outcome: Not Progressing  Goal: Maintains adequate nutritional intake (undernourished)  Description: INTERVENTIONS:  - Monitor percentage of each meal consumed  - Identify factors contributing to decreased intake, treat as appropriate  - Assist with meals as needed  - Monitor I&O, WT and lab values  - Obtain nutritional consult as needed  - Optimize oral hygiene and moisture  - Encourage food from home; allow for food preferences  - Enhance eating environment  Outcome: Not Progressing     Problem: METABOLIC/FLUID AND ELECTROLYTES - ADULT  Goal: Glucose maintained within prescribed range  Description: INTERVENTIONS:  - Monitor Blood Glucose as ordered  - Assess for signs and symptoms of hyperglycemia and hypoglycemia  - Administer ordered medications to maintain glucose within target range  - Assess barriers to adequate nutritional intake and initiate nutrition consult as needed  - Instruct patient on self management of diabetes  Outcome: Not Progressing

## 2022-06-22 NOTE — CM/SW NOTE
CM sent today's lab results and TPN order to [facility] for discharge. SHANNON confirmed [facility] RN will see patient for [facility]. JONES spoke to Hospitalist with update; UR CM notified this  patient insurance coverage not guaranteed after today. JONES/SHANNON to finalize patient discharge plan.      Karrie Coulter, RN Case Manager [id number]

## 2022-06-22 NOTE — CM/SW NOTE
06/22/22 1100   Discharge disposition   Expected discharge disposition Home or Self   Post Acute Care Provider Residential   Outpatient services Infusion center   DME/Infusion Providers [address]; [facility]   Discharge transportation Private car       SHANNON met with patient to discuss DC order and plan. Patient reported that she is agreeable to DC today. [facility] is set to deliver TPN to patient's home tonight around [place] will be conducting start of care tonight at 7pm.     Patient reported that she will DC home in private vehicle with her . Patient reported that her  can bring her O2 supplies to the hospital for transport home. SW notified patient's nurse, [facility] and [facility] of DC plan. SW will continue to remain available for any additional DC needs or concerns.      Daphney Zamora MSW, LSW  Discharge Planner   [phone]

## 2022-06-22 NOTE — DIETARY NOTE
Cheri 14 FOLLOW UP    Jose Maria Frazier      TPN order for tonight to provide: 950 dextrose calories, 570 lipid calories, 30% lipids, 90 grams protein in 1800mL fluid to meet ~100% of nutrition prescription. Communicated with pharmacy and case mgmt due to pt going home with [facility] care. Intake/Output Summary (Last 24 hours) at 6/22/2022 0942  Last data filed at 6/22/2022 0845  Gross per 24 hour   Intake 970 ml   Output 4800 ml   Net -3830 ml     Labs:   Recent Labs   Lab 06/16/22  0707 06/17/22  0532 06/21/22  1010 06/22/22  0550   *   < > 237* 151*   *   < > 135* 135*   K 4.3   < > 4.4 4.5   CL 95*   < > 103 102   CO2 23.0   < > 26.0 28.0   BUN 21*   < > 21* 25*   CREATSERUM 1.29*   < > 0.51* 0.56   CA 8.6   < > 7.9* 8.1*   PHOS 2.5   < > 2.8  --    MG 2.1   < > 2.2  --    ALKPHO 645*   < > 603* 702*   *   < > 183* 169*   ALT 49   < > 62* 67*   BILT 2.3*   < > 1.9 2.1*   TRIG 185*  --   --   --     < > = values in this interval not displayed. Electrolytes adjusted based on today's labs. RD will write TPN daily and follow per protocol.     Pt is at high nutrition risk    Jason Trevino RD, LDN  Clinical Dietitian

## 2022-06-22 NOTE — PLAN OF CARE
Pt a/o x4. VSS on 2L NC. C/o pain- prn given per MAR. Meds given per MAR. Per pharmacy- infuse D5+0.45 @ 83ml once TPN finished. Pt up to bathroom w/ walker. Port dressing & needle changed. RLQ pleurx dressing c/d/i. Call light within reach.      Problem: Diabetes/Glucose Control  Goal: Glucose maintained within prescribed range  Description: INTERVENTIONS:  - Monitor Blood Glucose as ordered  - Assess for signs and symptoms of hyperglycemia and hypoglycemia  - Administer ordered medications to maintain glucose within target range  - Assess barriers to adequate nutritional intake and initiate nutrition consult as needed  - Instruct patient on self management of diabetes  6/22/2022 0427 by Farshad Velásquez RN  Outcome: Progressing  6/22/2022 0213 by Farshad Velásquez RN  Outcome: Progressing     Problem: GASTROINTESTINAL - ADULT  Goal: Minimal or absence of nausea and vomiting  Description: INTERVENTIONS:  - Maintain adequate hydration with IV or PO as ordered and tolerated  - Nasogastric tube to low intermittent suction as ordered  - Evaluate effectiveness of ordered antiemetic medications  - Provide nonpharmacologic comfort measures as appropriate  - Advance diet as tolerated, if ordered  - Obtain nutritional consult as needed  - Evaluate fluid balance  6/22/2022 0427 by Farshad Velásquez RN  Outcome: Progressing  6/22/2022 0213 by Farshad Velásquez RN  Outcome: Progressing  Goal: Maintains adequate nutritional intake (undernourished)  Description: INTERVENTIONS:  - Monitor percentage of each meal consumed  - Identify factors contributing to decreased intake, treat as appropriate  - Assist with meals as needed  - Monitor I&O, WT and lab values  - Obtain nutritional consult as needed  - Optimize oral hygiene and moisture  - Encourage food from home; allow for food preferences  - Enhance eating environment  Outcome: Progressing     Problem: METABOLIC/FLUID AND ELECTROLYTES - ADULT  Goal: Glucose maintained within prescribed range  Description: INTERVENTIONS:  - Monitor Blood Glucose as ordered  - Assess for signs and symptoms of hyperglycemia and hypoglycemia  - Administer ordered medications to maintain glucose within target range  - Assess barriers to adequate nutritional intake and initiate nutrition consult as needed  - Instruct patient on self management of diabetes  6/22/2022 0427 by Eula Bragg RN  Outcome: Progressing  6/22/2022 0213 by Eula Bragg RN  Outcome: Progressing     Problem: HEMATOLOGIC - ADULT  Goal: Free from bleeding injury  Description: (Example usage: patient with low platelets)  INTERVENTIONS:  - Avoid intramuscular injections, enemas and rectal medication administration  - Ensure safe mobilization of patient  - Hold pressure on venipuncture sites to achieve adequate hemostasis  - Assess for signs and symptoms of internal bleeding  - Monitor lab trends  - Patient is to report abnormal signs of bleeding to staff  - Avoid use of toothpicks and dental floss  - Use electric shaver for shaving  - Use soft bristle tooth brush  - Limit straining and forceful nose blowing  6/22/2022 0427 by Eula Bragg RN  Outcome: Progressing  6/22/2022 0213 by Eula Bragg RN  Outcome: Progressing     Problem: PAIN - ADULT  Goal: Verbalizes/displays adequate comfort level or patient's stated pain goal  Description: INTERVENTIONS:  - Encourage pt to monitor pain and request assistance  - Assess pain using appropriate pain scale  - Administer analgesics based on type and severity of pain and evaluate response  - Implement non-pharmacological measures as appropriate and evaluate response  - Consider cultural and social influences on pain and pain management  - Manage/alleviate anxiety  - Utilize distraction and/or relaxation techniques  - Monitor for opioid side effects  - Notify MD/LIP if interventions unsuccessful or patient reports new pain  - Anticipate increased pain with activity and pre-medicate as appropriate  6/22/2022 1333 by Roderick Castillo RN  Outcome: Progressing  6/22/2022 0213 by Roderick Castillo RN  Outcome: Progressing

## 2022-06-22 NOTE — PLAN OF CARE
Patient c/o mild pain on lower back. Patient's abdomen distended, soft, bowel sounds active. Patient with poor appetite. Patient's vital signs stable.

## 2022-06-23 ENCOUNTER — TELEPHONE (OUTPATIENT)
Dept: HEMATOLOGY/ONCOLOGY | Facility: HOSPITAL | Age: 62
End: 2022-06-23

## 2022-06-23 NOTE — TELEPHONE ENCOUNTER
Spoke with Smita Ramos at [facility]. As of now, patient is only approved by Medicaid for two visits to help with TPN at home. Family being taught how to assist. Additional visit requests have been submitted. Patient is still feeling abdominal distention despite being drained. MD updated on all information. RN will call with updates soon.

## 2022-06-26 ENCOUNTER — HOSPITAL ENCOUNTER (EMERGENCY)
Facility: HOSPITAL | Age: 62
Discharge: LEFT BEFORE DISCHARGE | End: 2022-06-26
Payer: MEDICAID

## 2022-06-26 VITALS
WEIGHT: 124 LBS | OXYGEN SATURATION: 100 % | RESPIRATION RATE: 18 BRPM | TEMPERATURE: 96 F | SYSTOLIC BLOOD PRESSURE: 115 MMHG | BODY MASS INDEX: 22.82 KG/M2 | DIASTOLIC BLOOD PRESSURE: 78 MMHG | HEIGHT: 62 IN | HEART RATE: 95 BPM

## 2022-06-27 ENCOUNTER — TELEPHONE (OUTPATIENT)
Dept: HEMATOLOGY/ONCOLOGY | Facility: HOSPITAL | Age: 62
End: 2022-06-27

## 2022-06-27 NOTE — TELEPHONE ENCOUNTER
Spoke with patient's spouse. They have called [facility] health this weekend asking for a refill on pleurx kits. Patient's spouse states they went through their supply faster due to requiring two bottles per day. Spoke with Lady Paz at [facility] ([phone]). They will work on expediting refills on pleurx kits. Spouse updated. Patient now having uncontrolled pain and breathing becoming impaired. Instructed spouse to call 911 to be evaluated further. He verbalized understanding and will do so. APN notified.

## 2022-06-27 NOTE — TELEPHONE ENCOUNTER
Edmond called to ask Dr Wallace Sic nurse how to get plerux drainage kits? Christopher Marcos was discharged from the hospital last Wednesday. This morning Terrie's dressing is soaked and she needs to be drained. He called the Home Health nurse. She told him she does not have the kits and to call Dr Madison Allison nurse. Edmond is asking for a call back at [phone].

## 2022-06-29 PROBLEM — E87.5 HYPERKALEMIA: Status: ACTIVE | Noted: 2022-01-01

## 2022-06-29 PROBLEM — C79.51 METASTATIC CHOLANGIOCARCINOMA TO BONE (HCC): Status: ACTIVE | Noted: 2020-02-26

## 2022-06-29 PROBLEM — C22.1 METASTATIC CHOLANGIOCARCINOMA TO BONE (HCC): Status: ACTIVE | Noted: 2020-02-26

## 2022-06-29 PROBLEM — A41.9 SEPSIS DUE TO UNDETERMINED ORGANISM (HCC): Status: ACTIVE | Noted: 2022-01-01

## 2022-06-29 NOTE — ED QUICK NOTES
Pt being escorted to CT scan, she still appears restless. BPn has risen slightly, will continue to monitor.

## 2022-06-29 NOTE — PROGRESS NOTES
06/29/22 0437   Clinical Encounter Type   Visited With Patient not available;Family  ( met with pt's )   Routine Visit   ( responded to the page)   Crisis Visit   (pt going to comfort care)   Referral From Nurse   Voodoo Encounters   Voodoo Needs Pastoral care brochure      received page from nurse, informing  that pt is going to comfort care and pt family need a . Thamas Krabbe is making some arrangements to contact . Thamas Krabbe also talked with pt's  who is going through emotional.  gave support to pt's . For further spiritual care please Enter an Epic or page 2000 as needed.

## 2022-06-29 NOTE — ED QUICK NOTES
pts  states she feels like she needs more oxygen than her 4 L at [facility]ine, turned up to 6 l and checked o2, dfficult to read but reading 74%, called  To arrange pts to move pt back quicker

## 2022-06-29 NOTE — PROGRESS NOTES
[facility] Pharmacy Note:  Renal Adjustment for piperacillin/tazobactam (Elmyra Sprout)    Daphne Luciano is a 62year old patient who has been prescribed piperacillin/tazobactam (ZOSYN) 3.375 g every 8 hrs. The estimated creatinine clearance is 17.1 mL/min (A) (based on SCr of 2.77 mg/dL (H)). The dose has been adjusted to piperacillin/tazobactam (ZOSYN) 3.375 g every 12 hrs per hospital renal dose adjustment protocol for treatment of sepsis. Pharmacy will follow and adjust dose as warranted for additional renal function changes.     Thank you,    Odette Lopez, PharmD  6/29/2022  5:00 PM

## 2022-06-29 NOTE — ED QUICK NOTES
Pt is restless again, states she is having trouble breathing again. Pt is becoming hypotensive. Pt has been readjusted, Dr. Darrell Cardoza notified.

## 2022-06-29 NOTE — PROGRESS NOTES
Pt came in for a port needle change. Pt reported worsening shortness of breath, nausea, fatigue, pain and expressed she was unable to have a bowel movement for multiple days. Difficulty obtaining BP. Manual attempt tried. 99/82 was the reading. Attempted again with machine. Reading was 106/76. Pt states she took two zofran pills this AM with no relief. Pt has not taken anything for pain today. Pt very jaundiced. Pt also is very weak at rest and with ambulation. Notified APN. Per Alexis BANKSN, the recommendation was to be evaluated in ER. Pt was taken to ER with  at side.

## 2022-06-30 NOTE — PLAN OF CARE
Received patient from ED after 4pm. Patient settled to new room, super restless. Unable to obtain BP and O2 sats. Consults at bedside. Transitioned to comfort care. Morphine given and gtt initiated.  and family visiting at bedside. Care endorsed to next shift.

## 2022-06-30 NOTE — PLAN OF CARE
Received patient following report. Comfort care measures continued.  and family at bedside throughout night. Questions addressed. Pt medication per MAR.      1050 - Asystole on tele monitor. No spontaneous respirations. No audible heart sounds. No palpable carotid pulse.

## 2022-06-30 NOTE — DISCHARGE SUMMARY
DOA: 22  DOD: 22    Please see H&P. Pt septic w/ advanced cancer. Elected for comfort care. Started on morphine gtt.    22 @ Shant Draper MD

## 2022-07-01 ENCOUNTER — TELEPHONE (OUTPATIENT)
Dept: HEMATOLOGY/ONCOLOGY | Facility: HOSPITAL | Age: 62
End: 2022-07-01

## 2022-07-01 NOTE — TELEPHONE ENCOUNTER
Left message for Melisa that  has been out of the office this week. He will sign orders when he is back in the office. If you have additional question call [phone].

## 2022-07-01 NOTE — PAYOR COMM NOTE
Discharge Notification    Patient Name: Lety Jamisonurry: [id number] [facility] #: [id number]  Authorization Number: [id number]  Admit Date/Time: 6/29/2022 12:37 PM  Discharge Date/Time: 6/30/2022 3:51 AM

## 2022-07-01 NOTE — TELEPHONE ENCOUNTER
[facility] at [phone] she is sending an order for the 3rd time by fax today for orders needing a signature for services rendered before the patient passed away.

## 2022-07-05 ENCOUNTER — APPOINTMENT (OUTPATIENT)
Dept: HEMATOLOGY/ONCOLOGY | Facility: HOSPITAL | Age: 62
End: 2022-07-05
Attending: INTERNAL MEDICINE
Payer: MEDICAID

## 2022-12-29 NOTE — PROGRESS NOTES
[facility] Hematology and Oncology Clinic Note    Diagnosis: Metastatic Cholangiocarcinoma: liver, bone: BRAF V600E +    Treatment History:   1. Cisplatin 25 mg/m2 D1/8 + Gemcitabine 1000 mg/m2 D1/8 q21 days.    C1: 3/3/20 and 3/10/20: : 985,901 CEA 43.6 from her Low back, no other focal areas of bone pain. She is a previous smoker, she smoked for about 40 years and quit 2 years ago. She works as a . No Etoh abuse. FH of throat cancer in her brother.      Labs from 1/31/20: CBC is normal. CMP SUV 7.3), infiltrative hepatic lesions. · 8/12/20: Discussed at TB: proceed with maintenance Gemcitabine   · 11/4/20: CTA Chest: No PE.   · 11/6/20: MR L Spine: Focal T1/T2 signal at L3 concerning for osseous disease. 1.8 x 1.7 x 1,7 cm.    · 11/12/20: PE EVENING , Disp: 30 tablet, Rfl: 0    •  ondansetron 8 MG Oral Tablet Dispersible, Take 1 tablet (8 mg total) by mouth every 8 (eight) hours as needed for Nausea., Disp: 60 tablet, Rfl: 11    •  Prochlorperazine Maleate (COMPAZINE) 10 mg tablet, Take 1 tabl 11/14/2017   • GERD (gastroesophageal reflux disease)    • High blood pressure    • High cholesterol    • Tobacco abuse    • Visual impairment     glasses     Past Surgical History:   Procedure Laterality Date   • ARTHROSCOPY OF JOINT UNLISTED  05/2019 EOSABS 0.00 06/23/2020     Lab Results   Component Value Date     03/02/2021    K 3.9 03/02/2021    CO2 26.0 03/02/2021     03/02/2021    BUN 17 03/02/2021    PHOS 3.3 02/27/2018    ALB 2.6 (L) 03/02/2021       Radiology: reviewed     Pathol dermatology    Nausea: Rx for compazine and Zofran. Thrombocytopenia: 2/2 chemotherapy. Improving. . Normal B12/Folic acid/TSH. Back Pain: L3 and T7 sclerotic lesion. Resolved with RT. Dyspnea/Hypoxia: improving.  likely 2/2 her COPD and pulmonary caffeine

## 2023-01-27 NOTE — PROGRESS NOTES
No chemotherapy treatment today per Dr. Abe Weinberg due to platelet count of 34. Patient to start radiation next week.      Education Record    Learner:  Patient    Disease / Diagnosis: Cholangiocarcinoma    Barriers / Limitations:  None   Comments:    Method:  B no edema,  no murmurs,  regular rate and rhythm.

## 2023-03-08 NOTE — PLAN OF CARE
PAIN - ADULT    • Verbalizes/displays adequate comfort level or patient's stated pain goal Progressing        Patient/Family Goals    • Patient/Family Long Term Goal Progressing    • Patient/Family Short Term Goal Progressing        RISK FOR INFECTION - AD Products Recommended: Zinc sunscreen\\nCoolibar sun protective clothing Detail Level: Generalized General Sunscreen Counseling: I recommended a broad spectrum sunscreen with a SPF of 30 or higher.  I explained that SPF 30 sunscreens block approximately 97 percent of the sun's harmful rays.  Sunscreens should be applied at least 15 minutes prior to expected sun exposure and then every 2 hours after that as long as sun exposure continues. If swimming or exercising sunscreen should be reapplied every 45 minutes to an hour after getting wet or sweating.  One ounce, or the equivalent of a shot glass full of sunscreen, is adequate to protect the skin not covered by a bathing suit. I also recommended a lip balm with a sunscreen as well. Sun protective clothing can be used in lieu of sunscreen but must be worn the entire time you are exposed to the sun's rays.

## 2023-04-15 NOTE — CONSULTS
[facility] Dosing Service    Initial Pharmacokinetic Consult for Vancomycin Dosing     Therese Neff is a 62year old patient who is being treated for sepsis. Weights:  Ideal body weight: 50.1 kg (110 lb 7.2 oz)  Adjusted ideal body weight: 54.6 kg (120 lb 5.2 oz)  Actual weight:  61.3 kg (135 lb 2.3 oz)    Labs:  Lab Results   Component Value Date    CREATSERUM 2.77 06/29/2022      CrCl:  Estimated Creatinine Clearance: 17.1 mL/min (A) (based on SCr of 2.77 mg/dL (H)). Based on the above:    1. This patient has received a loading dose of Vancomycin  1500 mg IVPB (25mg/kg, capped at 2000 mg) x 1 dose. This will be followed by 1000 mg Q 24 hours based upon actual body weight of 61.3 kg and renal function. 2. Vancomycin level will be obtained prior to the 3rd dose. Goal trough is 10-15 mcg/mL unless otherwise noted by ordering provider. If renal function improves significantly prior to this, will readjust dosing strategy. 3. Pharmacy will order SCr as clinically indicated while on vancomycin to assess renal function. 4. Pharmacy will follow and monitor renal function, toxicity and efficacy. We appreciate the opportunity to assist in the care of this patient.     Librado Padilla PharmD  6/29/2022  5:16 PM  [address] [place] [address] Extension: [phone] oral

## 2023-05-03 NOTE — PATIENT INSTRUCTIONS
- Start antibiotics today  - If your neck swelling comes back in the near future, follow up with ENT (Bia 2). - Follow up with me within a month after surgery so we can check up on the blood pressure.       It was a pleasure seeing you in
1.87

## 2023-06-05 NOTE — PROGRESS NOTES
Bobby Reece is a 62year old female who presents for  Type 2 diabetes management. Primary care physician: Aron Willis MD     In the past 3m her DM  control has improved .  Today's A1C is 7.8%  (last A1C  8.7% )     Still undergoing chemo for metas [FreeTextEntry3] : JADA HERNANDEZ  underwent today - 06/02/2023 - a circumcision and repair of penile torsion under general anesthesia\par A penile block was done, using 9 cc of 0.25% plain Marcaine.\par A sterile dressing was placed\par \par Discharge instructions:\par No shower for 2 days\par No physical activity for a week\par Once the dressing comes off please stretch the skin gently, and apply the Bacitracin ointment every 4 hours\par \par Follow up in the office in a week\par \par ARLENE LADD\par  meal BG over 150           Diabetes History:  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 Take 40 mg by mouth daily. • TAFINLAR 75 MG Oral Cap Take 150 mg by mouth 2 (two) times a day. 0645 and 1615      • amLODIPine Besylate 10 MG Oral Tab Take 10 mg by mouth daily. • MEKINIST 2 MG Oral Tab Take 2 mg by mouth daily.  30 tablet 1   • tri 176lb)   Diabetes is improving  Reminded health benefits of improved glycemic control   No metformin due to past GI issues   Patient was provided high dose steroid insulin regimen and maintenance insulin dose regimen:   Due to lower fasting trends; tamia mosquera adverse effects of suboptimal glucose control, and goals of therapy   Reviewed the A1C test, what the value reflects and the goal for the patient.    Reminded pt on A1C and blood sugar targets (Fasting < 130 and post prandial <150 ) and complications associ

## 2024-09-20 NOTE — TELEPHONE ENCOUNTER
Call with patient. She has not been taking the glimepiride, she never filled it. She will test BG two hours after meals today. Pt will increase Lantus to 22 units daily and increase sliding scale as directed.    Pt says at home, she doses humalog right a 9/1/2024
